# Patient Record
Sex: FEMALE | Race: WHITE | Employment: OTHER | URBAN - METROPOLITAN AREA
[De-identification: names, ages, dates, MRNs, and addresses within clinical notes are randomized per-mention and may not be internally consistent; named-entity substitution may affect disease eponyms.]

---

## 2017-02-17 ENCOUNTER — HOSPITAL ENCOUNTER (OUTPATIENT)
Dept: PHYSICAL THERAPY | Age: 57
Discharge: HOME | End: 2017-02-17
Payer: COMMERCIAL

## 2017-02-17 PROCEDURE — 97110 THERAPEUTIC EXERCISES: CPT

## 2017-02-17 PROCEDURE — 97140 MANUAL THERAPY 1/> REGIONS: CPT

## 2017-02-17 PROCEDURE — 97162 PT EVAL MOD COMPLEX 30 MIN: CPT

## 2017-02-17 NOTE — PROGRESS NOTES
[facility]  [place] [facility] [place]  DANIELA  [address] [facility]  [phone] fax    Plan of Care/ Statement of Necessity for Physical Therapy Services    Patient name: Juana Mcclain Start of Care: 2017   Referral source: Inna Dennis MD : 1960    Medical Diagnosis: Cervicalgia [M54.2]   Onset Date:> 1 year   Treatment Diagnosis: Cervicalgia    Prior Hospitalization: see medical history Provider#: [id number]   Medications: Verified on Patient summary List    Comorbidities: arthritis, depression, tobacco use, COPD, Bipolar, GERD, RLS, osteoporosis    Prior Level of Function: lives with  in a 1 story home with 4-5 steps to enter with B HR, R handed, retired from the [employer], activity tolerance 10-15 minutes with O2     The Plan of Care and following information is based on the information from the initial evaluation. Assessment/ key information: Pt is a 60 yo F who presents with R cervical pain of insidious onset over 1 year ago. Subjective reports of pain aggravated with sitting up and movement and relieved with heat and supine or L sidelying. Pt presents with forward head/rounded shoulders and is TTP spinous processes of C3, C6, T6, and T7 likely with increased stress at curve apexes contributing. She is TTP B UT/MT/levator and severe TTP R scalenes, with decreased R cervical rotation consistent with R scalene hypertonicity. Pt reports previous injury to L shoulder and is TTP over infraspinatus, subscapularis, and teres minor, demonstrating minor UT substitution B with shoulder elevation. Decreased trapezius strength noted B (grossly 4-/5). Findings consistent with DDD and postural pain syndrome. Pt will benefit from skilled PT to address ROM, strength, flexibility, and postural deficits in order to improve ease of daily tasks and return to PLOF.       Evaluation Complexity History HIGH Complexity :3+ comorbidities / personal factors will impact the outcome/ POC ; Examination MEDIUM Complexity : 3 Standardized tests and measures addressing body structure, function, activity limitation and / or participation in recreation  ;Presentation MEDIUM Complexity : Evolving with changing characteristics  ; Clinical Decision Making MEDIUM Complexity : FOTO score of 26-74  Overall Complexity Rating: MEDIUM  Problem List: pain affecting function, decrease ROM, decrease strength, decrease ADL/ functional abilitiies, decrease activity tolerance and decrease flexibility/ joint mobility   Treatment Plan may include any combination of the following: Therapeutic exercise, Therapeutic activities, Neuromuscular re-education, Physical agent/modality, Gait/balance training, Manual therapy, Patient education, Self Care training, Functional mobility training, Home safety training and Stair training  Patient / Family readiness to learn indicated by: trying to perform skills and interest  Persons(s) to be included in education: patient (P) and family support person (FSP);list sister, sister   Barriers to Learning/Limitations: None  Patient Goal (s): get rid of or lessen the pain  Patient Self Reported Health Status: fair  Rehabilitation Potential: good    Short Term Goals: To be accomplished in 1 weeks:  1. Pt will be compliant with initial HEP  Long Term Goals: To be accomplished in 6 weeks:  1. Pt will improve FOTO by 8 points in order to demonstrate functional improvement   2. Pt will improve R cervical rotation AROM to 60 dgrs in order to improve ease of turning head iwht ADLs  3. Pt will improve B UT/MT/LT strength to 4/5 in order to improve ease of upright posture  4. Pt will report <3/10 average pain with daily tasks in order to improve QOL   Frequency / Duration: Patient to be seen 2 times per week for 6 weeks.     Patient/ CarPatient/ Caregiver education and instruction: Diagnosis, prognosis, activity modification and exercises   [x]  Plan of care has been reviewed with TIAN Tanner, PT 2/17/2017 11:13 AM    ________________________________________________________________________    I certify that the above Therapy Services are being furnished while the patient is under my care. I agree with the treatment plan and certify that this therapy is necessary.     Physician's Signature:____________________  Date:____________Time: _________    Please sign and return to [facility]  [address] [facility]  [phone] fax

## 2017-02-17 NOTE — PROGRESS NOTES
PT DAILY TREATMENT NOTE/CERVICAL EVAL3-16    Patient Name: Hanna Rodriges  Date:2017  : 1960  [x]  Patient  Verified  Payor: Hoa Manzano / Plan: [id number] / Product Type: PPO /    In time: 11:11  Out time:12:00  Total Treatment Time (min): 49  Visit #: 1 of 12    Treatment Area: Cervicalgia [M54.2]    SUBJECTIVE  Pain Level (0-10 scale): 4/10  []constant []intermittent []improving []worsening []no change since onset    Any medication changes, allergies to medications, adverse drug reactions, diagnosis change, or new procedure performed?: [x] No    [] Yes (see summary sheet for update)  Subjective functional status/changes:       Cervicalgia with insidious onset >1 year. Worse on the R side.   No radicular sx     Barriers: []pain [x]financial []time []transportation []other  Motivation: high  Substance use: []Alcohol [x]Tobacco []other:   FABQ Score: [x]low []elevate - based on FOTO  Cognition: A & O x 4    Other:    OBJECTIVE/EXAMINATION      28 min [x]Eval                  []Re-Eval       9 min Therapeutic Exercise:  [] See flow sheet : See HEP   Rationale: increase ROM and increase strength to improve the patients ability to improve ease of ADLs    5 min Therapeutic Activity:  []  See flow sheet :   Rationale: Educated patient regarding findings of evaluation, importance of proper posture, heat use 10-15 minutes, self TPR with fingers, purpose of therapy, and therapy plan in order to ensure pt understanding/compliance with therapy     7 min Manual Therapy:  DTM B UT, TPR R UT/MT, STM R scalenes, MFR B UT   Rationale: decrease pain, increase ROM, increase tissue extensibility and decrease trigger points to tolerate daily tasks             With   [] TE   [] TA   [] neuro   [] other: Patient Education: [x] Review HEP    [] Progressed/Changed HEP based on:   [] positioning   [] body mechanics   [] transfers   [] heat/ice application    [] other:      Other Objective/Functional Measures:     /68 taken manually prior to therapy     Physical Therapy Evaluation Cervical Spine     SUBJECTIVE     General Health:  Red Flags Indicated? [] Yes    [] No  [] Yes [x] No Recent weight change (If yes, due to dieting? [] Yes  [] No)  [x] Yes [] No Persistent cough - COPD  [] Yes [x] No Unremitting pain at night  [] Yes [x] No Dizziness  [] Yes [x] No Blurred vision  [x] Yes [] No Hands more cold or painful in cold weather  [] Yes [x] No Ringing in ears  [] Yes [x] No Difficulty swallowing  [] Yes [x] No Dysfunction of bowel or bladder  [] Yes [x] No Recent illness within past 3 weeks (i.e, cold, flu)  [] Yes [x] No Jaw pain    Past History/Treatments:    Diagnostic Tests: none    Headaches: Do you have headaches?  [] Yes   [x] No    OBJECTIVE  Posture: [] WNL  Head Position:  Shoulder/Scapular Position:  C-Kyphosis:  [x] increased   [] decreased - lower cervical   C-Lordosis:   [x] increased   [] decreased - upper cervical   T-Kyphosis:  [x] increased   [] decreased  T-Lordosis:   [] increased   [] decreased     Cervical Retraction: [x] WNL    [] Abnormal:    Shoulder/Scapular Screen: [] WNL    [] Abnormal: increased upward rotation on L, decreased eccentric control B upon lowering     Active Movements: [] N/A   [] Too acute   [] Other:  ROM  AROM (dgrs) % PROM Comments:pain, area   Forward flexion 39     Extension 39     SB right 27     SB left  25     Rotation right 44     Rotation left 67       Palpation:  [] Min  [x] Mod  [] Severe    Location: TTP spinous processes C3/C6/T6/T7, B UT, B MT, B levator, L infraspinatus/subscapularis/teres minor, R subacromial space   [] Min  [] Mod  [x] Severe    Location: hypertonicity and TTP R scalenes     Strength:   MMT Shoulder  Flex 4-/5 B   Abd R 4-/5, L 3+/5  IR 4+/5 B  ER 4+/5 B     MMT Trapezius  UT 3+/5 B  MT L 4-/5, R 4/5  LT L 3+/5, R 4-/5     Muscle Flexibility: [] N/A   Scalenes: [] WNL    [x] Tight    [x] R    [] L   Upper Trap: [] WNL    [x] Tight    [x] R    [x] L   Levator: [] WNL    [x] Tight    [x] R    [x] L   Pect. Minor: [] WNL    [x] Tight    [x] R    [x] L    Other tests/comments:  Pain relieved after manual  No increased pain with HEP        Pain Level (0-10 scale) post treatment: 2/10    ASSESSMENT/Changes in Function: See POC    Patient will continue to benefit from skilled PT services to modify and progress therapeutic interventions, address ROM deficits, address strength deficits, analyze and address soft tissue restrictions, analyze and cue movement patterns, assess and modify postural abnormalities and instruct in home and community integration to attain remaining goals.      [x]  See Plan of Care  []  See progress note/recertification  []  See Discharge Summary         Progress towards goals / Updated goals:  See POC    PLAN  [x]  Upgrade activities as tolerated     []  Continue plan of care  [x]  Update interventions per flow sheet       []  Discharge due to:_  []  Other:_      Victorina Dowd, PT 2/17/2017  11:13 AM

## 2017-02-20 ENCOUNTER — HOSPITAL ENCOUNTER (OUTPATIENT)
Dept: PHYSICAL THERAPY | Age: 57
Discharge: HOME | End: 2017-02-20
Payer: COMMERCIAL

## 2017-02-20 PROCEDURE — 97140 MANUAL THERAPY 1/> REGIONS: CPT

## 2017-02-20 PROCEDURE — 97110 THERAPEUTIC EXERCISES: CPT

## 2017-02-20 NOTE — PROGRESS NOTES
PT DAILY TREATMENT NOTE     Patient Name: Payton Lazo  Date:2017  : 1960  [x]  Patient  Verified  Payor: Venu Javier / Plan: [id number] / Product Type: PPO /    In time:1000  Out time:1042  Total Treatment Time (min): 42  Visit #: 2 of 12    Treatment Area: Cervicalgia [M54.2]    SUBJECTIVE  Pain Level (0-10 scale): 3/10  Any medication changes, allergies to medications, adverse drug reactions, diagnosis change, or new procedure performed?: [x] No    [] Yes (see summary sheet for update)  Subjective functional status/changes:   [] No changes reported  Pt stated that she is doing pretty good and had a good weekend    OBJECTIVE    Modality rationale: decrease pain and increase tissue extensibility to improve the patients ability to increase ease with ADLs   Min Type Additional Details    [] Estim:  []Unatt       []IFC  []Premod                        []Other:  []w/ice   []w/heat  Position:  Location:    [] Estim: []Att    []TENS instruct  []NMES                    []Other:  []w/US   []w/ice   []w/heat  Position:  Location:    []  Traction: [] Cervical       []Lumbar                       [] Prone          []Supine                       []Intermittent   []Continuous Lbs:  [] before manual  [] after manual    []  Ultrasound: []Continuous   [] Pulsed                           []1MHz   []3MHz W/cm2:  Location:    []  Iontophoresis with dexamethasone         Location: [] Take home patch   [] In clinic   10 []  Ice     [x]  heat  []  Ice massage  []  Laser   []  Anodyne Position: seated  Location:B sh    []  Laser with stim  []  Other:  Position:  Location:    []  Vasopneumatic Device Pressure:       [] lo [] med [] hi   Temperature: [] lo [] med [] hi   [x] Skin assessment post-treatment:  [x]intact []redness- no adverse reaction    []redness  adverse reaction:     24 min Therapeutic Exercise:  [x] See flow sheet :   Rationale: increase ROM and increase strength to improve the patients ability to increase ease with ADLs    8 min Manual Therapy:  DTM to B UT and Lev   Rationale: decrease pain, increase ROM and increase tissue extensibility to increase ease with ADLs    With   [x] TE   [] TA   [] neuro   [] other: Patient Education: [x] Review HEP    [] Progressed/Changed HEP based on:   [] positioning   [] body mechanics   [] transfers   [] heat/ice application    [] other:      Other Objective/Functional Measures:   Pt had no complaint of increased pain with exercises  Pt reported that when she performed the doorway stretch last visit it caused ant sh pain. She performed with hands down low and had no increase in pain     Pain Level (0-10 scale) post treatment: 0/10    ASSESSMENT/Changes in Function:   Initiated therex today per flow sheet. Pt was issued HEP today. Pt put forth good effort with all exercises    Patient will continue to benefit from skilled PT services to address functional mobility deficits, address ROM deficits and address strength deficits to attain remaining goals. [x]  See Plan of Care  []  See progress note/recertification  []  See Discharge Summary         Progress towards goals / Updated goals:  Short Term Goals: To be accomplished in 1 weeks:  1. Pt will be compliant with initial HEP  Progressing. Initial HEP was issued today. 2/20/17  Long Term Goals: To be accomplished in 6 weeks:  1. Pt will improve FOTO by 8 points in order to demonstrate functional improvement   2. Pt will improve R cervical rotation AROM to 60 dgrs in order to improve ease of turning head iwht ADLs  3. Pt will improve B UT/MT/LT strength to 4/5 in order to improve ease of upright posture  4.  Pt will report <3/10 average pain with daily tasks in order to improve QOL     PLAN  []  Upgrade activities as tolerated     [x]  Continue plan of care  []  Update interventions per flow sheet       []  Discharge due to:_  []  Other:_      Elza Blue, PTA 2/20/2017  9:55 AM    Future Appointments  Date Time Provider Giovanni Imelda   2/20/2017 10:00 AM Elisha Guajardo PTA [facility]   2/22/2017 7:30 AM Elisha Guajardo PTA [facility]   2/27/2017 8:00 AM Elisha Guajardo PTA [facility]   3/2/2017 9:00 AM Elisha Guajardo PTA [facility]   3/6/2017 9:30 AM Ericka Dewitt PT [facility]   3/9/2017 8:00 AM Elisha Guajardo PTA [facility]

## 2017-02-22 ENCOUNTER — HOSPITAL ENCOUNTER (OUTPATIENT)
Dept: PHYSICAL THERAPY | Age: 57
Discharge: HOME | End: 2017-02-22
Payer: COMMERCIAL

## 2017-02-22 PROCEDURE — 97110 THERAPEUTIC EXERCISES: CPT

## 2017-02-22 PROCEDURE — 97140 MANUAL THERAPY 1/> REGIONS: CPT

## 2017-02-22 NOTE — PROGRESS NOTES
PT DAILY TREATMENT NOTE     Patient Name: Nixon Serrano  Date:2017  : 1960  [x]  Patient  Verified  Payor: BLUE CROSS / Plan: [id number] / Product Type: PPO /    In time:730  Out time:803  Total Treatment Time (min): 33  Visit #: 3 of 12    Treatment Area: Cervicalgia [M54.2]    SUBJECTIVE  Pain Level (0-10 scale): 3/10  Any medication changes, allergies to medications, adverse drug reactions, diagnosis change, or new procedure performed?: [x] No    [] Yes (see summary sheet for update)  Subjective functional status/changes:   [] No changes reported  Pt woke with some pain today. Feels as though she was laying wrong    OBJECTIVE    25 min Therapeutic Exercise:  [x] See flow sheet :   Rationale: increase ROM and increase strength to improve the patients ability to increase ease with ADLs    8 min Manual Therapy:  DTM to R UT and Lev   Rationale: decrease pain, increase ROM and increase tissue extensibility to increase ease with ADLs    With   [x] TE   [] TA   [] neuro   [] other: Patient Education: [x] Review HEP    [] Progressed/Changed HEP based on:   [] positioning   [] body mechanics   [] transfers   [] heat/ice application    [] other:      Other Objective/Functional Measures:   Pt had some tightness in L UT and Lev, but minimal on the R  Pt had no complaint of increased pain with exercises  Pt cont with forward shoulders and head     Pain Level (0-10 scale) post treatment: 2/10    ASSESSMENT/Changes in Function:   Pt cont to slowly progress toward goals. Pt cont with decreased range of cervical motion. Cont with 3/10 pain, which increases with increased activity. Cont with decreased strength in UT/MT/LT. Patient will continue to benefit from skilled PT services to modify and progress therapeutic interventions, address functional mobility deficits, address ROM deficits and address strength deficits to attain remaining goals.      [x]  See Plan of Care  []  See progress note/recertification  []  See Discharge Summary         Progress towards goals / Updated goals:  Short Term Goals: To be accomplished in 1 weeks:  1. Pt will be compliant with initial HEP  Progressing. Initial HEP was issued today. 2/20/17  Long Term Goals: To be accomplished in 6 weeks:  1. Pt will improve FOTO by 8 points in order to demonstrate functional improvement   2. Pt will improve R cervical rotation AROM to 60 dgrs in order to improve ease of turning head iwht ADLs  3. Pt will improve B UT/MT/LT strength to 4/5 in order to improve ease of upright posture  4.  Pt will report <3/10 average pain with daily tasks in order to improve QOL     PLAN  []  Upgrade activities as tolerated     [x]  Continue plan of care  []  Update interventions per flow sheet       []  Discharge due to:_  []  Other:_      Kimmy Childs PTA 2/22/2017  7:34 AM    Future Appointments  Date Time Provider Giovanni Segura   2/27/2017 8:00 AM Kimmy Childs PTA [facility]   3/2/2017 9:00 AM Kimmy Childs PTA [facility]   3/6/2017 9:30 AM Catalina Quarles PT [facility]   3/9/2017 8:00 AM Kimmy Childs PTA [facility]

## 2017-02-27 ENCOUNTER — HOSPITAL ENCOUNTER (OUTPATIENT)
Dept: PHYSICAL THERAPY | Age: 57
Discharge: HOME | End: 2017-02-27
Payer: COMMERCIAL

## 2017-02-27 PROCEDURE — 97110 THERAPEUTIC EXERCISES: CPT

## 2017-02-27 PROCEDURE — 97140 MANUAL THERAPY 1/> REGIONS: CPT

## 2017-02-27 NOTE — PROGRESS NOTES
PT DAILY TREATMENT NOTE     Patient Name: Catalina Luther  Date:2017  : 1960  [x]  Patient  Verified  Payor: Reggie May / Plan: [id number] / Product Type: PPO /    In time:800  Out time:834  Total Treatment Time (min): 34  Visit #: 4 of 12    Treatment Area: Cervicalgia [M54.2]    SUBJECTIVE  Pain Level (0-10 scale): 3/10  Any medication changes, allergies to medications, adverse drug reactions, diagnosis change, or new procedure performed?: [x] No    [] Yes (see summary sheet for update)  Subjective functional status/changes:   [] No changes reported  Pt stated that she is doing ok today    OBJECTIVE    26 min Therapeutic Exercise:  [x] See flow sheet :   Rationale: increase ROM and increase strength to improve the patients ability to increase ease with ADLs    8 min Manual Therapy:  DTM to B UT and Lev   Rationale: decrease pain, increase ROM and increase tissue extensibility to increase ease with ADLs    With   [x] TE   [] TA   [] neuro   [] other: Patient Education: [x] Review HEP    [] Progressed/Changed HEP based on:   [] positioning   [] body mechanics   [] transfers   [] heat/ice application    [] other:      Other Objective/Functional Measures:   Pt cont to need vc's for preventing utilization on UT with exercises  Pt had no complaint of increased pain with exercises  Pt had moderate tightness in L UT today which responded fairly well with manual therapy  Pt declined MH today     Pain Level (0-10 scale) post treatment: 1-2/10    ASSESSMENT/Changes in Function:   Pt is making limited progress toward goals. Pt cont to utilize UT muscles with most activities which increases her pain and stiffness. Pt cont with decreased strength in cervical musculature.  Cont with decreased cervical range of motion    Patient will continue to benefit from skilled PT services to modify and progress therapeutic interventions, address functional mobility deficits, address ROM deficits and address strength deficits to attain remaining goals. [x]  See Plan of Care  []  See progress note/recertification  []  See Discharge Summary         Progress towards goals / Updated goals:  Short Term Goals: To be accomplished in 1 weeks:  1. Pt will be compliant with initial HEP  Progressing. Initial HEP was issued today. 2/20/17  Long Term Goals: To be accomplished in 6 weeks:  1. Pt will improve FOTO by 8 points in order to demonstrate functional improvement   2. Pt will improve R cervical rotation AROM to 60 dgrs in order to improve ease of turning head iwht ADLs  3. Pt will improve B UT/MT/LT strength to 4/5 in order to improve ease of upright posture  4.  Pt will report <3/10 average pain with daily tasks in order to improve QOL     PLAN  []  Upgrade activities as tolerated     [x]  Continue plan of care  []  Update interventions per flow sheet       []  Discharge due to:_  []  Other:_      Kimmy Childs PTA 2/27/2017  8:09 AM    Future Appointments  Date Time Provider Giovanni Segura   3/2/2017 9:00 AM Kimmy Childs PTA [facility]   3/6/2017 9:30 AM Catalina Quarles PT [facility]   3/9/2017 8:00 AM Kimmy Childs PTA [facility]

## 2017-03-02 ENCOUNTER — APPOINTMENT (OUTPATIENT)
Dept: PHYSICAL THERAPY | Age: 57
End: 2017-03-02

## 2017-03-06 ENCOUNTER — APPOINTMENT (OUTPATIENT)
Dept: PHYSICAL THERAPY | Age: 57
End: 2017-03-06

## 2017-03-09 ENCOUNTER — HOSPITAL ENCOUNTER (OUTPATIENT)
Dept: PHYSICAL THERAPY | Age: 57
End: 2017-03-09

## 2017-04-25 DIAGNOSIS — D35.2 PITUITARY ADENOMA (H): Primary | ICD-10-CM

## 2017-05-02 RX ORDER — GLUCOSAMINE HCL 500 MG
TABLET ORAL EVERY OTHER DAY
COMMUNITY

## 2017-05-02 RX ORDER — DOCUSATE SODIUM 100 MG/1
100 CAPSULE, LIQUID FILLED ORAL 2 TIMES DAILY
COMMUNITY
End: 2019-09-30

## 2017-05-02 RX ORDER — OXYCODONE HYDROCHLORIDE 15 MG/1
15 TABLET ORAL 4 TIMES DAILY
COMMUNITY
End: 2019-09-30

## 2017-05-02 RX ORDER — MEGESTROL ACETATE 40 MG/1
40 TABLET ORAL DAILY
COMMUNITY

## 2017-05-02 RX ORDER — DICLOFENAC SODIUM 10 MG/G
GEL TOPICAL
COMMUNITY

## 2017-05-04 NOTE — H&P
[address]   Eugenie Gomez  Phone: [phone]  Fax: [phone]           Patient: Thanh Hutchison   : 1960   Date of Encounter: 2017 09:30 AM   I had the pleasure of seeing Thanh Hutchison in my office on the above date. The following is a description of that office visit. History of Present Illness    The patient is a 61year old female who presents for a Recheck of Wrist Problem. The patient describes having sharp pain and other (throbbing). The problem is described as being located in the left wrist and volar wrist (recheck for possible left thumb basilar joint arthroplasty revision on May 9, 2017. H&P done by PCP). The onset of the wrist problem has been sudden following a specific incident (after a fall- slipped in the snow). The symptom has been occuring for 4 months (2017). The symptom occurs intermittently (with use). The course has been unchanged. The wrist problem is described as moderate. The wrist problem is aggravated by flexion of the wrist and extension of the wrist. The wrist problem was preceeded by trauma. The patient's dominant hand is their right. I have reviewed the patient's reason for visit, review of systems, and past medical and social history as documented by my staff. Pertinent items were discussed with the patient.       History   Allergy   Levaquin *FLUOROQUINOLONES* []: Hives    Cipro *FLUOROQUINOLONES* []: Hives    Wellbutrin SR *ANTIDEPRESSANTS* []: Hives    NexIUM *ULCER DRUGS* []: Hives    Haldol *ANTIPSYCHOTICS/ANTIMANIC AGENTS* []: Hives       Problem List/Past Medical   Asthma   Esophageal Reflux   Depression   Headache   Emphysema Of Lung   Arthritis   Anxiety   Restless Leg Syndrome   Bipolar Disorder   Blood Clot   COPD   Osteoarthrosis, generalized, hand   Osteoarthritis of carpometacarpal (CMC) joint of left thumb   Arthritis of wrist, left   Osteoarthritis, hand, primary localized   Contracture of finger joint Joint pain   Wrist pain   Pain, hand joint, left   Ganglion, joint   Lateral epicondylitis   Tendonitis of wrist, left   Hand pain, left   Contusion of left wrist   Contusion of hand, left   Finger laceration   Scaphoid fracture of wrist   Closed displaced fracture of distal pole of scaphoid bone of left wrist, initial encounter   DRUJ (distal radioulnar joint) sprain   Skin abrasion   Wound infection, posttraumatic   Wound dehiscence   Accidental fall on or from other stairs or steps   Fall at home   Accidental fall, sequela      Past Surgical   3 major surgeries on right knee:   tonsillectomy:   Knee: Right; 12/10   Removed right knee replacement : June 28, 2011   Left basilar joint arthroplasty with trapeziectomy and abductor pollicis longus tendon transfer and interposition. Left wrist ganglionectomy on September 18, 2012:   Lesions removed from vocal cord: Date: 4/10/2012;   Left knee surgery: 2 in 2004   Cyst on neck:   Left basilar joint revision: Date: 12/27/2012;   Deviated septum:   Right basilar joint arthroplasty: Date: 10/31/2013;   Right thumb MCP joint release with capsulotomy: Date: 6/17/2014;   Right basilar joint revision: Date: 12/5/2013; Throat cyst removed: 2003   Joint replacement: Right knee- 9/14/06   Diagnostic Studies   CT Scan: Date: 11/23/2015, Results: ; Left Wrist: Postsurgical and erosive changes of the wrist most prominently involving the base of the first metacarpal. Small bony fragments around the base of the first metacarpal, favor postsurgical change over acute fracture, please correlate clinically. Otherwise, no definite evidence of acute fracture. Hand X-ray: December 28, 2007: Three views of the right hand: ulnar minus variance   Social   Tobacco Use: Smokes <1 packs of cigarettes per day for 40+ years   Non Drinker/No Alcohol Use:   Medications   Latuda (60MG Tablet, Oral daily) Active. Megestrol Acetate ( Oral once every 3 days) Specific dose unknown - Active. Probiotic ( Oral daily) Specific dose unknown - Active. Voltaren (1% Gel, Transdermal) Active. Spiriva HandiHaler (18MCG Capsule, Inhalation) Active. OxyCODONE HCl (15MG Tablet, Oral every 6 hrs) Active. Vitamin D3 (6000 mg Oral daily) Specific dose unknown - Active. Mobic (15MG Tablet, Oral once a day) Active. Dexilant (60MG Capsule DR, Oral daily) Active. Morphine Sulfate CR (60MG Tablet ER, 30 mg Oral three times daily) Active. Albuterol (90MCG/ACT Aerosol Soln, Inhalation prn) Active. LamoTRIgine (100MG Tablet, Oral two pills two times a day) Active. Ativan (1MG Tablet, Oral -one--three times a day) Active. Multivitamin ( Oral one a day) Active. Family   Family history unknown:;     Review of Systems    General Not Present- Chills and Fever. Skin Not Present- Bruising, Pallor and Skin Color Changes. Respiratory Not Present- Cough and Difficulty Breathing. Cardiovascular Not Present- Chest Pain and Fainting / Blacking Out. Musculoskeletal Present- Decreased Range of Motion and Joint Pain. Not Present- Joint Swelling. Neurological Not Present- Dysesthesia, Paresthesias and Weakness In Extremities. Hematology Not Present- Abnormal Bleeding and Petechiae. Physical Exam       General  Mental Status - Alert. General Appearance - Cooperative, Well groomed, Not in acute distress. Orientation - Oriented X3. Build & Nutrition - Well nourished. Musculoskeletal  Upper Extremity    Hand/Wrist: Hand - Evaluation of related systems reveals - no digital clubbing or cyanosis and neurovascularly intact bilaterally. Inspection and Palpation - Crepitus - no crepitus (L). Sensation is - normal, (L). Instability - Left - no instability or laxity. Hand - Deformities/Malalignments/Discrepancies - no deformities, malalignments, or discrepancies. Phalanges:   Left:   Thumb: Inspection and Palpation - Tenderness - moderate and over the basilar joint. Swelling - boggy.  Thumb - Deformities/Malalignments/Discrepancies - \"Z\" deformity of the thumb. Functional Testing - Flexor Pollicis Longus is intact. Index Finger - Functional Testing - Flexor Digitorum superficialis is intact and Flexor Digitorum Profundus is intact. Long Finger - Functional Testing - Flexor Digitorum Superficialis is intact and Flexor Digitorum Profundus is intact. Ring Finger - Functional Testing - Flexor Digitorum Superficialis is intact and Flexor Digitorum Profundus is intact. Small Finger - Functional Testing - Flexor Digitorum Superficialis is intact and Flexor Digitorum Profundus is intact. Assessment & Plan (Adriane Hernandez MD; 23/92/553550:87 PM)    Osteoarthritis of carpometacarpal [place]) joint of left thumb (715.94  M18.12)   Today's' Impression: She has received a preoperative clearance. She has evidence of significant collapse of the left basal joint. Our plan is to proceed with flexor carpi radialis tendon interpositional arthroplasty. Further we will pin the base of the thumb. Further a tight rope will be used to stabilize the thumb. We discussed this with her. We discussed other options. We will proceed with surgery. She understands return of pain and failure of procedure. Currently she takes pain medicine. The plan will be to prescribe 5-10 mg of Percocet every four hours for the next 72 hours after the surgery. Then she will resume her usual pain medication course. Current Plans:   List of current medications documented by the Provider () ; Routine ()   The procedure was discussed with the patient and a written consent was obtained and questions were answered. Risks of the procedure were discussed and include but are not limited to infection, bleeding, nerve, vascular injury as well as the need for future procedures. It was also discussed the importance of compliance with the treatment directions and participation in the care of the treated extremity.  Patient wishes to proceed with the planned LEFT basilar joint arthroplasty revision   General Patient Education          Voice recognition software may have been used to generate this report, which may have resulted in some phonetic based errors in grammar and contents. Even though attempts were made to correct all the mistakes, some may have been missed, and remain in the body of the document.            Isiah Heimlich MD

## 2017-05-08 ENCOUNTER — ANESTHESIA EVENT (OUTPATIENT)
Dept: SURGERY | Age: 57
End: 2017-05-08
Payer: COMMERCIAL

## 2017-05-08 ENCOUNTER — TRANSFERRED RECORDS (OUTPATIENT)
Dept: HEALTH INFORMATION MANAGEMENT | Facility: CLINIC | Age: 57
End: 2017-05-08

## 2017-05-09 ENCOUNTER — APPOINTMENT (OUTPATIENT)
Dept: GENERAL RADIOLOGY | Age: 57
End: 2017-05-09
Attending: ORTHOPAEDIC SURGERY
Payer: COMMERCIAL

## 2017-05-09 ENCOUNTER — HOSPITAL ENCOUNTER (OUTPATIENT)
Age: 57
Setting detail: OUTPATIENT SURGERY
Discharge: HOME | End: 2017-05-09
Attending: ORTHOPAEDIC SURGERY | Admitting: ORTHOPAEDIC SURGERY
Payer: COMMERCIAL

## 2017-05-09 ENCOUNTER — ANESTHESIA (OUTPATIENT)
Dept: SURGERY | Age: 57
End: 2017-05-09
Payer: COMMERCIAL

## 2017-05-09 VITALS
OXYGEN SATURATION: 96 % | TEMPERATURE: 97.5 F | DIASTOLIC BLOOD PRESSURE: 64 MMHG | SYSTOLIC BLOOD PRESSURE: 102 MMHG | WEIGHT: 94.31 LBS | BODY MASS INDEX: 17.36 KG/M2 | RESPIRATION RATE: 16 BRPM | HEART RATE: 98 BPM | HEIGHT: 62 IN

## 2017-05-09 PROBLEM — M18.12 ARTHRITIS OF CARPOMETACARPAL (CMC) JOINT OF LEFT THUMB: Chronic | Status: ACTIVE | Noted: 2017-05-09

## 2017-05-09 PROCEDURE — 76060000036 HC ANESTHESIA 2.5 TO 3 HR: Performed by: ORTHOPAEDIC SURGERY

## 2017-05-09 PROCEDURE — 74011250636 HC RX REV CODE- 250/636

## 2017-05-09 PROCEDURE — 77030002986 HC SUT PROL J&J -A: Performed by: ORTHOPAEDIC SURGERY

## 2017-05-09 PROCEDURE — 74011250637 HC RX REV CODE- 250/637: Performed by: ANESTHESIOLOGY

## 2017-05-09 PROCEDURE — 77030002933 HC SUT MCRYL J&J -A: Performed by: ORTHOPAEDIC SURGERY

## 2017-05-09 PROCEDURE — 76210000017 HC OR PH I REC 1.5 TO 2 HR: Performed by: ORTHOPAEDIC SURGERY

## 2017-05-09 PROCEDURE — 77030000032 HC CUF TRNQT ZIMM -B: Performed by: ORTHOPAEDIC SURGERY

## 2017-05-09 PROCEDURE — C1713 ANCHOR/SCREW BN/BN,TIS/BN: HCPCS | Performed by: ORTHOPAEDIC SURGERY

## 2017-05-09 PROCEDURE — 76010000132 HC OR TIME 2.5 TO 3 HR: Performed by: ORTHOPAEDIC SURGERY

## 2017-05-09 PROCEDURE — 74011250636 HC RX REV CODE- 250/636: Performed by: NURSE ANESTHETIST, CERTIFIED REGISTERED

## 2017-05-09 PROCEDURE — 74011000250 HC RX REV CODE- 250

## 2017-05-09 PROCEDURE — 77030016665 HC BUR RND2 STRY -B: Performed by: ORTHOPAEDIC SURGERY

## 2017-05-09 PROCEDURE — 77030018074 HC RTVR SUT ARTH4 S&N -B: Performed by: ORTHOPAEDIC SURGERY

## 2017-05-09 PROCEDURE — 77030018836 HC SOL IRR NACL ICUM -A: Performed by: ORTHOPAEDIC SURGERY

## 2017-05-09 PROCEDURE — 77030006689 HC BLD OPHTH BVR BD -A: Performed by: ORTHOPAEDIC SURGERY

## 2017-05-09 PROCEDURE — 74011000250 HC RX REV CODE- 250: Performed by: NURSE ANESTHETIST, CERTIFIED REGISTERED

## 2017-05-09 PROCEDURE — 77030002922 HC SUT FBRWRE ARTH -B: Performed by: ORTHOPAEDIC SURGERY

## 2017-05-09 PROCEDURE — 77030032490 HC SLV COMPR SCD KNE COVD -B: Performed by: ORTHOPAEDIC SURGERY

## 2017-05-09 PROCEDURE — 74011250636 HC RX REV CODE- 250/636: Performed by: PHYSICIAN ASSISTANT

## 2017-05-09 PROCEDURE — 74011000250 HC RX REV CODE- 250: Performed by: ORTHOPAEDIC SURGERY

## 2017-05-09 PROCEDURE — 74011000272 HC RX REV CODE- 272: Performed by: ORTHOPAEDIC SURGERY

## 2017-05-09 PROCEDURE — 76210000022 HC REC RM PH II 1.5 TO 2 HR: Performed by: ORTHOPAEDIC SURGERY

## 2017-05-09 DEVICE — KIT ARTHSCP INCLUDE 1.1MM MINI TIGHTROPE ANCHR S STL 2.6MM: Type: IMPLANTABLE DEVICE | Site: WRIST | Status: FUNCTIONAL

## 2017-05-09 DEVICE — ANCHOR SUT K WIRE DIA2.2MM MIC W/ 2-0 FIBERWIRE AND 2 NDL: Type: IMPLANTABLE DEVICE | Site: WRIST | Status: FUNCTIONAL

## 2017-05-09 RX ORDER — SODIUM CHLORIDE 0.9 % (FLUSH) 0.9 %
5-10 SYRINGE (ML) INJECTION EVERY 8 HOURS
Status: DISCONTINUED | OUTPATIENT
Start: 2017-05-09 | End: 2017-05-09 | Stop reason: HOSPADM

## 2017-05-09 RX ORDER — FENTANYL CITRATE 50 UG/ML
25 INJECTION, SOLUTION INTRAMUSCULAR; INTRAVENOUS AS NEEDED
Status: DISCONTINUED | OUTPATIENT
Start: 2017-05-09 | End: 2017-05-09 | Stop reason: HOSPADM

## 2017-05-09 RX ORDER — HYDROCODONE BITARTRATE AND ACETAMINOPHEN 5; 325 MG/1; MG/1
1 TABLET ORAL ONCE
Status: DISCONTINUED | OUTPATIENT
Start: 2017-05-09 | End: 2017-05-09 | Stop reason: HOSPADM

## 2017-05-09 RX ORDER — OXYCODONE AND ACETAMINOPHEN 10; 325 MG/1; MG/1
1-2 TABLET ORAL
Qty: 30 TAB | Refills: 0 | Status: SHIPPED | OUTPATIENT
Start: 2017-05-09 | End: 2017-10-02

## 2017-05-09 RX ORDER — OXYCODONE AND ACETAMINOPHEN 5; 325 MG/1; MG/1
1 TABLET ORAL ONCE
Status: COMPLETED | OUTPATIENT
Start: 2017-05-09 | End: 2017-05-09

## 2017-05-09 RX ORDER — SODIUM CHLORIDE, SODIUM LACTATE, POTASSIUM CHLORIDE, CALCIUM CHLORIDE 600; 310; 30; 20 MG/100ML; MG/100ML; MG/100ML; MG/100ML
75 INJECTION, SOLUTION INTRAVENOUS CONTINUOUS
Status: DISCONTINUED | OUTPATIENT
Start: 2017-05-09 | End: 2017-05-09 | Stop reason: HOSPADM

## 2017-05-09 RX ORDER — ALBUTEROL SULFATE 0.83 MG/ML
SOLUTION RESPIRATORY (INHALATION)
Status: COMPLETED
Start: 2017-05-09 | End: 2017-05-09

## 2017-05-09 RX ORDER — PROPOFOL 10 MG/ML
INJECTION, EMULSION INTRAVENOUS
Status: DISCONTINUED | OUTPATIENT
Start: 2017-05-09 | End: 2017-05-09 | Stop reason: HOSPADM

## 2017-05-09 RX ORDER — LIDOCAINE HYDROCHLORIDE 10 MG/ML
0.1 INJECTION, SOLUTION EPIDURAL; INFILTRATION; INTRACAUDAL; PERINEURAL AS NEEDED
Status: DISCONTINUED | OUTPATIENT
Start: 2017-05-09 | End: 2017-05-09 | Stop reason: HOSPADM

## 2017-05-09 RX ORDER — IPRATROPIUM BROMIDE AND ALBUTEROL SULFATE 2.5; .5 MG/3ML; MG/3ML
3 SOLUTION RESPIRATORY (INHALATION)
Status: COMPLETED | OUTPATIENT
Start: 2017-05-09 | End: 2017-05-09

## 2017-05-09 RX ORDER — CEPHALEXIN 500 MG/1
500 CAPSULE ORAL 3 TIMES DAILY
Qty: 21 CAP | Refills: 0 | Status: SHIPPED | OUTPATIENT
Start: 2017-05-09 | End: 2017-10-02

## 2017-05-09 RX ORDER — DIPHENHYDRAMINE HYDROCHLORIDE 50 MG/ML
25 INJECTION, SOLUTION INTRAMUSCULAR; INTRAVENOUS
Status: DISCONTINUED | OUTPATIENT
Start: 2017-05-09 | End: 2017-05-09 | Stop reason: HOSPADM

## 2017-05-09 RX ORDER — ONDANSETRON 2 MG/ML
INJECTION INTRAMUSCULAR; INTRAVENOUS AS NEEDED
Status: DISCONTINUED | OUTPATIENT
Start: 2017-05-09 | End: 2017-05-09 | Stop reason: HOSPADM

## 2017-05-09 RX ORDER — MIDAZOLAM HYDROCHLORIDE 1 MG/ML
INJECTION, SOLUTION INTRAMUSCULAR; INTRAVENOUS AS NEEDED
Status: DISCONTINUED | OUTPATIENT
Start: 2017-05-09 | End: 2017-05-09 | Stop reason: HOSPADM

## 2017-05-09 RX ORDER — CEFAZOLIN SODIUM 2 G/50ML
2 SOLUTION INTRAVENOUS ONCE
Status: COMPLETED | OUTPATIENT
Start: 2017-05-09 | End: 2017-05-09

## 2017-05-09 RX ORDER — IPRATROPIUM BROMIDE AND ALBUTEROL SULFATE 2.5; .5 MG/3ML; MG/3ML
3 SOLUTION RESPIRATORY (INHALATION)
Status: DISCONTINUED | OUTPATIENT
Start: 2017-05-09 | End: 2017-05-09 | Stop reason: HOSPADM

## 2017-05-09 RX ORDER — SODIUM CHLORIDE 0.9 % (FLUSH) 0.9 %
5-10 SYRINGE (ML) INJECTION AS NEEDED
Status: DISCONTINUED | OUTPATIENT
Start: 2017-05-09 | End: 2017-05-09 | Stop reason: HOSPADM

## 2017-05-09 RX ORDER — HYDROCORTISONE SODIUM SUCCINATE 100 MG/2ML
INJECTION, POWDER, FOR SOLUTION INTRAMUSCULAR; INTRAVENOUS AS NEEDED
Status: DISCONTINUED | OUTPATIENT
Start: 2017-05-09 | End: 2017-05-09 | Stop reason: HOSPADM

## 2017-05-09 RX ORDER — FENTANYL CITRATE 50 UG/ML
INJECTION, SOLUTION INTRAMUSCULAR; INTRAVENOUS AS NEEDED
Status: DISCONTINUED | OUTPATIENT
Start: 2017-05-09 | End: 2017-05-09 | Stop reason: HOSPADM

## 2017-05-09 RX ORDER — PROPOFOL 10 MG/ML
INJECTION, EMULSION INTRAVENOUS AS NEEDED
Status: DISCONTINUED | OUTPATIENT
Start: 2017-05-09 | End: 2017-05-09 | Stop reason: HOSPADM

## 2017-05-09 RX ORDER — LIDOCAINE HYDROCHLORIDE 20 MG/ML
INJECTION, SOLUTION EPIDURAL; INFILTRATION; INTRACAUDAL; PERINEURAL AS NEEDED
Status: DISCONTINUED | OUTPATIENT
Start: 2017-05-09 | End: 2017-05-09 | Stop reason: HOSPADM

## 2017-05-09 RX ADMIN — HYDROCORTISONE SODIUM SUCCINATE 100 MG: 100 INJECTION, POWDER, FOR SOLUTION INTRAMUSCULAR; INTRAVENOUS at 07:52

## 2017-05-09 RX ADMIN — SODIUM CHLORIDE, SODIUM LACTATE, POTASSIUM CHLORIDE, AND CALCIUM CHLORIDE: 600; 310; 30; 20 INJECTION, SOLUTION INTRAVENOUS at 07:30

## 2017-05-09 RX ADMIN — OXYCODONE HYDROCHLORIDE AND ACETAMINOPHEN 1 TABLET: 5; 325 TABLET ORAL at 12:19

## 2017-05-09 RX ADMIN — MIDAZOLAM HYDROCHLORIDE 1 MG: 1 INJECTION, SOLUTION INTRAMUSCULAR; INTRAVENOUS at 08:00

## 2017-05-09 RX ADMIN — PROPOFOL 100 MCG/KG/MIN: 10 INJECTION, EMULSION INTRAVENOUS at 07:51

## 2017-05-09 RX ADMIN — FENTANYL CITRATE 50 MCG: 50 INJECTION, SOLUTION INTRAMUSCULAR; INTRAVENOUS at 07:41

## 2017-05-09 RX ADMIN — FENTANYL CITRATE 25 MCG: 50 INJECTION, SOLUTION INTRAMUSCULAR; INTRAVENOUS at 10:40

## 2017-05-09 RX ADMIN — ONDANSETRON 4 MG: 2 INJECTION INTRAMUSCULAR; INTRAVENOUS at 08:15

## 2017-05-09 RX ADMIN — SODIUM CHLORIDE, SODIUM LACTATE, POTASSIUM CHLORIDE, AND CALCIUM CHLORIDE 75 ML/HR: 600; 310; 30; 20 INJECTION, SOLUTION INTRAVENOUS at 07:41

## 2017-05-09 RX ADMIN — IPRATROPIUM BROMIDE AND ALBUTEROL SULFATE 3 ML: .5; 2.5 SOLUTION RESPIRATORY (INHALATION) at 10:35

## 2017-05-09 RX ADMIN — MIDAZOLAM HYDROCHLORIDE 1 MG: 1 INJECTION, SOLUTION INTRAMUSCULAR; INTRAVENOUS at 07:41

## 2017-05-09 RX ADMIN — CEFAZOLIN SODIUM 2 G: 2 SOLUTION INTRAVENOUS at 07:44

## 2017-05-09 RX ADMIN — FENTANYL CITRATE 50 MCG: 50 INJECTION, SOLUTION INTRAMUSCULAR; INTRAVENOUS at 08:00

## 2017-05-09 RX ADMIN — PROPOFOL 20 MG: 10 INJECTION, EMULSION INTRAVENOUS at 08:57

## 2017-05-09 RX ADMIN — ALBUTEROL SULFATE 2.5 MG: 2.5 SOLUTION RESPIRATORY (INHALATION) at 07:46

## 2017-05-09 RX ADMIN — FENTANYL CITRATE 25 MCG: 50 INJECTION, SOLUTION INTRAMUSCULAR; INTRAVENOUS at 10:48

## 2017-05-09 RX ADMIN — LIDOCAINE HYDROCHLORIDE 60 MG: 20 INJECTION, SOLUTION EPIDURAL; INFILTRATION; INTRACAUDAL; PERINEURAL at 07:47

## 2017-05-09 NOTE — DISCHARGE INSTRUCTIONS
Cash Ferreira PA-C   Upper Extremity Surgery   Discharge Instructions   Please take the time to review the following instructions before you leave the hospital and use them as guidelines during your recovery from surgery. If you have any questions you may contact my office at [phone]. Wound Care/Dressing Changes:   Dont remove your dressing or get them wet. It isnt necessary to apply antibiotic ointment to your incisions. Sutures will be removed at your one week post-op visit. Staples (if you have them) are removed in two weeks. If you have steri-strips over your incision they will start to peel off in 7-10 days as you get them wet. They dont need to be removed prior to that. When they begin to peel off, you may remove them. They should all be removed by 14 days from your surgery. Showering/Bathing: You may shower after your surgery. Your dressing may NOT be removed for showering. Do not take a bath or get into a swimming pool or Jacuzzi until the incisions are completely healed. This may take about 14 days. Do not soak your incision under water. Sling: You are not required to wear your sling and should do so only as needed for comfort. You have no restrictions with regards to the movement of your shoulder. Please push to achieve full range of motion as soon as possible. Please follow motion instructions given at the time of surgery. Ice/Elevation   Continue ice consistently for 24 hours after surgery. After 48 hours, you should ice your hand 3 times per day, for 20 minutes at a time for the next 5 days. After one week from surgery, you may use ice as needed for pain and swelling. Elevate the extremity higher than your heart for the next 24 - 48 hours. If you get throbbing this is telling you to elevate the extremity. Diet:   You may advance to your regular diet as tolerated. Medication:   1.  You will be given a prescription for pain medication when you are discharged from the hospital. Take the medication as needed according to the directions on the prescription bottle. Possible side effects of the medication include dizziness, headache, nausea, vomiting, constipation and urinary retention. If you experience any of these side effects call the office so that we can assist you in relieving them. Discontinue the use of the pain medication if you develop itching, rash, shortness of breath or difficulties swallowing. If these symptoms become severe or arent relieved by discontinuing the medication you should seek immediate medical attention. Refills of pain medication are authorized during office hours only. (7 AM-3PM Mon. thru Fri.)    2. If you were prescribed Percocet/oxycodone you must have a written prescription. These medications legally cannot be called in to the pharmacy. 3. You may take over the counter Ibuprofen/Advil/Aleve between dosages of your pain medication if needed. Do not take Tylenol in addition to your pain medication as most of the pain medication already contains Tylenol. Do not exceed 3000mg of Tylenol per day. Ex: (hydrocodone 5/325g= 325mg of Tylenol)  4. You may resume the medication you were taking prior to surgery. Pain medication may change the effects of any antidepressant medication you are taking. If you have any questions about possible interactions between your regular medications and the pain medication you should consult the physician who prescribes your regular medications. Follow-up:   Check the letter for Follow-up appointment or call [phone] for questions.      DISCHARGE SUMMARY from Nurse    The following personal items are in your possession at time of discharge:    Dental Appliances: None  Visual Aid: Glasses     Home Medications: None  Jewelry: None  Clothing: Shirt, Socks, Sweater, Undergarments, Footwear, Pants  Other Valuables: None             PATIENT INSTRUCTIONS:    After general anesthesia or intravenous sedation, for 24 hours or while taking prescription Narcotics:  · Limit your activities  · Do not drive and operate hazardous machinery  · Do not make important personal or business decisions  · Do  not drink alcoholic beverages  · If you have not urinated within 8 hours after discharge, please contact your surgeon on call. Report the following to your surgeon:  · Excessive pain, swelling, redness or odor of or around the surgical area  · Temperature over 100.5  · Nausea and vomiting lasting longer than 4 hours or if unable to take medications  · Any signs of decreased circulation or nerve impairment to extremity: change in color, persistent  numbness, tingling, coldness or increase pain  · Any questions        What to do at Home:  These are general instructions for a healthy lifestyle:    No smoking/ No tobacco products/ Avoid exposure to second hand smoke    Surgeon General's Warning:  Quitting smoking now greatly reduces serious risk to your health. Obesity, smoking, and sedentary lifestyle greatly increases your risk for illness    A healthy diet, regular physical exercise & weight monitoring are important for maintaining a healthy lifestyle    You may be retaining fluid if you have a history of heart failure or if you experience any of the following symptoms:  Weight gain of 3 pounds or more overnight or 5 pounds in a week, increased swelling in our hands or feet or shortness of breath while lying flat in bed. Please call your doctor as soon as you notice any of these symptoms; do not wait until your next office visit. Recognize signs and symptoms of STROKE:    F-face looks uneven    A-arms unable to move or move unevenly    S-speech slurred or non-existent    T-time-call 911 as soon as signs and symptoms begin-DO NOT go       Back to bed or wait to see if you get better-TIME IS BRAIN.     Warning Signs of HEART ATTACK     Call 911 if you have these symptoms:   Chest discomfort. Most heart attacks involve discomfort in the center of the chest that lasts more than a few minutes, or that goes away and comes back. It can feel like uncomfortable pressure, squeezing, fullness, or pain.  Discomfort in other areas of the upper body. Symptoms can include pain or discomfort in one or both arms, the back, neck, jaw, or stomach.  Shortness of breath with or without chest discomfort.  Other signs may include breaking out in a cold sweat, nausea, or lightheadedness. Don't wait more than five minutes to call 911 - MINUTES MATTER! Fast action can save your life. Calling 911 is almost always the fastest way to get lifesaving treatment. Emergency Medical Services staff can begin treatment when they arrive -- up to an hour sooner than if someone gets to the hospital by car. The discharge information has been reviewed with the patient. The patient verbalized understanding. Discharge medications reviewed with the patient and appropriate educational materials and side effects teaching were provided. Patient armband removed and given to patient to take home.   Patient was informed of the privacy risks if armband lost or stolen

## 2017-05-09 NOTE — BRIEF OP NOTE
BRIEF OPERATIVE NOTE      BRIEF OPERATIVE NOTE    Patient: Shila Garcia MRN: [id number]  CSN: [id number]    YOB: 1960  Age: 61 y.o. Sex: female        Date of Procedure: 5/9/2017     Preoperative Diagnosis: osteoarthritis of cmc joint  m18.12    Postoperative Diagnosis: osteoarthritis of cmc joint  m18.12      Procedure: Procedure(s):  left thumb basilar joint revision with FCR tendon transposition and transfer     Surgeon: Julio Forde MD      First Assistant:   Audrey Rivas  Anesthesia Staff: Anesthesiologist: Haseeb Butler MD  CRNA: Alton Mendez CRNA    Anesthesia: MAC with wrist block    Local Used: 19 2% lidocaine and 0.05 % marcaine  50:50 mix    Fluid:  800 cc crystalloid    Tourniquet Time:   105 minutes @  200  mmHg    Total Tourniquet Time:   Total Tourniquet Time Documented:  Upper Arm (Left) - 106 minutes  Total: Upper Arm (Left) - 106 minutes       Estimated Blood Loss: < 20 cc    Specimens: * No specimens in log *     Implants:   Implant Name Type Inv. Item Serial No.  Lot No. LRB No. Used   ANCHOR MICROSCREW FULL THREAD --  - [id number]  ANCHOR MICROSCREW FULL THREAD --   [employer] [id number] Left 1   KIT TIGHTROPE MINI 1.1MM SS --  - [id number]   KIT TIGHTROPE MINI 1.1MM SS --    [employer] [id number] Left 1       Findings:Instability and settling of thumb    Complications: None; patient tolerated the procedure well.       Julio Forde MD  5/9/2017  9:57 AM      .

## 2017-05-09 NOTE — ANESTHESIA PREPROCEDURE EVALUATION
Anesthetic History               Review of Systems / Medical History  Patient summary reviewed, nursing notes reviewed and pertinent labs reviewed    Pulmonary    COPD: severe    Sleep apnea: CPAP    Asthma : poorly controlled       Neuro/Psych         Psychiatric history     Cardiovascular                  Exercise tolerance: <4 METS     GI/Hepatic/Renal     GERD: well controlled           Endo/Other        Arthritis     Other Findings   Comments:   Risk Factors for Postoperative nausea/vomiting:       History of postoperative nausea/vomiting? NO       Female? YES       Motion sickness? NO       Intended opioid administration for postoperative analgesia? YES      Smoking Abstinence  Current Smoker? YES  Elective Surgery? YES  Seen preoperatively by anesthesiologist or proxy prior to day of surgery? YES  Pt abstained from smoking 24 hours prior to anesthesia?  NO         Physical Exam    Airway  Mallampati: II  TM Distance: > 6 cm  Neck ROM: normal range of motion   Mouth opening: Normal     Cardiovascular    Rhythm: regular  Rate: normal         Dental    Dentition: Poor dentition     Pulmonary      Decreased breath sounds: bilateral  Wheezes         Abdominal  GI exam deferred       Other Findings            Anesthetic Plan    ASA: 4  Anesthesia type: MAC      Post-op pain plan if not by surgeon: peripheral nerve block single    Induction: Intravenous  Anesthetic plan and risks discussed with: Patient

## 2017-05-09 NOTE — IP AVS SNAPSHOT
[address] [address] [address]  
[phone] Patient: Shila Garcia MRN: [id number] :[id number] You are allergic to the following Allergen Reactions Abilify (Aripiprazole) Hives Ciprofloxacin Hives Demerol (Meperidine) Other (comments) PATIENT IS ON A MAO INHIBITOR Pt denies allergy currently Haldol (Haloperidol Lactate) Hives Levaquin (Levofloxacin) Hives Nexium (Esomeprazole Magnesium) Hives Wellbutrin (Bupropion Hcl) Hives Recent Documentation Height Weight BMI OB Status Smoking Status 1.575 m 42.8 kg 17.25 kg/m2 Postmenopausal Current Every Day Smoker Emergency Contacts Name Discharge Info Relation Home Work Mobile Sorin Valero DISCHARGE CAREGIVER [3] Spouse [3]   [phone] [address]  Other Relative [6] [phone] About your hospitalization You were admitted on:  May 9, 2017 [facility] You were discharged on:  May 9, 2017 Unit phone number:  [phone] Why you were hospitalized Your primary diagnosis was: 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  
  
 Julio Forde MD  If symptoms worsen, For suture removal, For wound re-check, as scheduled [address] [facility] [id number] [facility] [address] [address] 
[phone] Current Discharge Medication List  
  
START taking these medications Dose & Instructions Dispensing Information Comments Morning Noon Evening Bedtime  
 cephALEXin 500 mg capsule Commonly known as:  Marine Ulysses Your last dose was: Your next dose is:    
   
   
 Dose:  500 mg Take 1 Cap by mouth three (3) times daily. Quantity:  21 Cap Refills:  0  
     
   
   
   
  
 oxyCODONE-acetaminophen  mg per tablet Commonly known as:  PERCOCET 10 Your last dose was: Your next dose is:    
   
   
 Dose:  1-2 Tab Take 1-2 Tabs by mouth every six (6) hours as needed for Pain. Max Daily Amount: 8 Tabs. Quantity:  30 Tab Refills:  0 CONTINUE these medications which have NOT CHANGED Dose & Instructions Dispensing Information Comments Morning Noon Evening Bedtime * albuterol 90 mcg/actuation inhaler Commonly known as:  PROVENTIL HFA, VENTOLIN HFA, PROAIR HFA Your last dose was: Your next dose is:    
   
   
 Dose:  2 Puff Take 2 Puffs by inhalation every four (4) hours as needed. Encouraged pt to always carry her rescue inhaler when travelling and she agreed. Refills:  0  
     
   
   
   
  
 * albuterol 1.25 mg/3 mL Nebu Commonly known as:  Jackson Sabianist Your last dose was: Your next dose is:    
   
   
 Dose:  1.25 mg Take 1.25 mg by inhalation every six (6) hours as needed for Wheezing. Refills:  0  
     
   
   
   
  
 ATIVAN 1 mg tablet Generic drug:  LORazepam  
   
Your last dose was: Your next dose is:    
   
   
 Dose:  1 mg Take 1 mg by mouth three (3) times daily as needed. Refills:  0 Cholecalciferol (Vitamin D3) 3,000 unit Tab Your last dose was: Your next dose is: Take  by mouth daily. Refills:  0 DEXILANT 60 mg Cpdb Generic drug:  Dexlansoprazole Your last dose was: Your next dose is:    
   
   
 Dose:  60 mg Take 60 mg by mouth nightly. Refills:  0  
     
   
   
   
  
 diclofenac 1 % Gel Commonly known as:  VOLTAREN Your last dose was: Your next dose is:    
   
   
 Apply  to affected area four (4) times daily. Refills:  0  
     
   
   
   
  
 docusate sodium 100 mg capsule Commonly known as:  Dex Cost Your last dose was: Your next dose is:    
   
   
 Dose:  100 mg Take 100 mg by mouth two (2) times a day. Refills:  0  
     
   
   
   
  
 lamoTRIgine 100 mg tablet Commonly known as: LaMICtal  
   
Your last dose was: Your next dose is:    
   
   
 Dose:  200 mg Take 200 mg by mouth two (2) times a day. Refills:  0  
     
   
   
   
  
 LATUDA 60 mg Tab tablet Generic drug:  lurasidone Your last dose was: Your next dose is:    
   
   
 Dose:  60 mg Take 60 mg by mouth nightly. Refills:  0  
     
   
   
   
  
 megestrol 40 mg tablet Commonly known as:  MEGACE Your last dose was: Your next dose is:    
   
   
 Dose:  40 mg Take 40 mg by mouth daily. Refills:  0 MOBIC 15 mg tablet Generic drug:  meloxicam  
   
Your last dose was: Your next dose is:    
   
   
 Dose:  15 mg Take 15 mg by mouth daily. Patient reported that she stopped/placed med on hold since Nov. 22, 29[address] as instructed by her doctor before her surgery. Refills:  0 Morphine 60 mg ER capsule Commonly known as:  KALINA Your last dose was: Your next dose is:    
   
   
 Dose:  30 mg Take 30 mg by mouth three (3) times daily. Refills:  0  
     
   
   
   
  
 oxyCODONE IR 15 mg immediate release tablet Commonly known as:  OXY-IR Your last dose was: Your next dose is: Dose:  15 mg Take 15 mg by mouth four (4) times daily. Refills:  0 PROBIOTIC PO Your last dose was: Your next dose is: Take  by mouth. Refills:  0 SPIRIVA WITH HANDIHALER 18 mcg inhalation capsule Generic drug:  tiotropium Your last dose was: Your next dose is:    
   
   
 Dose:  2 Cap Take 2 Caps by inhalation daily. Refills:  0  
     
   
   
   
  
 therapeutic multivitamin tablet Commonly known as:  [facility] Your last dose was: Your next dose is:    
   
   
 Dose:  1 Tab Take 1 Tab by mouth daily. Refills:  0  
     
   
   
   
  
 * Notice: This list has 2 medication(s) that are the same as other medications prescribed for you. Read the directions carefully, and ask your doctor or other care provider to review them with you. Where to Get Your Medications Information on where to get these meds will be given to you by the nurse or doctor. ! Ask your nurse or doctor about these medications  
  cephALEXin 500 mg capsule  
 oxyCODONE-acetaminophen  mg per tablet Discharge Instructions Angela Rainey PA-C Upper Extremity Surgery Discharge Instructions Please take the time to review the following instructions before you leave the hospital and use them as guidelines during your recovery from surgery. If you have any questions you may contact my office at [phone]. Wound Care/Dressing Changes:  
Dont remove your dressing or get them wet. It isnt necessary to apply antibiotic ointment to your incisions. Sutures will be removed at your one week post-op visit. Staples (if you have them) are removed in two weeks. If you have steri-strips over your incision they will start to peel off in 7-10 days as you get them wet.  They dont need to be removed prior to that. When they begin to peel off, you may remove them. They should all be removed by 14 days from your surgery. Showering/Bathing: You may shower after your surgery. Your dressing may NOT be removed for showering. Do not take a bath or get into a swimming pool or Jacuzzi until the incisions are completely healed. This may take about 14 days. Do not soak your incision under water. Sling: You are not required to wear your sling and should do so only as needed for comfort. You have no restrictions with regards to the movement of your shoulder. Please push to achieve full range of motion as soon as possible. Please follow motion instructions given at the time of surgery. Ice/Elevation Continue ice consistently for 24 hours after surgery. After 48 hours, you should ice your hand 3 times per day, for 20 minutes at a time for the next 5 days. After one week from surgery, you may use ice as needed for pain and swelling. Elevate the extremity higher than your heart for the next 24 - 48 hours. If you get throbbing this is telling you to elevate the extremity. Diet:  
You may advance to your regular diet as tolerated. Medication:  
1. You will be given a prescription for pain medication when you are discharged from the hospital. Take the medication as needed according to the directions on the prescription bottle. Possible side effects of the medication include dizziness, headache, nausea, vomiting, constipation and urinary retention. If you experience any of these side effects call the office so that we can assist you in relieving them. Discontinue the use of the pain medication if you develop itching, rash, shortness of breath or difficulties swallowing. If these symptoms become severe or arent relieved by discontinuing the medication you should seek immediate medical attention. Refills of pain medication are authorized during office hours only. (7 AM-3PM Mon. thru Fri.) 2. If you were prescribed Percocet/oxycodone you must have a written prescription. These medications legally cannot be called in to the pharmacy. 3. You may take over the counter Ibuprofen/Advil/Aleve between dosages of your pain medication if needed. Do not take Tylenol in addition to your pain medication as most of the pain medication already contains Tylenol. Do not exceed 3000mg of Tylenol per day. Ex: (hydrocodone 5/325g= 325mg of Tylenol) 4. You may resume the medication you were taking prior to surgery. Pain medication may change the effects of any antidepressant medication you are taking. If you have any questions about possible interactions between your regular medications and the pain medication you should consult the physician who prescribes your regular medications. Follow-up:   Check the letter for Follow-up appointment or call [phone] for questions. DISCHARGE SUMMARY from Nurse The following personal items are in your possession at time of discharge: 
 
Dental Appliances: None Visual Aid: Glasses Home Medications: None Jewelry: None Clothing: Shirt, Socks, Sweater, Undergarments, Footwear, Pants Other Valuables: None PATIENT INSTRUCTIONS: 
 
After general anesthesia or intravenous sedation, for 24 hours or while taking prescription Narcotics: · Limit your activities · Do not drive and operate hazardous machinery · Do not make important personal or business decisions · Do  not drink alcoholic beverages · If you have not urinated within 8 hours after discharge, please contact your surgeon on call. Report the following to your surgeon: 
· Excessive pain, swelling, redness or odor of or around the surgical area · Temperature over 100.5 · Nausea and vomiting lasting longer than 4 hours or if unable to take medications · Any signs of decreased circulation or nerve impairment to extremity: change in color, persistent  numbness, tingling, coldness or increase pain · Any questions What to do at Home: These are general instructions for a healthy lifestyle: No smoking/ No tobacco products/ Avoid exposure to second hand smoke Surgeon General's Warning:  Quitting smoking now greatly reduces serious risk to your health. Obesity, smoking, and sedentary lifestyle greatly increases your risk for illness A healthy diet, regular physical exercise & weight monitoring are important for maintaining a healthy lifestyle You may be retaining fluid if you have a history of heart failure or if you experience any of the following symptoms:  Weight gain of 3 pounds or more overnight or 5 pounds in a week, increased swelling in our hands or feet or shortness of breath while lying flat in bed. Please call your doctor as soon as you notice any of these symptoms; do not wait until your next office visit. Recognize signs and symptoms of STROKE: 
 
F-face looks uneven A-arms unable to move or move unevenly S-speech slurred or non-existent T-time-call 911 as soon as signs and symptoms begin-DO NOT go Back to bed or wait to see if you get better-TIME IS BRAIN. Warning Signs of HEART ATTACK Call 911 if you have these symptoms: 
? Chest discomfort. Most heart attacks involve discomfort in the center of the chest that lasts more than a few minutes, or that goes away and comes back. It can feel like uncomfortable pressure, squeezing, fullness, or pain. ? Discomfort in other areas of the upper body. Symptoms can include pain or discomfort in one or both arms, the back, neck, jaw, or stomach. ? Shortness of breath with or without chest discomfort. ? Other signs may include breaking out in a cold sweat, nausea, or lightheadedness. Don't wait more than five minutes to call [address]! Fast action can save your life.  Calling 911 is almost always the fastest way to get lifesaving treatment. Emergency Medical Services staff can begin treatment when they arrive  up to an hour sooner than if someone gets to the hospital by car. The discharge information has been reviewed with the patient. The patient verbalized understanding. Discharge medications reviewed with the patient and appropriate educational materials and side effects teaching were provided. Patient armband removed and given to patient to take home. Patient was informed of the privacy risks if armband lost or stolen Discharge Orders None Introducing [facility]! Dear Giorgio Carver: Thank you for requesting a [employer] account. Our records indicate that you already have an active Fis[employer] account. You can access your account anytime at [email or web address] Did you know that you can access your hospital and ER discharge instructions at any time in Fisgo? You can also review all of your test results from your hospital stay or ER visit. Additional Information If you have questions, please visit the Frequently Asked Questions section of the Fisgo website at [email or web address] Remember, Fisgo is NOT to be used for urgent needs. For medical emergencies, dial 911. Now available from your iPhone and Android! General Information Please provide this summary of care documentation to your next provider. Patient Signature:  ____________________________________________________________ Date:  ____________________________________________________________  
  
Carmen Duran Provider Signature:  ____________________________________________________________ Date:  ____________________________________________________________

## 2017-05-09 NOTE — ANESTHESIA POSTPROCEDURE EVALUATION
Post-Anesthesia Evaluation & Assessment    Visit Vitals    /85    Pulse 94    Temp 36.4 °C (97.5 °F)    Resp 20    Ht 5' 2\" (1.575 m)    Wt 42.8 kg (94 lb 5 oz)    SpO2 95%    BMI 17.25 kg/m2       Post-operative hydration adequate. Pain score (VAS): 0 Pain Scale 1: Numeric (0 - 10) (05/09/17 0645)  Pain Intensity 1: 0 (05/09/17 0645)   Managed. Mental status & Level of consciousness: returned to baseline    Neurological status: returned to baseline    Pulmonary status: airway patent, oxygen given as needed. Complications related to anesthesia: none    Patient has met all discharge requirements.     Additional comments:        Juwan Carlson MD  May 9, 2017

## 2017-05-09 NOTE — INTERVAL H&P NOTE
H&P Update:  Xochitl Jackson was seen and examined. History and physical has been reviewed. The patient has been examined. There have been no significant clinical changes since the completion of the originally dated History and Physical.  Patient identified by surgeon; surgical site was confirmed by patient and surgeon.     Signed By: Nadine Keith MD     May 9, 2017 7:27 AM

## 2017-05-09 NOTE — PROGRESS NOTES
conducted a pre-surgery visit with Preethi Santos, who is a 61 y.o.,female. The  provided the following Interventions:  Initiated a relationship of care and support. Offered prayer and assurance of continued prayers on patient's behalf. Plan:  Chaplains will continue to follow and will provide pastoral care on an as needed/requested basis.  recommends bedside caregivers page  on duty if patient shows signs of acute spiritual or emotional distress.     [facility] Leader   Spiritual Care   [phone]

## 2017-05-09 NOTE — PERIOP NOTES
1021  Patient brought to PACU due to breathing difficulty and connected to monitors. Vital signs stable. Pt with noted difficulty breathing and wheezing. RN at bedside. Will continue to monitor. Anesthesia to order neb tx.    1035  Duo neb started per [facility]. Pt sitting at 45 degrees in stretcher. 1100  Pt changed from O2 mask to NC.    1115  Spoke to pt's sister in waiting room re update and plan of care. 1130  Pt instructed on incentive spirometer. Demonstrates understanding. 56  Spoke with anesthesia re pt's O2 sat and hx of emphysema, COPD, and asthma. Pt uses O2 at home. Dr. Mccord Pinky states, \"As long as pt's O2 sat is 90% or above, pt is okay to go home. \"

## 2017-05-10 NOTE — OP NOTES
Wisam Pugh    Name:  Karolina Argueta  MR#:  [id number]  :  1960  Account #:  [de-identified]  Date of Adm:  2017  Date of Surgery:  2017      PREOPERATIVE DIAGNOSES  1. 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 POSTOPERATIVE DIAGNOSES  1. 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 PROCEDURES PERFORMED:  Left basilar joint arthroplasty revision  using flexor carpi radialis tendon transfer and interpositional  arthroplasty using a micro-Corkscrew suture anchor of 2.2 mm, lot  number [id number], and a mini TightRope 1.1 mm, lot number [id number]. SURGEON:  Sarah Sanchez M.D.    ASSISTANT:  Georgette Neves    ESTIMATED BLOOD LOSS:  Less than 30 mL. SPECIMENS REMOVED: none    ANESTHESIA:  Local, with IV sedation. Local used was 19 mL of 2%  lidocaine and 0.5% Marcaine 50/50 mix. FLUIDS:  800 mL crystalloid. TOURNIQUET TIME:  105 minutes at 200 mmHg. COMPLICATIONS:  None. INDICATIONS FOR PROCEDURE:  The patient is a pleasant 57-year-  old female who has had previous basilar joint arthroplasty. She did  relatively well until recently. There was evidence of collapse and  abnormal alignment of her thumb. With her increasing pain, we  confirmed that there was evidence of proximal migration, and it was  decided to proceed with surgical revision. INFORMED CONSENT:  The risks and benefits of surgery were  discussed with her including, but not limited to, infection, bleeding,  neurovascular injury, and need for future surgeries. She stated she  understands and agreed to proceed. DESCRIPTION OF PROCEDURE:  After proper consent was  obtained, the extremity was marked. She was brought to the OR,  where she received IV sedation. The limb was prepped and draped. A  proper timeout was taken. Perioperative antibiotics were given.   Local  anesthetic was injected, for a total of 19 mL.  The injection sites were  in the previous area of the incision, dorsally along the hand, and then  blocking the median nerve, ulnar nerve, and the superficial radial nerve  using an infiltrative block technique. Further along the harvest site of  the flexor carpi radialis, we proceeded with an injection at this site as  well. The limb was elevated and exsanguinated. The tourniquet was  inflated to 200 mmHg and was up for a 105-minute duration. The  previous incision was incised. Sharp dissection through the skin was  followed by blunt dissection. The extensor pollicis brevis was released  from its tendon sheath, as well as the abductor pollicis longus. The  radial artery was isolated and protected. We then incised along the  course of the base of the metacarpal between the metacarpal and the  scaphoid. We developed this interval.  We removed some scar tissue  present at the interval between the scaphoid and the base of the  metacarpal.  Further, we dissected around the base of the metacarpal  in order to mobilize the base in order to be able to create further space  between the base of the metacarpal and the scaphoid. With traction,  we continued to work on creating the space. With this, as we corrected the base of the thumb, the alignment at the  metacarpophalangeal joint improved as well. We then created a neda hole and then went initially volarly. We  proceeded with isolating the flexor carpi radialis tendon and then  dissected proximally in order to transect the flexor carpi radialis and  deliver it distally. We brought it up into the joint to create our  suspension arthroplasty. We brought it through the neda hole that was  created, and there was copious irrigation.   We used a 2.2 mm  corkscrew in order to anchor the tendon after passing it through the  metacarpal.  We then brought it back upon itself and then, using an  anchovy-type suture, created it in a position with the other remaining  portions of the tendon. It is important to note that we sutured this using a 2-0 FiberWire  suture. Several sutures were placed in the capsule as well as in the  tendon itself, creating a suspension arthroplasty. We then used the guidewire for the TightRope System. We passed it  through the base of the metacarpal and then up through the second  metacarpal.  We made a small incision and dissected down onto the  pin. Then, we advanced the pin. We then passed the suture with the  button attached. We placed the button along the radial cortex of the  metacarpal and then brought it up through the index metacarpal and  then transected this. We passed it through the button and then tied  this overlying the button with multiple passes of the suture, creating  multiple knots. This was then ultimately transected and buried. We used C-arm images, both for the positioning of the base of the  metacarpal, as well as to help with creating a space between the  metacarpal and the scaphoid. We then placed a 0.62 K-wire coming across between the first and  second metacarpals and through the webspace. Then, having a nice  correction at the metacarpophalangeal joint, I also placed an additional  0.045 K-wire across the metacarpophalangeal joint, and in this joint as  well. This created a nice position to the thumb and should help  reinforce of the other surgery that was done on the thumb. With this, we closed the capsule using a Monocryl suture. She has  had reaction to sutures in the past.  It was decided to use the  FiberWire due to the advantage of FiberWire TightRope. In terms of  the scan, we proceeded with using Prolene so as to minimize  inflammatory reaction along the surface of the skin. With this, we closed all the wounds after the tourniquet had been  released. The total time was 105 minutes. Bipolar was used for  hemostasis. Copious irrigation was performed throughout. Xeroform  dressing, fluffs, and a bulky thumb spica splint were applied. Postoperatively, she is to keep it iced and elevated. She is to call if  there are any problems. There were no complications.         Araseli Hand MD    [facility] / [facility]  D:  05/09/2017   22:42  T:  05/09/2017   23:45  Job #:  [id number]

## 2017-06-03 ENCOUNTER — HEALTH MAINTENANCE LETTER (OUTPATIENT)
Age: 57
End: 2017-06-03

## 2017-07-05 NOTE — PROGRESS NOTES
[facility] [place]  [address] [facility]  [phone] fax    Physical Therapy Discharge Summary    Patient name: Evon Mason Start of Care: 17   Referral source: Freedom Mclean MD : 1960   Medical/Treatment Diagnosis: Cervicalgia [M54.2] Onset Date: >1 year     Prior Hospitalization: see medical history Provider#: [id number]   Medications: Verified on Patient Summary List    Comorbidities: arthritis, depression, tobacco use, COPD, Bipolar, GERD, RLS, osteoporosis    Prior Level of Function: lives with  in a 1 story home with 4-5 steps to enter with B HR, R handed, retired from the [employer], activity tolerance 10-15 minutes with O2       Visits from Start of Care: 4    Missed Visits: 1    Reporting Period : 17 to 17    Summary of Care:  Short Term Goals: To be accomplished in 1 weeks:  1. Pt will be compliant with initial HEP  Progressing. Initial HEP was issued today. 17  Long Term Goals: To be accomplished in 6 weeks:  1. Pt will improve FOTO by 8 points in order to demonstrate functional improvement   2. Pt will improve R cervical rotation AROM to 60 dgrs in order to improve ease of turning head iwht ADLs  3. Pt will improve B UT/MT/LT strength to 4/5 in order to improve ease of upright posture  4. Pt will report <3/10 average pain with daily tasks in order to improve QOL     Long term goals not assessed as pt returned to therapy for 3 sessions following initial evaluation       ASSESSMENT/RECOMMENDATIONS:  Pt has made limited progress in therapy as she only returned for 3 sessions following initial evaluation. She continues to demonstrate UT substitution with shoulder elevation as well as decreased cervical AROM.   Pt has injured her hand and is DC at this time to follow up with MD.   [x]Discontinue therapy: []Patient has reached or is progressing toward set goals      [x]Patient is non-compliant or has abdicated      [x]Patient has injured her hand and is following up with MD Jignesh Marinelli, PT 7/5/2017 1:32 PM

## 2017-07-24 ENCOUNTER — HOSPITAL ENCOUNTER (OUTPATIENT)
Dept: PHYSICAL THERAPY | Age: 57
Discharge: HOME | End: 2017-07-24
Payer: COMMERCIAL

## 2017-07-24 PROCEDURE — 97760 ORTHOTIC MGMT&TRAING 1ST ENC: CPT

## 2017-07-24 PROCEDURE — 97165 OT EVAL LOW COMPLEX 30 MIN: CPT

## 2017-07-24 NOTE — PROGRESS NOTES
OT DAILY TREATMENT NOTE  3-16    Patient Name: Cindy Apodaca  Date:2017  : 1960  [x]  Patient  Verified  Payor: BLUE CROSS / Plan: [id number] / Product Type: PPO /    In time:830  Out time:920  Total Treatment Time (min): 50  Visit #: 1 of 8    Treatment Area: Pain in left hand [M79.642]    SUBJECTIVE  Pain Level (0-10 scale): 2/10  Any medication changes, allergies to medications, adverse drug reactions, diagnosis change, or new procedure performed?: [x] No    [] Yes (see summary sheet for update)  Subjective functional status/changes:   [] No changes reported  I have trouble with my shoulder  I had to quit PT for my neck because of my hand  MD said to work on my whole arm    OBJECTIVE      5 min Therapeutic Exercise:  [] See flow sheet :   Rationale: increase ROM and increase strength to improve the patients ability to move shoulder without pain  Supine scap ret pro x 10 shoulder circles x 10         25 min Orthotic/Splinting: Hand based thumb spica   Rationale: increase ROM  to improve the patients ability to use thumb  Fabricated custom hand based thumb spica      With   [] TE   [] TA   [] neuro   [] other: Patient Education: [x] Review HEP    [] Progressed/Changed HEP based on: supine shoulder ex to reduce neck pain with shoulder movement  [] positioning   [] body mechanics   [] transfers   [] heat/ice application   [] Splint wear/care   [] Sensory re-education   [] scar management      [] other:             Other Objective/Functional Measures:   Subjective: pt is a right hand dominant, 61 y.o.y/o, female who  had surgery on 17 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  Patient with reduced ROM in total LUE  Prior level of function: I self care home care driving, gardening, retired  Pain level:(0-no pain 10-debilitating pain) mild    Description/Location: left thumb with c/o no relief   Worst pain10/10 Least pain 2/10   Activities which aggravate pain: unsure, use   Activities which ease pain: ice  Current functional limitations/living situation: with  in house, can't  things, some housework, opening jars, containers    Medical hx: MUltiple hand surgeries b hands, knee surgery, COPD, cyst in neck, depression    Medications: See the written copy of this report in the patient's paper medical record.        Objective:  Scar/Wound: size, color, drainage, adhesions, location: volar forearm and thumb    Sensation:no changes noted  kyler of Motion:Full AROM in shoulder with pain present      Active Passive     Norms Right Left Right Left   Shoulder Flex 0-180        Ext 0-60        abd 0-180        Horizontal add 0-45        IR 0-70        ER 0-90       Elbow Ext/flex 0-150       Forearm Supination 0-80        Pronation 0-80       Wrist Flex 0-80 58 28      Ext 0-70 68 35      Ulnar Dev 0-30 35 20      Radial Dev 0-20 15 0       Strength:WFL  Hand ROM    Index Middle Ring Small Thumb    MP      CMC   PIP     10 MP   DIP     60 IP        40 North Abd        35 Radial Abd     Hand Strength:NT  Nine-Hole Peg Test:  Left= __20___seconds  Right=__17___seconds  Finger Opposition:All tips      ADLs  Feeding:        []MaxA   []ModA   [x]Evie   [] CGA   []SBA   []Carleen   []Independent  UE Dressing:       []MaxA   []ModA   []Evie   [] CGA   []SBA   []Carleen   [x]Independent  LE Dressing:       []MaxA   []ModA   []Evie   [] CGA   []SBA   []Carleen   [x]Independent  Grooming:       []MaxA   []ModA   []Evie   [] CGA   []SBA   []Carleen   [x]Independent  Toileting:       []MaxA   []ModA   []Evie   [] CGA   []SBA   []Carleen   [x]Independent  Bathing:       []MaxA   []ModA   []Evie   [] CGA   []SBA   []Carleen   [x]Independent  Light Meal Prep:    []MaxA   [x]ModA   []Evie   [] CGA   []SBA   []Carleen   []Independent  Household/Other: []MaxA   [x]ModA   []Evie   [] CGA   []SBA   []Carleen   []Independent  Adaptive Equip:     []MaxA   []ModA   []Evie   [] CGA   []SBA   []Carleen []Independent  Driving:       []MaxA   []ModA   []Evie   [] CGA   []SBA   []Carleen   [x]Independent  Money Mgmt:        []MaxA   []ModA   []Evie   [] CGA   []SBA   []Carleen   [x]Independent       Pain Level (0-10 scale) post treatment: 2/10    ASSESSMENT/Changes in Function:   [x]  See Plan of Care  []  See progress note/recertification  []  See Discharge Summary               PLAN  [x]  Upgrade activities as tolerated     [x]  Continue plan of care  []  Update interventions per flow sheet       []  Discharge due to:_  []  Other:_      CLAIR Saini 7/24/2017  8:30 AM    No future appointments.

## 2017-07-28 ENCOUNTER — HOSPITAL ENCOUNTER (OUTPATIENT)
Dept: PHYSICAL THERAPY | Age: 57
Discharge: HOME | End: 2017-07-28
Payer: COMMERCIAL

## 2017-07-28 PROCEDURE — 97110 THERAPEUTIC EXERCISES: CPT

## 2017-07-28 PROCEDURE — 97530 THERAPEUTIC ACTIVITIES: CPT

## 2017-07-28 NOTE — PROGRESS NOTES
OT DAILY TREATMENT NOTE  3-16    Patient Name: Manoj Whitaker  Date:2017  : 1960  [x]  Patient  Verified  Payor: MEAGAN GARCES / Plan: [id number] / Product Type: PPO /    In time:805  Out time:830  Total Treatment Time (min): 25  Visit #: 2 of 8    Treatment Area: Pain in left hand [M79.642]    SUBJECTIVE  Pain Level (0-10 scale): 6/10  Any medication changes, allergies to medications, adverse drug reactions, diagnosis change, or new procedure performed?: [x] No    [] Yes (see summary sheet for update)  Subjective functional status/changes:   [] No changes reported  It has been killing me not sure why  I have a paraffin bath but I can't keep it out-the house is too small    OBJECTIVE      15 min Therapeutic Exercise:  [] See flow sheet :   Rationale: increase ROM to improve the patients ability to grasp  Wrist ext with and without fist x 10  Wrist circles x 10    10 min Therapeutic Activity:  []  See flow sheet :   Rationale: increase ROM and improve coordination  to improve the patients ability to manipulate items  1/4 in pegs palm to digit to place recip opp to remove       With   [] TE   [] TA   [] neuro   [] other: Patient Education: [x] Review HEP    [] Progressed/Changed HEP based on: wrist HEP  [] positioning   [] body mechanics   [] transfers   [] heat/ice application   [] Splint wear/care   [] Sensory re-education   [] scar management      [] other:             Other Objective/Functional Measures:   Wrist with limited extension     Pain Level (0-10 scale) post treatment: 6/10    ASSESSMENT/Changes in Function: Pain increase may be weather related    Patient will continue to benefit from skilled OT services to modify and progress therapeutic interventions, address ROM deficits, address strength deficits, analyze and address soft tissue restrictions and instruct in home and community integration to attain remaining goals.      []  See Plan of Care  []  See progress note/recertification  []  See Discharge Summary         Progress towards goals / Updated goals:  1. Patient will be provided with custom hand based thumb spica to position L thumb during recovery. met 7/24/17  2. Patient will be independent in initial home program for thumb ROM. 3.  Patient will be able to  small objects using opposition with all digits    Long Term Goals: To be accomplished in 8 treatments:   1. Patient will improve L wrist AROM by at least 20 in flexion extension for positioning wrist for bathing. 2.  Patient will be able to use l hand as assist in household activities without increase in pain. 3.  Patient will report improved performance in household tasks such as cooking as shown by increase in FOTO of at least 10 points.         PLAN  [x]  Upgrade activities as tolerated     [x]  Continue plan of care  []  Update interventions per flow sheet       []  Discharge due to:_  []  Other:_      CLAIR Fraser 7/28/2017  1:14 PM    Future Appointments  Date Time Provider Giovanni Segura   8/1/2017 8:00 AM CLAIR Fraser [facility]   8/4/2017 8:30 AM Katelyn Castro [facility]   8/8/2017 8:00 AM Diana Aparicio [facility]   8/11/2017 8:30 AM Katelyn Castro [facility]

## 2017-08-01 ENCOUNTER — HOSPITAL ENCOUNTER (OUTPATIENT)
Dept: PHYSICAL THERAPY | Age: 57
Discharge: HOME | End: 2017-08-01
Payer: COMMERCIAL

## 2017-08-01 PROCEDURE — 97530 THERAPEUTIC ACTIVITIES: CPT

## 2017-08-01 PROCEDURE — 97018 PARAFFIN BATH THERAPY: CPT

## 2017-08-01 PROCEDURE — 97110 THERAPEUTIC EXERCISES: CPT

## 2017-08-01 NOTE — PROGRESS NOTES
OT DAILY TREATMENT NOTE  3-16    Patient Name: Narinder Velasquez  Date:2017  : 1960  [x]  Patient  Verified  Payor: Loretta Levels / Plan: [id number] / Product Type: PPO /    In time:800  Out time:840  Total Treatment Time (min): 40  Visit #: 3 of 8    Treatment Area: Pain in left hand [M79.412]    SUBJECTIVE  Pain Level (0-10 scale): 5/10  Any medication changes, allergies to medications, adverse drug reactions, diagnosis change, or new procedure performed?: [x] No    [] Yes (see summary sheet for update)  Subjective functional status/changes:   [] No changes reported  MD said to adjust splint    OBJECTIVE    Modality rationale: decrease pain to improve the patients ability to grasp   Min Type Additional Details    [] Estim:  []Unatt       []IFC  []Premod                        []Other:  []w/ice   []w/heat  Position:  Location:    [] Estim: []Att    []TENS instruct  []NMES                    []Other:  []w/US   []w/ice   []w/heat  Position:  Location:    []  Traction: [] Cervical       []Lumbar                       [] Prone          []Supine                       []Intermittent   []Continuous Lbs:  [] before manual  [] after manual    []  Ultrasound: []Continuous   [] Pulsed                           []1MHz   []3MHz W/cm2:  Location:    []  Iontophoresis with dexamethasone         Location: [] Take home patch   [] In clinic    []  Ice     []  heat  []  Ice massage  []  Laser   []  Anodyne Position:  Location:     10 []  Laser with stim  [x]  Other:Paraffin  Position:  Location:L hand    []  Vasopneumatic Device Pressure:       [] lo [] med [] hi   Temperature: [] lo [] med [] hi   [x] Skin assessment post-treatment:  [x]intact []redness- no adverse reaction    []redness  adverse reaction:     15 min Therapeutic Exercise:  [] See flow sheet :   Rationale: increase ROM and improve coordination to improve the patients ability to move thumb  Blocking ex for L thumb  Wrist mazes x 5 each  Supine alphabet with shoulder , sidelying shoulder abd x 10    10 min Therapeutic Activity:  []  See flow sheet :   Rationale: increase ROM and improve coordination  to improve the patients ability to move thumb  1/4 in pegs palm to digit translation x 40         5 min Orthotic/Splinting: othosis adjustment   Rationale: increase ROM  to improve the patients ability to abduct      With   [] TE   [] TA   [] neuro   [] other: Patient Education: [x] Review HEP    [] Progressed/Changed HEP based on: avoid pain  [] positioning   [] body mechanics   [] transfers   [] heat/ice application   [] Splint wear/care   [] Sensory re-education   [] scar management      [] other:             Other Objective/Functional Measures:   Can touch all tips now     Pain Level (0-10 scale) post treatment: 3/10    ASSESSMENT/Changes in Function: Pain reduced with paraffin    Patient will continue to benefit from skilled OT services to modify and progress therapeutic interventions, address ROM deficits, address strength deficits, analyze and address soft tissue restrictions, analyze and cue movement patterns and instruct in home and community integration to attain remaining goals. []  See Plan of Care  []  See progress note/recertification  []  See Discharge Summary         Progress towards goals / Updated goals:  1. Patient will be provided with custom hand based thumb spica to position L thumb during recovery. met 7/24/17  2. Patient will be independent in initial home program for thumb ROM. met 8/1/17  3. Patient will be able to  small objects using opposition with all digitsmet 8/1/17    Long Term Goals: To be accomplished in 8 treatments:   1. Patient will improve L wrist AROM by at least 20 in flexion extension for positioning wrist for bathing. 2.  Patient will be able to use l hand as assist in household activities without increase in pain.   3.  Patient will report improved performance in household tasks such as cooking as shown by increase in FOTO of at least 10 points.         PLAN  [x]  Upgrade activities as tolerated     [x]  Continue plan of care  []  Update interventions per flow sheet       []  Discharge due to:_  []  Other:_      Padmini Fall, OTR/L 8/1/2017  12:54 PM    Future Appointments  Date Time Provider Giovanni Segura   8/2/2017 11:30 AM Padmini Singletary, OTR/L [facility]   8/4/2017 8:30 AM Teresa Oliver [facility]   8/8/2017 8:00 AM Dyan Cue Grogg [facility]   8/11/2017 8:30 AM Dyan Cue Grogg [facility]   8/18/2017 8:30 AM Dyan Cue Grogg [facility]   8/24/2017 10:00 AM Teresa Oliver [facility]

## 2017-08-02 ENCOUNTER — APPOINTMENT (OUTPATIENT)
Dept: PHYSICAL THERAPY | Age: 57
End: 2017-08-02
Payer: COMMERCIAL

## 2017-08-04 ENCOUNTER — HOSPITAL ENCOUNTER (OUTPATIENT)
Dept: PHYSICAL THERAPY | Age: 57
Discharge: HOME | End: 2017-08-04
Payer: COMMERCIAL

## 2017-08-04 PROCEDURE — 97110 THERAPEUTIC EXERCISES: CPT

## 2017-08-04 PROCEDURE — 97530 THERAPEUTIC ACTIVITIES: CPT

## 2017-08-04 PROCEDURE — 97018 PARAFFIN BATH THERAPY: CPT

## 2017-08-04 NOTE — PROGRESS NOTES
OT DAILY TREATMENT NOTE - non [facility] 3-16    Patient Name: Cindy Apodaca  Date:2017  : 1960  [x]  Patient  Verified  Payor: MEAGAN GARCES / Plan: [id number] / Product Type: PPO /    In time:830  Out time:900  Total Treatment Time (min): 30  Visit #: 4 of 8    Treatment Area: Pain in left hand [M79.642]    SUBJECTIVE  Pain Level (0-10 scale): 3/10  Any medication changes, allergies to medications, adverse drug reactions, diagnosis change, or new procedure performed?: [x] No    [] Yes (see summary sheet for update)  Subjective functional status/changes:   [] No changes reported  Pt reports practicing FM coordination at home.      OBJECTIVE  Modality rationale: decrease pain and increase tissue extensibility to improve the patients ability to use L Hand   Min Type Additional Details    [] Estim:  []Unatt       []IFC  []Premod                        []Other:  []w/ice   []w/heat  Position:  Location:    [] Estim: []Att    []TENS instruct  []NMES                    []Other:  []w/US   []w/ice   []w/heat  Position:  Location:    []  Traction: [] Cervical       []Lumbar                       [] Prone          []Supine                       []Intermittent   []Continuous Lbs:  [] before manual  [] after manual    []  Ultrasound: []Continuous   [] Pulsed                           []1MHz   []3MHz Location:  W/cm2:    []  Iontophoresis with dexamethasone         Location: [] Take home patch   [] In clinic    []  Ice     []  heat  []  Ice massage  []  Laser   []  Anodyne Position:  Location:   10 []  Laser with stim  [x]  Other: Paraffin Position:  Location: L Hand    []  Vasopneumatic Device Pressure:       [] lo [] med [] hi   Temperature: [] lo [] med [] hi   [x] Skin assessment post-treatment:  [x]intact []redness- no adverse reaction    []redness  adverse reaction:    10 min Therapeutic Exercise:  [] See flow sheet :   Rationale: increase ROM and increase strength to improve the patients ability to move thumb. Blocking ex for L thumb  L Wrist Circles        10 min Therapeutic Activity:  []  See flow sheet :   Rationale: increase ROM and improve coordination  to improve the patients ability to use thumb    1/4 inch pegs: palm to digit translation x40  Removal using finger opposition 40x    Chinese balls: clockwise, counter-clockwise 25x each    With   [x] TE   [] TA   [] neuro   [] other: Patient Education: [x] Review HEP    [] Progressed/Changed HEP based on:   [] positioning   [] body mechanics   [] transfers   [] heat/ice application   [] Splint wear/care   [] Sensory re-education   [] scar management      [] other:             Other Objective/Functional Measures:   Difficulty with removal of pegs with Thumb +SF     Pain Level (0-10 scale) post treatment: 2/10    ASSESSMENT/Changes in Function: Paraffin use decreased pain    Patient will continue to benefit from skilled OT services to modify and progress therapeutic interventions, address ROM deficits, address strength deficits, analyze and address soft tissue restrictions and instruct in home and community integration to attain remaining goals. []  See Plan of Care  []  See progress note/recertification  []  See Discharge Summary         Progress towards goals / Updated goals:  1. Patient will be provided with custom hand based thumb spica to position L thumb during recovery. met 7/24/17  2. Patient will be independent in initial home program for thumb ROM. met 8/1/17  3. Patient will be able to  small objects using opposition with all digitsmet 8/1/17     Long Term Goals: To be accomplished in 8 treatments:                 1.  Patient will improve L wrist AROM by at least 20 in flexion extension for positioning wrist for bathing. 2.  Patient will be able to use l hand as assist in household activities without increase in pain. Progressing 8/2/17  3.   Patient will report improved performance in household tasks such as cooking as shown by increase in FOTO of at least 10 points    PLAN  []  Upgrade activities as tolerated     [x]  Continue plan of care  []  Update interventions per flow sheet       []  Discharge due to:_  []  Other:_      Chuy Redmond 3/4/8358  4:96 AM    Future Appointments  Date Time Provider Giovanni Segura   8/7/2017 8:00 AM ROSA MARIA Valente/L [facility]   8/8/2017 8:00 AM Ralph Locus Grogg [facility]   8/11/2017 8:30 AM Ralph Locus Grogg [facility]   8/18/2017 8:30 AM Ralph Locus Grogg [facility]   8/24/2017 10:00 AM Barron Allen [facility]

## 2017-08-07 ENCOUNTER — HOSPITAL ENCOUNTER (OUTPATIENT)
Dept: PHYSICAL THERAPY | Age: 57
Discharge: HOME | End: 2017-08-07
Payer: COMMERCIAL

## 2017-08-07 PROCEDURE — 97760 ORTHOTIC MGMT&TRAING 1ST ENC: CPT

## 2017-08-07 NOTE — PROGRESS NOTES
OT DAILY TREATMENT NOTE  3    Patient Name: Jose Rutledge  Date:2017  : 1960  [x]  Patient  Verified  Payor: BLUE CROSS / Plan: [id number] / Product Type: PPO /    In time:800  Out time:825  Total Treatment Time (min): 25  Visit #: 5 of 8    Treatment Area: Pain in left hand [M79.642]    SUBJECTIVE  Pain Level (0-10 scale): 3/10  Any medication changes, allergies to medications, adverse drug reactions, diagnosis change, or new procedure performed?: [x] No    [] Yes (see summary sheet for update)  Subjective functional status/changes:   [] No changes reported  This feels better    OBJECTIVE        25 min Orthotic/Splinting: Thumb spica   Rationale: increase ROM  to improve the patients ability to keep thumb abducted  Refabricated orthosis to provide better MP abduction      With   [] TE   [] TA   [] neuro   [] other: Patient Education: [x] Review HEP    [] Progressed/Changed HEP based on:   [] positioning   [] body mechanics   [] transfers   [] heat/ice application   [] Splint wear/care   [] Sensory re-education   [] scar management      [] other:             Other Objective/Functional Measures:   Revised orthosis provided      Pain Level (0-10 scale) post treatment: 3/10    ASSESSMENT/Changes in Function: Improved positioning with orthosis    Patient will continue to benefit from skilled OT services to modify and progress therapeutic interventions and address ROM deficits to attain remaining goals. []  See Plan of Care  []  See progress note/recertification  []  See Discharge Summary         Progress towards goals / Updated goals:  1. Patient will be provided with custom hand based thumb spica to position L thumb during recovery. met 17, 17  2. Patient will be independent in initial home program for thumb ROM. met 17  3. Patient will be able to  small objects using opposition with all digitsmet 17     Long Term Goals:  To be accomplished in 8 treatments:                 1.  Patient will improve L wrist AROM by at least 20 in flexion extension for positioning wrist for bathing. 2.  Patient will be able to use l hand as assist in household activities without increase in pain. Progressing 8/2/17  3.   Patient will report improved performance in household tasks such as cooking as shown by increase in FOTO of at least 10 points        PLAN  [x]  Upgrade activities as tolerated     [x]  Continue plan of care  []  Update interventions per flow sheet       []  Discharge due to:_  []  Other:_      CLAIR Graves 8/7/2017  8:40 AM    Future Appointments  Date Time Provider Giovanni Segura   8/8/2017 8:00 AM Catrachito Lucas [facility]   8/11/2017 8:30 AM Trupti Petergg [facility]   8/18/2017 8:30 AM Trupti Petergg [facility]   8/24/2017 10:00 AM Catrachito Lucas [facility]

## 2017-08-08 ENCOUNTER — APPOINTMENT (OUTPATIENT)
Dept: PHYSICAL THERAPY | Age: 57
End: 2017-08-08
Payer: COMMERCIAL

## 2017-08-10 ENCOUNTER — HOSPITAL ENCOUNTER (OUTPATIENT)
Dept: PHYSICAL THERAPY | Age: 57
Discharge: HOME | End: 2017-08-10
Payer: COMMERCIAL

## 2017-08-10 PROCEDURE — 97762 HC OT ORTHOTIC/PROS CHECKOUT: CPT

## 2017-08-10 PROCEDURE — 97110 THERAPEUTIC EXERCISES: CPT

## 2017-08-10 NOTE — PROGRESS NOTES
[facility] Yamile Mcnair  [facility]  [phone] fax    Occupational Therapy Progress Note  Patient name: Zoe Li Start of Care: 17   Referral source: Janne Klinefelter, MD : 1960   Medical/Treatment Diagnosis: Pain in left hand [M79.642] Onset Date:17     Prior Hospitalization: see medical history Provider#: [id number]   Medications: Verified on Patient Summary List    Comorbidities: Prior L thumb and R thumb CMC arthroplasty, COPD  Prior Level of Function:I self care, gardening, driving, home care  Visits from Start of Care: 6    Missed Visits: 1    Established Goals:         Excellent           Good         Limited           None  [x] Increased ROM   []  [x]  []  []  [] Increased Strength  []  []  []  []  [] Increased Mobility  []  []  []  []   [x] Decreased Pain   []  [x]  []  []  [] Decreased Swelling  []  []  []  []  [] Increased Fine Motor Skills []  [x]  []  []  [] Increased ADL [place] []  []  [x]  []    Key Functional Changes: Improved ROM. Ongoing difficulty with MP flexion with limitation both actively and passively. Current measures are listed below with prior in ( )  MP  10  (10)  IP  70 (60)  Rad Abd 40 (35)  Palmar Abd 45 (40)  Wrist Flex 48 (28)  Ext  50 (35)  UD  32 (20)  RD  5 (0)    Prior goals and progress:  1. Patient will be provided with custom hand based thumb spica to position L thumb during recovery. met   2. Patient will be independent in initial home program for thumb ROM. met   3. Patient will be able to  small objects using opposition with all digitsmet     Long Term Goals: To be accomplished in 8 treatments:                 1.  Patient will improve L wrist AROM by at least 20 in flexion extension for positioning wrist for bathing. met  2. Patient will be able to use l hand as assist in household activities without increase in pain. Progressing   3.   Patient will report improved performance in household tasks such as cooking as shown by increase in FOTO of at least 10 points Progressing      Updated Goals: to be achieved in 1 weeks:(complete RX)  2. Patient will be able to use l hand as assist in household activities without increase in pain. 3.  Patient will report improved performance in household tasks such as cooking as shown by increase in FOTO of at least 10 points       ASSESSMENT/RECOMMENDATIONS:  [x]Continue therapy per initial plan/protocol at a frequency of  2 x per week for 1 weeks  []Continue therapy with the following recommended changes:_____________________      _____________________________________________________________________  []Discontinue therapy progressing towards or have reached established goals  []Discontinue therapy due to lack of appreciable progress towards goals  []Discontinue therapy due to lack of attendance or compliance  []Await Physician's recommendations/decisions regarding therapy  []Other:________________________________________________________________    Thank you for this referral.   CLAIR Will 8/10/2017 10:57 AM  NOTE TO PHYSICIAN:  [address]   FAX TO [facility]: [phone]  If you are unable to process this request in 24 hours please contact our office: [phone] I have read the above report and request that my patient continue as recommended. ? I have read the above report and request that my patient continue therapy with the following changes/special instructions:________________________________________________________  ? I have read the above report and request that my patient be discharged from therapy.     Physicians signature: ________________________________Date: _____Time:_____

## 2017-08-10 NOTE — PROGRESS NOTES
OT DAILY TREATMENT NOTE  3-16    Patient Name: Narinder Velasquez  Date:8/10/2017  : 1960  [x]  Patient  Verified  Payor: Loretta Levels / Plan: [id number] / Product Type: PPO /    In time:930  Out time:1000  Total Treatment Time (min): 30  Visit #: 6 of 8    Treatment Area: Pain in left hand [M79.632]    SUBJECTIVE  Pain Level (0-10 scale): 3/10  Any medication changes, allergies to medications, adverse drug reactions, diagnosis change, or new procedure performed?: [x] No    [] Yes (see summary sheet for update)  Subjective functional status/changes:   [] No changes reported  It is rubbing on the back of my thumb  I still can't get much of a bend here (MP)  Neck doing better after I do my exercises for my shoulder, but then the pain comes right back in my neck    OBJECTIVE      20 min Therapeutic Exercise:  [] See flow sheet :   Rationale: increase ROM to improve the patients ability to grasp  Recheck for ROM  Thumb MP flexion place and hold        10 min Orthotic/Splinting: splint adjustment   Rationale: improve comfort  to improve the patients ability to tolerate orthosis wear      With   [] TE   [] TA   [] neuro   [] other: Patient Education: [x] Review HEP    [] Progressed/Changed HEP based on: exercise modification  [] positioning   [] body mechanics   [] transfers   [] heat/ice application   [x] Splint wear/care   [] Sensory re-education   [] scar management      [] other:             Other Objective/Functional Measures:   MP  10  (10)  IP  70 (60)  Rad Abd 40 (35)  Palmar Abd 45 (40)  Wrist Flex 48 (28)  Ext  50 (35)  UD  32 (20)  RD  5 (0)    Pain Level (0-10 scale) post treatment: 0/10    ASSESSMENT/Changes in Function: Improving ROM    Patient will continue to benefit from skilled OT services to modify and progress therapeutic interventions, address ROM deficits, analyze and address soft tissue restrictions, analyze and cue movement patterns and instruct in home and community integration to attain remaining goals. []  See Plan of Care  [x]  See progress note/recertification  []  See Discharge Summary         Progress towards goals / Updated goals:  1. Patient will be provided with custom hand based thumb spica to position L thumb during recovery. met 7/24/17, 8/7/17  2. Patient will be independent in initial home program for thumb ROM. met 8/1/17  3. Patient will be able to  small objects using opposition with all digitsmet 8/1/17     Long Term Goals: To be accomplished in 8 treatments:                 1.  Patient will improve L wrist AROM by at least 20 in flexion extension for positioning wrist for bathing. Met 8/10/17  2. Patient will be able to use l hand as assist in household activities without increase in pain. Progressing 8/2/17  3.   Patient will report improved performance in household tasks such as cooking as shown by increase in FOTO of at least 10 pointsprogressing increased 2 8/10/17        PLAN  [x]  Upgrade activities as tolerated     [x]  Continue plan of care  []  Update interventions per flow sheet       []  Discharge due to:_  []  Other:_      CLAIR Fraser 8/10/2017  9:45 AM    Future Appointments  Date Time Provider Giovanni Segura   8/11/2017 8:30 AM Katelyn Castro [facility]   8/18/2017 8:30 AM [facility]   8/24/2017 10:00 AM Armauricio Castro [facility]

## 2017-08-11 ENCOUNTER — APPOINTMENT (OUTPATIENT)
Dept: PHYSICAL THERAPY | Age: 57
End: 2017-08-11
Payer: COMMERCIAL

## 2017-08-16 ENCOUNTER — HOSPITAL ENCOUNTER (OUTPATIENT)
Dept: PHYSICAL THERAPY | Age: 57
Discharge: HOME | End: 2017-08-16
Payer: COMMERCIAL

## 2017-08-16 PROCEDURE — 97530 THERAPEUTIC ACTIVITIES: CPT

## 2017-08-16 PROCEDURE — 97110 THERAPEUTIC EXERCISES: CPT

## 2017-08-16 NOTE — PROGRESS NOTES
OT DAILY TREATMENT NOTE  3-16    Patient Name: Hiren Cortes  Date:2017  : 1960  [x]  Patient  Verified  Payor: BLUE CROSS / Plan: [id number] / Product Type: PPO /    In time:800  Out time:830  Total Treatment Time (min): 30  Visit #: 1 of 8    Treatment Area: Pain in left hand [M79.642]    SUBJECTIVE  Pain Level (0-10 scale): 3/10  Any medication changes, allergies to medications, adverse drug reactions, diagnosis change, or new procedure performed?: [x] No    [] Yes (see summary sheet for update)  Subjective functional status/changes:   [] No changes reported  MD said three more weeks in brace    OBJECTIVE      10 min Therapeutic Exercise:  [] See flow sheet :   Rationale: increase ROM to improve the patients ability to flex MP  Blocking and passive ROM MP of thumb    15 min Therapeutic Activity:  []  See flow sheet :   Rationale: increase ROM and improve coordination  to improve the patients ability to pinch  Tip pinch with proper O to remove pegs from soft putty  Tip pinch to  foam pieces       5 min Orthotic/Splinting: Adjustment   Rationale: increase ROM  to improve the patients ability to move thumb      With   [] TE   [] TA   [] neuro   [] other: Patient Education: [x] Review HEP    [] Progressed/Changed HEP based on: Proper O  [] positioning   [] body mechanics   [] transfers   [] heat/ice application   [x] Splint wear/care   [] Sensory re-education   [] scar management      [] other:             Other Objective/Functional Measures:   Able to perform tip pinch with proper O     Pain Level (0-10 scale) post treatment: 3/10    ASSESSMENT/Changes in Function: Improving hand function    Patient will continue to benefit from skilled OT services to modify and progress therapeutic interventions, address ROM deficits, address strength deficits, analyze and address soft tissue restrictions, analyze and cue movement patterns and instruct in home and community integration to attain remaining goals. []  See Plan of Care  []  See progress note/recertification  []  See Discharge Summary         Progress towards goals / Updated goals:  2. Patient will be able to use l hand as assist in household activities without increase in painimproving pinch accuracy 8/16/17.    3.  Patient will report improved performance in household tasks such as cooking as shown by increase in FOTO of at least 10 points         PLAN  [x]  Upgrade activities as tolerated     [x]  Continue plan of care  []  Update interventions per flow sheet       []  Discharge due to:_  []  Other:_      Andria Pereira, OTR/L 8/16/2017  10:56 AM    Future Appointments  Date Time Provider Giovanni Segura   8/18/2017 8:30 AM Darcy Smoke [facility]   8/21/2017 8:00 AM Andria Pereira, OTR/L [facility]   8/24/2017 10:00 AM Denis Aparicio [facility]   8/28/2017 8:30 AM Andria Pereira OTR/L [facility]   8/30/2017 8:30 AM Darcy Smoke [facility]

## 2017-08-17 ENCOUNTER — APPOINTMENT (OUTPATIENT)
Dept: PHYSICAL THERAPY | Age: 57
End: 2017-08-17
Payer: COMMERCIAL

## 2017-08-17 ENCOUNTER — HOSPITAL ENCOUNTER (OUTPATIENT)
Dept: PHYSICAL THERAPY | Age: 57
Discharge: HOME | End: 2017-08-17
Payer: COMMERCIAL

## 2017-08-17 PROCEDURE — 97110 THERAPEUTIC EXERCISES: CPT

## 2017-08-17 NOTE — PROGRESS NOTES
OT DAILY TREATMENT NOTE  3    Patient Name: Ángel Fried  Date:2017  : 1960  [x]  Patient  Verified  Payor: MEAGAN GARCES / Plan: [id number] / Product Type: PPO /    In time:*300**  Out time:325  Total Treatment Time (min): 25  Visit #: 2 of 8    Treatment Area: Pain in left hand [M79.642]    SUBJECTIVE  Pain Level (0-10 scale): 3/10  Any medication changes, allergies to medications, adverse drug reactions, diagnosis change, or new procedure performed?: [x] No    [] Yes (see summary sheet for update)  Subjective functional status/changes:   [] No changes reported  It rubs here (dorsal thumb    OBJECTIVE      20 min Therapeutic Exercise:  [] See flow sheet :   Rationale: increase ROM and increase strength to improve the patients ability to pinch  MP flex blocking and place and hold  Soft putty O prehension      5 min Orthotic/Splinting: Adjust   Rationale: improve comfort  to improve the patients ability to tolerate prescribed splint wear      With   [] TE   [] TA   [] neuro   [] other: Patient Education: [x] Review HEP    [] Progressed/Changed HEP based on:   [] positioning   [] body mechanics   [] transfers   [] heat/ice application   [] Splint wear/care   [] Sensory re-education   [] scar management      [] other:             Other Objective/Functional Measures:   Difficulty with accurate O     Pain Level (0-10 scale) post treatment: 3/10    ASSESSMENT/Changes in Function: Improving strength    Patient will continue to benefit from skilled OT services to modify and progress therapeutic interventions, address ROM deficits, address strength deficits, analyze and address soft tissue restrictions and instruct in home and community integration to attain remaining goals. []  See Plan of Care  []  See progress note/recertification  []  See Discharge Summary         Progress towards goals / Updated goals:  1.   Patient will be able to use l hand as assist in household activities without increase in painimproving pinch accuracy 8/16/17.    2.  Patient will report improved performance in household tasks such as cooking as shown by increase in FOTO of at least 10 points         PLAN  [x]  Upgrade activities as tolerated     [x]  Continue plan of care  []  Update interventions per flow sheet       []  Discharge due to:_  []  Other:_      Andria Pereira OTR/L 8/17/2017  4:03 PM    Future Appointments  Date Time Provider Giovanni Segura   8/21/2017 8:00 AM Andria Pereira OTR/L [facility]   8/24/2017 10:00 AM Denis Aparicio [facility]   8/28/2017 8:30 AM Andria Pereira OTR/L [facility]   8/30/2017 8:30 AM Darcy Smoke [facility]

## 2017-08-18 ENCOUNTER — APPOINTMENT (OUTPATIENT)
Dept: PHYSICAL THERAPY | Age: 57
End: 2017-08-18
Payer: COMMERCIAL

## 2017-08-21 ENCOUNTER — APPOINTMENT (OUTPATIENT)
Dept: PHYSICAL THERAPY | Age: 57
End: 2017-08-21
Payer: COMMERCIAL

## 2017-08-24 ENCOUNTER — HOSPITAL ENCOUNTER (OUTPATIENT)
Dept: PHYSICAL THERAPY | Age: 57
Discharge: HOME | End: 2017-08-24
Payer: COMMERCIAL

## 2017-08-24 PROCEDURE — 97110 THERAPEUTIC EXERCISES: CPT

## 2017-08-24 PROCEDURE — 97530 THERAPEUTIC ACTIVITIES: CPT

## 2017-08-24 NOTE — PROGRESS NOTES
OT DAILY TREATMENT NOTE - non [facility] 3    Patient Name: Catherine Grewal  Date:2017  : 1960  [x]  Patient  Verified  Payor: MEAGAN GARCES / Plan: [id number] / Product Type: PPO /    In time:1005  Out time:1035  Total Treatment Time (min): 35  Visit #: 3 of 8    Treatment Area: Pain in left hand [M79.642]    SUBJECTIVE  Pain Level (0-10 scale): 10  Any medication changes, allergies to medications, adverse drug reactions, diagnosis change, or new procedure performed?: [x] No    [] Yes (see summary sheet for update)  Subjective functional status/changes:   [] No changes reported  Pt reports next MD visit is 17    OBJECTIVE    15 min Therapeutic Exercise:  [] See flow sheet :   Rationale: increase ROM and increase strength to improve the patients ability to use L Hand    Paper Tearing: Pt engaged in 10 minutes of paper tearing using L Thumb + MF O Prehension  L CMC Extension 10x      15 min Therapeutic Activity:  []  See flow sheet :   Rationale: increase ROM and improve coordination  to improve the patients ability to pinch    Theraputty- Pt used L Hand to manipulate soft theraputty to reveal and remove 1/4 in pegs 10x  Puff Balls: Pt used L Hand tip pinch (Thumb + MF) to  puff balls    With   [] TE   [] TA   [] neuro   [] other: Patient Education: [x] Review HEP    [] Progressed/Changed HEP based on:   [] positioning   [] body mechanics   [] transfers   [] heat/ice application   [] Splint wear/care   [] Sensory re-education   [] scar management      [x] other:  CMC Orthosis            Other Objective/Functional Measures:    Pt required frequent breaks secondary to muscle fatigue    Pain Level (0-10 scale) post treatment: 4/10    ASSESSMENT/Changes in Function: CMC extension decreased pain after activities    Patient will continue to benefit from skilled OT services to modify and progress therapeutic interventions, address ROM deficits, address strength deficits, analyze and address soft tissue restrictions and instruct in home and community integration to attain remaining goals. []  See Plan of Care  []  See progress note/recertification  []  See Discharge Summary         Progress towards goals / Updated goals:  1.  Patient will be able to use l hand as assist in household activities without increase in pain.  Progressing 8/24/17   2.  Patient will report improved performance in household tasks such as cooking as shown by increase in FOTO of at least 10 points     PLAN  []  Upgrade activities as tolerated     [x]  Continue plan of care  []  Update interventions per flow sheet       []  Discharge due to:_  []  Other:_      Adam Blanco, DERRICK-Applicant 4/28/1657  91:15 AM    Future Appointments  Date Time Provider Giovanni Segura   8/28/2017 8:30 AM CLAIR Blel [facility]   8/30/2017 8:30 AM Adam Blanco [facility]

## 2017-08-28 ENCOUNTER — APPOINTMENT (OUTPATIENT)
Dept: PHYSICAL THERAPY | Age: 57
End: 2017-08-28
Payer: COMMERCIAL

## 2017-08-30 ENCOUNTER — APPOINTMENT (OUTPATIENT)
Dept: PHYSICAL THERAPY | Age: 57
End: 2017-08-30
Payer: COMMERCIAL

## 2017-10-02 RX ORDER — POLYETHYLENE GLYCOL 3350 17 G/17G
17 POWDER, FOR SOLUTION ORAL AS NEEDED
COMMUNITY
End: 2019-09-30

## 2017-10-03 ENCOUNTER — ANESTHESIA EVENT (OUTPATIENT)
Dept: ENDOSCOPY | Age: 57
End: 2017-10-03
Payer: COMMERCIAL

## 2017-10-04 ENCOUNTER — HOSPITAL ENCOUNTER (OUTPATIENT)
Age: 57
Setting detail: OUTPATIENT SURGERY
Discharge: HOME | End: 2017-10-04
Attending: INTERNAL MEDICINE | Admitting: INTERNAL MEDICINE
Payer: COMMERCIAL

## 2017-10-04 ENCOUNTER — ANESTHESIA (OUTPATIENT)
Dept: ENDOSCOPY | Age: 57
End: 2017-10-04
Payer: COMMERCIAL

## 2017-10-04 VITALS
RESPIRATION RATE: 14 BRPM | WEIGHT: 90 LBS | BODY MASS INDEX: 16.56 KG/M2 | OXYGEN SATURATION: 98 % | SYSTOLIC BLOOD PRESSURE: 124 MMHG | HEIGHT: 62 IN | DIASTOLIC BLOOD PRESSURE: 73 MMHG | HEART RATE: 80 BPM | TEMPERATURE: 98.3 F

## 2017-10-04 LAB
ATRIAL RATE: 75 BPM
CALCULATED P AXIS, ECG09: 70 DEGREES
CALCULATED R AXIS, ECG10: 79 DEGREES
CALCULATED T AXIS, ECG11: 66 DEGREES
DIAGNOSIS, 93000: NORMAL
P-R INTERVAL, ECG05: 122 MS
Q-T INTERVAL, ECG07: 356 MS
QRS DURATION, ECG06: 82 MS
QTC CALCULATION (BEZET), ECG08: 397 MS
VENTRICULAR RATE, ECG03: 75 BPM

## 2017-10-04 PROCEDURE — 74011250636 HC RX REV CODE- 250/636

## 2017-10-04 PROCEDURE — 74011250636 HC RX REV CODE- 250/636: Performed by: NURSE ANESTHETIST, CERTIFIED REGISTERED

## 2017-10-04 PROCEDURE — 77030019988 HC FCPS ENDOSC DISP BSC -B: Performed by: INTERNAL MEDICINE

## 2017-10-04 PROCEDURE — 88305 TISSUE EXAM BY PATHOLOGIST: CPT | Performed by: INTERNAL MEDICINE

## 2017-10-04 PROCEDURE — 74011250637 HC RX REV CODE- 250/637: Performed by: INTERNAL MEDICINE

## 2017-10-04 PROCEDURE — 93005 ELECTROCARDIOGRAM TRACING: CPT

## 2017-10-04 PROCEDURE — 76040000019: Performed by: INTERNAL MEDICINE

## 2017-10-04 PROCEDURE — 88342 IMHCHEM/IMCYTCHM 1ST ANTB: CPT | Performed by: INTERNAL MEDICINE

## 2017-10-04 PROCEDURE — 74011000250 HC RX REV CODE- 250: Performed by: NURSE ANESTHETIST, CERTIFIED REGISTERED

## 2017-10-04 PROCEDURE — 77030018846 HC SOL IRR STRL H20 ICUM -A: Performed by: INTERNAL MEDICINE

## 2017-10-04 PROCEDURE — 76060000031 HC ANESTHESIA FIRST 0.5 HR: Performed by: INTERNAL MEDICINE

## 2017-10-04 PROCEDURE — 77030008565 HC TBNG SUC IRR ERBE -B: Performed by: INTERNAL MEDICINE

## 2017-10-04 PROCEDURE — 74011000250 HC RX REV CODE- 250

## 2017-10-04 RX ORDER — SODIUM CHLORIDE, SODIUM LACTATE, POTASSIUM CHLORIDE, CALCIUM CHLORIDE 600; 310; 30; 20 MG/100ML; MG/100ML; MG/100ML; MG/100ML
75 INJECTION, SOLUTION INTRAVENOUS CONTINUOUS
Status: DISCONTINUED | OUTPATIENT
Start: 2017-10-04 | End: 2017-10-04 | Stop reason: HOSPADM

## 2017-10-04 RX ORDER — MAGNESIUM SULFATE 100 %
4 CRYSTALS MISCELLANEOUS AS NEEDED
Status: DISCONTINUED | OUTPATIENT
Start: 2017-10-04 | End: 2017-10-04 | Stop reason: HOSPADM

## 2017-10-04 RX ORDER — LIDOCAINE HYDROCHLORIDE 20 MG/ML
INJECTION, SOLUTION EPIDURAL; INFILTRATION; INTRACAUDAL; PERINEURAL AS NEEDED
Status: DISCONTINUED | OUTPATIENT
Start: 2017-10-04 | End: 2017-10-04 | Stop reason: HOSPADM

## 2017-10-04 RX ORDER — SODIUM CHLORIDE 0.9 % (FLUSH) 0.9 %
5-10 SYRINGE (ML) INJECTION EVERY 8 HOURS
Status: DISCONTINUED | OUTPATIENT
Start: 2017-10-04 | End: 2017-10-04 | Stop reason: HOSPADM

## 2017-10-04 RX ORDER — DEXTROSE 50 % IN WATER (D50W) INTRAVENOUS SYRINGE
25-50 AS NEEDED
Status: DISCONTINUED | OUTPATIENT
Start: 2017-10-04 | End: 2017-10-04 | Stop reason: HOSPADM

## 2017-10-04 RX ORDER — SODIUM CHLORIDE, SODIUM LACTATE, POTASSIUM CHLORIDE, CALCIUM CHLORIDE 600; 310; 30; 20 MG/100ML; MG/100ML; MG/100ML; MG/100ML
50 INJECTION, SOLUTION INTRAVENOUS CONTINUOUS
Status: DISCONTINUED | OUTPATIENT
Start: 2017-10-04 | End: 2017-10-04 | Stop reason: HOSPADM

## 2017-10-04 RX ORDER — SODIUM CHLORIDE 0.9 % (FLUSH) 0.9 %
5-10 SYRINGE (ML) INJECTION AS NEEDED
Status: DISCONTINUED | OUTPATIENT
Start: 2017-10-04 | End: 2017-10-04 | Stop reason: HOSPADM

## 2017-10-04 RX ORDER — INSULIN LISPRO 100 [IU]/ML
INJECTION, SOLUTION INTRAVENOUS; SUBCUTANEOUS ONCE
Status: DISCONTINUED | OUTPATIENT
Start: 2017-10-04 | End: 2017-10-04 | Stop reason: HOSPADM

## 2017-10-04 RX ORDER — PROPOFOL 10 MG/ML
INJECTION, EMULSION INTRAVENOUS AS NEEDED
Status: DISCONTINUED | OUTPATIENT
Start: 2017-10-04 | End: 2017-10-04 | Stop reason: HOSPADM

## 2017-10-04 RX ORDER — DEXTROMETHORPHAN/PSEUDOEPHED 2.5-7.5/.8
DROPS ORAL AS NEEDED
Status: DISCONTINUED | OUTPATIENT
Start: 2017-10-04 | End: 2017-10-04 | Stop reason: HOSPADM

## 2017-10-04 RX ADMIN — FAMOTIDINE 20 MG: 10 INJECTION, SOLUTION INTRAVENOUS at 08:09

## 2017-10-04 RX ADMIN — PROPOFOL 70 MG: 10 INJECTION, EMULSION INTRAVENOUS at 08:50

## 2017-10-04 RX ADMIN — SODIUM CHLORIDE, SODIUM LACTATE, POTASSIUM CHLORIDE, AND CALCIUM CHLORIDE 75 ML/HR: 600; 310; 30; 20 INJECTION, SOLUTION INTRAVENOUS at 08:08

## 2017-10-04 RX ADMIN — LIDOCAINE HYDROCHLORIDE 40 MG: 20 INJECTION, SOLUTION EPIDURAL; INFILTRATION; INTRACAUDAL; PERINEURAL at 08:50

## 2017-10-04 RX ADMIN — Medication 10 ML: at 07:59

## 2017-10-04 NOTE — PERIOP NOTES
Patient armband removed and shredded    Patient confirmed by two identifiers with discharge instructions prior to being provided to patient, sister refused to wait

## 2017-10-04 NOTE — DISCHARGE INSTRUCTIONS
Colonoscopy: What to Expect at [address] [place] [address]  After you have a colonoscopy, you will stay at the clinic for 1 to 2 hours until the medicines wear off. Then you can go home. But you will need to arrange for a ride. Your doctor will tell you when you can eat and do your other usual activities. Your doctor will talk to you about when you will need your next colonoscopy. Your doctor can help you decide how often you need to be checked. This will depend on the results of your test and your risk for colorectal cancer. After the test, you may be bloated or have gas pains. You may need to pass gas. If a biopsy was done or a polyp was removed, you may have streaks of blood in your stool (feces) for a few days. This care sheet gives you a general idea about how long it will take for you to recover. But each person recovers at a different pace. Follow the steps below to get better as quickly as possible. How can you care for yourself at home? Activity  · Rest when you feel tired. · You can do your normal activities when it feels okay to do so. Diet  · Follow your doctor's directions for eating. · Unless your doctor has told you not to, drink plenty of fluids. This helps to replace the fluids that were lost during the colon prep. · Do not drink alcohol. Medicines  · Your doctor will tell you if and when you can restart your medicines. He or she will also give you instructions about taking any new medicines. · If you take blood thinners, such as warfarin (Coumadin), clopidogrel (Plavix), or aspirin, be sure to talk to your doctor. He or she will tell you if and when to start taking those medicines again. Make sure that you understand exactly what your doctor wants you to do. · If polyps were removed or a biopsy was done during the test, your doctor may tell you not to take aspirin or other anti-inflammatory medicines for a few days. These include ibuprofen (Advil, Motrin) and naproxen (Aleve).   Other instructions  · For your safety, do not drive or operate machinery until the medicine wears off and you can think clearly. Your doctor may tell you not to drive or operate machinery until the day after your test.  · Do not sign legal documents or make major decisions until the medicine wears off and you can think clearly. The anesthesia can make it hard for you to fully understand what you are agreeing to. Follow-up care is a key part of your treatment and safety. Be sure to make and go to all appointments, and call your doctor if you are having problems. It's also a good idea to know your test results and keep a list of the medicines you take. When should you call for help? Call 911 anytime you think you may need emergency care. For example, call if:  · You passed out (lost consciousness). · You pass maroon or bloody stools. · You have severe belly pain. Call your doctor now or seek immediate medical care if:  · Your stools are black and tarlike. · Your stools have streaks of blood, but you did not have a biopsy or any polyps removed. · You have belly pain, or your belly is swollen and firm. · You vomit. · You have a fever. · You are very dizzy. Watch closely for changes in your health, and be sure to contact your doctor if you have any problems. Where can you learn more? Go to http://sonia-good.info/. Enter E264 in the search box to learn more about \"Colonoscopy: What to Expect at Home. \"  Current as of: August 9, 2016  Content Version: 11.3  © 0130-3556 [employer]. Care instructions adapted under license by [employer] (which disclaims liability or warranty for this information). If you have questions about a medical condition or this instruction, always ask your healthcare professional. [place] [address] any warranty or liability for your use of this information.     DISCHARGE SUMMARY from Nurse    The following personal items are in your possession at time of discharge:    Dental Appliances: None  Visual Aid: Glasses                  PATIENT INSTRUCTIONS:    After general anesthesia or intravenous sedation, for 24 hours or while taking prescription Narcotics:  · Limit your activities  · Do not drive and operate hazardous machinery  · Do not make important personal or business decisions  · Do  not drink alcoholic beverages  · If you have not urinated within 8 hours after discharge, please contact your surgeon on call. Report the following to your surgeon:  · Excessive pain, swelling, redness or odor of or around the surgical area  · Temperature over 100.5  · Nausea and vomiting lasting longer than 4 hours or if unable to take medications  · Any signs of decreased circulation or nerve impairment to extremity: change in color, persistent  numbness, tingling, coldness or increase pain  · Any questions      *  Please give a list of your current medications to your Primary Care Provider. *  Please update this list whenever your medications are discontinued, doses are      changed, or new medications (including over-the-counter products) are added. *  Please carry medication information at all times in case of emergency situations. These are general instructions for a healthy lifestyle:    No smoking/ No tobacco products/ Avoid exposure to second hand smoke    Surgeon General's Warning:  Quitting smoking now greatly reduces serious risk to your health. Obesity, smoking, and sedentary lifestyle greatly increases your risk for illness    A healthy diet, regular physical exercise & weight monitoring are important for maintaining a healthy lifestyle    You may be retaining fluid if you have a history of heart failure or if you experience any of the following symptoms:  Weight gain of 3 pounds or more overnight or 5 pounds in a week, increased swelling in our hands or feet or shortness of breath while lying flat in bed.   Please call your doctor as soon as you notice any of these symptoms; do not wait until your next office visit. Recognize signs and symptoms of STROKE:    F-face looks uneven    A-arms unable to move or move unevenly    S-speech slurred or non-existent    T-time-call 911 as soon as signs and symptoms begin-DO NOT go       Back to bed or wait to see if you get better-TIME IS BRAIN. Warning Signs of HEART ATTACK     Call 911 if you have these symptoms:   Chest discomfort. Most heart attacks involve discomfort in the center of the chest that lasts more than a few minutes, or that goes away and comes back. It can feel like uncomfortable pressure, squeezing, fullness, or pain.  Discomfort in other areas of the upper body. Symptoms can include pain or discomfort in one or both arms, the back, neck, jaw, or stomach.  Shortness of breath with or without chest discomfort.  Other signs may include breaking out in a cold sweat, nausea, or lightheadedness. Don't wait more than five minutes to call 911 - MINUTES MATTER! Fast action can save your life. Calling 911 is almost always the fastest way to get lifesaving treatment. Emergency Medical Services staff can begin treatment when they arrive -- up to an hour sooner than if someone gets to the hospital by car. The discharge information has been reviewed with the patient. The patient verbalized understanding. Discharge medications reviewed with the patient and appropriate educational materials and side effects teaching were provided.

## 2017-10-04 NOTE — ANESTHESIA PREPROCEDURE EVALUATION
Anesthetic History   No history of anesthetic complications            Review of Systems / Medical History  Patient summary reviewed and pertinent labs reviewed    Pulmonary    COPD    Sleep apnea: CPAP    Asthma        Neuro/Psych         Psychiatric history     Cardiovascular                       GI/Hepatic/Renal     GERD           Endo/Other        Arthritis     Other Findings   Comments:   Risk Factors for Postoperative nausea/vomiting:       History of postoperative nausea/vomiting? NO       Female? YES       Motion sickness? NO       Intended opioid administration for postoperative analgesia? NO      Smoking Abstinence  Current Smoker? NO  Elective Surgery? YES  Seen preoperatively by anesthesiologist or proxy prior to day of surgery? YES  Pt abstained from smoking 24 hours prior to anesthesia?  N/A           Physical Exam    Airway  Mallampati: II  TM Distance: 4 - 6 cm  Neck ROM: normal range of motion        Cardiovascular    Rhythm: irregular  Rate: normal         Dental    Dentition: Bridges and Poor dentition     Pulmonary      Decreased breath sounds: bilateral           Abdominal  GI exam deferred       Other Findings            Anesthetic Plan    ASA: 4  Anesthesia type: MAC            Anesthetic plan and risks discussed with: Patient

## 2017-10-04 NOTE — H&P
[facility]    [email or web address]      Impression:   1.epigastric pain dysphagia     Plan:     1. egd possible dil mac all risks discussed       Chief Complaint: epigastric pain ? Dysphagia       HPI:  Priya Allen is a 61 y.o. female who is being seen on consult for epigastric pain dysphagia . PMH:   Past Medical History:   Diagnosis Date    Adverse effect of anesthesia     delayed waking up    Anxiety     Arthritis     Asthma     Bipolar disorder ([address]     COPD     Emphysema     GERD (gastroesophageal reflux disease)     On home O2     Pain of right thumb     Psychiatric disorder     bipolar    Restless leg syndrome     Restless legs syndrome     Sleep apnea     uses cpap machine & oxygen 2L NC    Thromboembolus ([address]     right leg s/p knee revision    Unspecified adverse effect of anesthesia     reported lung accidentally punctured Nov. 2012 during procedure       PSH:   Past Surgical History:   Procedure Laterality Date    HX HEENT      LESIONS REMOVED FROM VOCAL CORDS    HX KNEE ARTHROSCOPY      LEFT & RIGHT    HX KNEE REPLACEMENT      RIGHT (3RD REPLACEMENT ON THIS KNEE)    HX OTHER SURGICAL      RIGHT GROIN LYMPH NODE    HX SEPTOPLASTY      HX TONSILLECTOMY      HX TUMOR REMOVAL      NECK    IR TUBE THORACOSTOMY  11/2012    THUMB TENDON TRANSFER,GRAFT  11/13/2012    left thumb       Social HX:   Social History     Social History    Marital status:      Spouse name: N/A    Number of children: N/A    Years of education: N/A     Occupational History    Not on file.      Social History Main Topics    Smoking status: Current Every Day Smoker     Packs/day: 1.00     Years: 40.00    Smokeless tobacco: Never Used    Alcohol use No    Drug use: No    Sexual activity: Not on file     Other Topics Concern    Not on file     Social History Narrative       FHX:   Family History   Problem Relation Age of Onset    Emphysema Mother     Cancer Father        Allergy:   Allergies   Allergen Reactions    Abilify [Aripiprazole] Hives    Ciprofloxacin Hives    Demerol [Meperidine] Other (comments)     PATIENT IS ON A MAO INHIBITOR    Pt denies allergy currently     Haldol [Haloperidol Lactate] Hives    Levaquin [Levofloxacin] Hives    Nexium [Esomeprazole Magnesium] Hives    Wellbutrin [Bupropion Hcl] Hives       Home Medications:     Prescriptions Prior to Admission   Medication Sig    polyethylene glycol (MIRALAX) 17 gram packet Take 17 g by mouth as needed.  oxyCODONE IR (OXY-IR) 15 mg immediate release tablet Take 15 mg by mouth four (4) times daily.  lurasidone (LATUDA) 60 mg tab tablet Take 60 mg by mouth nightly.  tiotropium (SPIRIVA WITH HANDIHALER) 18 mcg inhalation capsule Take 2 Caps by inhalation daily.  docusate sodium (COLACE) 100 mg capsule Take 100 mg by mouth two (2) times a day.  megestrol (MEGACE) 40 mg tablet Take 40 mg by mouth daily.  diclofenac (VOLTAREN) 1 % gel Apply  to affected area four (4) times daily.  LACTOBACILLUS ACIDOPHILUS (PROBIOTIC PO) Take  by mouth.  Cholecalciferol, Vitamin D3, 3,000 unit tab Take  by mouth daily.  albuterol (ACCUNEB) 1.25 mg/3 mL nebulizer solution Take 1.25 mg by inhalation every six (6) hours as needed for Wheezing.  therapeutic multivitamin (THERAGRAN) tablet Take 1 Tab by mouth daily.  lamoTRIgine (LAMICTAL) 100 mg tablet Take 200 mg by mouth two (2) times a day.  LORazepam (ATIVAN) 1 mg tablet Take 1 mg by mouth three (3) times daily as needed.  meloxicam (MOBIC) 15 mg tablet Take 15 mg by mouth daily. Patient reported that she stopped/placed med on hold since Nov. 22, 29013 as instructed by her doctor before her surgery.  Dexlansoprazole (DEXILANT) 60 mg CpDM Take 60 mg by mouth nightly.  albuterol (PROVENTIL HFA, VENTOLIN HFA) 90 mcg/actuation inhaler Take 2 Puffs by inhalation every four (4) hours as needed. Encouraged pt to always carry her rescue inhaler when travelling and she agreed.  Morphine (KALINA) 60 mg ER capsule Take 30 mg by mouth three (3) times daily. Review of Systems:     Constitutional: No fevers, chills, weight loss, fatigue. Skin: No rashes, pruritis, jaundice, ulcerations, erythema. HENT: No headaches, nosebleeds, sinus pressure, rhinorrhea, sore throat. Eyes: No visual changes, blurred vision, eye pain, photophobia, jaundice. Cardiovascular: No chest pain, heart palpitations. Respiratory: No cough, SOB, wheezing, chest discomfort, orthopnea. Gastrointestinal: Epigastric pain ? Dysphagia    Genitourinary: No dysuria, bleeding, discharge, pyuria. Musculoskeletal: No weakness, arthralgias, wasting. Endo: No sweats. Heme: No bruising, easy bleeding. Allergies: As noted. Neurological: Cranial nerves intact. Alert and oriented. Gait not assessed. Psychiatric:  No anxiety, depression, hallucinations. Visit Vitals    Ht 5' 2\" (1.575 m)    Wt 42.6 kg (94 lb)    BMI 17.19 kg/m2       Physical Assessment:     constitutional: appearance: well developed, well nourished, normal habitus, no deformities, in no acute distress. skin: inspection: no rashes, ulcers, icterus or other lesions; no clubbing or telangiectasias. palpation: no induration or subcutaneos nodules. eyes: inspection: normal conjunctivae and lids; no jaundice pupils: symmetrical, normoreactive to light, normal accommodation and size. ENMT: mouth: normal oral mucosa,lips and gums; good dentition. oropharynx: normal tongue, hard and soft palate; posterior pharynx without erythema, exudate or lesions. neck: no masses organomegaly or tenderness. respiratory: effort: normal chest excursion; no intercostal retraction or accessory muscle use. cardiovascular: abdominal aorta: normal size and position; no bruits. palpation: PMI of normal size and position; normal rhythm; no thrill or murmurs. abdominal: abdomen: normal consistency; no tenderness or masses. hernias: no hernias appreciated. liver: normal size and consistency. spleen: not palpable. rectal: hemoccult/guaiac: not performed. musculoskeletal: no deformities or muscle wasting   lymphatic: axilae: not palpable. groin: not palpable. neck: within normal limits. other: not palpable. neurologic: cranial nerves: II-XII normal.   psychiatric: judgement/insight: within normal limits. memory: within normal limits for recent and remote events. mood and affect: no evidence of depression, anxiety or agitation. orientation: oriented to time, space and person. Basic Metabolic Profile   No results for input(s): NA, K, CL, CO2, BUN, GLU, CA, MG, PHOS in the last 72 hours. No lab exists for component: CREAT      CBC w/Diff    No results for input(s): WBC, RBC, HGB, HCT, MCV, MCH, MCHC, RDW, PLT, HGBEXT, HCTEXT, PLTEXT in the last 72 hours. No lab exists for component: MPV No results for input(s): GRANS, LYMPH, EOS, PRO, MYELO, METAS, BLAST in the last 72 hours. No lab exists for component: MONO, BASO     Hepatic Function   No results for input(s): ALB, TP, TBILI, GPT, SGOT, AP, AML, LPSE in the last 72 hours. No lab exists for component: Siobhan Nguyen MD, M.D. [facility] 1948, [address] [facility]  [email or web address]

## 2017-10-04 NOTE — ANESTHESIA POSTPROCEDURE EVALUATION
Post-Anesthesia Evaluation and Assessment    Patient: Priya Allen MRN: [id number]  SSN: [id number]    YOB: 1960  Age: 61 y.o. Sex: female      Data from PACU flowsheet    Cardiovascular Function/Vital Signs  Visit Vitals    /75    Pulse 81    Temp 37.1 °C (98.8 °F)    Resp 21    Ht 5' 2\" (1.575 m)    Wt 40.8 kg (90 lb)    SpO2 93%    Breastfeeding No    BMI 16.46 kg/m2       Patient is status post MAC anesthesia for Procedure(s):  ENDOSCOPY with biopsies. Nausea/Vomiting: controlled    Postoperative hydration reviewed and adequate. Pain:  Pain Scale 1: Numeric (0 - 10) (10/04/17 0920)  Pain Intensity 1: 0 (10/04/17 0920)   Managed      Mental Status and Level of Consciousness: Alert and oriented     Pulmonary Status:   O2 Device: Nasal cannula (10/04/17 0920)   Adequate oxygenation and airway patent    Complications related to anesthesia: None    Post-anesthesia assessment completed.  No concerns    Signed By: Nkechi Leonard MD     October 4, 2017

## 2017-10-04 NOTE — IP AVS SNAPSHOT
[address] Patient: Noemi Childs MRN: [id number] :1960 You are allergic to the following Allergen Reactions Abilify (Aripiprazole) Hives Ciprofloxacin Hives Demerol (Meperidine) Other (comments) PATIENT IS ON A MAO INHIBITOR Pt denies allergy currently Haldol (Haloperidol Lactate) Hives Levaquin (Levofloxacin) Hives Nexium (Esomeprazole Magnesium) Hives Wellbutrin (Bupropion Hcl) Hives Recent Documentation Height Weight Breastfeeding? BMI OB Status Smoking Status 1.575 m 40.8 kg No 16.46 kg/m2 Postmenopausal Current Every Day Smoker Emergency Contacts Name Discharge Info Relation Home Work Mobile Sorin Valero DISCHARGE CAREGIVER [3] Spouse [3] [phone] Jurline Nirav  Sister [23]   [phone] About your hospitalization You were admitted on:  2017 You last received care in the:  [facility] You were discharged on:  2017 Unit phone number:  [phone] Why you were hospitalized Your primary diagnosis was:  Not on File Providers Seen During Your Hospitalizations Provider Role Specialty Primary office phone Tran Mathis MD Attending Provider Gastroenterology [phone] Your Primary Care Physician (PCP) Primary Care Physician Office Phone Office Fax NONE ** None ** ** None ** Follow-up Information Follow up With Details Comments Contact Info None   None ([phone]) Patient stated that they have no PCP 
  
 Tran Mathis MD  Follow up in 6 weeks [place] [id number] [facility] [address] [place] [address] 
[phone] Current Discharge Medication List  
  
CONTINUE these medications which have NOT CHANGED Dose & Instructions Dispensing Information Comments Morning Noon Evening Bedtime * albuterol 90 mcg/actuation inhaler Commonly known as:  PROVENTIL HFA, VENTOLIN HFA, PROAIR HFA Your last dose was: Your next dose is:    
   
   
 Dose:  2 Puff Take 2 Puffs by inhalation every four (4) hours as needed. Encouraged pt to always carry her rescue inhaler when travelling and she agreed. Refills:  0  
     
   
   
   
  
 * albuterol 1.25 mg/3 mL Nebu Commonly known as:  Peterson Carbajal Your last dose was: Your next dose is:    
   
   
 Dose:  1.25 mg Take 1.25 mg by inhalation every six (6) hours as needed for Wheezing. Refills:  0  
     
   
   
   
  
 ATIVAN 1 mg tablet Generic drug:  LORazepam  
   
Your last dose was: Your next dose is:    
   
   
 Dose:  1 mg Take 1 mg by mouth three (3) times daily as needed. Refills:  0 Cholecalciferol (Vitamin D3) 3,000 unit Tab Your last dose was: Your next dose is: Take  by mouth daily. Refills:  0 DEXILANT 60 mg Cpdb Generic drug:  Dexlansoprazole Your last dose was: Your next dose is:    
   
   
 Dose:  60 mg Take 60 mg by mouth nightly. Refills:  0  
     
   
   
   
  
 diclofenac 1 % Gel Commonly known as:  VOLTAREN Your last dose was: Your next dose is:    
   
   
 Apply  to affected area four (4) times daily. Refills:  0  
     
   
   
   
  
 docusate sodium 100 mg capsule Commonly known as:  Arman Duster Your last dose was: Your next dose is:    
   
   
 Dose:  100 mg Take 100 mg by mouth two (2) times a day. Refills:  0  
     
   
   
   
  
 lamoTRIgine 100 mg tablet Commonly known as: LaMICtal  
   
Your last dose was: Your next dose is:    
   
   
 Dose:  200 mg Take 200 mg by mouth two (2) times a day. Refills:  0  
     
   
   
   
  
 LATUDA 60 mg Tab tablet Generic drug:  lurasidone Your last dose was: Your next dose is:    
   
   
 Dose:  60 mg Take 60 mg by mouth nightly. Refills:  0  
     
   
   
   
  
 megestrol 40 mg tablet Commonly known as:  MEGACE Your last dose was: Your next dose is:    
   
   
 Dose:  40 mg Take 40 mg by mouth daily. Refills:  0 MIRALAX 17 gram packet Generic drug:  polyethylene glycol Your last dose was: Your next dose is:    
   
   
 Dose:  17 g Take 17 g by mouth as needed. Refills:  0 MOBIC 15 mg tablet Generic drug:  meloxicam  
   
Your last dose was: Your next dose is:    
   
   
 Dose:  15 mg Take 15 mg by mouth daily. Patient reported that she stopped/placed med on hold since Nov. 22, 29013 as instructed by her doctor before her surgery. Refills:  0 Morphine 60 mg ER capsule Commonly known as:  KALINA Your last dose was: Your next dose is:    
   
   
 Dose:  30 mg Take 30 mg by mouth three (3) times daily. Refills:  0  
     
   
   
   
  
 oxyCODONE IR 15 mg immediate release tablet Commonly known as:  OXY-IR Your last dose was: Your next dose is:    
   
   
 Dose:  15 mg Take 15 mg by mouth four (4) times daily. Refills:  0 PROBIOTIC PO Your last dose was: Your next dose is: Take  by mouth. Refills:  0 SPIRIVA WITH HANDIHALER 18 mcg inhalation capsule Generic drug:  tiotropium Your last dose was: Your next dose is:    
   
   
 Dose:  2 Cap Take 2 Caps by inhalation daily. Refills:  0  
     
   
   
   
  
 therapeutic multivitamin tablet Commonly known as:  [facility] Your last dose was: Your next dose is:    
   
   
 Dose:  1 Tab Take 1 Tab by mouth daily. Refills:  0 * Notice: This list has 2 medication(s) that are the same as other medications prescribed for you. Read the directions carefully, and ask your doctor or other care provider to review them with you. Discharge Instructions Colonoscopy: What to Expect at [facility] Your Recovery After you have a colonoscopy, you will stay at the clinic for 1 to 2 hours until the medicines wear off. Then you can go home. But you will need to arrange for a ride. Your doctor will tell you when you can eat and do your other usual activities. Your doctor will talk to you about when you will need your next colonoscopy. Your doctor can help you decide how often you need to be checked. This will depend on the results of your test and your risk for colorectal cancer. After the test, you may be bloated or have gas pains. You may need to pass gas. If a biopsy was done or a polyp was removed, you may have streaks of blood in your stool (feces) for a few days. This care sheet gives you a general idea about how long it will take for you to recover. But each person recovers at a different pace. Follow the steps below to get better as quickly as possible. How can you care for yourself at home? Activity · Rest when you feel tired. · You can do your normal activities when it feels okay to do so. Diet · Follow your doctor's directions for eating. · Unless your doctor has told you not to, drink plenty of fluids. This helps to replace the fluids that were lost during the colon prep. · Do not drink alcohol. Medicines · Your doctor will tell you if and when you can restart your medicines. He or she will also give you instructions about taking any new medicines. · If you take blood thinners, such as warfarin (Coumadin), clopidogrel (Plavix), or aspirin, be sure to talk to your doctor. He or she will tell you if and when to start taking those medicines again.  Make sure that you understand exactly what your doctor wants you to do. · If polyps were removed or a biopsy was done during the test, your doctor may tell you not to take aspirin or other anti-inflammatory medicines for a few days. These include ibuprofen (Advil, Motrin) and naproxen (Aleve). Other instructions · For your safety, do not drive or operate machinery until the medicine wears off and you can think clearly. Your doctor may tell you not to drive or operate machinery until the day after your test. 
· Do not sign legal documents or make major decisions until the medicine wears off and you can think clearly. The anesthesia can make it hard for you to fully understand what you are agreeing to. Follow-up care is a key part of your treatment and safety. Be sure to make and go to all appointments, and call your doctor if you are having problems. It's also a good idea to know your test results and keep a list of the medicines you take. When should you call for help? Call 911 anytime you think you may need emergency care. For example, call if: 
· You passed out (lost consciousness). · You pass maroon or bloody stools. · You have severe belly pain. Call your doctor now or seek immediate medical care if: 
· Your stools are black and tarlike. · Your stools have streaks of blood, but you did not have a biopsy or any polyps removed. · You have belly pain, or your belly is swollen and firm. · You vomit. · You have a fever. · You are very dizzy. Watch closely for changes in your health, and be sure to contact your doctor if you have any problems. Where can you learn more? Go to http://sonia-good.info/. Enter E264 in the search box to learn more about \"Colonoscopy: What to Expect at Home. \" Current as of: August 9, 2016 Content Version: 11.3 © 9303-7874 [employer].  Care instructions adapted under license by [employer] (which disclaims liability or warranty for this information). If you have questions about a medical condition or this instruction, always ask your healthcare professional. [address] any warranty or liability for your use of this information. DISCHARGE SUMMARY from Nurse The following personal items are in your possession at time of discharge: 
 
Dental Appliances: None Visual Aid: Glasses PATIENT INSTRUCTIONS: 
 
 
F-face looks uneven A-arms unable to move or move unevenly S-speech slurred or non-existent T-time-call 911 as soon as signs and symptoms begin-DO NOT go Back to bed or wait to see if you get better-TIME IS BRAIN. Warning Signs of HEART ATTACK Call 911 if you have these symptoms: 
? Chest discomfort. Most heart attacks involve discomfort in the center of the chest that lasts more than a few minutes, or that goes away and comes back. It can feel like uncomfortable pressure, squeezing, fullness, or pain. ? Discomfort in other areas of the upper body. Symptoms can include pain or discomfort in one or both arms, the back, neck, jaw, or stomach. ? Shortness of breath with or without chest discomfort. ? Other signs may include breaking out in a cold sweat, nausea, or lightheadedness. Don't wait more than five minutes to call [address]! Fast action can save your life. Calling 911 is almost always the fastest way to get lifesaving treatment. Emergency Medical Services staff can begin treatment when they arrive  up to an hour sooner than if someone gets to the hospital by car. The discharge information has been reviewed with the patient. The patient verbalized understanding. Discharge medications reviewed with the patient and appropriate educational materials and side effects teaching were provided. Discharge Orders None Introducing [facility]! Dear Grisel Beverly: Thank you for requesting a [email or web address] account. Our records indicate that you already have an active [email or web address] account. You can access your account anytime at [email or web address] Did you know that you can access your hospital and ER discharge instructions at any time in [email or web address]? You can also review all of your test results from your hospital stay or ER visit. Additional Information If you have questions, please visit the Frequently Asked Questions section of the [email or web address] website at [email or web address] Remember, [email or web address] is NOT to be used for urgent needs. For medical emergencies, dial 911. Now available from your iPhone and Android! General Information Please provide this summary of care documentation to your next provider. Patient Signature:  ____________________________________________________________ Date:  ____________________________________________________________  
  
Prabhjot Pipermp Provider Signature:  ____________________________________________________________ Date:  ____________________________________________________________

## 2017-10-12 NOTE — PROGRESS NOTES
[facility]z Prior   [facility]  [phone] fax    Occupational Therapy Discharge Summary    Patient name: Nicolle Shafer Start of Care: 17   Referral source: Carloz Bruce MD : 1960   Medical/Treatment Diagnosis: Pain in left hand [M79.642] Onset Date:17     Prior Hospitalization: see medical history Provider#: [id number]   Medications: Verified on Patient Summary List    Comorbidities: L thumb and R thumb CMC arthroplasty, COPD  Prior Level of Function:I self care driving, home care, gardening  Visits from Start of Care: 9    Missed Visits: 4  Reporting Period : 8/10/17 to 17    Summary of Care:Patient seen for therapeutic exercises and activities and modalities for R hand. She has HEP. Earl Shelton will be able to use l hand as assist in household activities without increase in pain. Status at last note/certification:Unable  Status at discharge: not met progressing    Earl Shelton will report improved performance in household tasks such as cooking as shown by increase in FOTO of at least 10 points   Status at last note/certification:FOTO 38  Status at discharge: not met progressing now 40      ASSESSMENT/Changes in Function: patient progressed well with functional use of L thumb for basic pinch and grasp. She cancelled last two appointments.       ASSESSMENT/RECOMMENDATIONS:  [x]Discontinue therapy: []Patient has reached or is progressing toward set goals      [x]Patient is non-compliant or has abdicated      []Due to lack of appreciable progress towards set goals    ROSA MARIA Vilchis/L 10/12/2017 3:34 PM

## 2018-05-16 ENCOUNTER — OFFICE VISIT (OUTPATIENT)
Dept: VASCULAR SURGERY | Age: 58
End: 2018-05-16

## 2018-05-16 VITALS
DIASTOLIC BLOOD PRESSURE: 70 MMHG | BODY MASS INDEX: 16.56 KG/M2 | WEIGHT: 90 LBS | HEART RATE: 88 BPM | SYSTOLIC BLOOD PRESSURE: 110 MMHG | RESPIRATION RATE: 17 BRPM | HEIGHT: 62 IN

## 2018-05-16 DIAGNOSIS — J44.9 CHRONIC OBSTRUCTIVE PULMONARY DISEASE, UNSPECIFIED COPD TYPE (HCC): ICD-10-CM

## 2018-05-16 DIAGNOSIS — R63.4 WEIGHT LOSS: ICD-10-CM

## 2018-05-16 DIAGNOSIS — R10.13 POSTPRANDIAL EPIGASTRIC PAIN: Primary | ICD-10-CM

## 2018-05-16 DIAGNOSIS — I70.213 ATHEROSCLEROSIS OF NATIVE ARTERY OF BOTH LOWER EXTREMITIES WITH INTERMITTENT CLAUDICATION (HCC): ICD-10-CM

## 2018-05-16 DIAGNOSIS — Z99.81 O2 DEPENDENT: ICD-10-CM

## 2018-05-16 RX ORDER — RABEPRAZOLE SODIUM 20 MG/1
20 TABLET, DELAYED RELEASE ORAL DAILY
COMMUNITY
End: 2019-09-30

## 2018-05-16 NOTE — PROGRESS NOTES
1. Have you been to an emergency room or urgent care clinic since your last visit? No    Hospitalized since your last visit? If yes, where, when, and reason for visit? No  2. Have you seen or consulted any other health care providers outside of the [facility] since your last visit including any procedures, health maintenance items. If yes, where, when and reason for visit?   No

## 2018-05-16 NOTE — MR AVS SNAPSHOT
Anise Breeding 
 
 
 [address] [place], [place] [address] [facility] [address] 
[phone] Patient: Dayo Pugh MRN: [id number] :1960 Visit Information Date & Time Provider Department Dept. Phone Encounter #  
 2018  9:00 AM [facility] [phone] [id number] Your Appointments 2018  9:00 AM  
PROCEDURE with [facility] IMAGING 2 [facility] ([facility]) Appt Note: abdominal pain wild [address] [facility] Terrell Coleman [address] [facility] [address]  
[phone] [address] [facility]  
  
    
 2018 10:00 AM  
PROCEDURE with [facility] NONIMAGING [facility] ([facility]) Appt Note: LEG ART WILD WILL CALL PATIENT WITH RESULTS  
 [address] [place], [place] [address] [facility] [address]  
[phone] [address] [facility] [address] [facility] [address] Upcoming Health Maintenance Date Due Hepatitis C Screening 1960 Pneumococcal 19-64 Medium Risk (1 of 1 - PPSV23) 10/14/1978 DTaP/Tdap/Td series (1 - Tdap) 10/14/1980 PAP AKA CERVICAL CYTOLOGY 10/14/1980 BREAST CANCER SCRN MAMMOGRAM 10/14/2009 FOBT Q 1 YEAR AGE 50-75 10/14/2009 Influenza Age 5 to Adult 2018 Allergies as of 2018  Review Complete On: 2018 By: Elder Parkinson Severity Noted Reaction Type Reactions Abilify [Aripiprazole]  2012    Hives Ciprofloxacin  2012    Hives Demerol [Meperidine]  2012    Other (comments) PATIENT IS ON A MAO INHIBITOR Pt denies allergy currently Haldol [Haloperidol Lactate]  2012    Hives Levaquin [Levofloxacin]  2012    Hives Nexium [Esomeprazole Magnesium]  2012    Hives Wellbutrin [Bupropion Hcl]  2012    Hives Current Immunizations  Never Reviewed No immunizations on file. Not reviewed this visit You Were Diagnosed With   
  
 Codes Comments Atherosclerosis of native artery of both lower extremities with intermittent claudication ([facility]    -  Primary ICD-10-CM: F60.896 ICD-9-CM: 440.21 Postprandial epigastric pain     ICD-10-CM: R10.13 ICD-9-CM: 789.06 Vitals BP Pulse Resp Height(growth percentile) Weight(growth percentile) BMI  
 110/70 (BP 1 Location: Left arm, BP Patient Position: Sitting) 88 17 5' 2\" (1.575 m) 90 lb (40.8 kg) 16.46 kg/m2 OB Status Smoking Status Postmenopausal Current Every Day Smoker Vitals History BMI and BSA Data Body Mass Index Body Surface Area  
 16.46 kg/m 2 1.34 m 2 Your Updated Medication List  
  
   
This list is accurate as of 5/16/18  9:18 AM.  Always use your most recent med list.  
  
  
  
  
 ACIPHEX 20 mg tablet Generic drug:  RABEprazole Take 20 mg by mouth daily. * albuterol 90 mcg/actuation inhaler Commonly known as:  PROVENTIL HFA, VENTOLIN HFA, PROAIR HFA Take 2 Puffs by inhalation every four (4) hours as needed. Encouraged pt to always carry her rescue inhaler when travelling and she agreed. * albuterol 1.25 mg/3 mL Nebu Commonly known as:  Kaleen Ashish Take 1.25 mg by inhalation every six (6) hours as needed for Wheezing. ATIVAN 1 mg tablet Generic drug:  LORazepam  
Take 1 mg by mouth three (3) times daily as needed. Cholecalciferol (Vitamin D3) 3,000 unit Tab Take  by mouth daily. DEXILANT 60 mg Cpdb Generic drug:  Dexlansoprazole Take 60 mg by mouth nightly. diclofenac 1 % Gel Commonly known as:  VOLTAREN Apply  to affected area four (4) times daily. docusate sodium 100 mg capsule Commonly known as:  Charity Sauce Take 100 mg by mouth two (2) times a day. lamoTRIgine 100 mg tablet Commonly known as: LaMICtal  
Take 200 mg by mouth two (2) times a day. LATUDA 60 mg Tab tablet Generic drug:  lurasidone Take 60 mg by mouth nightly. megestrol 40 mg tablet Commonly known as:  MEGACE Take 40 mg by mouth daily. MIRALAX 17 gram packet Generic drug:  polyethylene glycol Take 17 g by mouth as needed. MOBIC 15 mg tablet Generic drug:  meloxicam  
Take 15 mg by mouth daily. Patient reported that she stopped/placed med on hold since Nov. 22, 29[address] as instructed by her doctor before her surgery. Morphine 60 mg ER capsule Commonly known as:  KALINA Take 30 mg by mouth three (3) times daily. oxyCODONE IR 15 mg immediate release tablet Commonly known as:  OXY-IR Take 15 mg by mouth four (4) times daily. PROBIOTIC PO Take  by mouth. SPIRIVA WITH HANDIHALER 18 mcg inhalation capsule Generic drug:  tiotropium Take 2 Caps by inhalation daily. therapeutic multivitamin tablet Commonly known as:  [facility] Take 1 Tab by mouth daily. * Notice: This list has 2 medication(s) that are the same as other medications prescribed for you. Read the directions carefully, and ask your doctor or other care provider to review them with you. To-Do List   
 06/01/2018 Imaging:  DUPLEX ABD VISC ART ORGANS COMPLETE AMB   
  
 06/01/2018 Imaging:  LOWER EXT ART PVR MULT LEVEL SEG PRESSURES AMB [facility]! Dear Ronnie Stapleton: Thank you for requesting a [employer] account. Our records indicate that you already have an active [employer] account. You can access your account anytime at [email or web address] Did you know that you can access your hospital and ER discharge instructions at any time in [employer]? You can also review all of your test results from your hospital stay or ER visit. Additional Information If you have questions, please visit the Frequently Asked Questions section of the [employer] website at [email or web address] Remember, [employer] is NOT to be used for urgent needs. For medical emergencies, dial 911. Now available from your iPhone and Android! Please provide this summary of care documentation to your next provider. Your primary care clinician is listed as ANTHONY REAL. If you have any questions after today's visit, please call [phone].

## 2018-05-16 NOTE — PROGRESS NOTES
Preston Estrada    Chief Complaint   Patient presents with   [facility]     Renal Bruit       HPI    Preston Estrada is a 61 y.o. female who presents to the office today at the request of her primary care physician for possible renal bruit. Patient states she has been dealing with abdominal discomfort and constipation for quite some time. She does follow with a GI physician. She did have a CT scan done in September of last year which showed some atherosclerosis of the abdominal aorta without aneurysm. There is also question of renal bruit on exam.  She was therefore sent for vascular evaluation. She has no history of hypertension and does not take any hypertensive medications. She has no history of chronic renal disease. She does not describe any significant claudication symptoms although she does state that her legs will get fatigued with muscle burning if she walks too far. She states that this is due to deconditioning as she does not do much activity due to her severe COPD. She is oxygen dependent as well. Unfortunately she does continue to smoke. She denies any rest pain. No history of skin changes or ulcers. She does state that she has had significant weight loss over the past year or so. She states that she is able to eat only small meals. She states that if she eats to the point that she is full she will have abdominal pain. She does suffer from chronic constipation. She denies any nausea or vomiting. No bloody stools are reported. No fevers or chills.       Past Medical History:   Diagnosis Date    Adverse effect of anesthesia     delayed waking up    Anxiety     Arthritis     Asthma     Bipolar disorder ([facility]     COPD     Emphysema     GERD (gastroesophageal reflux disease)     On home O2     Pain of right thumb     Psychiatric disorder     bipolar    Restless leg syndrome     Restless legs syndrome     Sleep apnea     uses cpap machine & oxygen 2L NC    Thromboembolus ([facility]     right leg s/p knee revision    Unspecified adverse effect of anesthesia     reported lung accidentally punctured Nov. 2012 during procedure     Patient Active Problem List   Diagnosis Code    COPD (chronic obstructive pulmonary disease) ([facility] J44.9    Arthritis of carpometacarpal (CMC) joint of left thumb M18.12     Past Surgical History:   Procedure Laterality Date    HX HEENT      LESIONS REMOVED FROM VOCAL CORDS    HX KNEE ARTHROSCOPY      LEFT & RIGHT    HX KNEE REPLACEMENT      RIGHT (3RD REPLACEMENT ON THIS KNEE)    HX OTHER SURGICAL      RIGHT GROIN LYMPH NODE    HX SEPTOPLASTY      HX TONSILLECTOMY      HX TUMOR REMOVAL      NECK    IR TUBE THORACOSTOMY  11/2012    THUMB TENDON TRANSFER,GRAFT  11/13/2012    left thumb     Current Outpatient Prescriptions   Medication Sig Dispense Refill    RABEprazole (ACIPHEX) 20 mg tablet Take 20 mg by mouth daily.  polyethylene glycol (MIRALAX) 17 gram packet Take 17 g by mouth as needed.  oxyCODONE IR (OXY-IR) 15 mg immediate release tablet Take 15 mg by mouth four (4) times daily.  lurasidone (LATUDA) 60 mg tab tablet Take 60 mg by mouth nightly.  tiotropium (SPIRIVA WITH HANDIHALER) 18 mcg inhalation capsule Take 2 Caps by inhalation daily.  docusate sodium (COLACE) 100 mg capsule Take 100 mg by mouth two (2) times a day.  megestrol (MEGACE) 40 mg tablet Take 40 mg by mouth daily.  diclofenac (VOLTAREN) 1 % gel Apply  to affected area four (4) times daily.  LACTOBACILLUS ACIDOPHILUS (PROBIOTIC PO) Take  by mouth.  Cholecalciferol, Vitamin D3, 3,000 unit tab Take  by mouth daily.  albuterol (ACCUNEB) 1.25 mg/3 mL nebulizer solution Take 1.25 mg by inhalation every six (6) hours as needed for Wheezing.  therapeutic multivitamin (THERAGRAN) tablet Take 1 Tab by mouth daily.  lamoTRIgine (LAMICTAL) 100 mg tablet Take 200 mg by mouth two (2) times a day.         meloxicam (MOBIC) 15 mg tablet Take 15 mg by mouth daily. Patient reported that she stopped/placed med on hold since Nov. 22, 29[address] as instructed by her doctor before her surgery.  albuterol (PROVENTIL HFA, VENTOLIN HFA) 90 mcg/actuation inhaler Take 2 Puffs by inhalation every four (4) hours as needed. Encouraged pt to always carry her rescue inhaler when travelling and she agreed.  LORazepam (ATIVAN) 1 mg tablet Take 1 mg by mouth three (3) times daily as needed.  Dexlansoprazole (DEXILANT) 60 mg CpDM Take 60 mg by mouth nightly.  Morphine (KALINA) 60 mg ER capsule Take 30 mg by mouth three (3) times daily. Allergies   Allergen Reactions    Abilify [Aripiprazole] Hives    Ciprofloxacin Hives    Demerol [Meperidine] Other (comments)     PATIENT IS ON A MAO INHIBITOR    Pt denies allergy currently     Haldol [Haloperidol Lactate] Hives    Levaquin [Levofloxacin] Hives    Nexium [Esomeprazole Magnesium] Hives    Wellbutrin [Bupropion Hcl] Hives     Social History     Social History    Marital status: SINGLE     Spouse name: N/A    Number of children: N/A    Years of education: N/A     Occupational History    Not on file.      Social History Main Topics    Smoking status: Current Every Day Smoker     Packs/day: 1.00     Years: 40.00    Smokeless tobacco: Never Used    Alcohol use No    Drug use: No    Sexual activity: Not on file     Other Topics Concern    Not on file     Social History Narrative      Family History   Problem Relation Age of Onset    Emphysema Mother     Cancer Father        Review of Systems    Constitutional: positive for weight loss  HEENT: negative   Respiratory: positive for chronic SOB, O2 dependent  Cardiovascular: negative   Gastrointestinal: positive for abdominal pain, postprandial pain, constipation  Genitourinary:negative   Hematologic/lymphatic: negative   Musculoskeletal:negative   Neurological: negative   Behavioral/Psych: negative   Endocrine: negative   Allergic/Immunologic: negative      Physical Exam:    Visit Vitals    /70 (BP 1 Location: Left arm, BP Patient Position: Sitting)    Pulse 88    Resp 17    Ht 5' 2\" (1.575 m)    Wt 90 lb (40.8 kg)    BMI 16.46 kg/m2      General: Well-appearing female in no acute distress   HEENT: EOMI, no scleral icterus is noted. No carotid bruits are heard bilaterally   Cardiovascular: Regular rhythm normal S1-S2 no rubs murmurs or gallops   Pulmonary: No increased work or breathing is noted. Clear to auscultation bilaterally. No wheeze, rales or rhonchi. Abdomen: cachectic, positive bowel sounds, soft, nondistended. TTP in epigastric region. No rebound or guarding is noted. Easily palpable aorta. Extremities: Warm and well perfused bilaterally. Pt has no BLE edema. Palpable femoral pulses and pedal pulses bilaterally. Neuro: Cranial nerves II through XII are grossly intact   Integument: No ulcerations are identified visibly      Impression and Plan:  Jose Rutledge is a 61 y.o. female with atherosclerosis of the abdominal aorta. No aneurysm per report. She also complains of some muscle pain with ambulation which she feels is secondary to severe deconditioning. She also has some chronic abdominal pain as well as postprandial pain and significant weight loss. Discussed that we will obtain arterial studies to assess for any arterial insufficiency which may be contributing to her muscle pain with ambulation. We will also obtain ultrasound of the mesenteric arteries to assess for any significant stenosis which may be contributing to her abdominal pain. Discussed that we will also assess the renal arteries during ultrasound. I will call her with the results of her ultrasound. Discussed that if everything looks okay and no significant stenosis is reported then she can follow-up in our office as needed. I did encourage her to quit smoking as well. Plan was discussed.  Patient expresses understanding and agrees. Erika Ward        PLEASE NOTE:  This document has been produced using voice recognition software. Unrecognized errors in transcription may be present.

## 2018-06-04 ENCOUNTER — OFFICE VISIT (OUTPATIENT)
Dept: VASCULAR SURGERY | Age: 58
End: 2018-06-04

## 2018-06-04 DIAGNOSIS — I70.213 ATHEROSCLEROSIS OF NATIVE ARTERY OF BOTH LOWER EXTREMITIES WITH INTERMITTENT CLAUDICATION (HCC): ICD-10-CM

## 2018-06-04 DIAGNOSIS — R10.13 POSTPRANDIAL EPIGASTRIC PAIN: ICD-10-CM

## 2018-06-04 NOTE — PROCEDURES
[facility]  *** FINAL REPORT ***    Name: Fabiola Licona  MRN: [id number]       Outpatient  : 14 Oct 1960  HIS Order #: [id number]  [address] [facility] Visit #: [id number]  Date: 2018    TYPE OF TEST: Visceral Arterial Duplex    REASON FOR TEST  Abdominal pain    Aortic PSV:  75.0 cm/s  Diameter AP:     cm   TV:     cm                   Right          Left  Renal Artery:- -------------  -------------  Proximal  PSV: 107.0           92.0  Mid       PSV:  Distal    PSV:  Aortic ratio :   1.4            1.2    Medullary PSV:            EDV:            EDR:            SDR:    Cortical  PSV:            EDV:            EDR:            SDR:  Stenosis:  Kidney size:        cm             cm               x      cm      x      cm    Hilar:-        Right          Left  Acc. Time  AT:     secs           secs  Acc. Index AI:             RI:    Mesenteric:-                  Prox   Mid   Dist Ratio Stenosis          Aneurysm                  ----- ----- ----- ----- ----------------- ------------  SMA:            140.0 177.0 188.0  2.5 Normal  Celiac:         163.0               2.2 Normal  Hepatic:        140.0               1.9  Splenic:        124.0 100.0 132.0   1.8  SHEILA:            138.0               1.8  :    INTERPRETATION/FINDINGS  Duplex images were obtained using 2-D gray scale, color flow and  spectral doppler analysis. Visceral Study:  1. No significant stenosis identified in the superior mesenteric  artery. 2. No significant stenosis identified in the celiac artery. 3. Splenic artery patent wihtout significant stenosis. 4. Proximal hepatic artery patent. 5. Proximal renal arteries patent. 6. Proximal SHEILA patent. No prior study. ADDITIONAL COMMENTS    I have personally reviewed the data relevant to the interpretation of  this  study. TECHNOLOGIST: Kumar Navarro RVT, BS  Signed: 2018 09:09 AM    PHYSICIAN: Caio Vazquez D.O.   Signed: 2018 09:01 AM

## 2018-06-04 NOTE — PROCEDURES
[facility]  *** FINAL REPORT ***    Name: Mark Andujar  MRN: [id number]       Outpatient  : 14 Oct 1960  HIS Order #: [id number]  [id number] [facility] Visit #: [id number]  Date: 2018    TYPE OF TEST: Peripheral Arterial Testing    REASON FOR TEST  Claudication, Peripheral vascular dz NOS    Right Leg  Segmentals: Normal                     mmHg  Brachial         122  High thigh  Low thigh  Calf             122  Posterior tibial 114  Dorsalis pedis   118  Peroneal  Metatarsal  Toe pressure      48  Doppler:    Normal  Ankle/Brachial: 0.97    Left Leg  Segmentals: Abnormal                     mmHg  Brachial         120  High thigh  Low thigh  Calf              89  Posterior tibial  85  Dorsalis pedis    84  Peroneal  Metatarsal  Toe pressure      46  Doppler:    Abnormal  Ankle/Brachial: 0.70    Site of occlusive disease:-  femoral, popliteal and tibioperoneal segments and the digits    INTERPRETATION/FINDINGS  Physiologic testing was performed using continuous wave doppler and  segmental pressures. 1. No evidence of significant peripheral arterial disease at rest in  the right leg. 2. Moderate arterial insufficiency on the left at rest due to  infrapopliteal vessel disease with femoral/popliteal artery  involvement. 3. The right ankle/brachial index is 0.97 and the left ankle/brachial  index is 0.70.  4. The DBI on the right is 0.39 and on the left is 0.38. No prior study. ADDITIONAL COMMENTS    I have personally reviewed the data relevant to the interpretation of  this  study. TECHNOLOGIST: Nivia Navarro RVT, BS  Signed: 2018 09:13 AM    PHYSICIAN: Sole Pickering D.O.   Signed: 2018 09:01 AM

## 2018-06-05 ENCOUNTER — TELEPHONE (OUTPATIENT)
Dept: VASCULAR SURGERY | Age: 58
End: 2018-06-05

## 2018-06-05 DIAGNOSIS — I70.213 ATHEROSCLEROSIS OF NATIVE ARTERY OF BOTH LOWER EXTREMITIES WITH INTERMITTENT CLAUDICATION (HCC): Primary | ICD-10-CM

## 2018-06-05 NOTE — TELEPHONE ENCOUNTER
Called patient to review results of studies. No arterial stenosis reported in the mesenteric arteries or renal arteries. She does have moderate disease in the right leg. No significant arterial insufficiency on the left at rest.   She does c/o bilateral claudication but does not feel it is lifestyle limiting at this point. She also states that she has advanced arthritis in the left knee and has been told that she may need TKR in the future. Discussed that we will have her return in 6 months with repeat studies. She was educated on the signs and symptoms of worsening arterial disease and will call sooner as needed.

## 2018-06-18 ENCOUNTER — TRANSFERRED RECORDS (OUTPATIENT)
Dept: HEALTH INFORMATION MANAGEMENT | Facility: CLINIC | Age: 58
End: 2018-06-18

## 2018-06-22 ENCOUNTER — TELEPHONE (OUTPATIENT)
Dept: VASCULAR SURGERY | Age: 58
End: 2018-06-22

## 2018-07-06 ENCOUNTER — TELEPHONE (OUTPATIENT)
Dept: ENDOCRINOLOGY | Facility: CLINIC | Age: 58
End: 2018-07-06

## 2018-07-06 NOTE — TELEPHONE ENCOUNTER
"Spoke to Gopal who agreed to see patient, despite her being considered \"new\" due to not being seen within last 3 years.    Got a \"page financial counselor\" pop-up warning message when I went to schedule. Sent message to Sumeet Delgado to look into.    [facility] for pt to call me back to discuss we have proper insurance and discuss scheduling options.  "

## 2018-07-06 NOTE — TELEPHONE ENCOUNTER
[facility]    Phone Message    May a detailed message be left on voicemail: yes    Reason for Call: Other: Other: PT would like to be seen again by Dr. Herron - last seen in 2014.  She had an appt. scheduled 8/29/17 with Dr. Herron but cancelled it.  Please follow up with the PT.      Action Taken: Message routed to:  [facility] Endo

## 2018-07-18 ENCOUNTER — APPOINTMENT (OUTPATIENT)
Dept: PHYSICAL THERAPY | Age: 58
End: 2018-07-18

## 2018-07-19 ENCOUNTER — HOSPITAL ENCOUNTER (OUTPATIENT)
Dept: PHYSICAL THERAPY | Age: 58
Discharge: HOME | End: 2018-07-19
Payer: COMMERCIAL

## 2018-07-19 PROCEDURE — 97110 THERAPEUTIC EXERCISES: CPT

## 2018-07-19 PROCEDURE — 97162 PT EVAL MOD COMPLEX 30 MIN: CPT

## 2018-07-19 NOTE — PROGRESS NOTES
PT DAILY TREATMENT NOTE/LUMBAR EVAL 3-16    Patient Name: Hanna Rodriges  Date:2018  : 1960  [x]  Patient  Verified  Payor: MEAGAN GARCES / Plan: [id number] / Product Type: PPO /    In time:8:50  Out time:9:30  Total Treatment Time (min): 40  Total Timed Codes (min): 25  1:1 Treatment Time ( only): 40  Visit #: 1 of   Treatment Area: Lower back pain [M54.5]  Myofascial pain syndrome [M79.1]  SUBJECTIVE  Pain Level (0-10 scale): 7/10 right leg, neck, left knee, left hand  []constant []intermittent []improving []worsening []no change since onset    Any medication changes, allergies to medications, adverse drug reactions, diagnosis change, or new procedure performed?: [x] No    [] Yes (see summary sheet for update)  Subjective functional status/changes:     Pt is a 62 yo F who presents with a long hx of pain/weakness in all joints with progressive decline. Pt does not wish to be evaluated for pain at this time. Her main goal in therapy is to establish an exercise program to address BUE/BLE strength deficits in order to improve ease of functional mobility and ADLs.   Pt reports a fear of falling d/t sensation that knees will buckle when fatigued      Barriers: []pain []financial []time []transportation []other  Motivation: high   Substance use: []Alcohol []Tobacco []other:   FABQ Score: []low []elevate  Cognition: A & O x 4    Other:    OBJECTIVE/EXAMINATION    15 min [x]Eval                  []Re-Eval       25 min Therapeutic Exercise:  [] See flow sheet :   Rationale: increase ROM and increase strength to improve the patients ability to improve ease of ambulation and ADLs            With   [x] TE   [] TA   [] neuro   [] other: Patient Education: [x] Review HEP    [] Progressed/Changed HEP based on:   [] positioning   [] body mechanics   [] transfers   [] heat/ice application    [] other: educated patient regarding findings of evaluation and new therex technique and purpose       Other Objective/Functional Measures:     /68 taken manually prior to therapy     Physical Therapy Evaluation - Lumbar Spine ([facility])    SUBJECTIVE     General Health:  Red Flags Indicated? [] Yes    [] No  [] Yes [x] No Recent weight change (If yes, due to dieting?  [] Yes  [] No)   [x] Yes [] No Weakness in legs during walking - right knee clem   [x] Yes [] No Unremitting pain at night  [] Yes [x] No Abdominal pain or problems  [] Yes [x] No Rectal bleeding  [x] Yes [] No Feet more cold or painful in cold weather  [] Yes [] No Menstrual irregularities  [] Yes [x] No Blood or pain with urination  [] Yes [x] No Dysfunction of bowel or bladder  [] Yes [x] No Recent illness within past 3 weeks (i.e, cold, flu)  [] Yes [x] No Numbness/tingling in buttock/genitalia region    Past History/Treatments:     Gait:  [] Normal     [x] Abnormal: increased lumbar flexion during gait      Strength   L(0-5) R (0-5) N/T   Hip Flexion (L1,2) 4- 4- []   Knee Extension (L3,4) 4 3+ []   Ankle Dorsiflexion (L4) 4 4 []   Hip IR   []   Hip ER  3+ 3+ []   Knee Flexion (S1,2) 4 4 []   Shoulder Flexoin 4- 4- []   Shoulder Abduction 4- 4- []   Shoulder IR 4 4 []   Shoulder ER 4 4 []   Elbow Flexion 4 4 []   Elbow Extension  4 4 []     Wrist Flexion: right 4/5, left 3/5  Wrist Extension right 4/5, left not tested   right 53#, left PD    Balance Screen  Romberg Floor EO/EC 30 seconds  Romberg Foam EO 30 seconds, EC 12 seconds     Other tests/comments:     No increased pain following session     Pain Level (0-10 scale) post treatment: 7/10 right leg, neck, left knee, left hand    ASSESSMENT/Changes in Function: See POC    Patient will continue to benefit from skilled PT services to modify and progress therapeutic interventions, address functional mobility deficits, address ROM deficits, address strength deficits, analyze and address soft tissue restrictions, analyze and cue movement patterns, analyze and modify body mechanics/ergonomics, assess and modify postural abnormalities, address imbalance/dizziness and instruct in home and community integration to attain remaining goals.      [x]  See Plan of Care  []  See progress note/recertification  []  See Discharge Summary         Progress towards goals / Updated goals:  See POC    PLAN  [x]  Upgrade activities as tolerated     []  Continue plan of care  [x]  Update interventions per flow sheet       []  Discharge due to:_  []  Other:_      Holley Aquino, PT 7/19/2018  8:51 AM

## 2018-07-19 NOTE — PROGRESS NOTES
[facility]  [employer] [facility]  [address] [place] [address]  [phone] fax    Plan of Care/ Statement of Necessity for Physical Therapy Services    Patient name: Amada Ahumada Start of Care: 2018   Referral source: Janesjuhi Hill, * : 1960    Medical Diagnosis: Lower back pain [M54.5]  Myofascial pain syndrome [M79.1]   Onset Date: with progressive decline    Treatment Diagnosis: Generalized Weakness    Prior Hospitalization: see medical history Provider#: [id number]   Medications: Verified on Patient summary List    Comorbidities: depression, osteoporosis, arthritis, tobacco use (1 pack per day), asthma, GERD, restless leg syndrome, bipolar, COPD, peripheral vascular disease    Prior Level of Function: lives with  in a 1 story home with 4-5 steps to enter with B HR, R handed, retired from the [employer], activity tolerance     The Plan of Care and following information is based on the information from the initial evaluation. Assessment/ key information: Pt is a 60 yo F who presents with a long hx of pain/weakness in all joints with progressive decline. Pt does not wish to be evaluated for pain at this time. Her main goal in therapy is to establish an exercise program to address BUE/BLE strength deficits in order to improve ease of functional mobility and ADLs. Pt reports a fear of falling d/t sensation that knees will buckle when fatigued. Strength of BLE/BUE is decreased, with greatest LE strength deficits evident in hip ER B and left knee extension, with decreased left quad strength likely contributing to fall risk. Left wrist/hand testing was limited d/t recent hand injury, and pt is following up with MD for her hand next week. Pt is able to maintain Romberg EO/EC on non-compliant surface and EO on compliant surface for 30 seconds.   Romberg EC on non-compliant surface is limited to 12 seconds before requiring hand-held support on parallel bars. Pt will benefit from a short bout of skilled PT to address strength deficits in order to improve ease of ADLs and improve QOL with transition towards independent strength progression with HEP when skilled intervention is no longer required. Evaluation Complexity History HIGH Complexity :3+ comorbidities / personal factors will impact the outcome/ POC ; Examination LOW Complexity : 1-2 Standardized tests and measures addressing body structure, function, activity limitation and / or participation in recreation  ;Presentation MEDIUM Complexity : Evolving with changing characteristics  ; Clinical Decision Making MEDIUM Complexity : FOTO score of 26-74  Overall Complexity Rating: MEDIUM  Problem List: pain affecting function, decrease ROM, decrease strength, impaired gait/ balance, decrease ADL/ functional abilitiies, decrease activity tolerance, decrease flexibility/ joint mobility and decrease transfer abilities   Treatment Plan may include any combination of the following: Therapeutic exercise, Therapeutic activities, Neuromuscular re-education, Physical agent/modality, Gait/balance training, Manual therapy, Patient education, Self Care training, Functional mobility training, Home safety training and Stair training  Patient / Family readiness to learn indicated by: asking questions, trying to perform skills and interest  Persons(s) to be included in education: patient (P) and family support person (FSP);list sister, brother  Barriers to Learning/Limitations: None  Patient Goal (s): to set up an exercise routine  Patient Self Reported Health Status: fair  Rehabilitation Potential: fair    Short Term Goals: To be accomplished in 1 weeks:  1. Pt will be compliant with initial HEP to improve therapy outcomes   Long Term Goals: To be accomplished in 6 weeks:  1. Pt will improve FOTO by 6 points in order to demonstrate functional improvement   2.  Pt will improve BUE/BLE strength by 1/2 MMT grade in order to improve ease of ADLs  3. Pt will report 50% improvement in order to improve QOL    Frequency / Duration: Patient to be seen 2-3 times per week for 6 weeks. Patient/ CarPatient/ Caregiver education and instruction: Diagnosis, prognosis, activity modification and exercises   [x]  Plan of care has been reviewed with TIAN Mejia, PT 7/19/2018 8:51 AM    ________________________________________________________________________    I certify that the above Therapy Services are being furnished while the patient is under my care. I agree with the treatment plan and certify that this therapy is necessary.     Physician's Signature:____________________  Date:____________Time: _________    Please sign and return to [facility]  [address]  [phone] fax

## 2018-07-26 ENCOUNTER — APPOINTMENT (OUTPATIENT)
Dept: PHYSICAL THERAPY | Age: 58
End: 2018-07-26
Payer: COMMERCIAL

## 2018-07-31 ENCOUNTER — HOSPITAL ENCOUNTER (OUTPATIENT)
Dept: PHYSICAL THERAPY | Age: 58
Discharge: HOME | End: 2018-07-31
Payer: COMMERCIAL

## 2018-07-31 PROCEDURE — 97110 THERAPEUTIC EXERCISES: CPT

## 2018-07-31 NOTE — PROGRESS NOTES
PT DAILY TREATMENT NOTE - [facility]  Patient Name: Josue Watts Date:2018 : 1960 [x]  Patient  Verified Payor: MEAGAN GARCES / Plan: [id number] / Product Type: PPO / In time:300  Out time:340 Total Treatment Time (min): 40 Total Timed Codes (min): 40 
1:1 Treatment Time ( only): 17 Visit #: 2 of  Treatment Area: Lower back pain [M54.5] Myofascial pain syndrome [M79.1] Weakness [R53.1] SUBJECTIVE Pain Level (0-10 scale): 7/10 knee, 5/10 hand Any medication changes, allergies to medications, adverse drug reactions, diagnosis change, or new procedure performed?: [x] No    [] Yes (see summary sheet for update) Subjective functional status/changes:   [] No changes reported Pt stated that her pain is less than it was last week OBJECTIVE 40 min Therapeutic Exercise:  [x] See flow sheet :  
Rationale: increase ROM and increase strength to improve the patients ability to increase ease with aDLs With 
 [x] TE 
 [] TA 
 [] neuro 
 [] other: Patient Education: [x] Review HEP [] Progressed/Changed HEP based on:  
[] positioning   [] body mechanics   [] transfers   [] heat/ice application   
[] other:   
 
Other Objective/Functional Measures:  
Pt requested to have more home exercises for her sh, was issued all 6 sh iso No complaint of increased pain with exercises No LOB with foam Romberg EO Pain Level (0-10 scale) post treatment: 7/10 knee, 5/10 hand ASSESSMENT/Changes in Function:  
Initiated therex today per flow sheet. Pt reported compliance with HEP. Pt put forth good effort with all exercises Patient will continue to benefit from skilled PT services to address functional mobility deficits, address ROM deficits and address strength deficits to attain remaining goals. [x]  See Plan of Care 
[]  See progress note/recertification 
[]  See Discharge Summary Progress towards goals / Updated goals: 
Short Term Goals:  To be accomplished in 1 weeks: 1. Pt will be compliant with initial HEP to improve therapy outcomes Goal met. 7/31/18 Long Term Goals: To be accomplished in 6 weeks: 1. Pt will improve FOTO by 6 points in order to demonstrate functional improvement 2. Pt will improve BUE/BLE strength by 1/2 MMT grade in order to improve ease of ADLs 3. Pt will report 50% improvement in order to improve QOL PLAN 
[]  Upgrade activities as tolerated     [x]  Continue plan of care 
[]  Update interventions per flow sheet      
[]  Discharge due to:_ 
[]  Other:_ Raven Asif PTA 7/31/2018  3:01 PM 
 
Future Appointments Date Time Provider Giovanni Segura 8/2/2018 3:00 PM Raven Asif PTA [facility]  
8/6/2018 1:00 PM Raven Asif PTA [facility]  
8/8/2018 1:00 PM Raven Asif PTA [facility]  
8/15/2018 10:30 AM Raven Asif PTA [facility]  
8/17/2018 10:30 AM Victorina Dowd, PT [id number] [facility]  
8/21/2018 10:30 AM Victorina Dowd, PT [id number] [facility]  
8/23/2018 10:30 AM Raven Asif, PTA [id number] [facility]  
12/13/2018 10:00 AM [facility]AG[facility] JAYANT SCHED  
12/13/2018 1:30 PM Belem WeiserGERONIMO Ziegler

## 2018-08-02 ENCOUNTER — APPOINTMENT (OUTPATIENT)
Dept: PHYSICAL THERAPY | Age: 58
End: 2018-08-02
Payer: COMMERCIAL

## 2018-08-06 ENCOUNTER — HOSPITAL ENCOUNTER (OUTPATIENT)
Dept: PHYSICAL THERAPY | Age: 58
Discharge: HOME | End: 2018-08-06
Payer: COMMERCIAL

## 2018-08-06 PROCEDURE — 97110 THERAPEUTIC EXERCISES: CPT

## 2018-08-06 NOTE — PROGRESS NOTES
PT DAILY TREATMENT NOTE - [facility]     Patient Name: Hanna Rodriges  Date:2018  : 1960  [x]  Patient  Verified  Payor: MEAGAN GARCES / Plan: [id number] / Product Type: PPO /    In time:102  Out time:233  Total Treatment Time (min): 31  Total Timed Codes (min): 31  1:1 Treatment Time ( only): 31   Visit #: 3 of     Treatment Area: Lower back pain [M54.5]  Myofascial pain syndrome [M79.1]  Weakness [R53.1]    SUBJECTIVE  Pain Level (0-10 scale): 6/10 sh, 2/10 knee  Any medication changes, allergies to medications, adverse drug reactions, diagnosis change, or new procedure performed?: [x] No    [] Yes (see summary sheet for update)  Subjective functional status/changes:   [] No changes reported  Pt stated that she over did the shoulder exercises over the weekend and is really sore today    OBJECTIVE    31 min Therapeutic Exercise:  [x] See flow sheet :   Rationale: increase ROM and increase strength to improve the patients ability to increase ease with ADLs    With   [x] TE   [] TA   [] neuro   [] other: Patient Education: [x] Review HEP    [] Progressed/Changed HEP based on:   [] positioning   [] body mechanics   [] transfers   [] heat/ice application    [] other:      Other Objective/Functional Measures:   Pt had no complaint of increased pain with exercises  No difficulty with EO Romberg on foam, slight swaying with EC  No complaint of increased pain with bicep curls x 3     Pain Level (0-10 scale) post treatment: 6/10 sh, 2/10 knee    ASSESSMENT/Changes in Function:   Pt is slowly progressing toward goals. Pt cont with decreased strength in B LE and UE. Cont with decreased activity tolerance    Patient will continue to benefit from skilled PT services to modify and progress therapeutic interventions, address functional mobility deficits, address ROM deficits and address strength deficits to attain remaining goals.      [x]  See Plan of Care  []  See progress note/recertification  []  See Discharge Summary         Progress towards goals / Updated goals:  Short Term Goals: To be accomplished in 1 weeks:  1. Pt will be compliant with initial HEP to improve therapy outcomes   Goal met. 7/31/18  Long Term Goals: To be accomplished in 6 weeks:  1. Pt will improve FOTO by 6 points in order to demonstrate functional improvement   2. Pt will improve BUE/BLE strength by 1/2 MMT grade in order to improve ease of ADLs  3.  Pt will report 50% improvement in order to improve QOL    PLAN  []  Upgrade activities as tolerated     [x]  Continue plan of care  []  Update interventions per flow sheet       []  Discharge due to:_  []  Other:_      Sada Caban PTA 8/6/2018  1:08 PM    Future Appointments  Date Time Provider Giovanni Segura   8/8/2018 1:00 PM Sada Caban PTA [facility]   8/15/2018 10:30 AM Sada Caban PTA [facility]   8/17/2018 10:30 AM Perla Jane, PT [id number] [facility]   8/21/2018 10:30 AM Perla Jane, PT [id number] [facility]   8/23/2018 10:30 AM Sada Caban PTA [facility]   12/13/2018 10:00 AM [facility] NONIMAGING [facility] JAYANT SCHED   12/13/2018 1:30 PM GERONIMO Martinez 51

## 2018-08-08 ENCOUNTER — HOSPITAL ENCOUNTER (OUTPATIENT)
Dept: PHYSICAL THERAPY | Age: 58
Discharge: HOME | End: 2018-08-08
Payer: COMMERCIAL

## 2018-08-08 PROCEDURE — 97110 THERAPEUTIC EXERCISES: CPT

## 2018-08-08 NOTE — PROGRESS NOTES
PT DAILY TREATMENT NOTE - [facility]     Patient Name: Catalina Luther  Date:2018  : 1960  [x]  Patient  Verified  Payor: MEAGAN GARCES / Plan: [id number] / Product Type: PPO /    In time:100  Out time:131  Total Treatment Time (min): 31  Total Timed Codes (min): 31  1:1 Treatment Time ( only): 23   Visit #: 4 of     Treatment Area: Lower back pain [M54.5]  Myofascial pain syndrome [M79.1]  Weakness [R53.1]    SUBJECTIVE  Pain Level (0-10 scale): 5/10, 2/10 knee  Any medication changes, allergies to medications, adverse drug reactions, diagnosis change, or new procedure performed?: [x] No    [] Yes (see summary sheet for update)  Subjective functional status/changes:   [] No changes reported  Pt stated that the sh iso cont to increase her pain    OBJECTIVE    31 min Therapeutic Exercise:  [x] See flow sheet :   Rationale: increase ROM and increase strength to improve the patients ability to increase ease with ADLs    With   [x] TE   [] TA   [] neuro   [] other: Patient Education: [x] Review HEP    [] Progressed/Changed HEP based on:   [] positioning   [] body mechanics   [] transfers   [] heat/ice application    [] other:      Other Objective/Functional Measures:   Pt tolerated increased weights today well without difficulty  No complaint of increased pain in knee or sh during session  Was unable to perform ecc step downs on the right  Was able to ascend stairs using the right foot first, but is unable to descend     Pain Level (0-10 scale) post treatment: 5/10 sh. 1-210 knee    ASSESSMENT/Changes in Function:   Pt is slowly progressing toward goals. Pain level is slowly decreasing. Strength in B knees is improving.  Pt cont to have increased B sh pain with prolonged activity and isometric exercises performed at home    Patient will continue to benefit from skilled PT services to modify and progress therapeutic interventions, address functional mobility deficits, address ROM deficits and address strength deficits to attain remaining goals. [x]  See Plan of Care  []  See progress note/recertification  []  See Discharge Summary         Progress towards goals / Updated goals:  Short Term Goals: To be accomplished in 1 weeks:  1. Pt will be compliant with initial HEP to improve therapy outcomes   Goal met. 7/31/18  Long Term Goals: To be accomplished in 6 weeks:  1. Pt will improve FOTO by 6 points in order to demonstrate functional improvement   2. Pt will improve BUE/BLE strength by 1/2 MMT grade in order to improve ease of ADLs  3.  Pt will report 50% improvement in order to improve QOL    PLAN  []  Upgrade activities as tolerated     [x]  Continue plan of care  []  Update interventions per flow sheet       []  Discharge due to:_  []  Other:_      Shabnam Medicine, PTA 8/8/2018  12:59 PM    Future Appointments  Date Time Provider Giovanni Segura   8/8/2018 1:00 PM Shabnam Medicine, PTA [facility]   8/15/2018 10:30 AM Shabnam Medicine, PTA [facility]   8/17/2018 10:30 AM Greg Tanner, PT [id number] [facility]   8/21/2018 10:30 AM Greg Tanner, PT [id number] [facility]   8/23/2018 10:30 AM Shabnam Medicine, PTA [facility]CP[facility]   12/13/2018 10:00 AM [facility] NONIMAGING [facility] JAYANT SCHED   12/13/2018 1:30 PM GERONIMO Martinez

## 2018-08-15 ENCOUNTER — APPOINTMENT (OUTPATIENT)
Dept: PHYSICAL THERAPY | Age: 58
End: 2018-08-15
Payer: COMMERCIAL

## 2018-08-17 ENCOUNTER — APPOINTMENT (OUTPATIENT)
Dept: PHYSICAL THERAPY | Age: 58
End: 2018-08-17
Payer: COMMERCIAL

## 2018-08-21 ENCOUNTER — APPOINTMENT (OUTPATIENT)
Dept: PHYSICAL THERAPY | Age: 58
End: 2018-08-21
Payer: COMMERCIAL

## 2018-08-23 ENCOUNTER — HOSPITAL ENCOUNTER (OUTPATIENT)
Dept: PHYSICAL THERAPY | Age: 58
Discharge: HOME | End: 2018-08-23
Payer: COMMERCIAL

## 2018-08-23 PROCEDURE — 97110 THERAPEUTIC EXERCISES: CPT

## 2018-08-23 NOTE — PROGRESS NOTES
PT DAILY TREATMENT NOTE - [facility]     Patient Name: Tremaine Palomares  Date:2018  : 1960  [x]  Patient  Verified  Payor: MEAGAN GARCES / Plan: [id number] / Product Type: PPO /    In time:1025  Out time:1103  Total Treatment Time (min): 38  Total Timed Codes (min): 38  1:1 Treatment Time ( only): 38   Visit #: 5 of     Treatment Area: Lower back pain [M54.5]  Myofascial pain syndrome [M79.1]  Weakness [R53.1]    SUBJECTIVE  Pain Level (0-10 scale): 7/10  Any medication changes, allergies to medications, adverse drug reactions, diagnosis change, or new procedure performed?: [x] No    [] Yes (see summary sheet for update)  Subjective functional status/changes:   [] No changes reported  Pt stated that she is doing fair today    OBJECTIVE    38 min Therapeutic Exercise:  [x] See flow sheet :   Rationale: increase ROM and increase strength to improve the patients ability to increase ease with ADLs    With   [x] TE   [] TA   [] neuro   [] other: Patient Education: [x] Review HEP    [] Progressed/Changed HEP based on:   [] positioning   [] body mechanics   [] transfers   [] heat/ice application    [] other:      Other Objective/Functional Measures:   Pt stated that she had to miss last week due to finances  Had no complaint of increased pain during session     Pain Level (0-10 scale) post treatment: 7/10    ASSESSMENT/Changes in Function:   See progress note    Patient will continue to benefit from skilled PT services to modify and progress therapeutic interventions, address functional mobility deficits, address ROM deficits and address strength deficits to attain remaining goals. []  See Plan of Care  [x]  See progress note/recertification  []  See Discharge Summary         Progress towards goals / Updated goals:  Short Term Goals: To be accomplished in 1 weeks:  1. Pt will be compliant with initial HEP to improve therapy outcomes   Goal met.  18  Long Term Goals: To be accomplished in 6 weeks:  1. Pt will improve FOTO by 6 points in order to demonstrate functional improvement   Goal met. Increased from 56 to 63. 8/23/18  2. Pt will improve BUE/BLE strength by 1/2 MMT grade in order to improve ease of ADLs  Progressing: see below 8/23/18    Strength    L(0-5) /  8/23/18  R (0-5) / 8/23/18 N/T   Hip Flexion (L1,2) 4- / 4- 4- / 4 []   Knee Extension (L3,4) 4 / 4+ 3+ / 3+ []   Ankle Dorsiflexion (L4) 4 / 4+ 4 / 4+ []   Hip IR     []   Hip ER  3+ 3+ [x]   Knee Flexion (S1,2) 4 / 4+ 4 / 4 []   Shoulder Flexoin 4- / 4- 4- / 4 []   Shoulder Abduction 4- / 4- 4- / 4- []   Shoulder IR 4 / 4+ 4 / 4 []   Shoulder ER 4 / 4+ 4 / 4 []   Elbow Flexion 4 / 4+ 4 / 4+ []   Elbow Extension  4 / 4+ 4 / 4+ []     3. Pt will report 50% improvement in order to improve QOL  Progressing.  Pt reports 25% improvement since eval. 8/23/18    PLAN  []  Upgrade activities as tolerated     [x]  Continue plan of care  []  Update interventions per flow sheet       []  Discharge due to:_  []  Other:_      Ivana Hill PTA 8/23/2018  10:24 AM    Future Appointments  Date Time Provider Giovanni Segura   8/23/2018 10:30 AM Ivana Hill PTA [facility]   12/13/2018 10:00  Se Nallely Alexisy   12/13/2018 1:30  [facility], GERONIMO Dykes

## 2018-08-23 NOTE — PROGRESS NOTES
[facility] [address]  [address] [facility]  [phone] fax    Physical Therapy Progress Note  Patient name: Brenda Ramos Start of Care: 18   Referral source: Natalie Manzanares, * : 1960   Medical/Treatment Diagnosis: Lower back pain [M54.5]  Myofascial pain syndrome [M79.1]  Weakness [R53.1] Onset Date: with progressive decline      Prior Hospitalization: see medical history Provider#: [id number]   Medications: Verified on Patient Summary List    Comorbidities: depression, osteoporosis, arthritis, tobacco use (1 pack per day), asthma, GERD, restless leg syndrome, bipolar, COPD, peripheral vascular disease    Prior Level of Function: lives with  in a 1 story home with 4-5 steps to enter with B HR, R handed, retired from the [employer], activity tolerance     Visits from [place] of Care: 5    Missed Visits: 1    Established Goals:         Excellent           Good         Limited           None  [] Increased ROM   []  []  []  []  [x] Increased Strength  []  [x]  []  []  [] Increased Mobility  []  []  []  []   [] Decreased Pain   []  []  []  []  [] Decreased Swelling  []  []  []  []    Key Functional Changes: improving strength     Updated Goals: to be achieved in 4 weeks:  1. PT will improve B hip flexion strength to 4+/5, and right knee extension strength to 4-/5 in order to improve ease of prolonged ambulation   2. Pt will report 50% improvement in sx in order to improve QOL  3. Pt will demonstrate understanding/compliance with final HEP for continued progression independently upon DC    ASSESSMENT/RECOMMENDATIONS:  Pt is making slow, steady progress in therapy, meeting 2/4 initial goals and progressing towards strength and % improvement goals. Strength is slowly improving, with further strength progression limited by nearly 2 week absence from therapy d/t illness and finances.   Pt reports 25% improvement since initial evaluation, and increase in FOTO score is indicative of functional improvement. Pt will benefit from continued skilled PT to address remaining strength deficits with anticipated transition towards final HEP and DC within the next 3 weeks for continued progression independently upon DC. [x]Continue therapy per initial plan/protocol at a frequency of  2 x per week for 3 weeks  []Continue therapy with the following recommended changes:_____________________      _____________________________________________________________________  []Discontinue therapy progressing towards or have reached established goals  []Discontinue therapy due to lack of appreciable progress towards goals  []Discontinue therapy due to lack of attendance or compliance  []Await Physician's recommendations/decisions regarding therapy  []Other:________________________________________________________________    Thank you for this referral.   James Aviles, PT 8/23/2018 3:53 PM    NOTE TO PHYSICIAN:  [address] AND   [facility] [phone]  If you are unable to process this request in 24 hours please contact our office: [phone] I have read the above report and request that my patient continue as recommended. ? I have read the above report and request that my patient continue therapy with the following changes/special instructions:____________________________________  ? I have read the above report and request that my patient be discharged from therapy.     Physicians signature: ______________________________Date: ______Time:______

## 2018-08-28 ENCOUNTER — APPOINTMENT (OUTPATIENT)
Dept: PHYSICAL THERAPY | Age: 58
End: 2018-08-28
Payer: COMMERCIAL

## 2018-08-31 ENCOUNTER — HOSPITAL ENCOUNTER (OUTPATIENT)
Dept: PHYSICAL THERAPY | Age: 58
Discharge: HOME | End: 2018-08-31
Payer: COMMERCIAL

## 2018-08-31 PROCEDURE — 97110 THERAPEUTIC EXERCISES: CPT

## 2018-09-04 ENCOUNTER — HOSPITAL ENCOUNTER (OUTPATIENT)
Dept: PHYSICAL THERAPY | Age: 58
End: 2018-09-04

## 2018-09-05 NOTE — PROGRESS NOTES
[facility] [address] [facility] 
[phone] fax Physical Therapy Discharge Summary Patient name: Anne Marie Valero Start of Care: 18 Referral source: Genet Resendiz, * : 1960 Medical/Treatment Diagnosis: Lower back pain [M54.5] Myofascial pain syndrome [M79.1] Weakness [R53.1] Onset Date: with progressive decline Prior Hospitalization: see medical history Provider#: [id number] Medications: Verified on Patient Summary List   
Comorbidities: depression, osteoporosis, arthritis, tobacco use (1 pack per day), asthma, GERD, restless leg syndrome, bipolar, COPD, peripheral vascular disease  
 Prior Level of Function: lives with  in a 1 story home with 4-5 steps to enter with B HR, R handed, retired from the [employer], activity tolerance Visits from Start of Care:6    Missed Visits: 3 Reporting Period : 18 to 18 Summary of Care: 
Goal: PT will improve B hip flexion strength to 4+/5, and right knee extension strength to 4-/5 in order to improve ease of prolonged ambulation Status at last note/certification: Not assessed as pt returned for 1 session since last progress note Status at discharge: not met Goal: Pt will report 50% improvement in sx in order to improve QOL Status at last note/certification: Goal met. Pt reports 55-60% improvement Status at discharge: met Goal: Pt will demonstrate understanding/compliance with final HEP for continued progression independently upon DC Status at last note/certification: Goal met Status at discharge: met ASSESSMENT/RECOMMENDATIONS: 
Pt has made steady progress in therapy, meeting 2/3 updated goals. She returned to therapy for 1 session since last progress note before she woke up and her leg was hurting her on Saturday without injury. Pt is following up with MD d/t leg pain.   Spoke to patient on phone and she reports 55-60% improvement with therapy. She was compliant with HEP until recent leg pain. Pt would like to be DC at this time to follow up with her MD regarding LE pain and then to continue with HEP independently pending LE pain subsiding. [x]Discontinue therapy: [x]Patient has reached or is progressing toward set goals [x]Patient is non-compliant or has abdicated 
    []Due to lack of appreciable progress towards set goals Tamera Osullivan, PT 9/5/2018 12:17 PM

## 2018-09-06 ENCOUNTER — APPOINTMENT (OUTPATIENT)
Dept: PHYSICAL THERAPY | Age: 58
End: 2018-09-06

## 2018-09-11 ENCOUNTER — APPOINTMENT (OUTPATIENT)
Dept: PHYSICAL THERAPY | Age: 58
End: 2018-09-11

## 2018-09-13 ENCOUNTER — APPOINTMENT (OUTPATIENT)
Dept: PHYSICAL THERAPY | Age: 58
End: 2018-09-13

## 2018-09-18 ENCOUNTER — APPOINTMENT (OUTPATIENT)
Dept: PHYSICAL THERAPY | Age: 58
End: 2018-09-18

## 2018-09-20 ENCOUNTER — APPOINTMENT (OUTPATIENT)
Dept: PHYSICAL THERAPY | Age: 58
End: 2018-09-20

## 2018-09-25 ENCOUNTER — APPOINTMENT (OUTPATIENT)
Dept: PHYSICAL THERAPY | Age: 58
End: 2018-09-25

## 2018-09-27 ENCOUNTER — APPOINTMENT (OUTPATIENT)
Dept: PHYSICAL THERAPY | Age: 58
End: 2018-09-27

## 2018-10-25 ENCOUNTER — TELEPHONE (OUTPATIENT)
Dept: ENDOCRINOLOGY | Facility: CLINIC | Age: 58
End: 2018-10-25

## 2018-10-25 LAB
ANION GAP SERPL CALCULATED.3IONS-SCNC: 11 MMOL/L (ref 6–20)
BUN SERPL-MCNC: 26 MG/DL (ref 6–22)
BUN/CREATININE RATIO: 27.1 (ref 10–25)
CALCIUM SERPL-MCNC: 9.3 MG/DL (ref 8.5–10.5)
CHLORIDE SERPLBLD-SCNC: 107 MMOL/L (ref 99–110)
CHOLEST SERPL-MCNC: 213 MG/DL (ref 100–200)
CO2 SERPL-SCNC: 26 MMOL/L (ref 23–32)
CREAT SERPL-MCNC: 0.96 MG/DL (ref 0.6–1.1)
GFR SERPL CREATININE-BSD FRML MDRD: 59 ML/MIN/1.73M2
GLUCOSE SERPL-MCNC: 93 MG/DL (ref 70–100)
HDLC SERPL-MCNC: 55 MG/DL (ref 40–80)
LDL CHOLESTEROL: 142 MG/DL (ref 0–129)
POTASSIUM SERPL-SCNC: 4.2 MMOL/L (ref 3.5–5.3)
SODIUM SERPL-SCNC: 140 MMOL/L (ref 135–145)
TRIGL SERPL-MCNC: 78 MG/DL (ref 20–150)
VITAMIN D 25 HYDROXY (EXTERNAL): 25 (ref 30–100)

## 2018-10-25 NOTE — TELEPHONE ENCOUNTER
[facility] Call Center    Phone Message    May a detailed message be left on voicemail: yes    Reason for Call: Other: Pt states that her insurance called and said Dr. Herron authorization for pt has  in August. Pt request to have the authorization to be extended. Please call back pt for updates. Thanks.      Action Taken: Message routed to:  [facility] Thalia

## 2018-10-25 NOTE — TELEPHONE ENCOUNTER
Left message on Pts vm to please provide more detail on what it is she is wanting authorization extended for-

## 2018-10-28 NOTE — PROGRESS NOTES
PT DAILY TREATMENT NOTE - [facility]  Patient Name: Darron Puentes Date:2018 : 1960 [x]  Patient  Verified Payor: MEAGAN GARCES / Plan: [id number] / Product Type: PPO / In time: 1:32  Out time: 1:55 Total Treatment Time (min): 23 Total Timed Codes (min): 23 
1:1 Treatment Time (MC only): 1:45-1:55 (10) Visit #: 6 of  Treatment Area: Lower back pain [M54.5] Myofascial pain syndrome [M79.1] Weakness [R53.1] SUBJECTIVE Pain Level (0-10 scale): 10 Any medication changes, allergies to medications, adverse drug reactions, diagnosis change, or new procedure performed?: [x] No    [] Yes (see summary sheet for update) Subjective functional status/changes:   [] No changes reported I need to leave by 1:55 PM. I have to go pick my nephew up. OBJECTIVE 
 
23 (10) min Therapeutic Exercise:  [x] See flow sheet :  
Rationale: increase ROM and increase strength to improve the patients ability to increase ease with ADLs With 
 [x] TE 
 [] TA 
 [] neuro 
 [] other: Patient Education: [x] Review HEP [] Progressed/Changed HEP based on:  
[] positioning   [] body mechanics   [] transfers   [] heat/ice application   
[] other:   
 
Other Objective/Functional Measures:  
- Pt requests to leave early to  nephew - Pt provided with HEP today, will start prior to next visit. Pain Level (0-10 scale) post treatment: 4/10 ASSESSMENT/Changes in Function: Pt with good activity tolerance today. Limited session due to patient having to leave early. Will progress as able. Patient will continue to benefit from skilled PT services to modify and progress therapeutic interventions, address functional mobility deficits, address ROM deficits and address strength deficits to attain remaining goals. []  See Plan of Care [x]  See progress note/recertification 
[]  See Discharge Summary Updated Goals: to be achieved in 4 weeks: 1. PT will improve B hip flexion strength to 4+/5, and right knee extension strength to 4-/5 in order to improve ease of prolonged ambulation 2. Pt will report 50% improvement in sx in order to improve QOL 3. Pt will demonstrate understanding/compliance with final HEP for continued progression independently upon DC 
 
PLAN [x]  Upgrade activities as tolerated     [x]  Continue plan of care 
[]  Update interventions per flow sheet      
[]  Discharge due to:_ 
[]  Other:_   
 
LLOYD Rocha 8/31/2018  1:55 PM 
 
Future Appointments Date Time Provider Giovanni Segura 8/31/2018 1:30 PM [facility] PT [address] MMCPTPB [facility]  
9/4/2018 10:30 AM Vikki Velasquez, PT UPQXTAA [facility]  
9/6/2018 10:30 AM Evie Arms, PTA MMCPTPB [facility]  
9/11/2018 10:00 AM [facility] PT [address] MMCPTP[facility]  
9/13/2018 10:30 AM Evie Arms, PTA AMLTOVM [facility]  
9/18/2018 11:00 AM Evie Arms, PTA MMCPTPB [facility]  
9/20/2018 9:30 AM Evie Arms, PTA JGUUGML [facility]  
9/25/2018 10:30 AM Evie Arms, PTA KHTUIID [facility]  
9/27/2018 11:00 AM Vikki Velasquez, PT YJCRPHJ [facility]  
12/13/2018 10:00 AM [facility] NONIMAGING B[facility] JAYANT SCHED  
12/13/2018 1:30 PM GERONIMO Martinez  
 
 
 
 no

## 2018-11-19 ENCOUNTER — PATIENT OUTREACH (OUTPATIENT)
Dept: CARE COORDINATION | Facility: CLINIC | Age: 58
End: 2018-11-19

## 2018-11-20 ENCOUNTER — OFFICE VISIT (OUTPATIENT)
Dept: ENDOCRINOLOGY | Facility: CLINIC | Age: 58
End: 2018-11-20
Payer: COMMERCIAL

## 2018-11-20 VITALS
HEIGHT: 67 IN | WEIGHT: 162.5 LBS | BODY MASS INDEX: 25.51 KG/M2 | SYSTOLIC BLOOD PRESSURE: 100 MMHG | DIASTOLIC BLOOD PRESSURE: 55 MMHG | HEART RATE: 53 BPM

## 2018-11-20 DIAGNOSIS — N28.9 RENAL DYSFUNCTION: ICD-10-CM

## 2018-11-20 DIAGNOSIS — D35.2 PITUITARY ADENOMA (H): ICD-10-CM

## 2018-11-20 DIAGNOSIS — D35.2 PITUITARY ADENOMA (H): Primary | ICD-10-CM

## 2018-11-20 LAB
ALBUMIN UR-MCNC: NEGATIVE MG/DL
APPEARANCE UR: CLEAR
BACTERIA #/AREA URNS HPF: ABNORMAL /HPF
BILIRUB UR QL STRIP: NEGATIVE
CALCIUM SERPL-MCNC: 8.8 MG/DL (ref 8.5–10.1)
COLOR UR AUTO: ABNORMAL
FSH SERPL-ACNC: 28.9 IU/L
GH SERPL-MCNC: <0.1 UG/L
GLUCOSE UR STRIP-MCNC: NEGATIVE MG/DL
HGB UR QL STRIP: ABNORMAL
IGF-I BLD-MCNC: 144 NG/ML (ref 44–240)
KETONES UR STRIP-MCNC: NEGATIVE MG/DL
LEUKOCYTE ESTERASE UR QL STRIP: NEGATIVE
LH SERPL-ACNC: 11.6 IU/L
NITRATE UR QL: NEGATIVE
PH UR STRIP: 6 PH (ref 5–7)
PROLACTIN SERPL-MCNC: <1 UG/L (ref 3–27)
RBC #/AREA URNS AUTO: 1 /HPF (ref 0–2)
SOURCE: ABNORMAL
SP GR UR STRIP: 1.01 (ref 1–1.03)
SQUAMOUS #/AREA URNS AUTO: 1 /HPF (ref 0–1)
T4 FREE SERPL-MCNC: 1.01 NG/DL (ref 0.76–1.46)
TSH SERPL DL<=0.005 MIU/L-ACNC: 1.75 MU/L (ref 0.4–4)
UROBILINOGEN UR STRIP-MCNC: 0 MG/DL (ref 0–2)
WBC #/AREA URNS AUTO: <1 /HPF (ref 0–5)

## 2018-11-20 RX ORDER — ZOLMITRIPTAN 5 MG/1
5 TABLET, FILM COATED ORAL PRN
Refills: 0 | COMMUNITY
Start: 2018-10-23 | End: 2019-01-21

## 2018-11-20 RX ORDER — VIT C/E/ZN/COPPR/LUTEIN/ZEAXAN 60 MG-6 MG
1 CAPSULE ORAL DAILY
COMMUNITY
Start: 2018-10-24 | End: 2019-02-14

## 2018-11-20 ASSESSMENT — PAIN SCALES - GENERAL: PAINLEVEL: NO PAIN (0)

## 2018-11-20 NOTE — PROGRESS NOTES
[facility]    HPI   Ms. Casper is a 58 years old female who is s/p transphenoidal surgery 2x for a non-functioning pituitary macroadenoma.   I last saw Ms. Casper in 2014, and she saw Dr. Lujan in [address] in 2016 and 2017.  She has moved to [place] recently and is seeing me today for follow-up of her pituitary adenoma.  She was also seen by Dr. Hunter at [facility].       Briefly, Ms. Casper underwent a transphenoidal resection of a 1.4 cm sellar mass with suprasellar extension on 7/28/07.  Prior to surgery, patient reported occasional breast discharge.  Patient also reported headaches and amenorrhea for one year prior to the discovery of her macroadenoma.  There was no biochemical evidence of hormone hypersecretion or deficiency.  Pathology report suggested a non functioning macroadenoma.  Ms. Casper had a pituitary MRI which showed recurrence of the tumor.  Hormonal work-up was again negative, and the patient underwent a second transphenoidal procedure on 8/8/10.  Pathology report showed a pituitary adenoma.  After resection of the tumor, abdominal fat was packed into the sella.  There was a CSF leak, and a catheter was placed to control drainage.  Ms. Casper recovered extremely well.  After this surgery, Ms. Casper had an 1 mcg ACTH stimulation test to be done in [place] which was normal.  Baseline: 17.1   30 min: 19.8  60 min: 15.6    Her most recent MRI (May 2017) showed a stable 1-1.1cm lesion on the left aspect of the pituitary gland without compression.      She feels very well today and except for been concerned about renal function decline, she has no complains. Ms. Casper denies breast discharge or galactorrhea, increased urination, tremors, anxiety, palpitations, diarrhea or constipation. She is now in menopause.      Her previous history is significant for chronic migraines that would happen only once a month, on the day prior to the beginning of her  menstrual period.  Migraines have improved with menopause. She uses zolmitriptan as needed, but not on a regular basis.      Ms. Casper had an upper GI bleeding in .  She was on steroids at the time.  No endoscopic procedures were done. She was treated with IV and then oral Protonix.         Ms. Casper also has a history of vitamin D Deficiency and she is on vitamin D replacement.       Current Outpatient Prescriptions   Medication     multivitamin  with lutein (OCUVITE WITH LTEIN) CAPS per capsule     pramipexole (MIRAPEX) 1 MG tablet     PROPRANOLOL HCL PO     VITAMIN D, ERGOCALCIFEROL, PO     ZOLMitriptan (ZOMIG) 5 MG tablet     No current facility-administered medications for this visit.          ROS   Systemic symptoms: See HPI.  Eye symptoms: No eye symptoms.  Otolaryngeal symptoms: No otolaryngeal symptoms.  Breast symptoms: No breast symptoms.  Cardiovascular symptoms: No cardiovascular symptoms.  Pulmonary symptoms: No pulmonary symptoms.  Gastrointestinal symptoms:   See HPI.  Genitourinary symptoms:  per HPI.  Endocrine symptoms:  see HPI.  Musculoskeletal symptoms: No musculoskeletal symptoms.  Neurological symptoms: See HPI.  Skin symptoms: No skin symptoms.    Family Hx   Father  at age 85, CAD at age 59, HTN,  of pancreatic cancer  HTN in paternal grandparents  8 siblings, all of which have migraines. 68 yrs old sister with peritoneal cancer, 67 yrs old sister with migraines, 62 yrs old sister has lupus and borderline personality disorder, 61yrs old brother with addiction problems, 59 yrs old brother who is healthy, 56 yrs old brother that has severe seizures on his youth, okay now, 53 yrs old brother who has migraines as well.  Has 2 children, 28 years old son and 20 years old daughter, both healthy.  Breast cancer on maternal grandmother later in life, lived until age 94.    Personal Hx   No tobacco use.  No secondhand tobacco smoke in home.  2 kids, son graduated from  "college, and is working in [employer].    Worked as an  in a nursing home.  .  Ex- has depression.  Recently moved to [facility], living in [facility]   1. laparoscopic procedure for infertility  2. ehrlichiosis tick infection  3. GERD  4. depression  5. Pituitary macroadenoma s/p transphenoidal surgery 2x  6. Steroid replacement therapy  7. Upper GI bleeding.    Physical Exam   Vital signs:   /55  Pulse 53  Ht 1.702 m (5' 7\")  Wt 73.7 kg (162 lb 8 oz)  BMI 25.45 kg/m2  Estimated body mass index is 22.81 kg/(m^2) as calculated from the following:    Height as of 12/16/14: 1.727 m (5' 8\").    Weight as of 12/16/14: 68 kg (150 lb).  Gen:  alert, cooperative, NAD  HEENT:  PERRLA, EOMI, no gross visual field deficits, no conjunctival injection  Neck: supple, no LAD, no thyromegaly  CV: rrr, no m/r/g  Lung: ctab, no wheezes, rales, rhonchi  Extremities: non tender, no edema, no tremor out of the outstretched hands  Neuro: AAOx3, 2+ symmetric biceps and patellar reflexes, no focal weakness  Skin: no icterus.  Psych: appropriate mood and affect    Results   Image and lab results were reviewed with the patient      Assessment and Plan:  Ms. Casper is a 58 yrs old female patient with a non-functional pituitary tumor status post surgical resection x2     1. Non-functioning pituitary macroadenoma:  Ms. Casper has a non-functional pituitary macroadenoma.  She underwent two transnasal laparoscopic resections, the last one in August 2010.  Recent imaging was considered unchanged from previous. I will plan to review it with  Neurosurgery.  The patient is feeling very well and there is no evidence of hypofunction of the pituitary gland.  Mrs. Casper is aware of symptoms of pituitary hyper- and hypofunction and she was again advised to call in the unlikely situation that she develops symptoms related to pituitary dysfunction.   - We will obtain TSH, Free T4, ACTH, LH, FSH, GH, " IGF-1 and PRL today.  - RTC in 1 year or sooner if needed     2. Vitamin D Deficiency: Pt had documented vitamin D deficiency.  - advise to continue Vit D 1000 to 2000 IU PO daily, particularly during the winter months.  We will check vitamin D levels and calcium metabolism today.       3. History of migraines and headaches: this has significantly improved with menopause.      4. Renal function decline:  Ms. Casper has been losing renal function slowly.  No workup was done.  She is not on NSAIDS and does not seem to be exposed to any nephrotoxic agents.  She denies history of kidney stones.  No family history of kidney dysfunction. We will obtain an UA and a kidney US and refer to nephrology if needed.    She also would like to establish care with a PCP in our system, and I will try to facilitate that for her.  I will contact the patient with the test results and I will see her back in clinic in 1 year or sooner if needed.      Orders Placed This Encounter   Procedures     US Renal Complete     Adrenal corticotropin     Arginine vasopressin hormone     Follicle stimulating hormone     Lutropin     Human growth hormone     Prolactin     Insulin Growth Factor 1 by Immunoassay     TSH     T4 free     25 Hydroxyvitamin D2 and D3     Calcium     Routine UA with micro reflex to culture        Farida Herron MD MS PhD    Division of Endocrinology and Diabetes.

## 2018-11-20 NOTE — MR AVS SNAPSHOT
After Visit Summary   11/20/2018    Katie Casper    MRN: [id number]           Patient Information     Date Of Birth          1960        Visit Information        Provider Department      11/20/2018 10:00 AM MARAH Herron MD [facility]        Today's Diagnoses     Pituitary adenoma (H)    -  1    Renal dysfunction           Follow-ups after your visit        Follow-up notes from your care team     Return in about 1 year (around 11/20/2019).      Your next 10 appointments already scheduled     Nov 20, 2018 11:15 AM CST   LAB with  LAB   [facility] ([facility])    [address] [place] [id number] [phone]           Please do not eat 10-12 hours before your appointment if you are coming in fasting for labs on lipids, cholesterol, or glucose (sugar). This does not apply to pregnant women. Water, hot tea and black coffee (with nothing added) are okay. Do not drink other fluids, diet soda or chew gum.            Nov 21, 2018  6:45 AM CST   US RENAL COMPLETE with [facility] [place] [address] Imaging Center US ([facility])    [address] [place] [id number] [phone]           How do I prepare for my exam? (Food and drink instructions) No Food and Drink Restrictions.  How do I prepare for my exam? (Other instructions) You do not need to do anything special to prepare for your exam.  What should I wear: Wear comfortable clothes.  How long does the exam take: Most ultrasounds take 30 to 60 minutes.  What should I bring: Bring a list of your medicines, including vitamins, minerals and over-the-counter drugs. It is safest to leave personal items at home.  Do I need a :  No  is needed.  What do I need to tell my doctor: Tell your doctor about any allergies you may have.  What should I do after the exam: No restrictions, You may resume normal activities.  What is  this test: An ultrasound uses sound waves to make pictures of the body. Sound waves do not cause pain. The only discomfort may be the pressure of the wand against your skin or full bladder.  Who should I call with questions: If you have any questions, please call the Imaging Department where you will have your exam. Directions, parking instructions, and other information is available on our website, [email or web address].              Future tests that were ordered for you today     Open Standing Orders        Priority Remaining Interval Expires Ordered    TSH Routine 2/2 6/18/2019 11/20/2018          Open Future Orders        Priority Expected Expires Ordered    US Renal Complete Routine  11/20/2019 11/20/2018    Routine UA with micro reflex to culture Routine  11/20/2019 11/20/2018    Adrenal corticotropin Routine  11/20/2019 11/20/2018    Arginine vasopressin hormone Routine  11/20/2019 11/20/2018    Follicle stimulating hormone Routine  11/20/2019 11/20/2018    Lutropin Routine  11/20/2019 11/20/2018    Human growth hormone Routine  11/20/2019 11/20/2018    Prolactin Routine  11/20/2019 11/20/2018    Insulin Growth Factor 1 by Immunoassay Routine  11/20/2019 11/20/2018    T4 free Routine  11/20/2019 11/20/2018    25 Hydroxyvitamin D2 and D3 Routine  11/20/2019 11/20/2018    Calcium Routine  11/20/2019 11/20/2018            Who to contact     Please call your clinic at [phone] to:    Ask questions about your health    Make or cancel appointments    Discuss your medicines    Learn about your test results    Speak to your doctor            Additional Information About Your Visit        [employer] Information     [employer] gives you secure access to your electronic health record. If you see a primary care provider, you can also send messages to your care team and make appointments. If you have questions, please call your primary care clinic.  If you do not have a primary care provider, please call [phone] and  "they will assist you.      [employer] is an electronic gateway that provides easy, online access to your medical records. With [employer], you can request a clinic appointment, read your test results, renew a prescription or communicate with your care team.     To access your existing account, please contact your [facility] or call [phone] for assistance.        Care EveryWhere ID     This is your Care EveryWhere ID. This could be used by other organizations to access your [facility] medical records  [id number]        Your Vitals Were     Pulse Height BMI (Body Mass Index)             53 1.702 m (5' 7\") 25.45 kg/m2          Blood Pressure from Last 3 Encounters:   11/20/18 100/55   12/16/14 114/69   12/16/14 114/69    Weight from Last 3 Encounters:   11/20/18 73.7 kg (162 lb 8 oz)   12/16/14 68 kg (150 lb)   12/16/14 68 kg (150 lb)               Primary Care Provider Office Phone # Fax #    Amanda MARCH [facility] [phone]       [facility] [address] [facility] [address]        Equal Access to Services     [facility]: Hadii aad ku hadasho Soomaali, waaxda luqadaha, qaybta kaalmada adeegyada, waxay idiin hayaan iris belcher . So [facility] [phone].    ATENCIÓN: Si habla español, tiene a bianchi disposición servicios gratuitos de asistencia lingüística. Llame al [phone].    We comply with applicable federal civil rights laws and [place] laws. We do not discriminate on the basis of race, color, national origin, age, disability, sex, sexual orientation, or gender identity.            Thank you!     Thank you for choosing [facility] ENDOCRINOLOG[facility]  for your care. Our goal is always to provide you with excellent care. Hearing back from our patients is one way we can continue to improve our services. Please take a few minutes to complete the written survey that you may receive in the mail after your visit with us. Thank you!             Your Updated Medication List " - Protect others around you: Learn how to safely use, store and throw away your medicines at www.disposemymeds.org.          This list is accurate as of 11/20/18 10:23 AM.  Always use your most recent med list.                   Brand Name Dispense Instructions for use Diagnosis    MIRAPEX 1 MG tablet   Generic drug:  pramipexole      Take 1 mg by mouth At Bedtime        multivitamin  with lutein Caps per capsule      Take 1 capsule by mouth daily        PROPRANOLOL HCL PO      Take 160 mg by mouth daily        VITAMIN D (ERGOCALCIFEROL) PO      Take 1 tablet by mouth daily        ZOLMitriptan 5 MG tablet    ZOMIG     Take 5 mg by mouth as needed

## 2018-11-20 NOTE — LETTER
11/20/2018       RE: Katie Casper  [address] [facility] [id number]     Dear Colleague,    Thank you for referring your patient, Katie Casper, to the [facility]. Please see a copy of my visit note below.     Endocrinology and Diabetes Outpatient Clinic    HPI   Ms. Casper is a 58 years old female who is s/p transphenoidal surgery 2x for a non-functioning pituitary macroadenoma.   I last saw Ms. Casper in 2014, and she saw Dr. Lujan in [address] in 2016 and 2017.  She has moved to [place] recently and is seeing me today for follow-up of her pituitary adenoma.  She was also seen by Dr. Hunter at [facility].       Briefly, Ms. Casper underwent a transphenoidal resection of a 1.4 cm sellar mass with suprasellar extension on 7/28/07.  Prior to surgery, patient reported occasional breast discharge.  Patient also reported headaches and amenorrhea for one year prior to the discovery of her macroadenoma.  There was no biochemical evidence of hormone hypersecretion or deficiency.  Pathology report suggested a non functioning macroadenoma.  Ms. Casper had a pituitary MRI which showed recurrence of the tumor.  Hormonal work-up was again negative, and the patient underwent a second transphenoidal procedure on 8/8/10.  Pathology report showed a pituitary adenoma.  After resection of the tumor, abdominal fat was packed into the sella.  There was a CSF leak, and a catheter was placed to control drainage.  Ms. Casper recovered extremely well.  After this surgery, Ms. Casper had an 1 mcg ACTH stimulation test to be done in [place] which was normal.  Baseline: 17.1   30 min: 19.8  60 min: 15.6    Her most recent MRI (May 2017) showed a stable 1-1.1cm lesion on the left aspect of the pituitary gland without compression.      She feels very well today and except for been concerned about renal function decline, she has no complains. Ms.  Tremayne denies breast discharge or galactorrhea, increased urination, tremors, anxiety, palpitations, diarrhea or constipation. She is now in menopause.      Her previous history is significant for chronic migraines that would happen only once a month, on the day prior to the beginning of her menstrual period.  Migraines have improved with menopause. She uses zolmitriptan as needed, but not on a regular basis.      Ms. Casper had an upper GI bleeding in .  She was on steroids at the time.  No endoscopic procedures were done. She was treated with IV and then oral Protonix.         Ms. Casper also has a history of vitamin D Deficiency and she is on vitamin D replacement.       Current Outpatient Prescriptions   Medication     multivitamin  with lutein (OCUVITE WITH LTEIN) CAPS per capsule     pramipexole (MIRAPEX) 1 MG tablet     PROPRANOLOL HCL PO     VITAMIN D, ERGOCALCIFEROL, PO     ZOLMitriptan (ZOMIG) 5 MG tablet     No current facility-administered medications for this visit.          ROS   Systemic symptoms: See HPI.  Eye symptoms: No eye symptoms.  Otolaryngeal symptoms: No otolaryngeal symptoms.  Breast symptoms: No breast symptoms.  Cardiovascular symptoms: No cardiovascular symptoms.  Pulmonary symptoms: No pulmonary symptoms.  Gastrointestinal symptoms:   See HPI.  Genitourinary symptoms:  per HPI.  Endocrine symptoms:  see HPI.  Musculoskeletal symptoms: No musculoskeletal symptoms.  Neurological symptoms: See HPI.  Skin symptoms: No skin symptoms.    Family Hx   Father  at age 85, CAD at age 59, HTN,  of pancreatic cancer  HTN in paternal grandparents  8 siblings, all of which have migraines. 68 yrs old sister with peritoneal cancer, 67 yrs old sister with migraines, 62 yrs old sister has lupus and borderline personality disorder, 61yrs old brother with addiction problems, 59 yrs old brother who is healthy, 56 yrs old brother that has severe seizures on his youth, okay now, 53  "yrs old brother who has migraines as well.  Has 2 children, 28 years old son and 20 years old daughter, both healthy.  Breast cancer on maternal grandmother later in life, lived until age 94.    Personal Hx   No tobacco use.  No secondhand tobacco smoke in home.  2 kids, son graduated from college, and is working in [employer].    Worked as an  in a nursing home.  .  Ex- has depression.  Recently moved to [facility], living in [facility]   1. laparoscopic procedure for infertility  2. ehrlichiosis tick infection  3. GERD  4. depression  5. Pituitary macroadenoma s/p transphenoidal surgery 2x  6. Steroid replacement therapy  7. Upper GI bleeding.    Physical Exam   Vital signs:   /55  Pulse 53  Ht 1.702 m (5' 7\")  Wt 73.7 kg (162 lb 8 oz)  BMI 25.45 kg/m2  Estimated body mass index is 22.81 kg/(m^2) as calculated from the following:    Height as of 12/16/14: 1.727 m (5' 8\").    Weight as of 12/16/14: 68 kg (150 lb).  Gen:  alert, cooperative, NAD  HEENT:  PERRLA, EOMI, no gross visual field deficits, no conjunctival injection  Neck: supple, no LAD, no thyromegaly  CV: rrr, no m/r/g  Lung: ctab, no wheezes, rales, rhonchi  Extremities: non tender, no edema, no tremor out of the outstretched hands  Neuro: AAOx3, 2+ symmetric biceps and patellar reflexes, no focal weakness  Skin: no icterus.  Psych: appropriate mood and affect    Results   Image and lab results were reviewed with the patient      Assessment and Plan:  Ms. Casper is a 58 yrs old female patient with a non-functional pituitary tumor status post surgical resection x2     1. Non-functioning pituitary macroadenoma:  Ms. Casper has a non-functional pituitary macroadenoma.  She underwent two transnasal laparoscopic resections, the last one in August 2010.  Recent imaging was considered unchanged from previous. I will plan to review it with  Neurosurgery.  The patient is feeling very well and there is no " evidence of hypofunction of the pituitary gland.  Mrs. Casper is aware of symptoms of pituitary hyper- and hypofunction and she was again advised to call in the unlikely situation that she develops symptoms related to pituitary dysfunction.   - We will obtain TSH, Free T4, ACTH, LH, FSH, GH, IGF-1 and PRL today.  - RTC in 1 year or sooner if needed     2. Vitamin D Deficiency: Pt had documented vitamin D deficiency.  - advise to continue Vit D 1000 to 2000 IU PO daily, particularly during the winter months.  We will check vitamin D levels and calcium metabolism today.       3. History of migraines and headaches: this has significantly improved with menopause.      4. Renal function decline:  Ms. Casper has been losing renal function slowly.  No workup was done.  She is not on NSAIDS and does not seem to be exposed to any nephrotoxic agents.  She denies history of kidney stones.  No family history of kidney dysfunction. We will obtain an UA and a kidney US and refer to nephrology if needed.    She also would like to establish care with a PCP in our system, and I will try to facilitate that for her.  I will contact the patient with the test results and I will see her back in clinic in 1 year or sooner if needed.      Orders Placed This Encounter   Procedures     US Renal Complete     Adrenal corticotropin     Arginine vasopressin hormone     Follicle stimulating hormone     Lutropin     Human growth hormone     Prolactin     Insulin Growth Factor 1 by Immunoassay     TSH     T4 free     25 Hydroxyvitamin D2 and D3     Calcium     Routine UA with micro reflex to culture        Farida Herron MD MS PhD    Division of Endocrinology and Diabetes.

## 2018-11-21 ENCOUNTER — RADIANT APPOINTMENT (OUTPATIENT)
Dept: ULTRASOUND IMAGING | Facility: CLINIC | Age: 58
End: 2018-11-21
Attending: INTERNAL MEDICINE
Payer: COMMERCIAL

## 2018-11-21 DIAGNOSIS — N28.9 RENAL DYSFUNCTION: ICD-10-CM

## 2018-11-21 DIAGNOSIS — D35.2 PITUITARY ADENOMA (H): ICD-10-CM

## 2018-11-21 LAB — ACTH PLAS-MCNC: 15 PG/ML

## 2018-11-23 LAB
DEPRECATED CALCIDIOL+CALCIFEROL SERPL-MC: <37 UG/L (ref 20–75)
VITAMIN D2 SERPL-MCNC: <5 UG/L
VITAMIN D3 SERPL-MCNC: 32 UG/L

## 2018-11-25 LAB — VASOPRESSIN SERPL-MCNC: <0.5 PG/ML (ref 0–6.9)

## 2018-11-26 ENCOUNTER — TELEPHONE (OUTPATIENT)
Dept: INTERNAL MEDICINE | Facility: CLINIC | Age: 58
End: 2018-11-26

## 2018-11-26 NOTE — TELEPHONE ENCOUNTER
Dear Norma;     Please see note from Dr. Herron and add Ms. Casper onto my schedule     Thanks, LEE Del Castillo            Previous Messages       ----- Message -----      From: MARAH Herron MD      Sent: 11/20/2018   6:11 PM        To: MD Jadiel Stuart Arnaud Hawkins is a delightful lady who moved to [place] to the [facility] area, and needs a PCP (here on in ).  I believe you are not taking new pts.  Any suggestions?   Thanks and happy Thanksgiving.   Farida Herron MD PhD      Division of Endocrinology and Diabetes          Left message for pt to call back.  Norma Hernandez RN 3:08 PM on 11/26/2018.

## 2018-11-26 NOTE — TELEPHONE ENCOUNTER
[facility] Call Center    Phone Message    May a detailed message be left on voicemail: yes    Reason for Call: Other: Pt called back to schedule an appointment with Dr. Del Castillo. I scheduled her for the next available appointment on 02/11. If pt was needing to be seen sooner for any reason please give her a call back to discuss.     Action Taken: Message routed to:  [facility]     Left message for pt to call back.  Norma Hernandez RN 8:45 AM on 11/27/2018.

## 2018-11-27 NOTE — TELEPHONE ENCOUNTER
[facility] Call Center    Phone Message    May a detailed message be left on voicemail: yes    Reason for Call: Other: Pt is at work so hard tro answer cell - she is on break right now so if you could call in the next half hour she will try to answer. If she doesn't pick the first time, please call right back as she will be taking the phone to somewhere she can talk,.. Call her cell #. Thanks     Action Taken: Message routed to:  [facility] med      Pt called and appt changed to Dec 19th at 7:05am.  Norma Hernandez RN 8:37 AM on 11/28/2018.

## 2018-12-19 ENCOUNTER — OFFICE VISIT (OUTPATIENT)
Dept: INTERNAL MEDICINE | Facility: CLINIC | Age: 58
End: 2018-12-19
Payer: MEDICAID

## 2018-12-19 VITALS
BODY MASS INDEX: 25.37 KG/M2 | WEIGHT: 162 LBS | OXYGEN SATURATION: 100 % | TEMPERATURE: 97.9 F | HEART RATE: 52 BPM | SYSTOLIC BLOOD PRESSURE: 107 MMHG | DIASTOLIC BLOOD PRESSURE: 58 MMHG | RESPIRATION RATE: 16 BRPM

## 2018-12-19 DIAGNOSIS — D35.2 PITUITARY ADENOMA (H): ICD-10-CM

## 2018-12-19 DIAGNOSIS — D35.2 PITUITARY ADENOMA (H): Primary | ICD-10-CM

## 2018-12-19 DIAGNOSIS — Z28.9 VACCINATION DELAY: ICD-10-CM

## 2018-12-19 DIAGNOSIS — N28.9 RENAL DYSFUNCTION: ICD-10-CM

## 2018-12-19 DIAGNOSIS — Z12.83 SKIN CANCER SCREENING: ICD-10-CM

## 2018-12-19 DIAGNOSIS — R93.7 ABNORMAL BONE DENSITY SCREENING: ICD-10-CM

## 2018-12-19 LAB
ALBUMIN SERPL-MCNC: 3.6 G/DL (ref 3.4–5)
ALP SERPL-CCNC: 80 U/L (ref 40–150)
ALT SERPL W P-5'-P-CCNC: 27 U/L (ref 0–50)
ANION GAP SERPL CALCULATED.3IONS-SCNC: 5 MMOL/L (ref 3–14)
AST SERPL W P-5'-P-CCNC: 22 U/L (ref 0–45)
BASOPHILS # BLD AUTO: 0 10E9/L (ref 0–0.2)
BASOPHILS NFR BLD AUTO: 0.9 %
BILIRUB SERPL-MCNC: 0.4 MG/DL (ref 0.2–1.3)
BUN SERPL-MCNC: 21 MG/DL (ref 7–30)
CALCIUM SERPL-MCNC: 8.7 MG/DL (ref 8.5–10.1)
CHLORIDE SERPL-SCNC: 107 MMOL/L (ref 94–109)
CO2 SERPL-SCNC: 28 MMOL/L (ref 20–32)
CREAT SERPL-MCNC: 0.9 MG/DL (ref 0.52–1.04)
CREAT UR-MCNC: 32 MG/DL
DIFFERENTIAL METHOD BLD: NORMAL
EOSINOPHIL # BLD AUTO: 0.1 10E9/L (ref 0–0.7)
EOSINOPHIL NFR BLD AUTO: 2.8 %
ERYTHROCYTE [DISTWIDTH] IN BLOOD BY AUTOMATED COUNT: 13.1 % (ref 10–15)
GFR SERPL CREATININE-BSD FRML MDRD: 70 ML/MIN/{1.73_M2}
GLUCOSE SERPL-MCNC: 90 MG/DL (ref 70–99)
HCT VFR BLD AUTO: 40 % (ref 35–47)
HGB BLD-MCNC: 13.1 G/DL (ref 11.7–15.7)
IMM GRANULOCYTES # BLD: 0 10E9/L (ref 0–0.4)
IMM GRANULOCYTES NFR BLD: 0.2 %
LYMPHOCYTES # BLD AUTO: 1.3 10E9/L (ref 0.8–5.3)
LYMPHOCYTES NFR BLD AUTO: 30.8 %
MCH RBC QN AUTO: 29.5 PG (ref 26.5–33)
MCHC RBC AUTO-ENTMCNC: 32.8 G/DL (ref 31.5–36.5)
MCV RBC AUTO: 90 FL (ref 78–100)
MONOCYTES # BLD AUTO: 0.4 10E9/L (ref 0–1.3)
MONOCYTES NFR BLD AUTO: 8.3 %
NEUTROPHILS # BLD AUTO: 2.5 10E9/L (ref 1.6–8.3)
NEUTROPHILS NFR BLD AUTO: 57 %
NRBC # BLD AUTO: 0 10*3/UL
NRBC BLD AUTO-RTO: 0 /100
PLATELET # BLD AUTO: 193 10E9/L (ref 150–450)
POTASSIUM SERPL-SCNC: 4.2 MMOL/L (ref 3.4–5.3)
PROT SERPL-MCNC: 6.6 G/DL (ref 6.8–8.8)
PROT UR-MCNC: <0.05 G/L
PROT/CREAT 24H UR: NORMAL G/G CR (ref 0–0.2)
RBC # BLD AUTO: 4.44 10E12/L (ref 3.8–5.2)
SODIUM SERPL-SCNC: 140 MMOL/L (ref 133–144)
TSH SERPL DL<=0.005 MIU/L-ACNC: 1.92 MU/L (ref 0.4–4)
WBC # BLD AUTO: 4.3 10E9/L (ref 4–11)

## 2018-12-19 RX ORDER — ESTRADIOL 2 MG/1
RING VAGINAL
Refills: 2 | COMMUNITY
Start: 2018-09-13 | End: 2019-01-21

## 2018-12-19 ASSESSMENT — PAIN SCALES - GENERAL: PAINLEVEL: NO PAIN (0)

## 2018-12-19 NOTE — NURSING NOTE
Chief Complaint   Patient presents with     Establish Care     Pt is here to establish a new PCP.      Lety Lee LPN at 7:02 AM on 12/19/2018.

## 2018-12-19 NOTE — PROGRESS NOTES
HPI:    Pt. Comes into establish care today. [place] She has h/o non-functioning pituitary adenoma s/p resection. She is followed by Dr. Herron, Endocrine and last seen 11/20/2018 with lab tests. She declines influenza vaccine but will get Tdap. She does not smoke nor abuse EtOH. She remains active. She has h/o migraines stable on medications. She has fam. Hx. Migraines. She would like a dermatology evaluation for skin check. No other new HEENT, cardiopulmonary, abdominal, , GYN, neurological complaints.    PE:    Vitals noted gen nad cooperative alert, HEENT, oropharynx clear no exudate, neck supple nl rom, LCTA, B good air movement, RRR, S1, S2, no MRG, abdomen, no acute findings. Grossly normal neurological exam    A/P:    1. Pituitary adenoma. Seen in Endo by Dr. Herron 11/20/2018 with lab tests. She had outside MRI imaging 6/18/2018. Referred back to Dr. Hunter; neurosurgery  2. She has some concerns about possible altered renal function. She had normal renal U/S study 11/21/2018. Will check urine protein and get cystain C lab test and follow up  3. Referred to Dermatology for skin check  4. Referred to GYN for use of estring  5. Will order DEXA scan for baseline bone density.  6. Referred to optho for eye exam (also given h/o pituitary surgery)   7. Migraines; she is on daily Propanolol and PRN Zomig  8. She declines influenza vaccination but Tdap today  9. Mammogram done outside 6/7/2018  10. Colonoscopy done outside (in [facility]) 9/18/2013 but not noted when to repeat  11. Lipids done 10/25/2018;  and HDL 55      Total time spent 45  minutes.  More than 50% of the time spent with Ms. Casper on counseling / coordinating her care    I will see her back in one month

## 2018-12-19 NOTE — LETTER
Patient:  Katie Casper  :   1960  MRN:     [id number]        Ms.Teresa ANCA Casper  [facility] [address]        2018    Dear ,    We are writing to inform you of your test results.      Resulted Orders   Cystatin C with GFR   Result Value Ref Range    Lab Scanned Result CYSTATIN C GFR-Scanned (A)    TSH   Result Value Ref Range    TSH 1.92 0.40 - 4.00 mU/L       Lab  The kidney filtration, estimated by the other method we discussed (Cystain C) is 73 ml/min/1.73m2.  This is basically the same we obtained from the serum creatinine measurements, and thus suggest some initial loss of renal function.  It was a pleasure to see you at your recent visit.  Please let me know if you have any questions or concerns.  Sincerely,    Farida Herron MD PhD    Division of Endocrinology and Diabetes

## 2018-12-19 NOTE — PATIENT INSTRUCTIONS
[facility] Medication Refill Request Information:  * Please contact your pharmacy regarding ANY request for medication refills.  ** [facility] Pres[facility] Fax = [phone]  * Please allow 3 business days for routine medication refills.  * Please allow 5 business days for controlled substance medication refills.     [facility] Test Result notification information:  *You will be notified with in 7-10 days of your appointment day regarding the results of your test.  If you are on MyChart you will be notified as soon as the provider has reviewed the results and signed off on them.    [facility]: [phone]

## 2018-12-19 NOTE — LETTER
Patient:  Katie Casper  :   1960  MRN:     [id number]        Ms.Katie Casper  [address] [address]        January 10, 2019    Dear ,    Vasopressin and TSH levels are normal.  GFR (glomerular filtration rate, a marker of kidney function) based on Cystain C is 73. This is similar to the value we obtained based on serum creatinine measurement, and indicate mild reduction on kidney filtering capacity (normal is >90).   As there was some blood in the urine in your last urine collection, I suggest we repeat that.  Also please feel free to discuss with Dr. Del Castillo if any additional workup is needed at this time.         Resulted Orders   Cystatin C with GFR   Result Value Ref Range    Lab Scanned Result CYSTATIN C GFR-Scanned (A)    Arginine vasopressin hormone   Result Value Ref Range    Vasopressin 6.3 0.0 - 6.9 pg/mL      Comment:      (Note)  INTERPRETIVE INFORMATION: Arginine Vasopressin Hormone  Test developed and characteristics determined by [facility]. See Compliance Statement D: [facility]  Performed by [facility],  [address] [place] [id number] [phone]  [email or web address], Darell Doe MD, Lab. Director     TSH   Result Value Ref Range    TSH 1.92 0.40 - 4.00 mU/L       It was a pleasure to see you at your recent visit.  Please let me know if you have any questions or concerns.  Sincerely,    Farida Herron MD PhD    Division of Endocrinology and Diabetes

## 2018-12-21 LAB — LAB SCANNED RESULT: ABNORMAL

## 2018-12-26 LAB — VASOPRESSIN SERPL-MCNC: 6.3 PG/ML (ref 0–6.9)

## 2019-01-03 ENCOUNTER — OFFICE VISIT (OUTPATIENT)
Dept: DERMATOLOGY | Facility: CLINIC | Age: 59
End: 2019-01-03
Payer: COMMERCIAL

## 2019-01-03 DIAGNOSIS — L82.1 SEBORRHEIC KERATOSIS: ICD-10-CM

## 2019-01-03 DIAGNOSIS — L81.4 SOLAR LENTIGO: ICD-10-CM

## 2019-01-03 DIAGNOSIS — D23.9 DERMAL NEVUS: Primary | ICD-10-CM

## 2019-01-03 ASSESSMENT — PAIN SCALES - GENERAL: PAINLEVEL: NO PAIN (0)

## 2019-01-03 NOTE — PATIENT INSTRUCTIONS
If the lesion on the left neck area does not resolve/improve in a 6-8 weeks, please let us know and we would see you back in clinic to consider a biopsy.  Otherwise, you can RTC in 1 year for a regular skin check.

## 2019-01-03 NOTE — LETTER
1/3/2019       RE: Katie Casper  [address]  [facility] [address]     Dear Colleague,    Thank you for referring your patient, Katie Casper, to the [facility] DERMATOLOGY at [facility]. Please see a copy of my visit note below.    [facility] Dermatology Note      Dermatology Problem List:  1. Suspected Dermal nevus over left trapezius  -Biopsy if lesion doesn't improve, gets bigger or bleeds (patient will let us know in ~6-8 weeks)  2. Solar lentigenes  3. SKs    Encounter Date: Vick 3, 2019    CC:   Chief Complaint   Patient presents with     Skin Check     Katie is here today for a skin check. No major concerns          History of Present Illness:  Ms. Katie Casper is a 58 year old female who presents for evaluation of a skin check.   She has no lesions of specific concern other than a spot she hasaround her left neck, which she says she noticed a few days ago. It is not painful. She has scratched at the lesion vs irritated by her necklace.  The patient has no other painful, growing, changing, or bleeding skin lesions.  She otherwise feels well and no other health related concerns at this time.    Past Medical History:   Patient Active Problem List   Diagnosis     Pituitary adenoma (H)     History reviewed. No pertinent past medical history.  History reviewed. No pertinent surgical history.    Social History:  Patient reports that  has never smoked. she has never used smokeless tobacco.    Family History:  Family History   Problem Relation Age of Onset     Anesthesia Reaction Maternal Half-Brother      FH of NMSC in mother  No history of melanoma    Medications:  Current Outpatient Medications   Medication Sig Dispense Refill     ESTRING 2 MG vaginal ring INSERT 1 RING VAGINALLY EVERY 3 MONTHS  2     multivitamin  with lutein (OCUVITE WITH LTEIN) CAPS per capsule Take 1 capsule by mouth daily       pramipexole (MIRAPEX) 1 MG tablet Take 1  mg by mouth At Bedtime       PROPRANOLOL HCL PO Take 160 mg by mouth daily        VITAMIN D, ERGOCALCIFEROL, PO Take 1 tablet by mouth daily       ZOLMitriptan (ZOMIG) 5 MG tablet Take 5 mg by mouth as needed  0        Allergies   Allergen Reactions     Sulfa Drugs Rash         Review of Systems:  -As per HPI  -Constitutional: Otherwise feeling well today, in usual state of health.  -HEENT: Patient denies nonhealing oral sores.  -Skin: As above in HPI. No additional skin concerns.    Physical exam:  Vitals: There were no vitals taken for this visit.  GEN: This is a well developed, well-nourished female in no acute distress, in a pleasant mood.    SKIN: Total skin excluding the undergarment areas was performed. The exam included the head/face, neck, both arms, chest, back, abdomen, both legs, digits and/or nails.   -Scattered brown macules on sun exposed areas.  -There are a few waxy stuck on tan to brown papules on the upper back.  -L trapezius with ~3 mm pink to skin colored papule with overlying crust; and under dermoscopy there are comma-shaped vessels seen  -No other lesions of concern on areas examined.     Impression/Plan:  1. Suspected Dermal nevus over left trapezius- Suspect benign etiology most likely but as there is overlying crust cannot completely exclude other etiology, ie BCC (though suspect this is less likely).  -Biopsy if lesion doesn't improve, gets bigger or bleeds (patient will let us know in ~6-8 weeks)    2. Solar lentigines    Benign etiology explained      3. Seborrheic keratosis, non irritated    Seborrheic keratosis: Discussed benign nature of lesions.    CC Arnaud Del Castillo MD  [address] [address] on close of this encounter.    Follow-up in 1 year, earlier for new or changing lesions. Will schedule patient sooner if lesion over L trapezius not improved.    Dr. Adorno staffed the patient.    Staff Involved:  Resident(Jamil Parmar)/Staff    Jamil  RONDA Parmar MD    I, Ruth Adorno MD, saw this patient with the resident and agree with the resident s findings and plan of care as documented in the resident s note.

## 2019-01-03 NOTE — NURSING NOTE
Dermatology Rooming Note    Katie Casper's goals for this visit include:   Chief Complaint   Patient presents with     Skin Check     Katie is here today for a skin check. No major concerns      TIFFANIE Leach

## 2019-01-03 NOTE — PROGRESS NOTES
[facility] Dermatology Note      Dermatology Problem List:  1. Suspected Dermal nevus over left trapezius  -Biopsy if lesion doesn't improve, gets bigger or bleeds (patient will let us know in ~6-8 weeks)  2. Solar lentigenes  3. SKs    Encounter Date: Vick 3, 2019    CC:   Chief Complaint   Patient presents with     Skin Check     Katie is here today for a skin check. No major concerns          History of Present Illness:  Ms. Katie Casper is a 58 year old female who presents for evaluation of a skin check.   She has no lesions of specific concern other than a spot she hasaround her left neck, which she says she noticed a few days ago. It is not painful. She has scratched at the lesion vs irritated by her necklace.  The patient has no other painful, growing, changing, or bleeding skin lesions.  She otherwise feels well and no other health related concerns at this time.    Past Medical History:   Patient Active Problem List   Diagnosis     Pituitary adenoma (H)     History reviewed. No pertinent past medical history.  History reviewed. No pertinent surgical history.    Social History:  Patient reports that  has never smoked. she has never used smokeless tobacco.    Family History:  Family History   Problem Relation Age of Onset     Anesthesia Reaction Maternal Half-Brother      FH of NMSC in mother  No history of melanoma    Medications:  Current Outpatient Medications   Medication Sig Dispense Refill     ESTRING 2 MG vaginal ring INSERT 1 RING VAGINALLY EVERY 3 MONTHS  2     multivitamin  with lutein (OCUVITE WITH LTEIN) CAPS per capsule Take 1 capsule by mouth daily       pramipexole (MIRAPEX) 1 MG tablet Take 1 mg by mouth At Bedtime       PROPRANOLOL HCL PO Take 160 mg by mouth daily        VITAMIN D, ERGOCALCIFEROL, PO Take 1 tablet by mouth daily       ZOLMitriptan (ZOMIG) 5 MG tablet Take 5 mg by mouth as needed  0        Allergies   Allergen Reactions     Sulfa Drugs Rash          Review of Systems:  -As per HPI  -Constitutional: Otherwise feeling well today, in usual state of health.  -HEENT: Patient denies nonhealing oral sores.  -Skin: As above in HPI. No additional skin concerns.    Physical exam:  Vitals: There were no vitals taken for this visit.  GEN: This is a well developed, well-nourished female in no acute distress, in a pleasant mood.    SKIN: Total skin excluding the undergarment areas was performed. The exam included the head/face, neck, both arms, chest, back, abdomen, both legs, digits and/or nails.   -Scattered brown macules on sun exposed areas.  -There are a few waxy stuck on tan to brown papules on the upper back.  -L trapezius with ~3 mm pink to skin colored papule with overlying crust; and under dermoscopy there are comma-shaped vessels seen  -No other lesions of concern on areas examined.     Impression/Plan:  1. Suspected Dermal nevus over left trapezius- Suspect benign etiology most likely but as there is overlying crust cannot completely exclude other etiology, ie BCC (though suspect this is less likely).  -Biopsy if lesion doesn't improve, gets bigger or bleeds (patient will let us know in ~6-8 weeks)    2. Solar lentigines    Benign etiology explained      3. Seborrheic keratosis, non irritated    Seborrheic keratosis: Discussed benign nature of lesions.    CC Arnaud Del Castillo MD  [address] [address] on close of this encounter.  Follow-up in 1 year, earlier for new or changing lesions. Will schedule patient sooner if lesion over L trapezius not improved.      Dr. Adorno staffed the patient.    Staff Involved:  Resident(Jamil Parmar)/Staff  .I, Ruth Adorno MD, saw this patient with the resident and agree with the resident s findings and plan of care as documented in the resident s note.

## 2019-01-07 ASSESSMENT — ANXIETY QUESTIONNAIRES
7. FEELING AFRAID AS IF SOMETHING AWFUL MIGHT HAPPEN: NOT AT ALL
5. BEING SO RESTLESS THAT IT IS HARD TO SIT STILL: NOT AT ALL
GAD7 TOTAL SCORE: 1
3. WORRYING TOO MUCH ABOUT DIFFERENT THINGS: SEVERAL DAYS
GAD7 TOTAL SCORE: 1
6. BECOMING EASILY ANNOYED OR IRRITABLE: NOT AT ALL
2. NOT BEING ABLE TO STOP OR CONTROL WORRYING: NOT AT ALL
4. TROUBLE RELAXING: NOT AT ALL
1. FEELING NERVOUS, ANXIOUS, OR ON EDGE: NOT AT ALL
7. FEELING AFRAID AS IF SOMETHING AWFUL MIGHT HAPPEN: NOT AT ALL

## 2019-01-09 ASSESSMENT — ANXIETY QUESTIONNAIRES: GAD7 TOTAL SCORE: 1

## 2019-01-10 ENCOUNTER — OFFICE VISIT (OUTPATIENT)
Dept: PHARMACY | Facility: CLINIC | Age: 59
End: 2019-01-10
Payer: COMMERCIAL

## 2019-01-10 ENCOUNTER — OFFICE VISIT (OUTPATIENT)
Dept: OBGYN | Facility: CLINIC | Age: 59
End: 2019-01-10
Attending: OPTOMETRIST
Payer: COMMERCIAL

## 2019-01-10 VITALS
SYSTOLIC BLOOD PRESSURE: 114 MMHG | WEIGHT: 165.3 LBS | HEART RATE: 76 BPM | DIASTOLIC BLOOD PRESSURE: 70 MMHG | BODY MASS INDEX: 25.89 KG/M2

## 2019-01-10 DIAGNOSIS — Z12.4 CERVICAL CANCER SCREENING: ICD-10-CM

## 2019-01-10 DIAGNOSIS — Z83.518 FAMILY HISTORY OF MACULAR DEGENERATION: ICD-10-CM

## 2019-01-10 DIAGNOSIS — Z11.3 ROUTINE SCREENING FOR STI (SEXUALLY TRANSMITTED INFECTION): ICD-10-CM

## 2019-01-10 DIAGNOSIS — N95.2 ATROPHIC VAGINITIS: Primary | ICD-10-CM

## 2019-01-10 DIAGNOSIS — Z01.419 ENCOUNTER FOR GYNECOLOGICAL EXAMINATION WITHOUT ABNORMAL FINDING: Primary | ICD-10-CM

## 2019-01-10 DIAGNOSIS — Z12.39 BREAST CANCER SCREENING: ICD-10-CM

## 2019-01-10 DIAGNOSIS — G25.81 RESTLESS LEGS SYNDROME (RLS): ICD-10-CM

## 2019-01-10 DIAGNOSIS — G43.909 MIGRAINE WITHOUT STATUS MIGRAINOSUS, NOT INTRACTABLE, UNSPECIFIED MIGRAINE TYPE: ICD-10-CM

## 2019-01-10 DIAGNOSIS — E56.9 VITAMIN DEFICIENCY: ICD-10-CM

## 2019-01-10 PROBLEM — F41.9 ANXIETY AND DEPRESSION: Status: ACTIVE | Noted: 2017-11-01

## 2019-01-10 PROBLEM — E55.9 VITAMIN D DEFICIENCY: Status: ACTIVE | Noted: 2018-10-24

## 2019-01-10 PROBLEM — F32.A ANXIETY AND DEPRESSION: Status: ACTIVE | Noted: 2017-11-01

## 2019-01-10 PROCEDURE — 99607 MTMS BY PHARM ADDL 15 MIN: CPT | Performed by: PHARMACIST

## 2019-01-10 PROCEDURE — 87624 HPV HI-RISK TYP POOLED RSLT: CPT | Performed by: ADVANCED PRACTICE MIDWIFE

## 2019-01-10 PROCEDURE — 87591 N.GONORRHOEAE DNA AMP PROB: CPT | Performed by: ADVANCED PRACTICE MIDWIFE

## 2019-01-10 PROCEDURE — 99605 MTMS BY PHARM NP 15 MIN: CPT | Performed by: PHARMACIST

## 2019-01-10 PROCEDURE — G0145 SCR C/V CYTO,THINLAYER,RESCR: HCPCS | Performed by: ADVANCED PRACTICE MIDWIFE

## 2019-01-10 PROCEDURE — 87491 CHLMYD TRACH DNA AMP PROBE: CPT | Performed by: ADVANCED PRACTICE MIDWIFE

## 2019-01-10 RX ORDER — ESTRADIOL 10 UG/1
10 INSERT VAGINAL
Qty: 24 TABLET | Refills: 3 | Status: SHIPPED | OUTPATIENT
Start: 2019-01-10 | End: 2019-02-14

## 2019-01-10 RX ORDER — MULTIVIT-MIN/IRON/FOLIC ACID/K 18-600-40
1 CAPSULE ORAL AT BEDTIME
COMMUNITY

## 2019-01-10 ASSESSMENT — PAIN SCALES - GENERAL: PAINLEVEL: NO PAIN (0)

## 2019-01-10 NOTE — LETTER
"1/10/2019       RE: Katie Casper  [address]  [facility] [address]     Dear Colleague,    Thank you for referring your patient, Katie Casper, to the [facility] at [facility]. Please see a copy of my visit note below.          Progress Note    SUBJECTIVE:  Katie Casper is an 58 year old  , who requests a breast and pelvic exam  And to establish care at [facility].    Patient is followed by Magdalene for primary care.      Concerns today include:   - Has been using Estring on/off for the past 1.5 years with good results for atrophic vaginitis.  Has had it out for ~2 weeks since her refill is now ~$150 dollars. Agreeable to meet with Dr. An today to discuss options for hormone therapy.    - Taking Multivitamin with luetin \"only sometimes\" since she is unsure if it causing her more migraines.   -  interested in Medical nutristionist.   - Recently started a new romantic/sexual relationship - feels safe.  No history of emotional ,physical, sexually unsafe situations.   - Moved from [facility] as her family lives here (mother, her kids)    - C/section x2  male,  female  - Pt's sister had Ovarian cancer with negative BRACA testing. Pt hasn't had any testing done.   - Her Maternal Grandmother had breast cancer in her 70s    Menstrual History: Post menopausal.  Last vaginal bleeding 1-2 years ago.  No spotting, no postcoital bleeding.  No spotting with E string.     Pap smears noted in   Last  PAP noted  NIL with NO HPV done in care everywhere           Pap noted  NIL with NO HPV done in care everywhere  History of abnormal Pap smear: NO - age 30-65 PAP every 5 years with negative HPV co-testing recommended.   Reviewed recommended Pap today as no co testing was done in .     Mammogram current: yes. BIRADS 2 2018 in care everywhere    HISTORY:  Prescription Medications as of 1/10/2019       Rx Number Disp Refills " Start End Last Dispensed Date Next Fill Date Owning Pharmacy    ESTRING 2 MG vaginal ring   2 2018        Sig: INSERT 1 RING VAGINALLY EVERY 3 MONTHS    Class: Historical    multivitamin  with lutein (OCUVITE WITH LTEIN) CAPS per capsule    10/24/2018        Sig: Take 1 capsule by mouth daily    Class: Historical    Route: Oral    pramipexole (MIRAPEX) 1 MG tablet            Sig: Take 1 mg by mouth At Bedtime    Class: Historical    Route: Oral    PROPRANOLOL HCL PO            Sig: Take 160 mg by mouth daily     Class: Historical    Route: Oral    estradiol (VAGIFEM) 10 MCG TABS vaginal tablet  24 tablet 3 1/10/2019 1/10/2020   [id number] [place] [address]    Sig: Place 1 tablet (10 mcg) vaginally twice a week    Class: E-Prescribe    Route: Vaginal    Vitamin D, Cholecalciferol, 1000 units TABS            Sig: Take 1 tablet by mouth daily    Class: Historical    Route: Oral    ZOLMitriptan (ZOMIG) 5 MG tablet   0 10/23/2018        Sig: Take 5 mg by mouth as needed    Class: Historical    Route: Oral        Allergies   Allergen Reactions     Sulfa Drugs Rash     Immunization History   Administered Date(s) Administered     Influenza Vaccine IM 3yrs+ 4 Valent IIV4 10/13/2014     TDAP Vaccine (Boostrix) 2008, 2018       Obstetric History       T2      L2     SAB0   TAB0   Ectopic0   Multiple0   Live Births0      Past Medical History:   Diagnosis Date     Anemia      Chronic kidney disease 2018    labs     Hx of previous reproductive problem      Past Surgical History:   Procedure Laterality Date      SECTION        SECTION       ENT SURGERY  pituitary tumor removal through nose     Family History   Problem Relation Age of Onset     Anesthesia Reaction Maternal Half-Brother      Other Cancer Sister         Ovarian cancer - negative BRACCA     Substance Abuse Brother         no longer living     Breast Cancer Maternal  Grandmother         in 70s     Social History     Socioeconomic History     Marital status:      Spouse name: None     Number of children: None     Years of education: None     Highest education level: None   Social Needs     Financial resource strain: None     Food insecurity - worry: None     Food insecurity - inability: None     Transportation needs - medical: None     Transportation needs - non-medical: None   Occupational History     None   Tobacco Use     Smoking status: Never Smoker     Smokeless tobacco: Never Used   Substance and Sexual Activity     Alcohol use: No     Drug use: No     Sexual activity: Yes     Partners: Male     Birth control/protection: Post-menopausal   Other Topics Concern     None   Social History Narrative     None       ROS    ROS: 10 point ROS neg other than the symptoms noted above in the HPI.      EXAM:  Blood pressure 114/70, pulse 76, weight 75 kg (165 lb 4.8 oz), not currently breastfeeding. Body mass index is 25.89 kg/m .  General appearance: Pleasant female in no acute distress.     BREAST EXAM:  Breast: Without visible skin changes. No dimpling or lesions seen.   Breasts supple, non-tender with palpation, no dominant mass, nodularity, or nipple discharge noted bilaterally. Axillary nodes negative.      PELVIC EXAM:  EG/BUS: Normal genital architecture without lesions, erythema or abnormal secretions Bartholin's, Urethra, [place]'s normal   Urethral meatus: normal   Urethra: no masses, tenderness, or scarring   Bladder: no masses or tenderness   Vagina: moist, slightly atrophic no secetions  Cervix: slightly atrophic, moist, closed, without lesion or CMT  Uterus: anteverted,  and small, smooth, firm, mobile w/o pain  Adnexa: Within normal limits and No masses, nodularity, tenderness  Rectum:hemorrhoids small external       ASSESSMENT:  Encounter Diagnoses   Name Primary?     Cervical cancer screening      Breast cancer screening      Routine screening for STI (sexually  transmitted infection)      Encounter for gynecological examination without abnormal finding Yes      58 year old Female Pelvic and Breast Exam  HRT Surveillance    PLAN:   Orders Placed This Encounter   Procedures     Pelvic and Breast Exam Procedure []     Obtaining, preparing and conveyance of cervical or vaginal smear to laboratory.     MA Screening Digital Bilateral     Pap imaged thin layer screen with HPV - recommended age 30 - 65 years (select HPV order below)     HPV High Risk Types DNA Cervical       - Plan to meet with Dr. An today to discuss HT and medications to talk about insurance and HT to conitnue.   - Pap with HPV co testing today.    - Continue annual well woman care  - Return in one year/PRN for preventive care or problems/concerns.     Verbalized understanding and agreement with visit plan.    Nicole FERNANEDZ, CNM      Answers for HPI/ROS submitted by the patient on 1/7/2019   BONITA 7 TOTAL SCORE: 1  General Symptoms: No  Skin Symptoms: No  HENT Symptoms: No  EYE SYMPTOMS: No  HEART SYMPTOMS: No  LUNG SYMPTOMS: No  INTESTINAL SYMPTOMS: No  URINARY SYMPTOMS: No  GYNECOLOGIC SYMPTOMS: No  BREAST SYMPTOMS: No  SKELETAL SYMPTOMS: No  BLOOD SYMPTOMS: No  NERVOUS SYSTEM SYMPTOMS: No  MENTAL HEALTH SYMPTOMS: No

## 2019-01-10 NOTE — PROGRESS NOTES
SUBJECTIVE/OBJECTIVE:                           Katie Casper is a 58 year old female coming in for an initial visit for Medication Therapy Management.  She was referred to me from Nicole Adamson CNM.    Chief Complaint: estrogen and vitamin questions.    Allergies/ADRs: Reviewed in Epic  Tobacco: No tobacco use  Alcohol: not currently using  PMH: Reviewed in Epic    Medication Adherence/Access:  no issues reported    Postmenopausal vaginal atrophy: has been on estring for ~1year with significant relief of vaginal discomfort.  Did not pick it up last time due to $150 cost.  Vaginal estradiol creams worsened migraines;  Is open to options that may be less expensive  Migraines:  Lifetime problem; states everyone in her family has migraines.  Use of zolmitriptan varies 1-3 times per week;   Has never been able to identify clear dietary triggers after years of trying.  Takes propranolol alternating 120mg up to 160mg daily for prevention -- states this has been helpful.  Believes her current level of control is very manageable.  Is educated on risk of medication overuse headache  RLS: currently controlled with pramipexole 1 mg at bedtime  Family h/o macular degeneration: States this is present in her mom and multiple older siblings.  Was prescribed Ocuvite with Lutein by her eye doctor, but feels it might be worsening her migraines.    Vitamin D deficiency:  Currently on vitamin D 1000 units daily, which raised her level from 25 to 32.      BP Readings from Last 3 Encounters:   01/10/19 114/70   12/19/18 107/58   11/20/18 100/55     Current Outpatient Medications   Medication Sig Dispense Refill     estradiol (VAGIFEM) 10 MCG TABS vaginal tablet Place 1 tablet (10 mcg) vaginally twice a week 24 tablet 3     ESTRING 2 MG vaginal ring INSERT 1 RING VAGINALLY EVERY 3 MONTHS  2     multivitamin  with lutein (OCUVITE WITH LTEIN) CAPS per capsule Take 1 capsule by mouth daily       pramipexole (MIRAPEX) 1 MG tablet Take 1  mg by mouth At Bedtime       PROPRANOLOL HCL PO Take 160 mg by mouth daily        Vitamin D, Cholecalciferol, 1000 units TABS Take 1 tablet by mouth daily       ZOLMitriptan (ZOMIG) 5 MG tablet Take 5 mg by mouth as needed  0         ASSESSMENT:                             Current medications were reviewed today.     Medication Adherence: good, no issues identified    Postmenopausal vaginal atrophy: Reviewed what is likely her insurance drug formulary, and generic vaginal estrogen tablets are likely to be less expensive for her.  She can try these and see if she likes;  Making sure they don't contribute to migraines.    Migraines: Good control currently;  Is likely maxed out on propranolol since she has had pulses in the 50's in the past.  RLS: stable  Family h/o macular generation: Reviewed the components of Ocuvite with Lutein.  Very difficult to know if that if any component of the vitamin may be contributing.  May benefit from trying other AREDS2 fomulations.    Vitamin D deficiency:  stable    PLAN:                            1. For your postmenopausal vaginal symptoms - we discussed that the estradiol vaginal tablets are likely to be less expensive for you.  An order for this was sent to your pharmacy.  If these worsen your headaches at all, you can return to the estring;   Please let us know what you decide so that we can keep your medication list up to date.    2. For reducing the risk of macular degeneration -- I gave you handouts from the [employer] [place] on the AREDS2 study.  You could consider switching to an AREDS2 vitamin, such as Preservision, and see if this is less likely to cause headaches.          I spent 20 minutes with this patient today. All changes were made via verbal approval with Nicole Adamson CNM. A copy of the visit note was provided to the patient's referring provider.    Will follow up as needed.    The patient was sent via [employer] a summary of these recommendations as an after  visit summary.         Dara An, Pharm.D., BCPS

## 2019-01-10 NOTE — PROGRESS NOTES
"  Progress Note    SUBJECTIVE:  Katie Casper is an 58 year old  , who requests a breast and pelvic exam  And to establish care at [facility].    Patient is followed by Magdalene for primary care.      Concerns today include:   - Has been using Estring on/off for the past 1.5 years with good results for atrophic vaginitis.  Has had it out for ~2 weeks since her refill is now ~$150 dollars. Agreeable to meet with Dr. An today to discuss options for hormone therapy.    - Taking Multivitamin with luetin \"only sometimes\" since she is unsure if it causing her more migraines.   -  interested in Medical nutristionist.   - Recently started a new romantic/sexual relationship - feels safe.  No history of emotional ,physical, sexually unsafe situations.   - Moved from [facility] as her family lives here (mother, her kids)    - C/section x2  male,  female  - Pt's sister had Ovarian cancer with negative BRACA testing. Pt hasn't had any testing done.   - Her Maternal Grandmother had breast cancer in her 70s    Menstrual History: Post menopausal.  Last vaginal bleeding 1-2 years ago.  No spotting, no postcoital bleeding.  No spotting with E string.     Pap smears noted in   Last  PAP noted  NIL with NO HPV done in care everywhere           Pap noted  NIL with NO HPV done in care everywhere  History of abnormal Pap smear: NO - age 30-65 PAP every 5 years with negative HPV co-testing recommended.   Reviewed recommended Pap today as no co testing was done in .     Mammogram current: yes. BIRADS 2 2018 in care everywhere    HISTORY:  Prescription Medications as of 1/10/2019       Rx Number Disp Refills Start End Last Dispensed Date Next Fill Date Owning Pharmacy    ESTRING 2 MG vaginal ring   2 2018        Sig: INSERT 1 RING VAGINALLY EVERY 3 MONTHS    Class: Historical    multivitamin  with lutein (OCUVITE WITH LTEIN) CAPS per capsule    10/24/2018        Sig: Take 1 capsule by mouth daily "    Class: Historical    Route: Oral    pramipexole (MIRAPEX) 1 MG tablet            Sig: Take 1 mg by mouth At Bedtime    Class: Historical    Route: Oral    PROPRANOLOL HCL PO            Sig: Take 160 mg by mouth daily     Class: Historical    Route: Oral    estradiol (VAGIFEM) 10 MCG TABS vaginal tablet  24 tablet 3 1/10/2019 1/10/2020   [facility] [id number] [place] [id number]    Sig: Place 1 tablet (10 mcg) vaginally twice a week    Class: E-Prescribe    Route: Vaginal    Vitamin D, Cholecalciferol, 1000 units TABS            Sig: Take 1 tablet by mouth daily    Class: Historical    Route: Oral    ZOLMitriptan (ZOMIG) 5 MG tablet   0 10/23/2018        Sig: Take 5 mg by mouth as needed    Class: Historical    Route: Oral        Allergies   Allergen Reactions     Sulfa Drugs Rash     Immunization History   Administered Date(s) Administered     Influenza Vaccine IM 3yrs+ 4 Valent IIV4 10/13/2014     TDAP Vaccine (Boostrix) 2008, 2018       Obstetric History       T2      L2     SAB0   TAB0   Ectopic0   Multiple0   Live Births0      Past Medical History:   Diagnosis Date     Anemia 2006     Chronic kidney disease 2018    labs     Hx of previous reproductive problem      Past Surgical History:   Procedure Laterality Date      SECTION        SECTION       ENT SURGERY  pituitary tumor removal through nose     Family History   Problem Relation Age of Onset     Anesthesia Reaction Maternal Half-Brother      Other Cancer Sister         Ovarian cancer - negative BRACCA     Substance Abuse Brother         no longer living     Breast Cancer Maternal Grandmother         in 70s     Social History     Socioeconomic History     Marital status:      Spouse name: None     Number of children: None     Years of education: None     Highest education level: None   Social Needs     Financial resource strain: None     Food insecurity - worry: None      Food insecurity - inability: None     Transportation needs - medical: None     Transportation needs - non-medical: None   Occupational History     None   Tobacco Use     Smoking status: Never Smoker     Smokeless tobacco: Never Used   Substance and Sexual Activity     Alcohol use: No     Drug use: No     Sexual activity: Yes     Partners: Male     Birth control/protection: Post-menopausal   Other Topics Concern     None   Social History Narrative     None       ROS    ROS: 10 point ROS neg other than the symptoms noted above in the HPI.      EXAM:  Blood pressure 114/70, pulse 76, weight 75 kg (165 lb 4.8 oz), not currently breastfeeding. Body mass index is 25.89 kg/m .  General appearance: Pleasant female in no acute distress.     BREAST EXAM:  Breast: Without visible skin changes. No dimpling or lesions seen.   Breasts supple, non-tender with palpation, no dominant mass, nodularity, or nipple discharge noted bilaterally. Axillary nodes negative.      PELVIC EXAM:  EG/BUS: Normal genital architecture without lesions, erythema or abnormal secretions Bartholin's, Urethra, [place]'s normal   Urethral meatus: normal   Urethra: no masses, tenderness, or scarring   Bladder: no masses or tenderness   Vagina: moist, slightly atrophic no secetions  Cervix: slightly atrophic, moist, closed, without lesion or CMT  Uterus: anteverted,  and small, smooth, firm, mobile w/o pain  Adnexa: Within normal limits and No masses, nodularity, tenderness  Rectum:hemorrhoids small external       ASSESSMENT:  Encounter Diagnoses   Name Primary?     Cervical cancer screening      Breast cancer screening      Routine screening for STI (sexually transmitted infection)      Encounter for gynecological examination without abnormal finding Yes      58 year old Female Pelvic and Breast Exam  HRT Surveillance    PLAN:   Orders Placed This Encounter   Procedures     Pelvic and Breast Exam Procedure []     Obtaining, preparing and conveyance  of cervical or vaginal smear to laboratory.     MA Screening Digital Bilateral     Pap imaged thin layer screen with HPV - recommended age 30 - 65 years (select HPV order below)     HPV High Risk Types DNA Cervical       - Plan to meet with Dr. An today to discuss HT and medications to talk about insurance and HT to yulia.   - Pap with HPV co testing today.    - Continue annual well woman care  - Return in one year/PRN for preventive care or problems/concerns.     Verbalized understanding and agreement with visit plan.    Nicole FERNANDEZ, FRED      Answers for HPI/ROS submitted by the patient on 1/7/2019   BONITA 7 TOTAL SCORE: 1  General Symptoms: No  Skin Symptoms: No  HENT Symptoms: No  EYE SYMPTOMS: No  HEART SYMPTOMS: No  LUNG SYMPTOMS: No  INTESTINAL SYMPTOMS: No  URINARY SYMPTOMS: No  GYNECOLOGIC SYMPTOMS: No  BREAST SYMPTOMS: No  SKELETAL SYMPTOMS: No  BLOOD SYMPTOMS: No  NERVOUS SYSTEM SYMPTOMS: No  MENTAL HEALTH SYMPTOMS: No

## 2019-01-10 NOTE — PATIENT INSTRUCTIONS
Recommendations from today's MTM visit:                                                    MTM (medication therapy management) is a service provided by a clinical pharmacist designed to help you get the most of out of your medicines.     1. For your postmenopausal vaginal symptoms - we discussed that the estradiol vaginal tablets are likely to be less expensive for you.  An order for this was sent to your pharmacy.  If these worsen your headaches at all, you can return to the estring;   Please let us know what you decide so that we can keep your medication list up to date.    2. For reducing the risk of macular degeneration -- I gave you handouts from the [employer] [facility] [place] on the AREDS2 study.  You could consider switching to an AREDS2 vitamin, such as Preservision, and see if this is less likely to cause headaches.        Next MTM visit: as needed    To schedule another MTM appointment, please call the clinic directly or you may call the MTM scheduling line at [phone] or toll-free at [phone].     My Clinical Pharmacist's contact information:                                                      It was a pleasure talking with you today!  Please feel free to contact me with any questions or concerns you have.      Dara nA, Pharm.D., [place].  Phone:  [phone]      You may receive a survey about the MTM services you received.  I would appreciate your feedback to help me serve you better in the future. Please fill it out and return it when you can. Your comments will be anonymous.

## 2019-01-11 LAB
C TRACH DNA SPEC QL NAA+PROBE: NEGATIVE
N GONORRHOEA DNA SPEC QL NAA+PROBE: NEGATIVE
SPECIMEN SOURCE: NORMAL
SPECIMEN SOURCE: NORMAL

## 2019-01-13 PROBLEM — H52.12 MYOPIA WITH PRESBYOPIA OF LEFT EYE: Status: ACTIVE | Noted: 2017-01-30

## 2019-01-13 PROBLEM — H52.4 MYOPIA WITH PRESBYOPIA OF LEFT EYE: Status: ACTIVE | Noted: 2017-01-30

## 2019-01-13 PROBLEM — H35.363 DRUSEN OF MACULA OF BOTH EYES: Status: ACTIVE | Noted: 2017-01-30

## 2019-01-14 LAB
COPATH REPORT: ABNORMAL
PAP: ABNORMAL

## 2019-01-16 ENCOUNTER — TELEPHONE (OUTPATIENT)
Dept: OBGYN | Facility: CLINIC | Age: 59
End: 2019-01-16

## 2019-01-16 LAB
FINAL DIAGNOSIS: ABNORMAL
HPV HR 12 DNA CVX QL NAA+PROBE: POSITIVE
HPV16 DNA SPEC QL NAA+PROBE: NEGATIVE
HPV18 DNA SPEC QL NAA+PROBE: NEGATIVE
SPECIMEN DESCRIPTION: ABNORMAL
SPECIMEN SOURCE CVX/VAG CYTO: ABNORMAL

## 2019-01-16 NOTE — TELEPHONE ENCOUNTER
Noted LSIL pap with +HR HPV (negaitve 16/18) with no noted history of abnormal.  Voicemail left for pt to call back to discuss lab results. Needs colpo.   Nicole FERNANDEZ, CNM

## 2019-01-16 NOTE — TELEPHONE ENCOUNTER
Received callback from Horace. Gave her all lab results including pap/hpv and need for colposcopy. Educated on what HPV is, what her result means, and what colposcopy is. informed her to abstain from sexual intercourse or anything else in the vagina for the 24 hours prior to the procedure. Can take tylenol one hour before procedure for mild discomfort. She indicated understanding and agreed with plan. She was forwarded to  to schedule.

## 2019-01-21 ENCOUNTER — OFFICE VISIT (OUTPATIENT)
Dept: INTERNAL MEDICINE | Facility: CLINIC | Age: 59
End: 2019-01-21
Payer: COMMERCIAL

## 2019-01-21 VITALS
HEART RATE: 54 BPM | WEIGHT: 163.4 LBS | OXYGEN SATURATION: 98 % | BODY MASS INDEX: 25.59 KG/M2 | RESPIRATION RATE: 20 BRPM | SYSTOLIC BLOOD PRESSURE: 98 MMHG | DIASTOLIC BLOOD PRESSURE: 60 MMHG

## 2019-01-21 DIAGNOSIS — Z12.31 VISIT FOR SCREENING MAMMOGRAM: ICD-10-CM

## 2019-01-21 DIAGNOSIS — Z12.11 SPECIAL SCREENING FOR MALIGNANT NEOPLASMS, COLON: Primary | ICD-10-CM

## 2019-01-21 DIAGNOSIS — Z11.59 NEED FOR HEPATITIS C SCREENING TEST: ICD-10-CM

## 2019-01-21 DIAGNOSIS — Z12.11 SCREEN FOR COLON CANCER: ICD-10-CM

## 2019-01-21 DIAGNOSIS — G43.C0 PERIODIC HEADACHE SYNDROME, NOT INTRACTABLE: ICD-10-CM

## 2019-01-21 DIAGNOSIS — Z12.11 SPECIAL SCREENING FOR MALIGNANT NEOPLASMS, COLON: ICD-10-CM

## 2019-01-21 DIAGNOSIS — E78.00 HIGH CHOLESTEROL: ICD-10-CM

## 2019-01-21 LAB
ALBUMIN UR-MCNC: NEGATIVE MG/DL
APPEARANCE UR: ABNORMAL
BACTERIA #/AREA URNS HPF: ABNORMAL /HPF
BILIRUB UR QL STRIP: NEGATIVE
CHOLEST SERPL-MCNC: 211 MG/DL
COLOR UR AUTO: YELLOW
GLUCOSE UR STRIP-MCNC: NEGATIVE MG/DL
HCV AB SERPL QL IA: NONREACTIVE
HDLC SERPL-MCNC: 55 MG/DL
HGB UR QL STRIP: ABNORMAL
KETONES UR STRIP-MCNC: NEGATIVE MG/DL
LDLC SERPL CALC-MCNC: 137 MG/DL
LEUKOCYTE ESTERASE UR QL STRIP: ABNORMAL
MUCOUS THREADS #/AREA URNS LPF: PRESENT /LPF
NITRATE UR QL: NEGATIVE
NONHDLC SERPL-MCNC: 156 MG/DL
PH UR STRIP: 6 PH (ref 5–7)
RBC #/AREA URNS AUTO: 4 /HPF (ref 0–2)
SOURCE: ABNORMAL
SP GR UR STRIP: 1.01 (ref 1–1.03)
SQUAMOUS #/AREA URNS AUTO: 5 /HPF (ref 0–1)
TRIGL SERPL-MCNC: 99 MG/DL
UROBILINOGEN UR STRIP-MCNC: 0 MG/DL (ref 0–2)
WBC #/AREA URNS AUTO: 4 /HPF (ref 0–5)

## 2019-01-21 RX ORDER — ZOLMITRIPTAN 5 MG/1
5 TABLET, FILM COATED ORAL PRN
Qty: 9 TABLET | Refills: 3 | Status: SHIPPED | OUTPATIENT
Start: 2019-01-21 | End: 2019-11-10

## 2019-01-21 ASSESSMENT — PAIN SCALES - GENERAL: PAINLEVEL: NO PAIN (0)

## 2019-01-21 NOTE — PROGRESS NOTES
HPI:    Pt. Comes in for follow up today. She has moved from [facility] recently. She has h/o non-functioning pituitary adenoma s/p resection. She is followed by Dr. Herron, Endocrine and last seen 11/20/2018 with lab tests. She declines influenza vaccine but will get Tdap. She does not smoke nor abuse EtOH. She remains active. She has h/o migraines stable on medications. She has fam. Hx. Migraines. She would like a dermatology evaluation for skin check. No other new HEENT, cardiopulmonary, abdominal, , GYN, neurological complaints. She states her 20 y/o daughter was dismissed from [employer] this term (she is home now in [place]). Apparently, she could not attend classes. She did see a Psychiatrist and was started on Wellbutrin. Horace has stress related to this.      PE:    Vitals noted gen nad cooperative alert, HEENT, oropharynx clear no exudate, neck supple nl rom, LCTA, B good air movement, RRR, S1, S2, no MRG, abdomen, no acute findings. Grossly normal neurological exam    A/P:    1. Pituitary adenoma. Seen in Endo by Dr. Herron 11/20/2018 with lab tests. She had outside MRI imaging 6/18/2018. She has appt. With  Dr. Hunter; neurosurgery 2/20/2019  2. She has some concerns about possible altered renal function. She had normal renal U/S study 11/21/2018.  Checked urine protein 12/19/2018 (normal).  Cystain C lab test and reviewed with Nephrology and no concern  3. Seen in  Dermatology for skin check 1/3/2019  4. Seen in  GYN for use of estring 1/10/2019 abnormal PAP and she has follow up 1/25/2019  5. Will order DEXA scan for baseline bone density.  6. Referred to optho for eye exam (also given h/o pituitary surgery)   7. Migraines; she is on daily Propanolol and PRN Zomig (refilled Zomig today 1/21/2019)  8. She declines influenza vaccination but Tdap  9. Mammogram done outside 6/7/2018 and ordered future  10. Colonoscopy done outside (in Care Everywhere) 9/18/2013 but not noted when to repeat. Ordered  future FIT test today  11. Lipids done 10/25/2018;  and HDL 55      Total time spent 25  minutes.  More than 50% of the time spent with Ms. Casper on counseling / coordinating her care

## 2019-01-25 ENCOUNTER — OFFICE VISIT (OUTPATIENT)
Dept: OBGYN | Facility: CLINIC | Age: 59
End: 2019-01-25
Payer: COMMERCIAL

## 2019-01-25 VITALS
SYSTOLIC BLOOD PRESSURE: 112 MMHG | WEIGHT: 165 LBS | HEART RATE: 52 BPM | DIASTOLIC BLOOD PRESSURE: 60 MMHG | BODY MASS INDEX: 25.84 KG/M2

## 2019-01-25 DIAGNOSIS — R30.0 DYSURIA: ICD-10-CM

## 2019-01-25 DIAGNOSIS — R87.612 LOW GRADE SQUAMOUS INTRAEPITHELIAL LESION (LGSIL) ON PAPANICOLAOU SMEAR OF CERVIX: Primary | ICD-10-CM

## 2019-01-25 PROCEDURE — G0463 HOSPITAL OUTPT CLINIC VISIT: HCPCS | Mod: 25,ZF

## 2019-01-25 PROCEDURE — 87086 URINE CULTURE/COLONY COUNT: CPT | Performed by: STUDENT IN AN ORGANIZED HEALTH CARE EDUCATION/TRAINING PROGRAM

## 2019-01-25 PROCEDURE — 88305 TISSUE EXAM BY PATHOLOGIST: CPT | Performed by: STUDENT IN AN ORGANIZED HEALTH CARE EDUCATION/TRAINING PROGRAM

## 2019-01-25 PROCEDURE — 57455 BIOPSY OF CERVIX W/SCOPE: CPT

## 2019-01-25 ASSESSMENT — PAIN SCALES - GENERAL: PAINLEVEL: NO PAIN (0)

## 2019-01-25 NOTE — PROGRESS NOTES
Colposcopy Visit/Procedure Note    Katie Casper is an 58 year old, , who comes in for diagnosis of abnormal pap screen.     Menstrual History:  LMP 2 years ago    Recent new partner, but abnormal Pap 2 weeks after first intercourse.     Lab Results   Component Value Date    PAP LSIL 01/10/2019     Last   Lab Results   Component Value Date    HPV16 Negative 01/10/2019     Last   Lab Results   Component Value Date    HPV18 Negative 01/10/2019     Last   Lab Results   Component Value Date    HRHPV Positive 01/10/2019       Dates/results of previous cervical pathology: 1/10/2019, this was her first abnormal ever    Dates of previous cervical pathology treatment: none    History   Smoking Status     Never Smoker   Smokeless Tobacco     Never Used       Allergies as of 2019 - Reviewed 2019   Allergen Reaction Noted     Sulfa drugs Rash 2011        Colposcopy Procedure:    Consent:  Details of the colposcopic procedure were reviewed. Risks, benefits of treatment, and alternate forms of evaluation were discussed.  Patient's questions were elicited and answered.   Written consent was obtained and scanned into medical record.     Verification of Procedure  Just before the procedure begins, through verbal and active participation of team members, I verified:   Initials   Patient Name cat   Patient  cat   Procedure to be performed cat       OBJECTIVE: /60   Pulse 52   Wt 74.8 kg (165 lb)   Breastfeeding? No   BMI 25.84 kg/m      Pelvic Exam:  EG/BUS: Normal genital architecture without lesions, erythema or abnormal secretions. Bartholin's, Urethra, Cookson's glands are normal.   Vagina: moist, pink, rugae with creamy, white and odorlesssecretions  Cervix: no lesions  Rectum:anus normal    PROCEDURE:  Acetic acid applied to cervix.  Colposcopic exam of the cervix and apex of the vagina was conducted in the usual fashion.     Findings:  SCJ was not seen entirely and the exam was not  satisfactory.    There were no visible lesions    Biopsies were obtained at 9 o'clock and 1 o'clock and placed together in labeled formalin. ECC collected separately and also sent in formalin.     Assessment: Normal exam without visible pathology, random biopsies sent    Plan:     Biopsies sent to pathology.  Will contact patient with results and recommended follow-up plan based on results. Please call patient with results.     Patient advised to contact clinic with heavy vaginal bleeding, fever over 101 degrees F, or any other concerns.    Advised that evaluation of sexual contacts is NOT warranted as she can not get the same virus again. Risk of exposure to a NEW virus is possible with partner change.    Verbalized understanding and agreement with plan.    Dr. De Dios was present for the procedure.     Paola Murillo MD PGY4  OB/Gyn  1/25/2019, 5:40 PM    I have personally performed colposcopy and supervised the biopsies. I have reviewed and edited the excellent note.   Stefanie De Dios

## 2019-01-26 LAB
BACTERIA SPEC CULT: NO GROWTH
Lab: NORMAL
SPECIMEN SOURCE: NORMAL

## 2019-01-29 LAB — COPATH REPORT: NORMAL

## 2019-01-30 DIAGNOSIS — Z12.11 SCREEN FOR COLON CANCER: ICD-10-CM

## 2019-01-30 PROCEDURE — 82274 ASSAY TEST FOR BLOOD FECAL: CPT | Performed by: INTERNAL MEDICINE

## 2019-01-30 NOTE — RESULT ENCOUNTER NOTE
Spoke with patient regarding LSIL on biopsy. Patient understands result and will schedule repeat PAP and cotesting in one year as recommended.

## 2019-02-01 ENCOUNTER — PATIENT OUTREACH (OUTPATIENT)
Dept: SURGERY | Facility: CLINIC | Age: 59
End: 2019-02-01

## 2019-02-01 DIAGNOSIS — K80.20 GALLSTONES: Primary | ICD-10-CM

## 2019-02-01 RX ORDER — CEFAZOLIN SODIUM 2 G/50ML
2 SOLUTION INTRAVENOUS
Status: CANCELLED | OUTPATIENT
Start: 2019-02-01

## 2019-02-01 RX ORDER — CEFAZOLIN SODIUM 1 G/50ML
1 INJECTION, SOLUTION INTRAVENOUS SEE ADMIN INSTRUCTIONS
Status: CANCELLED | OUTPATIENT
Start: 2019-02-01

## 2019-02-01 NOTE — PROGRESS NOTES
Katie Casper is a patient of Dr. Remy that is vacationing in [place].  She was seen in the ED for gallstones.  Dr. Snyder spoke with the patient and answered several questions.    Appointment arranged with Dr. Snyder 2/11 at 10 AM to discuss lap kelle.  Patient will need an H&P with her PCP.

## 2019-02-03 LAB — HEMOCCULT STL QL IA: NEGATIVE

## 2019-02-04 ENCOUNTER — TELEPHONE (OUTPATIENT)
Dept: SURGERY | Facility: CLINIC | Age: 59
End: 2019-02-04

## 2019-02-04 NOTE — TELEPHONE ENCOUNTER
Per task, this writer called the patient to schedule procedure with Dr. Sampson Snyder and confirmed the information below.    Patient is scheduled for surgery with Dr. Sampson Snyder      Spoke with: Katie Casper in clinic about surgery dates    Date of Surgery: 02/18/2019 at 11:00 AM with Dr. Esvin Lopez    Pre-op with surgeon (if applicable): 02/11/2019    H&P: Scheduled with Arnaud Del Castillo or a partner. Patient is aware that she can call to schedule a pre-op with the [facility] ([facility] if she is unable to schedule with her primary doctor.      Informed patient they will need an adult : YES    Surgery packet sent: Patient would like a hardcopy given to her in clinic on 02/11/2019    Additional comments: She also knows to call general surgery if she experiences any cold or flu symptoms. Surgery teaching and soap will be given to in clinic on 02/11/2019. She was asked to call [phone] if she needs to reschedule and [phone] if she experiences any symptoms.

## 2019-02-04 NOTE — TELEPHONE ENCOUNTER
----- Message from Kimberly Shahid RN sent at 2/1/2019 10:34 AM CST -----  Regarding: Scheduling  JG spoke with this patient.  She is vacationing in [place].  He will be seeing her in the clinic 2/11.    OBI would like you to arrange her surgery for when he is back in town.  She will need an H&P with Dr. Del Castillo.    WS has been placed.    Kimberly

## 2019-02-08 ENCOUNTER — TELEPHONE (OUTPATIENT)
Dept: SURGERY | Facility: CLINIC | Age: 59
End: 2019-02-08

## 2019-02-08 NOTE — TELEPHONE ENCOUNTER
Pre Visit Call and Assessment    Date of call:  02/08/2019    Phone numbers:  Home number on file [phone] (home)    Reached patient/confirmed appointment:  Yes  Patient care team/Primary provider:  Arnaud Del Castillo    Referred to:  Dr. Sampson Snyder    Reason for visit:  Gallstones consult

## 2019-02-11 ENCOUNTER — OFFICE VISIT (OUTPATIENT)
Dept: SURGERY | Facility: CLINIC | Age: 59
End: 2019-02-11
Payer: COMMERCIAL

## 2019-02-11 VITALS
TEMPERATURE: 97.8 F | SYSTOLIC BLOOD PRESSURE: 112 MMHG | HEIGHT: 67 IN | OXYGEN SATURATION: 99 % | HEART RATE: 49 BPM | WEIGHT: 161.2 LBS | BODY MASS INDEX: 25.3 KG/M2 | DIASTOLIC BLOOD PRESSURE: 53 MMHG

## 2019-02-11 DIAGNOSIS — K80.20 GALLSTONES: Primary | ICD-10-CM

## 2019-02-11 RX ORDER — ESOMEPRAZOLE MAGNESIUM 40 MG/1
40 CAPSULE, DELAYED RELEASE ORAL
COMMUNITY
Start: 2019-01-31 | End: 2019-02-14

## 2019-02-11 RX ORDER — DICYCLOMINE HCL 20 MG
20 TABLET ORAL PRN
COMMUNITY
Start: 2019-02-01 | End: 2019-04-11

## 2019-02-11 ASSESSMENT — ENCOUNTER SYMPTOMS
JAUNDICE: 0
BLOOD IN STOOL: 0
NAUSEA: 0
DIARRHEA: 0
HEARTBURN: 0
CONSTIPATION: 0
RECTAL PAIN: 0
BOWEL INCONTINENCE: 0
ABDOMINAL PAIN: 1
BLOATING: 0
VOMITING: 0

## 2019-02-11 ASSESSMENT — MIFFLIN-ST. JEOR: SCORE: 1338.83

## 2019-02-11 ASSESSMENT — PAIN SCALES - GENERAL: PAINLEVEL: NO PAIN (0)

## 2019-02-11 NOTE — PROGRESS NOTES
Katie Casper is a 58 year old female with a many week history of abdominal pain localized to the right upper quadrant.  Symptoms are associated with fatty food intake.  Associated symptoms: nausea  Other constitutional symptoms: no  Common duct symptoms:  None  Diet tolerance:  good  Patient was seen in the Emergency Room for these symptoms.  LFT's:  abnormal    Image studies included:  Ultrasound  Findings:  Gallstones seen    Past medical and surgical history, medications, allergies, family history, and social history were reviewed with the patient.    ROS: 10 point review of systems negative except noted in HPI  PHYSICAL EXAM  General appearance- healthy, alert, and in no distress.  Abdomen - soft non distended, non tender without obvious masses.  Impression:  Symptomatic cholelithiasis  Recommendation:  Laparoscopic Cholecystectomy and a low to nonfat diet until the patient undergoes surgery.    A full discussion regarding the alternatives, risks, goals, and potential complications for this surgery was completed today.  The patient understood that the potential problems included but are not limited to:  Infection, bleeding, bile leak, injury to structures about the gallbladder, possible common duct stone requiring further procedures. Most current review of literature confirm the more common specific risks related to laparoscopic cholecystectomy include bile duct injury (3/1000), bile leak (10/1000), retained common bile duct stone (10/1000), postcholecystectomy diarrhea (1-2%) and these complications may require additional treatment.    The patient verbally expressed understanding, was given the opportunity for questions, and gives full informed consent for the procedure.      Today the patient was instructed on the need for a preoperative H&P, NPO status prior to surgery, and the need to stop anticoagulants prior to surgery.  Additional educational material, soap, and instructions will be mailed out to the  patients home.    The total time spent with this patient was 10 minutes.  Of this time, greater than 50% was spent counseling and coordinating care.      Lovenox:  No          Answers for HPI/ROS submitted by the patient on 2/11/2019   General Symptoms: No  Skin Symptoms: No  HENT Symptoms: No  EYE SYMPTOMS: No  HEART SYMPTOMS: No  LUNG SYMPTOMS: No  INTESTINAL SYMPTOMS: Yes  URINARY SYMPTOMS: No  GYNECOLOGIC SYMPTOMS: No  BREAST SYMPTOMS: No  SKELETAL SYMPTOMS: No  BLOOD SYMPTOMS: No  NERVOUS SYSTEM SYMPTOMS: No  MENTAL HEALTH SYMPTOMS: No  Heart burn or indigestion: No  Nausea: No  Vomiting: No  Abdominal pain: Yes  Bloating: No  Constipation: No  Diarrhea: No  Blood in stool: No  Black stools: No  Rectal or Anal pain: No  Fecal incontinence: No  Yellowing of skin or eyes: No  Vomit with blood: No  Change in stools: No

## 2019-02-11 NOTE — PATIENT INSTRUCTIONS
You met with Dr. Sampson Snyder.      If you have questions please contact Wilber RN or Kimberly RN during regular clinic hours, Monday through Friday 7:30 AM - 4:00 PM, or you can contact us via [employer] at anytime.       If you have urgent needs after-hours, weekends, or holidays please call the hospital at [phone] and ask to speak with our on-call General Surgery Team.    Appointment [phone], option #1   Nurse Advice (Wilber or Kimberly): [phone]   Surgery Scheduler (Alicia): [phone]  Fax: [phone]    After your Laparoscopic Cholecystectomy          Incision care     You may take a shower the day after surgery. Carefully wash your incision with soap and water. Do not submerge yourself in water (bath, whirlpool, hot tub, pool, lake) for 14 days after surgery.     Remove the bandage the day after surgery, but leave the medical tape (Steri-Strips) or glue in place. These will loosen and fall off on their own 5 to 7 days after surgery.       Always wash your hands before touching your incisions or removing bandages.     It is not unusual to form a collection of fluid or blood under your incision that may feel firm or squishy- it can take several weeks to months for your body to reabsorb it.  At times, it may even drain.  If that should happen keep the area clean with soap, water,  and cover with a clean gauze dressing. You can change this daily or as needed.       Other medicines     Wait to start aspirin or blood thinners until the day after surgery. You can continue your regular medicines at your normal time the day after surgery.      Your pain medicine may cause constipation (hard, dry stools). To help with this, take the stool softener your doctor gave you or an over-the-counter stool softener or laxative. You can stop taking this when you are no longer taking pain medicine and your bowel movements are back to normal.      For pain or discomfort     Take the narcotic pain medicine your  doctor gave you as needed and as instructed on the bottle. If you prefer to use over-the-counter medication, use acetaminophen (Tylenol) or ibuprofen (Advil, Motrin) as instructed on the box. Do not take Tylenol if it is in your narcotic pain medication.     Use an ice pack on your abdomen (belly) for 20 minutes at a time as needed for the first 24 hours. Be sure to protect your skin by putting a cloth between the ice pack and your skin.     After 24 hours you can switch to heat for 20 minutes as needed. Be sure to protect your skin by putting a cloth between the heat pack and your skin.       Activities     No driving until you feel it s safe to do so. Don t drive while taking narcotic pain medicine.     Don t lift anything heavier than 20 pounds for 3 to 4 weeks after surgery.      Special equipment     If you left the hospital in VITALY socks (compression stockings), you may take them off the day after surgery.      Diet     You can eat your regular meals after surgery.      When to call the doctor   Call your doctor if you have:     A fever above 101 F (38.3 C) (taken under the tongue), or a fever or chills lasting more than a day.     Redness at the incision site.     Any fluid or blood draining from the incision, especially if it smells bad.      Severe pain that doesn t improve with pain medicine.          We will call you 2 to 4 days after surgery to review this handout, answer questions and help arrange after-surgery care. If you have questions or concerns, please call [phone] during regular office hours. If you need to call after business hours, call [phone] and ask to page the surgeon on-call.         Transversus Abdominis Plane (TAP) Pain Block      What is a TAP block?   A TAP block can help you manage your pain after surgery. TAP stands for transversus abdominis plane, which is a muscle layer in your abdomen (belly). The TAP block uses numbing medicine similar to Novocaine to block pain near the  site of your surgery.       Why get a TAP block?     To better manage your pain after surgery. A tap block will help keep the pain from getting severe and out of control.     To block pain signals from the nerve, which helps decrease pain after surgery.     To help you sleep, easily breathe deeply, walk and visit with others.      How is it done?   You will lie still on a table. We will use an ultrasound machine to help us see the correct muscle layer of your abdomen. Then, we ll use a needle to inject the medicine. We may also give you some sleep medicine to lessen the pain of the injection.       The procedure takes between 5 and 15 minutes. It is usually done right before surgery, but will sometimes be after. It depends on your surgery and care needs.      What can I expect?     You may feel numbness, tingling or a heaviness in your abdomen.      You may have pain control up to 72 hours after surgery.     The TAP block may not lessen all of your surgery pain. But most patients feel 50 to 75 percent less pain than without the block.       Tell your nurse if you have:     Numbness or tingling in areas other than where the injection was     Blurry vision     Ringing in your ears     A metallic taste in your mouth

## 2019-02-11 NOTE — LETTER
2/11/2019       RE: Katie Casper  [address] [id number]     Dear Colleague,    Thank you for referring your patient, Katie Casper, to the [facility] at [facility]. Please see a copy of my visit note below.    Katie Casper is a 58 year old female with a many week history of abdominal pain localized to the right upper quadrant.  Symptoms are associated with fatty food intake.  Associated symptoms: nausea  Other constitutional symptoms: no  Common duct symptoms:  None  Diet tolerance:  good  Patient was seen in the Emergency Room for these symptoms.  LFT's:  abnormal    Image studies included:  Ultrasound  Findings:  Gallstones seen    Past medical and surgical history, medications, allergies, family history, and social history were reviewed with the patient.    ROS: 10 point review of systems negative except noted in HPI  PHYSICAL EXAM  General appearance- healthy, alert, and in no distress.  Abdomen - soft non distended, non tender without obvious masses.  Impression:  Symptomatic cholelithiasis  Recommendation:  Laparoscopic Cholecystectomy and a low to nonfat diet until the patient undergoes surgery.    A full discussion regarding the alternatives, risks, goals, and potential complications for this surgery was completed today.  The patient understood that the potential problems included but are not limited to:  Infection, bleeding, bile leak, injury to structures about the gallbladder, possible common duct stone requiring further procedures. Most current review of literature confirm the more common specific risks related to laparoscopic cholecystectomy include bile duct injury (3/1000), bile leak (10/1000), retained common bile duct stone (10/1000), postcholecystectomy diarrhea (1-2%) and these complications may require additional treatment.    The patient verbally expressed understanding, was given the opportunity for questions,  and gives full informed consent for the procedure.      Today the patient was instructed on the need for a preoperative H&P, NPO status prior to surgery, and the need to stop anticoagulants prior to surgery.  Additional educational material, soap, and instructions will be mailed out to the patients home.    The total time spent with this patient was 10 minutes.  Of this time, greater than 50% was spent counseling and coordinating care.      Lovenox:  No    Again, thank you for allowing me to participate in the care of your patient.      Sincerely,    Sampson Snyder MD

## 2019-02-11 NOTE — NURSING NOTE
Pre and Post op Patient Education/Teaching Flowsheet  Relevant Diagnosis:  Gallstones  Teaching Topic:  Pre and post op teaching  Person(s) Involved in teaching:  Patient     Motivation Level:  Asks Questions:  Yes  Eager to Learn:  Yes  Cooperative:  Yes  Receptive (willing/able to accept information):  Yes  Any cultural factors/Alevism beliefs that may influence understanding or compliance?  No    Patient/caregiver/family demonstrates understanding of the following:  Reason for the appointment, diagnosis, and treatment plan:  Yes  Patient demonstrates understanding of the following:  Pre-op bowel prep:  No  Post-op pain management recommendations (medications, ice compress, binder/athletic supporter (if applicable), etc.:  Yes  Inguinal hernia patients:  Post-op urinary retention- discussed signs/symptoms and visit to ER for [facility] catheter placement and to stay in place for at least 48 hours:  NA  Restrictions:  Yes  Medications to take the day of surgery:  Per PCP  Blood thinner medications discussed and when to stop (if applicable):  Yes  Wound care:  Yes  Diabetes medication management (if applicable):  Per PCP  Which situations necessitate calling provider and whom to contact:  Discussed how to contact the hospital, nurse, and clinic scheduling staff if necessary      Date and time of surgery:  Yes  Location of surgery: [facility] [place]- 5th Floor  History and Physical and any other testing necessary prior to surgery:  Yes  Required time line for completion of History and Physical and any pre-op testing:  Yes  Discuss need for someone to drive patient home and stay with them for 24 hours:  Yes  Pre-op showering/scrub information with Surgical Scrub:  Yes  NPO Guidelines:  NPO per Anesthesia Guidelines    Infection Prevention: Patient demonstrates understanding of the following:  Patient instructed on hand hygiene:  Yes  Surgical procedure site care will be taught and will be  reviewed at the time of discharge  Signs and symptoms of infection taught:  Yes  Wound care reviewed and will be taught at the time of discharge  Central venous catheter care will be taught at the time of discharge (if applicable)    Post-op follow-up:  Instructional materials used/given/mailed:  [facility] Booklet, post op teaching sheet, Map, Soap, and arrival/location information    Surgical instructions mailed to patient

## 2019-02-11 NOTE — NURSING NOTE
"Chief Complaint   Patient presents with     Consult     NEW - Gallstones       Vitals:    02/11/19 1003   BP: 112/53   Pulse: (!) 49   Temp: 97.8  F (36.6  C)   TempSrc: Oral   SpO2: 99%   Weight: 73.1 kg (161 lb 3.2 oz)   Height: 1.702 m (5' 7\")       Body mass index is 25.25 kg/m .      Sp Veras, EMT on 2/11/2019 at 10:11 AM                          "

## 2019-02-14 ENCOUNTER — ANESTHESIA EVENT (OUTPATIENT)
Dept: SURGERY | Facility: AMBULATORY SURGERY CENTER | Age: 59
End: 2019-02-14

## 2019-02-14 ENCOUNTER — OFFICE VISIT (OUTPATIENT)
Dept: SURGERY | Facility: CLINIC | Age: 59
End: 2019-02-14
Payer: COMMERCIAL

## 2019-02-14 VITALS
RESPIRATION RATE: 16 BRPM | TEMPERATURE: 97.7 F | DIASTOLIC BLOOD PRESSURE: 70 MMHG | BODY MASS INDEX: 25.25 KG/M2 | WEIGHT: 160.9 LBS | SYSTOLIC BLOOD PRESSURE: 108 MMHG | HEIGHT: 67 IN | OXYGEN SATURATION: 100 % | HEART RATE: 52 BPM

## 2019-02-14 DIAGNOSIS — K80.10 CALCULUS OF GALLBLADDER WITH CHRONIC CHOLECYSTITIS WITHOUT OBSTRUCTION: Primary | ICD-10-CM

## 2019-02-14 ASSESSMENT — MIFFLIN-ST. JEOR: SCORE: 1337.47

## 2019-02-14 NOTE — ANESTHESIA PREPROCEDURE EVALUATION
Anesthesia Pre-Procedure Evaluation    Patient: Katie Casper   MRN:     [id number] Gender:   female   Age:    58 year old :      1960        Preoperative Diagnosis: Gallstones   Procedure(s):  Laparoscopic Cholecystectomy     Past Medical History:   Diagnosis Date     Anemia 2006     Atrophic vaginitis      Chronic kidney disease 2018    labs     Drusen (degenerative) of macula, bilateral      Hx of previous reproductive problem      Insomnia      Migraines      Myopia      Pituitary macroadenoma (H)       Past Surgical History:   Procedure Laterality Date      SECTION        SECTION       colonscopy       ENT SURGERY  pituitary tumor removal through nose     LAPAROSCOPY DIAGNOSTIC (GENERAL)            Anesthesia Evaluation     . Pt has had prior anesthetic. Type: General    History of anesthetic complications    woke up during surgery  did not during       ROS/MED HX    ENT/Pulmonary: Comment: Myopia  Drusen (degenerative) of macula, bilateral  Astigmatism of right eye with presbyopia      Neurologic: Comment: 2 weekly. Takes zomig prn and inderol daily    Restless legs    (+)migraines,     Cardiovascular:  - neg cardiovascular ROS   (+) ----. : . . . :. . Previous cardiac testing date:results:date: results:ECG reviewed date:2019 results:Sinus Bradycardia date: results:          METS/Exercise Tolerance:  >4 METS   Hematologic:     (+) Anemia, -      Musculoskeletal:   (+) fracture upper extremity: Other (comment) (right elbow fracture), , -       GI/Hepatic:     (+) cholecystitis/cholelithiasis (history of abdominal pain localized to the right upper quadrant & nausea over the last several months.),       Renal/Genitourinary: Comment: hx of abnormal labs w/ normal renal U/S study 2018. Labs improving    Atrophic vaginitis    (+) chronic renal disease, Pt does not require dialysis,       Endo: Comment: Vitamin D deficiency    (+) Other Endocrine  "Disorder non-functioning pituitary adenoma s/p resection.      Psychiatric: Comment: Insomnia    (+) psychiatric history depression and anxiety      Infectious Disease:  - neg infectious disease ROS       Malignancy:      - no malignancy   Other:    (+) No chance of pregnancy                        PHYSICAL EXAM:   Mental Status/Neuro: A/A/O   Airway: Facies: Feasible  Mallampati: Not Assessed  Mouth/Opening: Not Assessed  TM distance: Not Assessed  Neck ROM: Not Assessed   Respiratory: Auscultation: CTAB     Resp. Rate: Normal     Resp. Effort: Normal      CV: Rhythm: Regular  Rate: Maxime  Heart: Normal Sounds   Comments:      Dental: Normal                  Lab Results   Component Value Date    WBC 4.3 12/19/2018    HGB 13.1 12/19/2018    HCT 40.0 12/19/2018     12/19/2018     12/19/2018    POTASSIUM 4.2 12/19/2018    CHLORIDE 107 12/19/2018    CO2 28 12/19/2018    BUN 21 12/19/2018    CR 0.90 12/19/2018    GLC 90 12/19/2018    KVNG 8.7 12/19/2018    PHOS 2.3 (L) 08/08/2010    MAG 2.1 08/08/2010    ALBUMIN 3.6 12/19/2018    PROTTOTAL 6.6 (L) 12/19/2018    ALT 27 12/19/2018    AST 22 12/19/2018    ALKPHOS 80 12/19/2018    BILITOTAL 0.4 12/19/2018    PTT 30 09/28/2007    INR 1.06 08/07/2010    TSH 1.92 12/19/2018    T4 1.01 11/20/2018    T3 105 07/12/2011       Preop Vitals  BP Readings from Last 3 Encounters:   02/11/19 112/53   01/25/19 112/60   01/21/19 98/60    Pulse Readings from Last 3 Encounters:   02/11/19 (!) 49   01/25/19 52   01/21/19 54      Resp Readings from Last 3 Encounters:   01/21/19 20   12/19/18 16    SpO2 Readings from Last 3 Encounters:   02/11/19 99%   01/21/19 98%   12/19/18 100%      Temp Readings from Last 1 Encounters:   02/11/19 97.8  F (36.6  C) (Oral)    Ht Readings from Last 1 Encounters:   02/11/19 1.702 m (5' 7\")      Wt Readings from Last 1 Encounters:   02/11/19 73.1 kg (161 lb 3.2 oz)    Estimated body mass index is 25.25 kg/m  as calculated from the following:    " "Height as of 2/11/19: 1.702 m (5' 7\").    Weight as of 2/11/19: 73.1 kg (161 lb 3.2 oz).     LDA:            Assessment:   ASA SCORE: 2    NPO Status: > 2 hours since completed Clear Liquids; > 6 hours since completed Solid Foods   Documentation: H&P complete; Preop Testing complete; Consents complete   Proceeding: Proceed without further delay  Tobacco Use:  NO Active use of Tobacco/UNKNOWN Tobacco use status     Plan:   Anes. Type:  General; Regional     RA Details:  Pre Induction; SS; Exparel; FOR POSTOP PAIN CONTROL; L; R     RA-Location/Type: TAP; Plane Block   Pre-Induction: Midazolam IV; Opioid IV; Acetaminophen PO   Induction:  IV (Standard); Propofol   Airway: Oral ETT   Access/Monitoring: PIV   Maintenance: Balanced   Emergence: Procedure Site   Logistics: Same Day Surgery     Postop Pain/Sedation Strategy:  Standard-Options: Opioids PRN; Block SS; Exparel     PONV Management:  Adult Risk Factors: Female, Non-Smoker, Postop Opioids  Prevention: Ondansetron; Dexamethasone     CONSENT: Direct conversation   Plan and risks discussed with: Patient   Blood Products: N/a       Comments for Plan/Consent:  59 yo for Laparoscopic Cholecystectomy (N/A Abdomen) under GETA/regional block                  PAC Discussion and Assessment    ASA Classification: 1  Case is suitable for: ASC  Anesthetic techniques and relevant risks discussed: GA  Invasive monitoring and risk discussed:   Types:   Possibility and Risk of blood transfusion discussed:   NPO instructions given:   Additional anesthetic preparation and risks discussed:   Needs early admission to pre-op area:   Other:     PAC Resident/NP Anesthesia Assessment:  Horace Casper is a 58 year old female scheduled for Laparoscopic Cholecystectomy on 2/18/2019 by Dr. Snyder in treatment of Symptomatic cholelithiasis.  PAC referral for risk assessment and optimization for anesthesia with comorbid conditions of the following:    Pre-operative considerations:  1.  " Cardiac:  Functional status- METS >4.  Denies chest pain. No other cardiac history.       EKG 2/1/2019 Sinus Bradycardia  2.  Pulm:  Airway feasible.  Denies SOB or any other pulmonary symptoms or history. Never smoked.  3. Renal: hx of abnormal labs w/ normal renal U/S study 11/21/2018. Last BUN 18 and creat 0.85  4. Endo: She has h/o non-functioning pituitary adenoma s/p resection. 2007 & 2010 She is followed by Dr. Herron, Endocrine   5. Heme. Hx of anemia. No longer an issue post menopause. Last HGB 13.6  6. Hep: elevated liver enzymes thought to be secondary to cholelithiasis. No previous hx liver size normal on US  6. Anesthesia: Patient reports waking up during her 2007 surgery. Did not during 2010 surgical procedure.    RCRI :  0.4%  risk of major adverse cardiac event.   - VTE risk: 0.26%  - GLENDA # 1/8 of risks   - Risk of PONV score = 2.  If > 2, anti-emetic intervention recommended.    Patient is optimized and is acceptable candidate for the proposed procedure.  No further diagnostic evaluation is needed.      Patient also discussed with Dr. De Souza. See recommendations below.     For further details of assessment, testing, and physical exam please see H and P completed on same date.    Reviewed and Signed by PAC Mid-Level Provider/Resident  Mid-Level Provider/Resident: Janelle FERNANDEZ CNP  Date: 2/14/2019  Time:     Attending Anesthesiologist Anesthesia Assessment:        Anesthesiologist:   Date:   Time:   Pass/Fail:   Disposition:     PAC Pharmacist Assessment:        Pharmacist:   Date:   Time:        JIM Corbett CNP

## 2019-02-14 NOTE — PATIENT INSTRUCTIONS
Preparing for Your Surgery      Name:  Katie Casper   MRN:  [id number]   :  1960   Today's Date:  2019     Arriving for surgery:  Surgery date:  19  Arrival time:  9:30 am    Please come to:     [facility]  [address] [place] [id number]     Parking is available in front of the Clinics and Surgery Center building from 5:30AM to 8:00PM.  -  Proceed to the 5th floor to check into the Ambulatory Surgery Center.              >> There will be patient concierges on the 1st and 5th floor, for assistance or an escort, if you would like.              >> Please call [phone] with any questions day of surgery.    -  Bring your ID and insurance card.    What can I eat or drink?  -  You may have solid food or milk products until 8 hours prior to your surgery. (Until 3 am )  -  You may have water, apple juice or 7up/Sprite until 2 hours prior to your surgery. (Until 9 am )    Which medicines can I take?       -  Do not bring your own medications to the hospital.        -  Follow Surgery Clinic instructions regarding Ibuprofen. If no instructions given, NO Ibuprofen the day prior to surgery.    -  Please take these medications the morning of surgery:      Acetaminophen (Tylenol) if needed    How do I prepare myself?      -  Bring Health Care Directive.  -  Take two showers: one the night before surgery; and one the morning of surgery.         Use Scrubcare or Hibiclens to wash from neck down.  You may use your own shampoo and conditioner. No other hair products.   -  Do NOT use lotion, powder, colognes, deodorant, or antiperspirant the day of your surgery.  -  Do NOT wear any makeup, fingernail polish or jewelry.    Questions or Concerns:  If your surgery is at the Ambulatory Surgery Center, please call [phone]. (Mon - Fri 8 am- 3:30 pm)  Questions about surgery, contact your Surgeons office.      AFTER YOUR SURGERY  Breathing exercises   Breathing  exercises help you recover faster. Take deep breaths and let the air out slowly. This will:     Help you wake up after surgery.    Help prevent complications like pneumonia.  Preventing complications will help you go home sooner.   We may give you a breathing device (incentive spirometer) to encourage you to breathe deeply.   Nausea and vomiting   You may feel sick to your stomach after surgery; if so, let your nurse know.    Pain control:  After surgery, you may have pain. Our goal is to help you manage your pain. Pain medicine will help you feel comfortable enough to do activities that will help you heal.  These activities may include breathing exercises, walking and physical therapy.   To help your health care team treat your pain we will ask: 1) If you have pain  2) where it is located 3) describe your pain in your words  Methods of pain control include medications given by mouth, vein or by nerve block for some surgeries.  Sequential Compression Device (SCD) or Pneumo Boots:  You may need to wear SCD S on your legs or feet. These are wraps connected to a machine that pumps in air and releases it. The repeated pumping helps prevent blood clots from forming.

## 2019-02-14 NOTE — H&P
Pre-Operative H & P     CC:  Preoperative exam to assess for increased cardiopulmonary risk while undergoing surgery and anesthesia.    Date of Encounter: 2019  Primary Care Physician:  Arnaud Del Castillo  Reason for visit: Symptomatic Cholelithiasis  HPI  Katie Casper is a 58 year old female who presents for pre-operative H & P in preparation for Laparoscopic Cholecystectomy with Dr. Snyder on 19 at [facility].     Katie Casper is a 58 year old female with a  history of abdominal pain localized to the right upper quadrant & nausea over the last several months. Symptoms became significant and she was seen in the ED in early February.  Symptoms are associated with fatty food intake. She has consulted with Dr. Snyder and the above procedure was scheduled.    Her history also includes, anemia, Abnormal renal labs with normal renal U/S, weekly migraines and pituitary macroadenoma s/p surgery  and again in .    History is obtained from the patient.     Past Medical History  Past Medical History:   Diagnosis Date     Anemia      Atrophic vaginitis      Chronic kidney disease 2018    labs     Drusen (degenerative) of macula, bilateral      Hx of previous reproductive problem      Insomnia      Migraines      Myopia      Pituitary macroadenoma (H)        Past Surgical History  Past Surgical History:   Procedure Laterality Date      SECTION        SECTION       colonscopy       ENT SURGERY  pituitary tumor removal through nose    ,      LAPAROSCOPY DIAGNOSTIC (GENERAL)         Hx of Blood transfusions/reactions: no     Hx of abnormal bleeding or anti-platelet use: no    Menstrual history: No LMP recorded. Patient is postmenopausal.:     Steroid use in the last year: no    Personal or FH with difficulty with Anesthesia:  Patient states she woke up during her 2007 surgical procedure. However did not during her extensive surgery  in 2010    Prior to Admission Medications  Current Outpatient Medications   Medication Sig Dispense Refill     pramipexole (MIRAPEX) 1 MG tablet Take 1 mg by mouth At Bedtime       PROPRANOLOL HCL PO Take 160 mg by mouth At Bedtime        Vitamin D, Cholecalciferol, 1000 units TABS Take 1 tablet by mouth At Bedtime        ZOLMitriptan (ZOMIG) 5 MG tablet Take 1 tablet (5 mg) by mouth as needed 9 tablet 3     dicyclomine (BENTYL) 20 MG tablet Take 20 mg by mouth as needed          Allergies  Allergies   Allergen Reactions     Sulfa Drugs Rash       Social History  Social History     Socioeconomic History     Marital status:      Spouse name: Not on file     Number of children: Not on file     Years of education: Not on file     Highest education level: Not on file   Social Needs     Financial resource strain: Not on file     Food insecurity - worry: Not on file     Food insecurity - inability: Not on file     Transportation needs - medical: Not on file     Transportation needs - non-medical: Not on file   Occupational History     Not on file   Tobacco Use     Smoking status: Never Smoker     Smokeless tobacco: Never Used   Substance and Sexual Activity     Alcohol use: No     Drug use: No     Sexual activity: Yes     Partners: Male     Birth control/protection: Post-menopausal   Other Topics Concern     Not on file   Social History Narrative     Not on file       Family History  Family History   Problem Relation Age of Onset     Anesthesia Reaction Maternal Half-Brother      Other Cancer Sister         Ovarian cancer - negative BRACCA     Substance Abuse Brother         no longer living     Breast Cancer Maternal Grandmother         in 70s       ROS/MED HX  The complete review of systems is negative other than noted in the HPI or here.       ENT/Pulmonary:  - neg pulmonary ROS     Neurologic: Comment: 2 weekly. Takes zomig prn and inderol daily    (+)migraines,     Cardiovascular:  - neg cardiovascular ROS  "  (+) ----. : . . . :. . Previous cardiac testing date:results:date: results:ECG reviewed date:2/1/2019 results:Sinus Bradycardia date: results:          METS/Exercise Tolerance:  >4 METS   Hematologic:     (+) Anemia, -      Musculoskeletal:   (+) fracture upper extremity: Other (comment) (right elbow fracture), , -       GI/Hepatic:     (+) cholecystitis/cholelithiasis,       Renal/Genitourinary: Comment: hx of abnormal labs w/ normal renal U/S study 11/21/2018. Labs improving    (+) chronic renal disease, Pt does not require dialysis,       Endo:     (+) Other Endocrine Disorder non-functioning pituitary adenoma s/p resection.      Psychiatric:     (+) psychiatric history depression      Infectious Disease:  - neg infectious disease ROS       Malignancy:         Other:    (+) No chance of pregnancy                    Preop Vitals  BP Readings from Last 3 Encounters:   02/11/19 112/53   01/25/19 112/60   01/21/19 98/60    Pulse Readings from Last 3 Encounters:   02/11/19 (!) 49   01/25/19 52   01/21/19 54      Resp Readings from Last 3 Encounters:   01/21/19 20   12/19/18 16    SpO2 Readings from Last 3 Encounters:   02/11/19 99%   01/21/19 98%   12/19/18 100%      Temp Readings from Last 1 Encounters:   02/11/19 97.8  F (36.6  C) (Oral)    Ht Readings from Last 1 Encounters:   02/11/19 1.702 m (5' 7\")      Wt Readings from Last 1 Encounters:   02/11/19 73.1 kg (161 lb 3.2 oz)    Estimated body mass index is 25.25 kg/m  as calculated from the following:    Height as of 2/11/19: 1.702 m (5' 7\").    Weight as of 2/11/19: 73.1 kg (161 lb 3.2 oz).           Temp: 97.7  F (36.5  C) Temp src: Oral BP: 108/70 Pulse: 52   Resp: 16 SpO2: 100 %         160 lbs 14.4 oz  5' 7\"   Body mass index is 25.2 kg/m .       Physical Exam  Constitutional: Awake, alert, cooperative, no apparent distress, and appears stated age.  Eyes: Pupils equal, round and reactive to light, extra ocular muscles intact, sclera clear, conjunctiva " normal.  HENT: Normocephalic, oral pharynx with moist mucus membranes, good dentition. No goiter appreciated.   Respiratory: Clear to auscultation bilaterally, no crackles or wheezing.  Cardiovascular: Regular rate and rhythm, normal S1 and S2, and no murmur noted.  Carotids +2, no bruits. No edema. Palpable pulses to radial  DP and PT arteries.   GI: Normal bowel sounds, soft, non-distended, non-tender, no masses palpated, no hepatosplenomegaly.    Lymph/Hematologic: No cervical lymphadenopathy and no supraclavicular lymphadenopathy.  Genitourinary:  deferred  Skin: Warm and dry.  No rashes at anticipated surgical site.   Musculoskeletal: Full ROM of neck. There is no redness, warmth, or swelling of the joints. Gross motor strength is normal.    Neurologic: Awake, alert, oriented to name, place and time. Cranial nerves II-XII are grossly intact. Gait is normal.   Neuropsychiatric: Calm, cooperative. Normal affect.     Labs: (personally reviewed)  Lab Results   Component Value Date    WBC 4.3 12/19/2018     Lab Results   Component Value Date    RBC 4.44 12/19/2018     Lab Results   Component Value Date    HGB 13.1 12/19/2018     Lab Results   Component Value Date    HCT 40.0 12/19/2018     No components found for: MCT  Lab Results   Component Value Date    MCV 90 12/19/2018     Lab Results   Component Value Date    MCH 29.5 12/19/2018     Lab Results   Component Value Date    MCHC 32.8 12/19/2018     Lab Results   Component Value Date    RDW 13.1 12/19/2018     Lab Results   Component Value Date     12/19/2018     Last Comprehensive Metabolic Panel:  Sodium   Date Value Ref Range Status   12/19/2018 140 133 - 144 mmol/L Final     Potassium   Date Value Ref Range Status   12/19/2018 4.2 3.4 - 5.3 mmol/L Final     Chloride   Date Value Ref Range Status   12/19/2018 107 94 - 109 mmol/L Final     Carbon Dioxide   Date Value Ref Range Status   12/19/2018 28 20 - 32 mmol/L Final     Anion Gap   Date Value Ref Range  Status   12/19/2018 5 3 - 14 mmol/L Final     Glucose   Date Value Ref Range Status   12/19/2018 90 70 - 99 mg/dL Final     Urea Nitrogen   Date Value Ref Range Status   12/19/2018 21 7 - 30 mg/dL Final     Creatinine   Date Value Ref Range Status   12/19/2018 0.90 0.52 - 1.04 mg/dL Final     GFR Estimate   Date Value Ref Range Status   12/19/2018 70 >60 mL/min/[1.73_m2] Final     Comment:     Non  GFR Calc  Starting 12/18/2018, serum creatinine based estimated GFR (eGFR) will be   calculated using the Chronic Kidney Disease Epidemiology Collaboration   (CKD-EPI) equation.       Calcium   Date Value Ref Range Status   12/19/2018 8.7 8.5 - 10.1 mg/dL Final         EKG: Sinus Bradycardia ( 2/1/2019)      Outside records reviewed from: Care everywhere    ASSESSMENT and PLAN  Horace Casper is a 58 year old female scheduled for Laparoscopic Cholecystectomy on 2/18/2019 by Dr. Snyder in treatment of Symptomatic cholelithiasis.  PAC referral for risk assessment and optimization for anesthesia with comorbid conditions of the following:    Pre-operative considerations:  1.  Cardiac:  Functional status- METS >4.  Denies chest pain. No other cardiac history.       EKG 2/1/2019 Sinus Bradycardia  2.  Pulm:  Airway feasible.  Denies SOB or any other pulmonary symptoms or history. Never smoked.  3. Renal: hx of abnormal labs w/ normal renal U/S study 11/21/2018. Last BUN 18 and creat 0.85  4. Endo: She has h/o non-functioning pituitary adenoma s/p resection. 2007 & 2010 She is followed by Dr. Herron, Endocrine   5. Heme. Hx of anemia. No longer an issue post menopause. Last HGB 13.6  6. Hep: elevated liver enzymes thought to be secondary to cholelithiasis. No previous hx liver size normal on US  6. Anesthesia: Patient reports waking up during her 2007 surgery. Did not during 2010 surgical procedure.    RCRI :  0.4%  risk of major adverse cardiac event.   - VTE risk: 0.26%  - GLENDA # 1/8 of risks   - Risk of  PONV score = 2.  If > 2, anti-emetic intervention recommended.      Patient was discussed with Dr De Souza.    JIM Corbett CNP  Preoperative Assessment Center  [facility]  Clinic and Surgery Center  Phone: [phone]  Fax: [phone]

## 2019-02-18 ENCOUNTER — ANESTHESIA (OUTPATIENT)
Dept: SURGERY | Facility: AMBULATORY SURGERY CENTER | Age: 59
End: 2019-02-18

## 2019-02-18 ENCOUNTER — HOSPITAL ENCOUNTER (OUTPATIENT)
Facility: AMBULATORY SURGERY CENTER | Age: 59
End: 2019-02-18
Attending: SURGERY
Payer: COMMERCIAL

## 2019-02-18 VITALS
TEMPERATURE: 98.4 F | BODY MASS INDEX: 25.11 KG/M2 | HEART RATE: 64 BPM | HEIGHT: 67 IN | DIASTOLIC BLOOD PRESSURE: 74 MMHG | OXYGEN SATURATION: 100 % | SYSTOLIC BLOOD PRESSURE: 119 MMHG | RESPIRATION RATE: 16 BRPM | WEIGHT: 160 LBS

## 2019-02-18 DIAGNOSIS — K80.20 GALLSTONES: Primary | ICD-10-CM

## 2019-02-18 RX ORDER — ONDANSETRON 2 MG/ML
4 INJECTION INTRAMUSCULAR; INTRAVENOUS EVERY 30 MIN PRN
Status: DISCONTINUED | OUTPATIENT
Start: 2019-02-18 | End: 2019-02-19 | Stop reason: HOSPADM

## 2019-02-18 RX ORDER — PROPOFOL 10 MG/ML
INJECTION, EMULSION INTRAVENOUS CONTINUOUS PRN
Status: DISCONTINUED | OUTPATIENT
Start: 2019-02-18 | End: 2019-02-18

## 2019-02-18 RX ORDER — CEFAZOLIN SODIUM 2 G/50ML
2 SOLUTION INTRAVENOUS
Status: COMPLETED | OUTPATIENT
Start: 2019-02-18 | End: 2019-02-18

## 2019-02-18 RX ORDER — SODIUM CHLORIDE, SODIUM LACTATE, POTASSIUM CHLORIDE, CALCIUM CHLORIDE 600; 310; 30; 20 MG/100ML; MG/100ML; MG/100ML; MG/100ML
INJECTION, SOLUTION INTRAVENOUS CONTINUOUS
Status: DISCONTINUED | OUTPATIENT
Start: 2019-02-18 | End: 2019-02-18 | Stop reason: HOSPADM

## 2019-02-18 RX ORDER — GABAPENTIN 300 MG/1
300 CAPSULE ORAL ONCE
Status: COMPLETED | OUTPATIENT
Start: 2019-02-18 | End: 2019-02-18

## 2019-02-18 RX ORDER — MEPERIDINE HYDROCHLORIDE 25 MG/ML
12.5 INJECTION INTRAMUSCULAR; INTRAVENOUS; SUBCUTANEOUS
Status: DISCONTINUED | OUTPATIENT
Start: 2019-02-18 | End: 2019-02-19 | Stop reason: HOSPADM

## 2019-02-18 RX ORDER — DEXAMETHASONE SODIUM PHOSPHATE 4 MG/ML
INJECTION, SOLUTION INTRA-ARTICULAR; INTRALESIONAL; INTRAMUSCULAR; INTRAVENOUS; SOFT TISSUE PRN
Status: DISCONTINUED | OUTPATIENT
Start: 2019-02-18 | End: 2019-02-18

## 2019-02-18 RX ORDER — LIDOCAINE 40 MG/G
CREAM TOPICAL
Status: DISCONTINUED | OUTPATIENT
Start: 2019-02-18 | End: 2019-02-18 | Stop reason: HOSPADM

## 2019-02-18 RX ORDER — ONDANSETRON 4 MG/1
4 TABLET, ORALLY DISINTEGRATING ORAL EVERY 30 MIN PRN
Status: DISCONTINUED | OUTPATIENT
Start: 2019-02-18 | End: 2019-02-19 | Stop reason: HOSPADM

## 2019-02-18 RX ORDER — SODIUM CHLORIDE, SODIUM LACTATE, POTASSIUM CHLORIDE, CALCIUM CHLORIDE 600; 310; 30; 20 MG/100ML; MG/100ML; MG/100ML; MG/100ML
INJECTION, SOLUTION INTRAVENOUS CONTINUOUS
Status: DISCONTINUED | OUTPATIENT
Start: 2019-02-18 | End: 2019-02-19 | Stop reason: HOSPADM

## 2019-02-18 RX ORDER — ACETAMINOPHEN 325 MG/1
975 TABLET ORAL ONCE
Status: COMPLETED | OUTPATIENT
Start: 2019-02-18 | End: 2019-02-18

## 2019-02-18 RX ORDER — OXYCODONE HYDROCHLORIDE 5 MG/1
5 TABLET ORAL EVERY 4 HOURS PRN
Status: DISCONTINUED | OUTPATIENT
Start: 2019-02-18 | End: 2019-02-19 | Stop reason: HOSPADM

## 2019-02-18 RX ORDER — BUPIVACAINE HYDROCHLORIDE 2.5 MG/ML
INJECTION, SOLUTION INFILTRATION; PERINEURAL PRN
Status: DISCONTINUED | OUTPATIENT
Start: 2019-02-18 | End: 2019-02-18 | Stop reason: HOSPADM

## 2019-02-18 RX ORDER — NALOXONE HYDROCHLORIDE 0.4 MG/ML
.1-.4 INJECTION, SOLUTION INTRAMUSCULAR; INTRAVENOUS; SUBCUTANEOUS
Status: DISCONTINUED | OUTPATIENT
Start: 2019-02-18 | End: 2019-02-18 | Stop reason: HOSPADM

## 2019-02-18 RX ORDER — GLYCOPYRROLATE 0.2 MG/ML
INJECTION, SOLUTION INTRAMUSCULAR; INTRAVENOUS PRN
Status: DISCONTINUED | OUTPATIENT
Start: 2019-02-18 | End: 2019-02-18

## 2019-02-18 RX ORDER — CEFAZOLIN SODIUM 1 G/50ML
1 SOLUTION INTRAVENOUS SEE ADMIN INSTRUCTIONS
Status: DISCONTINUED | OUTPATIENT
Start: 2019-02-18 | End: 2019-02-18 | Stop reason: HOSPADM

## 2019-02-18 RX ORDER — KETOROLAC TROMETHAMINE 30 MG/ML
INJECTION, SOLUTION INTRAMUSCULAR; INTRAVENOUS PRN
Status: DISCONTINUED | OUTPATIENT
Start: 2019-02-18 | End: 2019-02-18

## 2019-02-18 RX ORDER — FLUMAZENIL 0.1 MG/ML
0.2 INJECTION, SOLUTION INTRAVENOUS
Status: DISCONTINUED | OUTPATIENT
Start: 2019-02-18 | End: 2019-02-18 | Stop reason: HOSPADM

## 2019-02-18 RX ORDER — ONDANSETRON 2 MG/ML
INJECTION INTRAMUSCULAR; INTRAVENOUS PRN
Status: DISCONTINUED | OUTPATIENT
Start: 2019-02-18 | End: 2019-02-18

## 2019-02-18 RX ORDER — NALOXONE HYDROCHLORIDE 0.4 MG/ML
.1-.4 INJECTION, SOLUTION INTRAMUSCULAR; INTRAVENOUS; SUBCUTANEOUS
Status: DISCONTINUED | OUTPATIENT
Start: 2019-02-18 | End: 2019-02-19 | Stop reason: HOSPADM

## 2019-02-18 RX ORDER — PROPOFOL 10 MG/ML
INJECTION, EMULSION INTRAVENOUS PRN
Status: DISCONTINUED | OUTPATIENT
Start: 2019-02-18 | End: 2019-02-18

## 2019-02-18 RX ORDER — BUPIVACAINE HYDROCHLORIDE 2.5 MG/ML
INJECTION, SOLUTION EPIDURAL; INFILTRATION; INTRACAUDAL PRN
Status: DISCONTINUED | OUTPATIENT
Start: 2019-02-18 | End: 2019-02-18

## 2019-02-18 RX ORDER — HYDROCODONE BITARTRATE AND ACETAMINOPHEN 5; 325 MG/1; MG/1
1-2 TABLET ORAL EVERY 4 HOURS PRN
Qty: 15 TABLET | Refills: 0 | Status: SHIPPED | OUTPATIENT
Start: 2019-02-18 | End: 2019-04-11

## 2019-02-18 RX ORDER — AMOXICILLIN 250 MG
1-2 CAPSULE ORAL 2 TIMES DAILY
Qty: 30 TABLET | Refills: 0 | Status: SHIPPED | OUTPATIENT
Start: 2019-02-18 | End: 2019-04-11

## 2019-02-18 RX ORDER — FENTANYL CITRATE 50 UG/ML
25-50 INJECTION, SOLUTION INTRAMUSCULAR; INTRAVENOUS
Status: DISCONTINUED | OUTPATIENT
Start: 2019-02-18 | End: 2019-02-18 | Stop reason: HOSPADM

## 2019-02-18 RX ORDER — EPHEDRINE SULFATE 50 MG/ML
INJECTION, SOLUTION INTRAMUSCULAR; INTRAVENOUS; SUBCUTANEOUS PRN
Status: DISCONTINUED | OUTPATIENT
Start: 2019-02-18 | End: 2019-02-18

## 2019-02-18 RX ORDER — DIAZEPAM 5 MG
5 TABLET ORAL EVERY 8 HOURS PRN
Qty: 5 TABLET | Refills: 0 | Status: SHIPPED | OUTPATIENT
Start: 2019-02-18 | End: 2019-04-11

## 2019-02-18 RX ADMIN — FENTANYL CITRATE 50 MCG: 50 INJECTION, SOLUTION INTRAMUSCULAR; INTRAVENOUS at 11:17

## 2019-02-18 RX ADMIN — ONDANSETRON 4 MG: 2 INJECTION INTRAMUSCULAR; INTRAVENOUS at 11:23

## 2019-02-18 RX ADMIN — Medication 10 MG: at 11:25

## 2019-02-18 RX ADMIN — GLYCOPYRROLATE 0.3 MG: 0.2 INJECTION, SOLUTION INTRAMUSCULAR; INTRAVENOUS at 11:30

## 2019-02-18 RX ADMIN — FENTANYL CITRATE 50 MCG: 50 INJECTION, SOLUTION INTRAMUSCULAR; INTRAVENOUS at 10:20

## 2019-02-18 RX ADMIN — CEFAZOLIN SODIUM 2 G: 2 SOLUTION INTRAVENOUS at 11:02

## 2019-02-18 RX ADMIN — BUPIVACAINE HYDROCHLORIDE 20 ML: 2.5 INJECTION, SOLUTION EPIDURAL; INFILTRATION; INTRACAUDAL at 10:25

## 2019-02-18 RX ADMIN — SODIUM CHLORIDE, SODIUM LACTATE, POTASSIUM CHLORIDE, CALCIUM CHLORIDE: 600; 310; 30; 20 INJECTION, SOLUTION INTRAVENOUS at 09:55

## 2019-02-18 RX ADMIN — Medication 40 MG: at 11:07

## 2019-02-18 RX ADMIN — PROPOFOL 150 MCG/KG/MIN: 10 INJECTION, EMULSION INTRAVENOUS at 11:07

## 2019-02-18 RX ADMIN — PROPOFOL 160 MG: 10 INJECTION, EMULSION INTRAVENOUS at 11:07

## 2019-02-18 RX ADMIN — KETOROLAC TROMETHAMINE 30 MG: 30 INJECTION, SOLUTION INTRAMUSCULAR; INTRAVENOUS at 11:57

## 2019-02-18 RX ADMIN — ACETAMINOPHEN 975 MG: 325 TABLET ORAL at 09:55

## 2019-02-18 RX ADMIN — DEXAMETHASONE SODIUM PHOSPHATE 4 MG: 4 INJECTION, SOLUTION INTRA-ARTICULAR; INTRALESIONAL; INTRAMUSCULAR; INTRAVENOUS; SOFT TISSUE at 11:07

## 2019-02-18 RX ADMIN — FENTANYL CITRATE 50 MCG: 50 INJECTION, SOLUTION INTRAMUSCULAR; INTRAVENOUS at 11:07

## 2019-02-18 RX ADMIN — EPHEDRINE SULFATE 10 MG: 50 INJECTION, SOLUTION INTRAMUSCULAR; INTRAVENOUS; SUBCUTANEOUS at 11:25

## 2019-02-18 RX ADMIN — PROPOFOL: 10 INJECTION, EMULSION INTRAVENOUS at 11:51

## 2019-02-18 RX ADMIN — GABAPENTIN 300 MG: 300 CAPSULE ORAL at 09:55

## 2019-02-18 ASSESSMENT — MIFFLIN-ST. JEOR: SCORE: 1333.39

## 2019-02-18 NOTE — ANESTHESIA PROCEDURE NOTES
Peripheral Nerve Block Procedure Note  Date/Time: 2/18/2019 10:25 AM    Staff:     Anesthesiologist:  Matthew Martin MD    Resident/CRNA:  Gagan Santiago MD    Block performed by resident/CRNA in the presence of a teaching physician    Location: Pre-op  Procedure Start/Stop TImes:      2/18/2019 10:20 AM     2/18/2019 10:25 AM    patient identified, IV checked, site marked, risks and benefits discussed, informed consent, monitors and equipment checked, pre-op evaluation, at physician/surgeon's request and post-op pain management      Correct Patient: Yes      Correct Position: Yes      Correct Site: Yes      Correct Procedure: Yes      Correct Laterality:  Yes    Site Marked:  Yes  Procedure details:     Procedure:  TAP    ASA:  2    Diagnosis:  Cholelithiasis    Laterality:  Bilateral    Position:  Supine    Sterile Prep: chloraprep, mask and sterile gloves      Needle:  Short bevel    Needle gauge:  21    Needle length (inches):  4    Ultrasound: Yes      Ultrasound used to identify targeted nerve, plexus, or vascular structure and placed a needle adjacent to it      Permanent Image entered into patiient's record      Abnormal pain on injection: No      Blood Aspirated: No      Paresthesias:  No    Bleeding at site: No      Test dose negative for signs of intravascular injection: Yes      Bolus via:  Needle    Infusion Method:  Single Shot  Assessment/Narrative:     Injection made incrementally with aspirations every (mL):  5

## 2019-02-18 NOTE — ANESTHESIA CARE TRANSFER NOTE
Patient: Katie Casper    Procedure(s):  Laparoscopic Cholecystectomy, umbilical hernia repair    Diagnosis: Gallstones  Diagnosis Additional Information: No value filed.    Anesthesia Type:   General, Peripheral Nerve Block for post-op pain at surgeon's request, ETT     Note:  Airway :Nasal Cannula  Patient transferred to:PACU  Comments: VSS and WNL, comfortable, no PONV, report to Leidy KRAMERHandoff Report: Identifed the Patient, Identified the Reponsible Provider, Reviewed the pertinent medical history, Discussed the surgical course, Reviewed Intra-OP anesthesia mangement and issues during anesthesia, Set expectations for post-procedure period and Allowed opportunity for questions and acknowledgement of understanding      Vitals: (Last set prior to Anesthesia Care Transfer)    CRNA VITALS  2/18/2019 1154 - 2/18/2019 1228      2/18/2019             Pulse:  75    SpO2:  100 %    Resp Rate (observed):  12                Electronically Signed By: JIM Kumari CRNA  February 18, 2019  12:28 PM

## 2019-02-18 NOTE — LETTER
[facility]  [address]  [address] [place] [address]  [phone]          February 18, 2019    RE:  Katie Casper                                                                                                                                                       [address] [address]            To whom it may concern:    Katie Casper is under my professional care for surgery on 2/18/2019. She is off work until 2/26/2019 and has lifting restrictions ( not to lift more than 15 lbs) for a total of 3 weeks.      Sincerely,         Sampson Snyder MD  Department of Surgery  [facility]

## 2019-02-18 NOTE — DISCHARGE INSTRUCTIONS
"[facility]  Home Care Following Anesthesia  For 24 hours after surgery:  1. Get plenty of rest.  A responsible adult must stay with you for at least 24 hours after you leave the surgery center.  2. Do not drive or use heavy equipment.  If you have weakness or tingling, don't drive or use heavy equipment until this feeling goes away.   3. Do not drink alcohol.   4. Avoid strenuous or risky activities.  Ask for help when climbing stairs.  5. You may feel lightheaded.  IF so, sit for a few minutes before standing.  Have someone help you get up.   6. If you have nausea (feel sick to your stomach): Drink only clear liquids such as apple juice, ginger ale, broth or 7-Up.  Rest may also help.  Be sure to drink enough fluids.  Move to a regular diet as you feel able.   7. You may have a slight fever.  Call the doctor if your fever is over 100 F (37.7 C) (taken under the tongue) or lasts longer than 24 hours.  8. You may have a dry mouth, a sore throat, muscle aches or trouble sleeping. These should go away after 24 hours.  9. Do not make important or legal decisions.     Today you receivedbupivacaine block to numb the nerves near your surgery site.  This is a block using a \"numbing\" medication injected around the nerves to anesthetize or \"numb\" the area supplied by those nerves.  This block is injected into the muscle layer near your surgical site.  The medication may numb the location where you had surgery for 6-18 hours, but may last up to 24 hours.      If your surgical site is an arm or leg you should be careful with your affected limb, since it is possible to injure your limb without being aware of it due to the numbing.  Until full feeling returns, you should guard against bumping or hitting your limb, and avoid extreme hot or cold temperatures on the skin.  As the block wears off, the feeling will return as a tingling or prickly sensation near your surgical site.  You will " "experience more discomfort from your incision as the feeling returns.  You may want to take a pain pill (a narcotic or Tylenol if this was prescribed by your surgeon) when you start to experience mild pain before the pain beccomes more severe.  If your pain medications do not control your pain you should notifiy your surgeon.    Today you received an Exparel block to numb the nerves near your surgery site.  This is a block using local anesthetic or \"numbing\" medication injected around the nerves to anesthetize or \"numb\" the area supplied by those nerves.  This block is injected into the muscle layer near your surgical site.  This medication may numb the location where you had surgery up to 72 hours.  If your surgical site is an arm or leg you should be careful with your affected limb, since it is possible to injure your limb without being aware of it due to the numbing.  Until full feeling returns, you should guard against bumping or hitting your limb, and avoid extreme hot or cold temperatures on the skin.  As the block wears off, the feeling will return as a tingling or prickly sensation near your surgical site.  You will experince more discomfort from your incision as the feeling returns.  You may want to take a pain pill (a narcotic or Tylenol if this was prescribed by your surgeon) when you start to experience mild pain before the pain beomes more severe.  If your pain medications do not control your pain, you should notify your surgeon.\"}    Tips for taking pain medications  To get the best pain relief possible, remember these points:    Take pain medications as directed, before pain becomes severe.    Pain medication can upset your stomach: taking it with food may help.    Constipation is a common side effect of pain medication. Drink plenty of  fluids.    Eat foods high in fiber. Take a stool softener if recommended by your doctor or pharmacist.    Do not drink alcohol, drive or operate machinery while " taking pain medications.    Ask about other ways to control pain, such as with heat, ice or relaxation.    Tylenol/Acetaminophen Consumption  To help encourage the safe use of acetaminophen, the makers of TYLENOL  have lowered the maximum daily dose for single-ingredient Extra Strength TYLENOL  (acetaminophen) products sold in the [place] from 8 pills per day (4,000 mg) to 6 pills per day (3,000 mg). The dosing interval has also changed from 2 pills every 4-6 hours to 2 pills every 6 hours.    If you feel your pain relief is insufficient, you may take Tylenol/Acetaminophen in addition to your narcotic pain medication.     Be careful not to exceed 3,000 mg of Tylenol/Acetaminophen in a 24 hour period from all sources.    If you are taking extra strength Tylenol/acetaminophen (500 mg), the maximum dose is 6 tablets in 24 hours.    If you are taking regular strength acetaminophen (325 mg), the maximum dose is 9 tablets in 24 hours.    Call a doctor for any of the followin. Signs of infection (fever, growing tenderness at the surgery site, a large amount of drainage or bleeding, severe pain, foul-smelling drainage, redness, swelling).  2. It has been over 8 to 10 hours since surgery and you are still not able to urinate (pass water).  3. Headache for over 24 hours.  4. Numbness, tingling or weakness the day after surgery (if you had spinal anesthesia).  Your doctor is:  Dr. Sampson Snyder, General Surgery: [phone]               Or dial [phone] and ask for the resident on call for:  General Surgery  For emergency care, call the:  [facility]:  [phone] (TTY for hearing impaired: [phone])            After your Laparoscopic Cholecystectomy          Incision care     You may take a shower the day after surgery. Carefully wash your incision with soap and water. Do not submerge yourself in water (bath, whirlpool, hot tub, pool, lake) for 14 days after surgery.     Remove the bandage the  day after surgery, but leave the medical tape (Steri-Strips) or glue in place. These will loosen and fall off on their own 5 to 7 days after surgery.       Always wash your hands before touching your incisions or removing bandages.     It is not unusual to form a collection of fluid or blood under your incision that may feel firm or squishy- it can take several weeks to months for your body to reabsorb it.  At times, it may even drain.  If that should happen keep the area clean with soap, water,  and cover with a clean gauze dressing. You can change this daily or as needed.       Other medicines     Wait to start aspirin or blood thinners until the day after surgery. You can continue your regular medicines at your normal time the day after surgery.      Your pain medicine may cause constipation (hard, dry stools). To help with this, take the stool softener your doctor gave you or an over-the-counter stool softener or laxative. You can stop taking this when you are no longer taking pain medicine and your bowel movements are back to normal.      For pain or discomfort     Take the narcotic pain medicine your doctor gave you as needed and as instructed on the bottle. If you prefer to use over-the-counter medication, use acetaminophen (Tylenol) or ibuprofen (Advil, Motrin) as instructed on the box. Do not take Tylenol if it is in your narcotic pain medication.     Use an ice pack on your abdomen (belly) for 20 minutes at a time as needed for the first 24 hours. Be sure to protect your skin by putting a cloth between the ice pack and your skin.     After 24 hours you can switch to heat for 20 minutes as needed. Be sure to protect your skin by putting a cloth between the heat pack and your skin.       Activities     No driving until you feel it s safe to do so. Don t drive while taking narcotic pain medicine.     Don t lift anything heavier than 20 pounds for 3 to 4 weeks after surgery.      Special equipment     If you  left the hospital in VITALY socks (compression stockings), you may take them off the day after surgery.      Diet     You can eat your regular meals after surgery.      When to call the doctor   Call your doctor if you have:     A fever above 101 F (38.3 C) (taken under the tongue), or a fever or chills lasting more than a day.     Redness at the incision site.     Any fluid or blood draining from the incision, especially if it smells bad.      Severe pain that doesn t improve with pain medicine.          We will call you 2 to 4 days after surgery to review this handout, answer questions and help arrange after-surgery care. If you have questions or concerns, please call [phone] during regular office hours. If you need to call after business hours, call [phone] and ask to page the surgeon on-call.         Transversus Abdominis Plane (TAP) Pain Block      What is a TAP block?   A TAP block can help you manage your pain after surgery. TAP stands for transversus abdominis plane, which is a muscle layer in your abdomen (belly). The TAP block uses numbing medicine similar to Novocaine to block pain near the site of your surgery.       Why get a TAP block?     To better manage your pain after surgery. A tap block will help keep the pain from getting severe and out of control.     To block pain signals from the nerve, which helps decrease pain after surgery.     To help you sleep, easily breathe deeply, walk and visit with others.      How is it done?   You will lie still on a table. We will use an ultrasound machine to help us see the correct muscle layer of your abdomen. Then, we ll use a needle to inject the medicine. We may also give you some sleep medicine to lessen the pain of the injection.       The procedure takes between 5 and 15 minutes. It is usually done right before surgery, but will sometimes be after. It depends on your surgery and care needs.      What can I expect?     You may feel numbness,  tingling or a heaviness in your abdomen.      You may have pain control up to 72 hours after surgery.     The TAP block may not lessen all of your surgery pain. But most patients feel 50 to 75 percent less pain than without the block.       Tell your nurse if you have:     Numbness or tingling in areas other than where the injection was     Blurry vision     Ringing in your ears     A metallic taste in your mouth

## 2019-02-18 NOTE — ANESTHESIA POSTPROCEDURE EVALUATION
Anesthesia POST Procedure Evaluation    Patient: Katie Casper   MRN:     [id number] Gender:   female   Age:    58 year old :      1960        Preoperative Diagnosis: Gallstones   Procedure(s):  Laparoscopic Cholecystectomy, umbilical hernia repair   Postop Comments: No value filed.       Anesthesia Type:  General, Regional    Reportable Event: NO     PAIN: Uncomplicated   Sign Out status: Comfortable, Well controlled pain     PONV: No PONV   Sign Out status:  No Nausea or Vomiting     Neuro/Psych: Uneventful perioperative course   Sign Out Status: Preoperative baseline; Age appropriate mentation     Airway/Resp.: Uneventful perioperative course   Sign Out Status: Non labored breathing, age appropriate RR; Resp. Status within EXPECTED Parameters     CV: Uneventful perioperative course   Sign Out status: Appropriate BP and perfusion indices; Appropriate HR/Rhythm     Disposition:   Sign Out in:  PACU  Disposition:  Phase II; Home  Recovery Course: Uneventful  Follow-Up: Not required           Last Anesthesia Record Vitals:  CRNA VITALS  2019 1154 - 2019 1254      2019             Pulse:  75    SpO2:  100 %    Resp Rate (observed):  12          Last PACU/Preop Vitals:  Vitals:    19 1245 19 1247 19 1325   BP: 121/76 119/78 119/74   Pulse: 72 58 64   Resp: 14 15 16   Temp:  36.9  C (98.4  F)    SpO2: 100% 100% 100%         Electronically Signed By: Kevin Mccauley MD, MD, 2019, 3:19 PM

## 2019-02-18 NOTE — OP NOTE
Procedure Date: 02/18/2019      PREOPERATIVE DIAGNOSIS:  Symptomatic cholelithiasis.      POSTOPERATIVE DIAGNOSIS:  Symptomatic cholelithiasis.      OPERATIVE PROCEDURES:   1.  Laparoscopic cholecystectomy.   2.  Primary repair of umbilical hernia.      SURGEON:  Sampson Snyder MD      ANESTHESIA:  General endotracheal.      INDICATIONS FOR PROCEDURE:  The patient presents with symptomatic cholelithiasis.  Informed consent was obtained.      OPERATIVE FINDINGS:  Normal cystic duct and cystic artery anatomy, cholelithiasis, adhesions about gallbladder, a small umbilical hernia.      PROCEDURE:  The patient was brought to the operating room and put under general anesthesia.  The abdomen was widely prepped and draped in the usual sterile fashion.  An infraumbilical skin incision was made.  Dissection was carried down to the umbilical hernia, which was dissected and secured laterally using stay stitches of 0 Vicryl.  The peritoneum was entered under direct vision, a 12 port placed and then 5 ports placed in routine position and technique.  The gallbladder was readily identified.  It was pushed out laterally.  Adhesions were taken down, and then the cystic duct and cystic artery were both readily identified.  The cystic duct was rather generous.  It was dissected proximally as far as I felt safe, doubly clipped with a 5 mm clip applier and cut.  The cystic artery was likewise identified, doubly clipped and cut.  The gallbladder was taken off the liver bed using electrocautery.  Hemostasis was achieved at the liver bed using electrocautery; 20 mL of Marcaine was placed in the liver bed for postop pain control.  The gallbladder was delivered to the infraumbilical port site without spillage of bile or stones.  The abdomen was deflated.  The fascial defect at the umbilical hernia was then secured circumferentially, and a series of interrupted figure-of-eight 0 Vicryls was used to close the defect primarily.  The skin was  closed with subcuticular, and Steri-Strips were applied.  Estimated blood loss was less than 5 mL.  Sponge and needle counts were correct twice.  The patient tolerated the procedure well and was taken to the recovery room, where she was without difficulty or apparent complication.         ROBERTO CARLOS CHAVEZ MD             D: 2019   T: 2019   MT: samantha      Name:     NICK BOYD   MRN:      [id number]        Account:        [id number]   :      1960           Procedure Date: 2019      Document: [id number]       cc: [facility] Billing

## 2019-02-19 ENCOUNTER — TELEPHONE (OUTPATIENT)
Dept: SURGERY | Facility: CLINIC | Age: 59
End: 2019-02-19

## 2019-02-19 NOTE — TELEPHONE ENCOUNTER
Health Call Center    Phone Message    May a detailed message be left on voicemail: yes    Reason for Call: Symptoms or Concerns     If patient has red-flag symptoms, warm transfer to triage line    Current symptom or concern: Can't sit or lay down    Symptoms have been present for:  All day today    Has patient previously been seen for this? No    By: Dr. Snyder    Date: 02/19/19    Are there any new or worsening symptoms? Yes: Horace called in, she says she thinks she is having a lot of trouble with the CO2 that was injected for her surgery. She says she cannot sit or lay down without shooting, excruciating pain. Everything else is fine, she can stand and walk perfectly normal and without pain. Please give Horace a call back.      Action Taken: Message routed to:  [facility] ([facility] General Surgery

## 2019-02-19 NOTE — BRIEF OP NOTE
[facility]    Brief Operative Note    Pre-operative diagnosis: Gallstones  Post-operative diagnosis * No post-op diagnosis entered *  Procedure: Procedure(s):  Laparoscopic Cholecystectomy, umbilical hernia repair  Surgeon: Surgeon(s) and Role:     * Sampson Snyder MD - Primary  Anesthesia: General   Estimated blood loss: None  Drains: None  Specimens:   ID Type Source Tests Collected by Time Destination   A : gallbladder and contents Tissue Gallbladder and Contents SURGICAL PATHOLOGY EXAM Sampson Snyder MD 2/18/2019 11:47 AM      Findings:   None.see dict  Complications: None.  Implants: None.

## 2019-02-20 ENCOUNTER — PATIENT OUTREACH (OUTPATIENT)
Dept: SURGERY | Facility: CLINIC | Age: 59
End: 2019-02-20

## 2019-02-20 LAB — COPATH REPORT: NORMAL

## 2019-02-20 NOTE — PROGRESS NOTES
RN Post-Op/Post-Discharge Care Coordination Note    Ms. Katie Casper is a 58 year old female who underwent laparoscopic holecuystectomy on 2/18 with  Dr. Sampson Snyder.  Spoke with Patient.    Support  Patient able to care for self independently     Health Status  Fevers/chills: Patient denies any fever or chills.  Nausea/Vomiting: Patient denies nausea/vomiting.  Eating/drinking: Patient is able to eat and drink without any complaints.  Bowel habits: Patient reports constipation with no BM in 2 days.  Minimal flatus, belching.  Denies bloating.  Recommended increasing Senna to 2 tabs BID and take OTC laxative tomorrow per package directions.  Drains (SLOANE): N/A  Incisions: Patient denies any signs and symptoms of infection..  Wound closure:  Steri strips  Pain: Improving.  Incisional pain minimal. Shoulder strap pain greatly improved.  New Medications:  Senna, Valium, Norco    Activity/Restrictions  No lifting in excess of 15-20 pounds for 3-4 weeks    Equipment  None    Pathology reviewed with patient:  No: Not yet available    Forms/Letters  No    All of her questions were answered including reviewing restrictions and wound care.  She will call this office if he has any further questions and/or concerns.      In lieu of a post-op clinic patient that patient would like to be contacted in approximately 7-10 days via telephone.    A copy of this note was routed to the primary surgeon.      Whom and When to Call  Patient acknowledges understanding of how to manage any medication changes and   when to seek medical care.     Patient advised that if after hour medical concerns arise to please call [phone] and choose option 4 to speak to the physician on call.

## 2019-02-26 ENCOUNTER — DOCUMENTATION ONLY (OUTPATIENT)
Dept: OTHER | Facility: CLINIC | Age: 59
End: 2019-02-26

## 2019-02-28 ENCOUNTER — PATIENT OUTREACH (OUTPATIENT)
Dept: SURGERY | Facility: CLINIC | Age: 59
End: 2019-02-28

## 2019-02-28 NOTE — PROGRESS NOTES
"Katie Casper was contacted several days post procedure via telephone for a status update and elected to have a telephone follow -up call approximately 10 days after original contact in lieu of a clinic visit with Dr. Sampson Chavez.  She continues to do well and the steri-strips has peeled off.  The patients wounds are healed and the area is C/D/I.  She is afebrile, pain free, and ayanna po; the patient is slowly resuming her normal activity. She states she is feeling much better today and feels she is finally over the \"hump\". She has also returned to work.       Pathology was reviewed with the patient: Yes: reviewed    All of Katie Casper question's were answered and  she would like to return to the clinic on a PRN basis.      A copy of this note was routed to his surgeon.        Patient Name: KATIE CASPER   MR#: [id number]   Specimen #: [id number]   Collected: 2/18/2019   Received: 2/18/2019   Reported: 2/20/2019 17:37   Ordering Phy(s): SAMPSON CHAVEZ     For improved result formatting, select 'View Enhanced Report Format' under    Linked Documents section.     SPECIMEN(S):   Gallbladder and contents     FINAL DIAGNOSIS:   GALLBLADDER AND CONTENTS, CHOLECYSTECTOMY:   - Chronic cholecystitis, cholelithiasis     I have personally reviewed all specimens and/or slides, including the   listed special stains, and used them   with my medical judgement to determine or confirm the final diagnosis.     Electronically signed out by:     Arnaud Beltran M.D., [facility]   "

## 2019-03-22 ENCOUNTER — TELEPHONE (OUTPATIENT)
Dept: PSYCHOLOGY | Facility: CLINIC | Age: 59
End: 2019-03-22

## 2019-03-22 NOTE — TELEPHONE ENCOUNTER
Received call from Ms. Casper about scheduling with Health Psychology  - returned call and LM to facilitate scheduling.   Ct Lorenzo, PhD, LP  Clinical Health Psychologist

## 2019-04-10 ENCOUNTER — MYC MEDICAL ADVICE (OUTPATIENT)
Dept: INTERNAL MEDICINE | Facility: CLINIC | Age: 59
End: 2019-04-10

## 2019-04-10 NOTE — TELEPHONE ENCOUNTER
HI there,  I have been having more migraines lately and was wondering if I should make an appointment to come up with a plan. I am usually getting them between 4am and 7am. Almost every other day.  Thanks! Stay warm in our BRIEF return of winter weather!  Horace    Pt called and states she has migraines about 14-15 a month.  Medications is not working to resolve headaches.   Appt with Dr Desai 04/11 at 7am with DR Desai.  Appt with DR Del Castillo on 04/30/2019 for evaluation, referral to neuro and medication check for pt's migraine headache.  Norma Hernandez RN 8:41 AM on 4/10/2019.

## 2019-04-10 NOTE — PROGRESS NOTES
[facility]  Primary Care Center   Jeremy Desai MD  04/11/2019      Chief Complaint:   1. Migraine with aura  2. Atrophic vaginitis  3. Liver function abnormalities      History of Present Illness:   Katie Casper is a 58 year old female with a history of migraine with aura, pituitary adenoma resection and atrophic vaginitis who presents for evaluation of migraine headaches.    Migraines:  Katie reports migraines have become more frequent over the past month  typically onset between 4-6 AM. She  has 1-2 migraines a week, but now has migraines 3-4 times a week. When she was having 1-2 migraines a week her Zomig was able to manage her symptoms, but when she has migraines 3-4 time a week the Zomig is not as effective. Her associated symptoms include phonophobia, photophobia,occasional nausea and rare vomiting. The biggest trigger for her migraines is change internally or in her environment such as barometric pressure changes, fragrances.  She rates the severity of her migraine symptoms as a 6-7/10. She does not ingest any artifical sugars. Currently not drinking any coffee. Over the past year she has had coffee once every 3-4 weeks. Her Propranolol dosage has gone up from 120 mg to 160 mg 5-6 months ago. She takes one pill at night. She has tried OTC anti- histamine for seasonal allergies such as Zyrtec, which has reduced some symptoms. Her pituitary gland ( previous resection) was last evaluated in June 2018. She feels her headaches are not related to her pituitary.    Atrophic Vaganitis  Between 20-40 years of age she had severe migraines once a month the day before her period. When she started menopause she started to get irration and pain in her vaginal region. She was prescribed E-string which has reduced her vaginal symptoms. I reviewed hormonal therapy is not advised with migraine with aura.    Other concerns discussed:    Her pulse was 49 in clinic today.     Been taking vitamin D supplements  "    Experienced gallbladder problems over the past year and had a cholecystectomy in Feb 2019     Review of Systems:   Pertinent items are noted in HPI, remainder of complete ROS is negative.      Active Medications:     Current Outpatient Medications:      pramipexole (MIRAPEX) 1 MG tablet, Take 1 mg by mouth At Bedtime, Disp: , Rfl:      propranolol ER (INDERAL LA) 120 MG 24 hr capsule, Take 1 capsule (120 mg) by mouth At Bedtime, Disp: 90 capsule, Rfl: 3     Vitamin D, Cholecalciferol, 1000 units TABS, Take 1 tablet by mouth At Bedtime , Disp: , Rfl:      ZOLMitriptan (ZOMIG) 5 MG tablet, Take 1 tablet (5 mg) by mouth as needed, Disp: 9 tablet, Rfl: 3      Allergies:   Ibuprofen and Sulfa drugs      Past Medical History:  Insomnia   Pituitary adenoma   Atrophic vaginitis   hormone replacement therapy   Vitamin D deficiency   Myopia with presbyopia of left eye   Anemia     Past Surgical History:  Laparoscopic cholecystectomy     Family History:   Ovarian cancer - sister  Breast cancer - maternal grandmother      Social History:   Patient presents in clinic alone   Denies any tobacco or drug use.   Admits to rare alcohol use.   She works as an  for [employer].   Life stressors recently: Daughter has mental health concerns working with [employer] providers, her son and his wife learned they are not able to have children and she is recurrently recovering from gallbladder surgery.      Physical Exam:   Resp 20   Ht 1.702 m (5' 7\")   Wt 73.3 kg (161 lb 9.6 oz)   SpO2 100%   BMI 25.31 kg/m     BMI= Body mass index is 25.31 kg/m .    GENERAL APPEARANCE: healthy, alert and no distress  EYES: Eyes grossly normal to inspection, PERRL and conjunctivae and sclerae normal   HENT: right ear canal  with cerumen, left ear canals and  bilateralTM's normal,  mouth without ulcers or lesions, oropharynx clear and oral mucous membranes moist, turner lateralizes to right, air conduction is greater than " bone conduction bilaterally   NECK: no adenopathy, no asymmetry, masses, or scars and thyroid normal to palpation  RESP: lungs clear to auscultation - no rales, rhonchi or wheezes  CV: bradycardic ( 42 bpm), normal S1 S2, no S3 or S4, no murmur, click or rub, no peripheral edema and peripheral pulses strong   ABDOMEN: soft, nontender, no hepatosplenomegaly, no masses and bowel sounds normal  MS: no musculoskeletal defects are noted and gait is age appropriate without ataxia  SKIN: no suspicious lesions or rashes  NEURO: CN 2-12 intact Normal strength and tone, sensory exam grossly normal, mentation intact and speech normal, finger to nose accurate, neg pronator drift, tandem walk normal  PSYCH: mentation appears normal and affect normal/bright    The 10-year ASCVD risk score (Cedrick FISHER Jr., et al., 2013) is: 2.3%    Values used to calculate the score:      Age: 58 years      Sex: Female      Is Non- : No      Diabetic: No      Tobacco smoker: No      Systolic Blood Pressure: 109 mmHg      Is BP treated: No      HDL Cholesterol: 55 mg/dL      Total Cholesterol: 211 mg/dL   Results for CURTIS BOYD (MRN [id number]) as of 4/11/2019 20:56   Ref. Range 4/11/2019 08:58   Sodium Latest Ref Range: 133 - 144 mmol/L 138   Potassium Latest Ref Range: 3.4 - 5.3 mmol/L 4.1   Chloride Latest Ref Range: 94 - 109 mmol/L 107   Carbon Dioxide Latest Ref Range: 20 - 32 mmol/L 28   Urea Nitrogen Latest Ref Range: 7 - 30 mg/dL 19   Creatinine Latest Ref Range: 0.52 - 1.04 mg/dL 0.82   GFR Estimate Latest Ref Range: >60 mL/min/1.73_m2 78   GFR Estimate If Black Latest Ref Range: >60 mL/min/1.73_m2 90   Calcium Latest Ref Range: 8.5 - 10.1 mg/dL 9.1   Anion Gap Latest Ref Range: 3 - 14 mmol/L 3   Albumin Latest Ref Range: 3.4 - 5.0 g/dL 3.7   Protein Total Latest Ref Range: 6.8 - 8.8 g/dL 6.9   Bilirubin Total Latest Ref Range: 0.2 - 1.3 mg/dL 0.3   Alkaline Phosphatase Latest Ref Range: 40 - 150 U/L 88   ALT  Latest Ref Range: 0 - 50 U/L 25   AST Latest Ref Range: 0 - 45 U/L 19   CRP Inflammation Latest Ref Range: 0.0 - 8.0 mg/L <2.9   Vitamin D Deficiency screening Latest Ref Range: 20 - 75 ug/L 30   Glucose Latest Ref Range: 70 - 99 mg/dL 86   WBC Latest Ref Range: 4.0 - 11.0 10e9/L 5.2   Hemoglobin Latest Ref Range: 11.7 - 15.7 g/dL 13.0   Hematocrit Latest Ref Range: 35.0 - 47.0 % 42.1   Platelet Count Latest Ref Range: 150 - 450 10e9/L 214   RBC Count Latest Ref Range: 3.8 - 5.2 10e12/L 4.59   MCV Latest Ref Range: 78 - 100 fl 92   MCH Latest Ref Range: 26.5 - 33.0 pg 28.3   MCHC Latest Ref Range: 31.5 - 36.5 g/dL 30.9 (L)   RDW Latest Ref Range: 10.0 - 15.0 % 13.2   Diff Method Unknown Automated Method   % Neutrophils Latest Units: % 54.1   % Lymphocytes Latest Units: % 31.5   % Monocytes Latest Units: % 6.6   % Eosinophils Latest Units: % 6.8   % Basophils Latest Units: % 0.8   % Immature Granulocytes Latest Units: % 0.2   Nucleated RBCs Latest Ref Range: 0 /100 0   Absolute Neutrophil Latest Ref Range: 1.6 - 8.3 10e9/L 2.8   Absolute Lymphocytes Latest Ref Range: 0.8 - 5.3 10e9/L 1.6   Absolute Monocytes Latest Ref Range: 0.0 - 1.3 10e9/L 0.3   Absolute Eosinophils Latest Ref Range: 0.0 - 0.7 10e9/L 0.4   Absolute Basophils Latest Ref Range: 0.0 - 0.2 10e9/L 0.0   Abs Immature Granulocytes Latest Ref Range: 0 - 0.4 10e9/L 0.0   Absolute Nucleated RBC Unknown 0.0   Hepatitis A Antibody IgG Latest Ref Range: NR^Nonreactive  Nonreactive   Hep B Surface Agn Latest Ref Range: NR^Nonreactive  Nonreactive   Hepatitis B Surface Antibody Latest Ref Range: <8.00 m[IU]/mL 0.00     Assessment and Plan:  Katie Casper is a 58 year old female with a history of migraines with aura, s/p pituitary adenoma resection which is stable, and atrophic vaginitis presents to day for evaluation of migraine headaches .    Migraine with aura and without status migrainosus, not intractable  Migraine has been stable with previous dosage of  Propranolol recently increased to 160 mg, however her blood pressure and pulse are low. Suspect we need to lower dose to 120 mg.  I advised against estrogen products due to migraine with Aura and stroke risk.  She also will work on hydration, dietary lifestyle changes and having routine patterns of sleep, exercise and eating. See after visit summary for details    She will continue current Zomig and contact me if she wishes to switch to Maxalt but needs 48-72 hours between medication switch   She may have seasonal allergy component therefore try Zrytec, Flonase OTC  - Comprehensive metabolic panel  - CBC with platelets differential  - CRP inflammation  - propranolol ER (INDERAL LA) 120 MG 24 hr capsule  Dispense: 90 capsule; Refill: 3    Anxiety and depression  She is following with health psychologist due to major family change.     ALT (SGPT) level raised  History of liver elevation, may be related to problems with her gallbladder resected in February, should have LFT normalized. therefore need to evaluate liver function. 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 She had a previous Hepatitis C test in January 2019, non reactive.   She would benefit from Hep A & B vaccination series as she works in a health care facility.  - Comprehensive metabolic panel  - Hepatitis B Surface Antibody  - Hepatitis B surface antigen  - Hepatitis Antibody A IgG    Vitamin D deficiency  - Vitamin D Deficiency    Visit for screening mammogram  - MA SCREENING DIGITAL BILAT - Future  (s+30)     Cerumen right ear, irrigation if nurse has time.  Follow-up: Return for follow-up Dr Del Castillo as scheduled.       Scribe Disclosure:  I, Dougie Tejeda, am serving as a scribe to document services personally performed by Jeremy Desai MD at this visit, based upon the provider's statements to me. All  documentation has been reviewed by the aforementioned provider prior to being entered into the official medical record.     Portions of this medical record were completed by a scribe. UPON MY REVIEW AND AUTHENTICATION BY ELECTRONIC SIGNATURE, this confirms (a) I performed the applicable clinical services, and (b) the record is accurate.   Jeremy Desai MD

## 2019-04-11 ENCOUNTER — OFFICE VISIT (OUTPATIENT)
Dept: FAMILY MEDICINE | Facility: CLINIC | Age: 59
End: 2019-04-11
Payer: COMMERCIAL

## 2019-04-11 VITALS — OXYGEN SATURATION: 100 % | WEIGHT: 161.6 LBS | RESPIRATION RATE: 20 BRPM | BODY MASS INDEX: 25.36 KG/M2 | HEIGHT: 67 IN

## 2019-04-11 DIAGNOSIS — G43.109 MIGRAINE WITH AURA AND WITHOUT STATUS MIGRAINOSUS, NOT INTRACTABLE: Primary | ICD-10-CM

## 2019-04-11 DIAGNOSIS — E55.9 VITAMIN D DEFICIENCY: ICD-10-CM

## 2019-04-11 DIAGNOSIS — Z12.31 VISIT FOR SCREENING MAMMOGRAM: ICD-10-CM

## 2019-04-11 DIAGNOSIS — R74.01 ALT (SGPT) LEVEL RAISED: ICD-10-CM

## 2019-04-11 DIAGNOSIS — F32.A ANXIETY AND DEPRESSION: ICD-10-CM

## 2019-04-11 DIAGNOSIS — G43.109 MIGRAINE WITH AURA AND WITHOUT STATUS MIGRAINOSUS, NOT INTRACTABLE: ICD-10-CM

## 2019-04-11 DIAGNOSIS — F41.9 ANXIETY AND DEPRESSION: ICD-10-CM

## 2019-04-11 DIAGNOSIS — H61.21 IMPACTED CERUMEN OF RIGHT EAR: ICD-10-CM

## 2019-04-11 LAB
ALBUMIN SERPL-MCNC: 3.7 G/DL (ref 3.4–5)
ALP SERPL-CCNC: 88 U/L (ref 40–150)
ALT SERPL W P-5'-P-CCNC: 25 U/L (ref 0–50)
ANION GAP SERPL CALCULATED.3IONS-SCNC: 3 MMOL/L (ref 3–14)
AST SERPL W P-5'-P-CCNC: 19 U/L (ref 0–45)
BASOPHILS # BLD AUTO: 0 10E9/L (ref 0–0.2)
BASOPHILS NFR BLD AUTO: 0.8 %
BILIRUB SERPL-MCNC: 0.3 MG/DL (ref 0.2–1.3)
BUN SERPL-MCNC: 19 MG/DL (ref 7–30)
CALCIUM SERPL-MCNC: 9.1 MG/DL (ref 8.5–10.1)
CHLORIDE SERPL-SCNC: 107 MMOL/L (ref 94–109)
CO2 SERPL-SCNC: 28 MMOL/L (ref 20–32)
CREAT SERPL-MCNC: 0.82 MG/DL (ref 0.52–1.04)
CRP SERPL-MCNC: <2.9 MG/L (ref 0–8)
DEPRECATED CALCIDIOL+CALCIFEROL SERPL-MC: 30 UG/L (ref 20–75)
DIFFERENTIAL METHOD BLD: ABNORMAL
EOSINOPHIL # BLD AUTO: 0.4 10E9/L (ref 0–0.7)
EOSINOPHIL NFR BLD AUTO: 6.8 %
ERYTHROCYTE [DISTWIDTH] IN BLOOD BY AUTOMATED COUNT: 13.2 % (ref 10–15)
GFR SERPL CREATININE-BSD FRML MDRD: 78 ML/MIN/{1.73_M2}
GLUCOSE SERPL-MCNC: 86 MG/DL (ref 70–99)
HAV IGG SER QL IA: NONREACTIVE
HBV SURFACE AB SERPL IA-ACNC: 0 M[IU]/ML
HBV SURFACE AG SERPL QL IA: NONREACTIVE
HCT VFR BLD AUTO: 42.1 % (ref 35–47)
HGB BLD-MCNC: 13 G/DL (ref 11.7–15.7)
IMM GRANULOCYTES # BLD: 0 10E9/L (ref 0–0.4)
IMM GRANULOCYTES NFR BLD: 0.2 %
LYMPHOCYTES # BLD AUTO: 1.6 10E9/L (ref 0.8–5.3)
LYMPHOCYTES NFR BLD AUTO: 31.5 %
MCH RBC QN AUTO: 28.3 PG (ref 26.5–33)
MCHC RBC AUTO-ENTMCNC: 30.9 G/DL (ref 31.5–36.5)
MCV RBC AUTO: 92 FL (ref 78–100)
MONOCYTES # BLD AUTO: 0.3 10E9/L (ref 0–1.3)
MONOCYTES NFR BLD AUTO: 6.6 %
NEUTROPHILS # BLD AUTO: 2.8 10E9/L (ref 1.6–8.3)
NEUTROPHILS NFR BLD AUTO: 54.1 %
NRBC # BLD AUTO: 0 10*3/UL
NRBC BLD AUTO-RTO: 0 /100
PLATELET # BLD AUTO: 214 10E9/L (ref 150–450)
POTASSIUM SERPL-SCNC: 4.1 MMOL/L (ref 3.4–5.3)
PROT SERPL-MCNC: 6.9 G/DL (ref 6.8–8.8)
RBC # BLD AUTO: 4.59 10E12/L (ref 3.8–5.2)
SODIUM SERPL-SCNC: 138 MMOL/L (ref 133–144)
WBC # BLD AUTO: 5.2 10E9/L (ref 4–11)

## 2019-04-11 RX ORDER — PROPRANOLOL HYDROCHLORIDE 120 MG/1
120 CAPSULE, EXTENDED RELEASE ORAL AT BEDTIME
Qty: 90 CAPSULE | Refills: 3 | Status: SHIPPED | OUTPATIENT
Start: 2019-04-11 | End: 2019-10-07 | Stop reason: DRUGHIGH

## 2019-04-11 ASSESSMENT — PATIENT HEALTH QUESTIONNAIRE - PHQ9
SUM OF ALL RESPONSES TO PHQ QUESTIONS 1-9: 5
5. POOR APPETITE OR OVEREATING: SEVERAL DAYS

## 2019-04-11 ASSESSMENT — MIFFLIN-ST. JEOR: SCORE: 1340.64

## 2019-04-11 ASSESSMENT — ANXIETY QUESTIONNAIRES
GAD7 TOTAL SCORE: 2
IF YOU CHECKED OFF ANY PROBLEMS ON THIS QUESTIONNAIRE, HOW DIFFICULT HAVE THESE PROBLEMS MADE IT FOR YOU TO DO YOUR WORK, TAKE CARE OF THINGS AT HOME, OR GET ALONG WITH OTHER PEOPLE: NOT DIFFICULT AT ALL
3. WORRYING TOO MUCH ABOUT DIFFERENT THINGS: NOT AT ALL
6. BECOMING EASILY ANNOYED OR IRRITABLE: SEVERAL DAYS
2. NOT BEING ABLE TO STOP OR CONTROL WORRYING: NOT AT ALL
5. BEING SO RESTLESS THAT IT IS HARD TO SIT STILL: NOT AT ALL
1. FEELING NERVOUS, ANXIOUS, OR ON EDGE: NOT AT ALL
7. FEELING AFRAID AS IF SOMETHING AWFUL MIGHT HAPPEN: NOT AT ALL

## 2019-04-11 ASSESSMENT — PAIN SCALES - GENERAL: PAINLEVEL: NO PAIN (0)

## 2019-04-11 NOTE — Clinical Note
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

## 2019-04-11 NOTE — PATIENT INSTRUCTIONS
I recommended she avoid artifical sugar, high fructose corn syrup, MSG, red wine, or preservatives. Nunu will stop Estrin, continue to hydrate and have normal sleep patterns.

## 2019-04-11 NOTE — NURSING NOTE
"Chief Complaint   Patient presents with     Headache     \" My migraines have been getting worse in the last month.\"   Cheryl Torre LPN 7:27 AM on 4/11/2019      Has been screened for HIV and was negative.Cheryl Torre LPN 7:29 AM on 4/11/2019    "

## 2019-04-12 ASSESSMENT — ANXIETY QUESTIONNAIRES: GAD7 TOTAL SCORE: 2

## 2019-04-16 ENCOUNTER — OFFICE VISIT (OUTPATIENT)
Dept: OPHTHALMOLOGY | Facility: CLINIC | Age: 59
End: 2019-04-16
Attending: INTERNAL MEDICINE
Payer: COMMERCIAL

## 2019-04-16 DIAGNOSIS — H52.13 MYOPIA OF BOTH EYES: ICD-10-CM

## 2019-04-16 DIAGNOSIS — D35.2 PITUITARY ADENOMA (H): Primary | ICD-10-CM

## 2019-04-16 DIAGNOSIS — H35.3132 INTERMEDIATE STAGE NONEXUDATIVE AGE-RELATED MACULAR DEGENERATION OF BOTH EYES: ICD-10-CM

## 2019-04-16 RX ORDER — ANTIOX #8/OM3/DHA/EPA/LUT/ZEAX 250-2.5 MG
1 CAPSULE ORAL 2 TIMES DAILY
Qty: 120 CAPSULE | Refills: 11 | Status: SHIPPED | OUTPATIENT
Start: 2019-04-16 | End: 2020-01-06

## 2019-04-16 ASSESSMENT — TONOMETRY
OS_IOP_MMHG: 13
IOP_METHOD: ICARE
OD_IOP_MMHG: 11

## 2019-04-16 ASSESSMENT — CUP TO DISC RATIO
OD_RATIO: 0.25
OS_RATIO: 0.25

## 2019-04-16 ASSESSMENT — EXTERNAL EXAM - LEFT EYE: OS_EXAM: NORMAL

## 2019-04-16 ASSESSMENT — VISUAL ACUITY
CORRECTION_TYPE: GLASSES
OD_CC+: -
OS_CC: 20/25
METHOD: SNELLEN - LINEAR
OD_CC: 20/40

## 2019-04-16 ASSESSMENT — REFRACTION_MANIFEST
OS_SPHERE: -0.50
OS_CYLINDER: SPHERE
OS_ADD: +2.00
OD_CYLINDER: SPHERE
OD_ADD: +2.00
OD_SPHERE: PLANO

## 2019-04-16 ASSESSMENT — REFRACTION_WEARINGRX
OD_SPHERE: -0.75
OS_CYLINDER: +0.25
OS_SPHERE: -0.75
OD_CYLINDER: +0.25
SPECS_TYPE: SVL
OS_AXIS: 036
OD_AXIS: 155

## 2019-04-16 ASSESSMENT — CONF VISUAL FIELD
METHOD: COUNTING FINGERS
OD_NORMAL: 1
OS_NORMAL: 1

## 2019-04-16 ASSESSMENT — SLIT LAMP EXAM - LIDS
COMMENTS: NORMAL
COMMENTS: NORMAL

## 2019-04-16 ASSESSMENT — EXTERNAL EXAM - RIGHT EYE: OD_EXAM: NORMAL

## 2019-04-16 NOTE — NURSING NOTE
Chief Complaints and History of Present Illnesses   Patient presents with     Annual Eye Exam     Chief Complaint(s) and History of Present Illness(es)     Annual Eye Exam     Laterality: both eyes    Onset: years ago    Frequency: constantly    Course: stable    Associated symptoms: Negative for eye pain    Treatments tried: no treatments    Pain scale: 0/10              Comments     Pt has pituitary tumor. Fx of macular degeneration, pt has drusen. Not taking eye vitamins, possibly causing migraines.   Glasses working well. Patient denies eye pain or irritation. Does not use any eye drops.    Faina Masters COT 7:05 AM April 16, 2019

## 2019-04-16 NOTE — PROGRESS NOTES
History  HPI     Annual Eye Exam     In both eyes.  This started years ago.  Occurring constantly.  Since onset it is stable.  Associated symptoms include Negative for eye pain.  Treatments tried include no treatments.  Pain was noted as 0/10.              Comments     Pt has pituitary tumor. Fx of macular degeneration, pt has drusen. Not taking eye vitamins, possibly causing migraines.   Glasses working well. Patient denies eye pain or irritation. Does not use any eye drops.    Faina Masters COT 7:05 AM April 16, 2019               Last edited by Faina Masters on 4/16/2019  7:05 AM. (History)          Assessment/Plan  (D35.2) Pituitary adenoma (H)  (primary encounter diagnosis)  Comment: No visual field defect  Plan: Glaucoma Top OU         Educated patient on clinical findings and the importance of continued management with primary care physician. Continue management as directed and return to clinic in 1 year for dilated exam, or sooner, as needed. Copy of chart sent to Dr. Del Castillo.    (D10.9993) Intermediate stage nonexudative age-related macular degeneration of both eyes  Comment: Extensive family history, baseline OCT obtained  Plan: OCT Retina Spectralis OU (both eyes), Multiple Vitamins-Minerals (PRESERVISION AREDS 2) CAPS         Provided patient with [employer]msler grid for weekly monitoring, return immediately if changes are noted. Prescribed AREDS 2 twice each day. Monitor in 1 year.    (H52.13) Myopia of both eyes  Comment: Myopia both eyes   Plan: REFRACTION         Dispensed spectacle prescription for as-needed wear. Educated patient on possibility of adaptation period, if symptoms do not improve return to clinic for further testing.    Return to clinic in 1 year for comprehensive eye exam.    Complete documentation of historical and exam elements from today's encounter can  be found in the full encounter summary report (not reduplicated in this progress  note). I personally obtained the chief complaint(s) and  history of present illness. I  confirmed and edited as necessary the review of systems, past medical/surgical  history, family history, social history, and examination findings as documented by  others; and I examined the patient myself. I personally reviewed the relevant tests,  images, and reports as documented above. I formulated and edited as necessary the  assessment and plan and discussed the findings and management plan with the  patient and family.    Aj Luna OD, FAAO

## 2019-04-16 NOTE — Clinical Note
Thank you for the referral.No visual field defect secondary to pituitary adenoma.Diagnosed with dry macular degeneration.Prescribed AREDs 2 supplement.Recommended repeat evaluation in 1 year.Please contact me with any questions.Aj Luna, OD on 4/16/2019 at 8:20 AM

## 2019-04-17 ENCOUNTER — MYC MEDICAL ADVICE (OUTPATIENT)
Dept: INTERNAL MEDICINE | Facility: CLINIC | Age: 59
End: 2019-04-17

## 2019-04-30 ENCOUNTER — OFFICE VISIT (OUTPATIENT)
Dept: INTERNAL MEDICINE | Facility: CLINIC | Age: 59
End: 2019-04-30
Payer: COMMERCIAL

## 2019-04-30 VITALS
HEART RATE: 51 BPM | DIASTOLIC BLOOD PRESSURE: 66 MMHG | OXYGEN SATURATION: 100 % | WEIGHT: 162.9 LBS | SYSTOLIC BLOOD PRESSURE: 102 MMHG | RESPIRATION RATE: 20 BRPM | BODY MASS INDEX: 25.51 KG/M2

## 2019-04-30 DIAGNOSIS — D35.2 PITUITARY ADENOMA (H): Primary | ICD-10-CM

## 2019-04-30 DIAGNOSIS — G44.89 OTHER HEADACHE SYNDROME: ICD-10-CM

## 2019-04-30 DIAGNOSIS — Z12.31 VISIT FOR SCREENING MAMMOGRAM: ICD-10-CM

## 2019-04-30 ASSESSMENT — PAIN SCALES - GENERAL: PAINLEVEL: MODERATE PAIN (5)

## 2019-04-30 NOTE — NURSING NOTE
Chief Complaint   Patient presents with     Headache     follow up migraines   Cheryl Torre LPN 7:47 AM on 4/30/2019    Has been screened for HIV and was negative..Cheryl Torre LPN 7:48 AM on 4/30/2019

## 2019-04-30 NOTE — PROGRESS NOTES
HPI:    Pt. Comes in for follow up today. She has moved from [facility] recently. She has h/o non-functioning pituitary adenoma s/p resection. She is followed by Dr. Herron, Endocrine and last seen 11/20/2018 with lab tests. She declines influenza vaccine but will get Tdap done 12/19/2018.  She does not smoke nor abuse EtOH. She remains active. She has h/o migraines stable on medications. She has fam. Hx. Migraines. She would like a dermatology evaluation for skin check. No other new HEENT, cardiopulmonary, abdominal, , GYN, neurological complaints. She states her 22 y/o daughter was dismissed from [employer] last margie (she is home now in [place]). Apparently, she could not attend classes. Horace has stress related to this.      PE:    Vitals noted gen nad cooperative alert, HEENT, oropharynx clear no exudate, neck supple nl rom, LCTA, B good air movement, RRR, S1, S2, no MRG, abdomen, no acute findings. Grossly normal neurological exam    A/P:    1. Pituitary adenoma. Seen in Endo by Dr. Herron 11/20/2018 with lab tests. She had outside MRI imaging 6/18/2018. She had appt. With  Dr. Hunter; neurosurgery 2/20/2019 that was cancelled and placed referral again today 4/30/2019  2. She has some concerns about possible altered renal function. She had normal renal U/S study 11/21/2018.  Checked urine protein 12/19/2018 (normal).  Cystain C lab test and reviewed with Nephrology and no concern  3. Seen in  Dermatology for skin check 1/3/2019  4. Seen in  GYN for use of estring 1/10/2019 abnormal PAP and she had follow up 1/25/2019  5. Ordered DEXA scan for baseline bone density 12/19/2018. Vit D checked 4/11/2019 at 30   6. Referred to optho for eye exam (also given h/o pituitary surgery)   7. Migraines; she is on daily Propanolol and PRN Zomig (refilled Zomig  1/21/2019)  8. She declines influenza vaccination but Tdap  9. Mammogram done outside 6/7/2018 and ordered future and scheduled 9/2019  10. Colonoscopy done  outside ([facility]) 9/18/2013 but not noted when to repeat.  FIT test negative 1/30/2019  11. Lipids done 10/25/2018;  and HDL 55  12. She had laparoscopic cholecystectomy with Dr. Snyder 2/18/2019  13. She has Health Psychology appt. With Dr. Hitchcock 5/1/2019    Total time spent 25  minutes.  More than 50% of the time spent with Ms. Casper on counseling / coordinating her care

## 2019-05-01 ENCOUNTER — OFFICE VISIT (OUTPATIENT)
Dept: PSYCHOLOGY | Facility: CLINIC | Age: 59
End: 2019-05-01
Payer: COMMERCIAL

## 2019-05-01 DIAGNOSIS — F33.0 MAJOR DEPRESSIVE DISORDER, RECURRENT EPISODE, MILD (H): Primary | ICD-10-CM

## 2019-05-01 ASSESSMENT — ANXIETY QUESTIONNAIRES
GAD7 TOTAL SCORE: 2
4. TROUBLE RELAXING: SEVERAL DAYS
2. NOT BEING ABLE TO STOP OR CONTROL WORRYING: NOT AT ALL
5. BEING SO RESTLESS THAT IT IS HARD TO SIT STILL: NOT AT ALL
1. FEELING NERVOUS, ANXIOUS, OR ON EDGE: NOT AT ALL
6. BECOMING EASILY ANNOYED OR IRRITABLE: SEVERAL DAYS
7. FEELING AFRAID AS IF SOMETHING AWFUL MIGHT HAPPEN: NOT AT ALL
3. WORRYING TOO MUCH ABOUT DIFFERENT THINGS: NOT AT ALL
GAD7 TOTAL SCORE: 2
7. FEELING AFRAID AS IF SOMETHING AWFUL MIGHT HAPPEN: NOT AT ALL
GAD7 TOTAL SCORE: 2

## 2019-05-01 ASSESSMENT — PATIENT HEALTH QUESTIONNAIRE - PHQ9
SUM OF ALL RESPONSES TO PHQ QUESTIONS 1-9: 4
SUM OF ALL RESPONSES TO PHQ QUESTIONS 1-9: 4

## 2019-05-01 NOTE — PROGRESS NOTES
Health Psychology                  Clinic    Department of Medicine  Katina Josue, Ph.D., L.P. [phone]                          Clinics and Surgery Center  [facility] Leonor Jarvis, Ph.D.,  L.P. [phone]                 3rd Floor  [place] [address]   [address] Lorenzo, Ph.D., L.P. [phone]    [address] Brett Hitchcock, Ph.D., A.B.P.P., L.P. [phone]      [place] [address]          Janelle Fairchild, Ph.D., L.P. [phone]        Confidential Summary of Health Psychology Evaluation*    Referral Source: Arnaud Del Castillo M.D.    Reason for Referral: Ms. Casper requested a referral to see a therapist due to multiple stressors within her family.    History of Presenting Concerns: Ms. Casper has experienced considerable change in loss in the past year.  She was feeling lonely living in [place], away from family,  so moved to the [facility] this past year, and started a new job.  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  She feels depleted at the end of the day especially if it is a slow commute.  Last year her oldest brother  in a single vehicle accident after at least 5 years of decline due to substance use disorder.  Her mother is currently posed to enter hospice at age 92 due to multiple health problems.  She is also concerned about both of her children.  Her daughter became dysfunctional last semester during her parish year at [employer].  She she was dismissed from school and has started psychiatric care at the [facility].  She is not aware of all of the details.  She believes her daughter is now on Zoloft.  She believes that daughter is undergoing more extensive evaluation and they hope tohave a better handle on the nature of the difficulties in coming weeks.  In addition,  "her son recently learned that his wife's infertility challenges., which likely mean that they will need to adopt.  Given all the changes are happening to herself and those around her, she is feeling over overwhelmed to some extent, tired, and uncentered.      Medical History: Ms. Casper has chronic migraines which appear to have worsened over the past month.  She underwent cholecystectomy and hernia repair since moving to the [facility].  Alcohol use is described as half a sip of alcohol once a month.  She denies use of tobacco products.  Caffeine is used \"medicinally\" and rarely.  She denies use of street drugs.  In general she has liked exercise, especially when she can be out of doors but has not gotten into a routine since moving  to the [facility].    Past Medical History:   Diagnosis Date     Anemia      Atrophic vaginitis      Chronic kidney disease 2018    labs     Drusen (degenerative) of macula, bilateral      Hx of previous reproductive problem      Insomnia      Migraines      Myopia      Pituitary macroadenoma (H)      Restless legs      Past Surgical History:   Procedure Laterality Date      SECTION        SECTION       colonscopy       ENT SURGERY  pituitary tumor removal through nose    ,      LAPAROSCOPIC CHOLECYSTECTOMY N/A 2019    Procedure: Laparoscopic Cholecystectomy, umbilical hernia repair;  Surgeon: Sampson Snyder MD;  [facility]     LAPAROSCOPY DIAGNOSTIC (GENERAL)       Current Outpatient Medications   Medication     Multiple Vitamins-Minerals (PRESERVISION AREDS 2) CAPS     pramipexole (MIRAPEX) 1 MG tablet     propranolol ER (INDERAL LA) 120 MG 24 hr capsule     Vitamin D, Cholecalciferol, 1000 units TABS     ZOLMitriptan (ZOMIG) 5 MG tablet     No current facility-administered medications for this visit.      Wt Readings from Last 4 Encounters:   19 73.9 kg (162 lb 14.4 oz)   19 73.3 kg (161 lb 9.6 oz) " "  19 72.6 kg (160 lb)   19 73 kg (160 lb 14.4 oz)     Estimated body mass index is 25.51 kg/m  as calculated from the following:    Height as of 19: 1.702 m (5' 7\").    Weight as of 19: 73.9 kg (162 lb 14.4 oz).    Social History: Ms. Casper is the sixth of eight children in her family of origin.  She grew up in [place].  Her mother was a nurse and then became homemaker.  Her father was a radiologist.  He  10 years ago pancreatic cancer.  She reports that her older sister  of cancer years ago and her oldest brother  in an auto accident a year ago after several difficult years of decline related to substance abuse.  She feels close to some of her other siblings.  She is concerned about mental illness in others.  They have been having difficulty agreeing on issues related to her mother's health care and likely entry into hospice.  She attended [employer] schools including [employer] and [employer].  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  She currently likes her job but finds it stressful.    Ms. Casper was  for decades to her ,  about 11 years ago. Early in the marriage they moved up north to \"[facility] in [place], [place].  More recently she had been living in an [place].  She states that her ex- has been unemployed for the past year due to depression.  In the past he is worked as a paraprofessional in schools and as a counselor for at-risk students.  He is currently participating in some type of outpatient mental health program at the [place].  Her 30-year-old son is .  She describes them as the steady person in the family.  However,  currently he is dealing with challenges related infertility, so she is worried about him.  He is  to a therapist. Her 21-year-old daughter, Nae, was " "valedictorian of her  high school, active in sports and had a full scholarship to [employer].  She did well the first couple years at school, but decompensated last semester, often having difficulty getting out of bed or attending classes.  She sought some mental health care at school, and appeared to be doing well over the vacation, but they learned that she been dismissed from school via a letter received from the school 2 days before she was planning on returning.  She is currently taking sertraline.  She believes she is seeing both a psychiatrist and therapist, but can't name them.    Ms. Casper describes herself as a spiritual person who is extroverted.  She had  been feeling alone in [place] and wanted to get closer to family so moved  to the [facility].  She is trying to reconnect with old  friends but has lived away for about 30 years.  She is currently living in an apartment in a former schoolmate's house in [place].  That seems to be going well.  She continues to feel lonely and is trying to reach out to others.  She has joined a small Universalist Judaism in [place].  She is considering Meet Up groups.  Given all the stressors with which she has reckoning, dating is not currently a priority.    Psychiatric History: Ms. Casper reports that she has seen therapists at various times in her life.  She considers therapy \"a way of life.\"  She first saw a therapist in her 20s.  Most recently she saw a therapist after her brother  and to address issues related to the end of a dating relationship.  She denies any personal history of psychiatric hospitalizations, chemical dependency treatment, or suicide attempts.  She believes she has had a diagnosis of depression past.  The family history is remarkable for her brother's multiple inpatient chemical dependency treatmentd.  Also, her ex- was psychiatrically hospitalized twice, around the time of the divorce.    Psychological Screening: Ms. Casper " completed the following four screening measures.    CAGE-AID Score:  CAGE-AID Total Score 5/1/2019   Total Score 0   Total Score MyChart 0 (A total score of 2 or greater is considered clinically significant)       CAGE-AID score  > 1 is a positive screen, suggesting further discussion is needed to determine if evaluation for alcohol or substance abuse is appropriate.  A score > 2 is considered clinically significant, suggesting further evaluation of alcohol or substance-related problems is indicated.    BONITA-7 Score:  BONITA-7 SCORE 1/7/2019 4/11/2019 5/1/2019   Total Score 1 (minimal anxiety) - 2 (minimal anxiety)   Total Score 1 2 2     PHQ-9 Score:  PHQ-9 SCORE 4/11/2019 5/1/2019   PHQ-9 Total Score MyChart - 4 (Minimal depression)   PHQ-9 Total Score 5 4     WHODAS 2.0 Total Score 5/1/2019   Total Score 14   Total Score MyChart 14     Mental Status/Interview: Ms. Casper was punctually for today's intake session.  She had been looking forward to meeting.  Affect was generally positive with some tearfulness related to discussion of family and work issues.  She appears to be an excellent historian despite not being able to name her daughter's mental health providers.  She believes that she currently seems upbeat at work, but at times feels down.  She is able to enjoy activities but not to the extent that she usually does.  She denies feeling hopeless, helpless, worthless, or guilty.  Energy is mildly diminished.  She reports sleep has historically been bad and estimates she generally gets 5 to 6 hours of sleep.  This pattern has not changed.  Appetite is good.  She describes mild difficulties with concentration and memory.  She feels she can make good decisions.  She denies feeling homicidal or suicidal.  She denies hallucinations or delusions.  Overall, she would like to feel less tired, and recognizes a need to seek out  additional supports and social interactions.  She was interviewed for 70 minutes.  Rapport was  positive.    Impression: Ms. Casper is contending with multiple stressors affecting close family members, as well as acclimating back to the [place] and to a new job.  She seems very responsive to support and open to pursuing additional avenues of support.    Diagnosis:  Major depression, recurrent mild (F33.0)    Recommendations/Plan: Ms. Casper will return to clinic May 15 at 8 AM to initiate problem-solving and supportive psychotherapy.  A treatment plan will be completed at that time.  She has been encouraged to increase exercise and to explore options for additional support through [facility] as well as general social activity.  I have also given her resources addressing infertility which may be helpful to discuss with her son.    Thank you for this opportunity to participate in your care of this patient.    Brett Hitchcock, Ph.D., A.B.P.P., L.P.  Director, Health Psychology    cc: Arnaud Del Castillo M.D.    *In accordance with the Rules of the [place] Board of Psychology, it is noted that psychological descriptions and scientific procedures underlying psychological evaluations have limitations.  Absolute predictions cannot be made based on information in this report. Note is dictated utilizing voice recognition software. Unfortunately this leads to occasional typographical errors. I apologize in advance if this occurs.  If questions arise, please do not hesitate to contact the author.

## 2019-05-02 ASSESSMENT — PATIENT HEALTH QUESTIONNAIRE - PHQ9: SUM OF ALL RESPONSES TO PHQ QUESTIONS 1-9: 4

## 2019-05-02 ASSESSMENT — ANXIETY QUESTIONNAIRES: GAD7 TOTAL SCORE: 2

## 2019-05-15 ENCOUNTER — OFFICE VISIT (OUTPATIENT)
Dept: PSYCHOLOGY | Facility: CLINIC | Age: 59
End: 2019-05-15
Payer: COMMERCIAL

## 2019-05-15 DIAGNOSIS — Z63.9 RELATIONAL PROBLEM RELATED TO A MENTAL DISORDER OR MEDICAL CONDITION: ICD-10-CM

## 2019-05-15 DIAGNOSIS — F33.0 MAJOR DEPRESSIVE DISORDER, RECURRENT EPISODE, MILD (H): Primary | ICD-10-CM

## 2019-05-15 SDOH — SOCIAL STABILITY - SOCIAL INSECURITY: PROBLEM RELATED TO PRIMARY SUPPORT GROUP, UNSPECIFIED: Z63.9

## 2019-05-15 NOTE — PROGRESS NOTES
Health Psychology                  Clinic    Department of Medicine  Katina Josue, Ph.D., L.P. [phone]                          Clinics and Surgery Center  [facility] Leonor Jarvis, Ph.D.,  L.P. [phone]                 [facility] Mail [address]   [address] Lorenzo, Ph.D., L.P. [phone]     [address] Brett Hitchcock, Ph.D., A.B.P.P., L.P. [phone]      [place] [address]          Janelle Fairchild, Ph.D., L.P. [phone]          Health Psychology Follow-Up Note    Referral Source: Arnaud Del Castillo M.D.    Reason for Referral: Ms. Casper requested a referral to see a therapist due to multiple stressors within her family.    History of Presenting Concerns: Ms. Casper has experienced considerable change in loss in the past year.  She was feeling lonely living in [place], away from family,  so moved to the [facility] this past year, and started a new job.  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  She feels depleted at the end of the day especially if it is a slow commute.  Last year her oldest brother  in a single vehicle accident after at least 5 years of decline due to substance use disorder.  Her mother is currently posed to enter hospice at age 92 due to multiple health problems.  She is also concerned about both of her children.  Her daughter became dysfunctional last semester during her parish year at [employer].  She she was dismissed from school and has started psychiatric care at the [facility].  She is not aware of all of the details.  She believes her daughter is now on Zoloft.  She believes that daughter is undergoing more extensive evaluation and they hope tohave a better handle on the nature of the difficulties in coming weeks.  In addition, her son recently  "learned that his wife's infertility challenges., which likely mean that they will need to adopt.  Given all the changes are happening to herself and those around her, she is feeling over overwhelmed to some extent, tired, and uncentered.      Medical History: Ms. Casper has chronic migraines which appear to have worsened over the past month.  She underwent cholecystectomy and hernia repair since moving to the [facility].  Alcohol use is described as half a sip of alcohol once a month.  She denies use of tobacco products.  Caffeine is used \"medicinally\" and rarely.  She denies use of street drugs.  In general she has liked exercise, especially when she can be out of doors but has not gotten into a routine since moving  to the [facility].    Past Medical History:   Diagnosis Date     Anemia      Atrophic vaginitis      Chronic kidney disease 2018    labs     Drusen (degenerative) of macula, bilateral      Hx of previous reproductive problem      Insomnia      Migraines      Myopia      Pituitary macroadenoma (H)      Restless legs      Past Surgical History:   Procedure Laterality Date      SECTION        SECTION       colonscopy       ENT SURGERY  pituitary tumor removal through nose    2010     LAPAROSCOPIC CHOLECYSTECTOMY N/A 2019    Procedure: Laparoscopic Cholecystectomy, umbilical hernia repair;  Surgeon: Sampson Snyder MD;  [facility]     LAPAROSCOPY DIAGNOSTIC (GENERAL)       Current Outpatient Medications   Medication     Multiple Vitamins-Minerals (PRESERVISION AREDS 2) CAPS     pramipexole (MIRAPEX) 1 MG tablet     propranolol ER (INDERAL LA) 120 MG 24 hr capsule     Vitamin D, Cholecalciferol, 1000 units TABS     ZOLMitriptan (ZOMIG) 5 MG tablet     No current facility-administered medications for this visit.      Wt Readings from Last 4 Encounters:   19 73.9 kg (162 lb 14.4 oz)   19 73.3 kg (161 lb 9.6 oz)   19 72.6 kg " "(160 lb)   19 73 kg (160 lb 14.4 oz)     Estimated body mass index is 25.51 kg/m  as calculated from the following:    Height as of 19: 1.702 m (5' 7\").    Weight as of 19: 73.9 kg (162 lb 14.4 oz).    Social History: Ms. Casper is the sixth of eight children in her family of origin.  She grew up in [place].  Her mother was a nurse and then became homemaker.  Her father was a radiologist.  He  10 years ago pancreatic cancer.  She reports that her older sister  of cancer years ago and her oldest brother  in an auto accident a year ago after several difficult years of decline related to substance abuse.  She feels close to some of her other siblings.  She is concerned about mental illness in others.  They have been having difficulty agreeing on issues related to her mother's health care and likely entry into hospice.  She attended [employer] including [employer].  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  She currently likes her job but finds it stressful.    Ms. Casper was  for decades to her ,  about 11 years ago. Early in the marriage they moved up north to \"[facility] in [place], [place].  More recently she had been living in an [place].  She states that her ex- has been unemployed for the past year due to depression.  In the past he is worked as a paraprofessional in schools and as a counselor for at-risk students.  He is currently participating in some type of outpatient mental health program at the [place].  Her 30-year-old son is .  She describes them as the steady person in the family.  However,  currently he is dealing with challenges related infertility, so she is worried about him.  He is  to a therapist. Her 21-year-old daughter, Nae, was valedictorian of her  high " "school, active in sports and had a full scholarship to [employer].  She did well the first couple years at school, but decompensated last semester, often having difficulty getting out of bed or attending classes.  She sought some mental health care at school, and appeared to be doing well over the vacation, but they learned that she been dismissed from school via a letter received from the school 2 days before she was planning on returning.  She is currently taking sertraline.  She believes she is seeing both a psychiatrist and therapist, but can't name them.    Ms. Casper describes herself as a spiritual person who is extroverted.  She had  been feeling alone in [place] and wanted to get closer to family so moved  to the [facility].  She is trying to reconnect with old  friends but has lived away for about 30 years.  She is currently living in an apartment in a former schoolmate's house in [place].  That seems to be going well.  She continues to feel lonely and is trying to reach out to others.  She has joined a small Universalist Sikh in [place].  She is considering Meet Up groups.  Given all the stressors with which she has reckoning, dating is not currently a priority.    Psychiatric History: Ms. Casper reports that she has seen therapists at various times in her life.  She considers therapy \"a way of life.\"  She first saw a therapist in her 20s.  Most recently she saw a therapist after her brother  and to address issues related to the end of a dating relationship.  She denies any personal history of psychiatric hospitalizations, chemical dependency treatment, or suicide attempts.  She believes she has had a diagnosis of depression past.  The family history is remarkable for her brother's multiple inpatient chemical dependency treatmentd.  Also, her ex- was psychiatrically hospitalized twice, around the time of the divorce.    Psychological Screening: Ms. Casper completed the following four " screening measures.    CAGE-AID Score:  CAGE-AID Total Score 5/1/2019   Total Score 0   Total Score MyChart 0 (A total score of 2 or greater is considered clinically significant)       CAGE-AID score  > 1 is a positive screen, suggesting further discussion is needed to determine if evaluation for alcohol or substance abuse is appropriate.  A score > 2 is considered clinically significant, suggesting further evaluation of alcohol or substance-related problems is indicated.    BONITA-7 Score:  BONITA-7 SCORE 1/7/2019 4/11/2019 5/1/2019   Total Score 1 (minimal anxiety) - 2 (minimal anxiety)   Total Score 1 2 2     PHQ-9 Score:  PHQ-9 SCORE 4/11/2019 5/1/2019   PHQ-9 Total Score MyChart - 4 (Minimal depression)   PHQ-9 Total Score 5 4     WHODAS 2.0 Total Score 5/1/2019   Total Score 14   Total Score MyChart 14     Mental Status/Interview: Ms. Casper was punctually for today's intake session.  She had been looking forward to meeting.  Affect was positive with no tearfulness related to discussion of family and work issues.  She isan excellent historian despite not being able to name her daughter's mental health providers.    We discussed her daughter's psychiatric issues at length. She has seen Dr. Alvarenga and a student named Wilber.  The plan is not clear to Horace. We discussed tryin to attend meetings. We also discussed getting to [facility] parent and general meetings.  She has gone to [facility] in the past.  Part of the session focused on her older sister who is volatile and at times scary.  She blocked her as she was accusatory re: her mother's decision to enter hospice.  We discussed strategies for dealing with her.  Currently she has blocked phone calls from her.    She participated fully and appeared to derive benefit.  Rapport was excellent.    Extended session due to complexity of case and length of interval.  A treatment plan was completed.    Impression: Ms. Casper is contending with multiple stressors affecting close  family members, as well as acclimating back to the [place] and to a new job.  She seems very responsive to support and open to pursuing additional avenues of support.    Diagnosis:  Major depression, recurrent mild (F33.0)  Relatinoal problm related to a mental disorder (Z63.9)    Time in:  8:01 (arrived before entered into the system)         Time out:  8:59           Plan: Ms. Casper will return to clinic May 29 at 8 AM to initiate problem-solving and supportive psychotherapy consistent withA treatment plan will be completed at that time.  She has been encouraged to increase exercise and to explore options for additional support through [facility] as well as general social activity.  I have also given her resources addressing infertility which may be helpful to discuss with her son.    Last treatment plan signed: 5/15/2019  Treatment plan due: 5/15/2020                             Brett Hitchcock, Ph.D., A.B.P.P., L.P.  Director, Health Psychology

## 2019-05-29 ENCOUNTER — OFFICE VISIT (OUTPATIENT)
Dept: PSYCHOLOGY | Facility: CLINIC | Age: 59
End: 2019-05-29
Payer: COMMERCIAL

## 2019-05-29 DIAGNOSIS — Z63.9 RELATIONAL PROBLEM RELATED TO A MENTAL DISORDER OR MEDICAL CONDITION: ICD-10-CM

## 2019-05-29 DIAGNOSIS — F33.0 MAJOR DEPRESSIVE DISORDER, RECURRENT EPISODE, MILD (H): Primary | ICD-10-CM

## 2019-05-29 SDOH — SOCIAL STABILITY - SOCIAL INSECURITY: PROBLEM RELATED TO PRIMARY SUPPORT GROUP, UNSPECIFIED: Z63.9

## 2019-05-29 NOTE — PROGRESS NOTES
Health Psychology                  Clinic    Department of Medicine  Katina Josue, Ph.D., L.P. [phone]                          Clinics [facility] Center  [facility] Leonor Jarvis, Ph.D.,  L.P. [phone]                 3rd [facility] Mail Code 425   tC Lorenzo, Ph.D., L.P. [phone]     [address] Brett Hitchcock, Ph.D., A.B.P.P., L.P. [phone]      [place] [address]          Janelle Fairchild, Ph.D., L.P. [phone]          Health Psychology Follow-Up Note    Referral Source: Arnaud Del Castillo M.D.    Reason for Referral: Ms. Casper requested a referral to see a therapist due to multiple stressors within her family.    History of Presenting Concerns: Ms. Casper has experienced considerable change in loss in the past year.  She was feeling lonely living in [place], away from family,  so moved to the [facility] this past year, and started a new job.  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  She feels depleted at the end of the day especially if it is a slow commute.  Last year her oldest brother  in a single vehicle accident after at least 5 years of decline due to substance use disorder.  Her mother is currently posed to enter hospice at age 92 due to multiple health problems.  She is also concerned about both of her children.  Her daughter became dysfunctional last semester during her parish year at [employer].  She was dismissed from school and has started psychiatric care at the [facility].  She is not aware of all of the details.  She believes her daughter is now on Zoloft.  She believes that daughter is undergoing more extensive evaluation and they hope tohave a better handle on the nature of the difficulties in coming weeks.  In addition, her son recently  "learned that his wife's infertility challenges, which likely mean that they will need to adopt.  Given all the changes are happening to herself and those around her, she is feeling over overwhelmed to some extent, tired, and uncentered.      Medical History: Ms. Casper has chronic migraines which appear to have worsened over the past month.  She underwent cholecystectomy and hernia repair since moving to the [facility].  Alcohol use is described as half a sip of alcohol once a month.  She denies use of tobacco products.  Caffeine is used \"medicinally\" and rarely.  She denies use of street drugs.  In general she has liked exercise, especially when she can be out of doors but has not gotten into a routine since moving  to the [facility].    Past Medical History:   Diagnosis Date     Anemia      Atrophic vaginitis      Chronic kidney disease 2018    labs     Drusen (degenerative) of macula, bilateral      Hx of previous reproductive problem      Insomnia      Migraines      Myopia      Pituitary macroadenoma (H)      Restless legs      Past Surgical History:   Procedure Laterality Date      SECTION        SECTION       colonscopy       ENT SURGERY  pituitary tumor removal through nose    2010     LAPAROSCOPIC CHOLECYSTECTOMY N/A 2019    Procedure: Laparoscopic Cholecystectomy, umbilical hernia repair;  Surgeon: Sampson Snyder MD;  Location: [facility] OR     LAPAROSCOPY DIAGNOSTIC (GENERAL)       Current Outpatient Medications   Medication     Multiple Vitamins-Minerals (PRESERVISION AREDS 2) CAPS     pramipexole (MIRAPEX) 1 MG tablet     propranolol ER (INDERAL LA) 120 MG 24 hr capsule     Vitamin D, Cholecalciferol, 1000 units TABS     ZOLMitriptan (ZOMIG) 5 MG tablet     No current facility-administered medications for this visit.      Wt Readings from Last 4 Encounters:   19 73.9 kg (162 lb 14.4 oz)   19 73.3 kg (161 lb 9.6 oz)   19 72.6 kg " "(160 lb)   19 73 kg (160 lb 14.4 oz)     Estimated body mass index is 25.51 kg/m  as calculated from the following:    Height as of 19: 1.702 m (5' 7\").    Weight as of 19: 73.9 kg (162 lb 14.4 oz).    Social History: Ms. Casper is the sixth of eight children in her family of origin.  She grew up in [place].  Her mother was a nurse and then became homemaker.  Her father was a radiologist.  He  10 years ago of pancreatic cancer.  She reports that her older sister  of cancer years ago and her oldest brother  in an auto accident a year ago after several difficult years of decline related to substance abuse.  She feels close to some of her other siblings.  She is concerned about mental illness in others.  They have been having difficulty agreeing on issues related to her mother's health care and likely entry into hospice.  She attended [employer] including [employer] and [email or web address].  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  She currently likes her job but finds it stressful.    Ms. Casper was  for decades to her ,  about 11 years ago. Early in the marriage they moved up north to \"[facility]\" in [place], [place].  More recently she had been living in an [place].  She states that her ex- has been unemployed for the past year due to depression.  In the past he is worked as a paraprofessional in schools and as a counselor for at-risk students.  He is currently participating in some type of outpatient mental health program at the [facility].  Her 30-year-old son is .  She describes him as the steady person in the family.  However,  currently he is dealing with challenges related infertility, so she is worried about him.  He is  to a therapist. Her 21-year-old daughter, Nae, was valedictorian of her  " "high school, active in sports and had a full scholarship to [employer].  She did well the first couple years at school, but decompensated last semester, often having difficulty getting out of bed or attending classes.  She sought some mental health care at school, and appeared to be doing well over the vacation, but they learned that she been dismissed from school via a letter received from the school 2 days before she was planning on returning.  She is currently taking sertraline.  She believes she is seeing both a psychiatrist and therapist, but can't name them.    Ms. Casper describes herself as a spiritual person who is extroverted.  She had  been feeling alone in [place] and wanted to get closer to family so moved  to the [facility].  She is trying to reconnect with old  friends but has lived away for about 30 years.  She is currently living in an apartment in a former schoolmate's house in [place].  That seems to be going well.  She continues to feel lonely and is trying to reach out to others.  She has joined a small [employer] in [place].  She is considering Meet Up groups.  Given all the stressors with which she has reckoning, dating is not currently a priority.    Psychiatric History: Ms. Casper reports that she has seen therapists at various times in her life.  She considers therapy \"a way of life.\"  She first saw a therapist in her 20s.  Most recently she saw a therapist after her brother  and to address issues related to the end of a dating relationship.  She denies any personal history of psychiatric hospitalizations, chemical dependency treatment, or suicide attempts.  She believes she has had a diagnosis of depression past.  The family history is remarkable for her brother's multiple inpatient chemical dependency treatmentd.  Also, her ex- was psychiatrically hospitalized twice, around the time of the divorce.    Psychological Screening: Ms. Casper completed the following " four screening measures.    CAGE-AID Score:  CAGE-AID Total Score 5/1/2019   Total Score 0   Total Score MyChart 0 (A total score of 2 or greater is considered clinically significant)       CAGE-AID score  > 1 is a positive screen, suggesting further discussion is needed to determine if evaluation for alcohol or substance abuse is appropriate.  A score > 2 is considered clinically significant, suggesting further evaluation of alcohol or substance-related problems is indicated.    BONITA-7 Score:  BONITA-7 SCORE 1/7/2019 4/11/2019 5/1/2019   Total Score 1 (minimal anxiety) - 2 (minimal anxiety)   Total Score 1 2 2     PHQ-9 Score:  PHQ-9 SCORE 4/11/2019 5/1/2019   PHQ-9 Total Score MyChart - 4 (Minimal depression)   PHQ-9 Total Score 5 4     WHODAS 2.0 Total Score 5/1/2019   Total Score 14   Total Score MyChart 14     Mental Status/Interview: Ms. Casper was here but stuck on line so there was a delayed start for today's intake session.  She had been looking forward to meeting.  Affect was positive with no tearfulness related to discussion of family and work issues.  She isan excellent historian despite not being able to name her daughter's mental health providers.    We discussed her daughter's psychiatric issues at length. She has seen Dr. Alvarenga and a student named Wilber.  She attended a feedback session and is now trying to organize work with a new therapist.  I gave her several suggestions.  We discussed some of the findings based on a feedback session.    We discussed her efforts to help her daughter, and her feeling overwhelmed by the multiple stressors.  She wants to cut back on hours at work.  She has difficulty making calls of a personal nature at work.  She brought up at the end of the session that coming here is difficult, so she is going to consider seeing a therapist closer to home or work.         She participated fully and appeared to derive benefit.  Rapport was excellent.    Extended session due to  complexity of case and length of interval.    Impression: Ms. Casper is contending with multiple stressors affecting close family members, as well as acclimating back to the [place] and to a new job.  She seems very responsive to support and open to pursuing additional avenues of support.    Diagnosis:  Major depression, recurrent mild (F33.0)  Relatinoal problm related to a mental disorder (Z63.9)    Time in:  8:05     Time out:  9:04          Plan: Ms. Casper will call if wishing return to clinic to continue problem-solving and supportive psychotherapy consistent with treatment plan.  She feels she needs to take a break as it is hard to get to these sessions.  She is encouraged to find alternatives and may return here if her work schedule changes.  She will look into [facility].    Last treatment plan signed: 5/15/2019  Treatment plan due: 5/15/2020                             Brett Hitchcock, Ph.D., A.B.P.P., L.P.  Director, Health Psychology

## 2019-06-13 ENCOUNTER — TELEPHONE (OUTPATIENT)
Dept: PHARMACY | Facility: CLINIC | Age: 59
End: 2019-06-13

## 2019-06-13 NOTE — TELEPHONE ENCOUNTER
Called patient at request of Nicole Adamson CNM to discuss use of vaginal estrogen.       Educated patient that her h/o migraines with aura may increase her risk of stroke;   It's possible that estrogen could add slightly to that risk, but this risk is primarily in premenopausal women on higher doses of estrogen for contraception.  In addition the systemic absorption of vaginal estrogen is low.      Discussed with Nicole Adamson, and we agreed that restarting the estring is a reasonable option, as patient has had a lot of vaginal discomfort daily since she stopped the estring.    Rx sent per Nicole Adamson.  Patient comfortable and appreciative of plan.    Dara An, Pharm.D., BCPS

## 2019-06-14 NOTE — TELEPHONE ENCOUNTER
Patient called and stated that Medina Rolon ordered a leg art and a follow up for patient. She scheduled it for December and Medina Rolon had told her that she could do it now if she wanted to. I just wanted to check with you to see if this was fine to move up appts.
Patient needs to have ultrasound in 6 months from her appointment and it is with exercise at that time.
Attending Attestation (For Attendings USE Only)...

## 2019-07-05 ENCOUNTER — TELEPHONE (OUTPATIENT)
Dept: INTERNAL MEDICINE | Facility: CLINIC | Age: 59
End: 2019-07-05

## 2019-07-05 NOTE — TELEPHONE ENCOUNTER
Patient transferred to RN Line. Patient was calling to ask if she could remove a deer tick that is embedded in her skin, herself. Writer advised patient that a medical professional should evaluate and safely remove the deer tick, to eliminate adverse effects. Patient states she can't get an appointment and that Urgent Care is 3 hours behind and needs to go somewhere now. Writer advised patient that she could reach out to a different [facility] location to see if any openings/decreased wait times. Patient asking if she can take it out herself - writer again strongly advised to not have her take it out herself, but to seek medical care for removal. She states understanding and reports she will contact the [facility] clinics for appointment next. Patient ended phone call.     Freya Bourgeois, JUSTINN, RN

## 2019-07-08 DIAGNOSIS — D35.2 PITUITARY ADENOMA (H): Primary | ICD-10-CM

## 2019-08-20 ENCOUNTER — MYC MEDICAL ADVICE (OUTPATIENT)
Dept: INTERNAL MEDICINE | Facility: CLINIC | Age: 59
End: 2019-08-20

## 2019-08-20 DIAGNOSIS — R93.0 ABNORMAL HEAD MRI: Primary | ICD-10-CM

## 2019-08-22 ENCOUNTER — TELEPHONE (OUTPATIENT)
Dept: INTERNAL MEDICINE | Facility: CLINIC | Age: 59
End: 2019-08-22

## 2019-08-22 RX ORDER — LORAZEPAM 0.5 MG/1
TABLET ORAL
Qty: 1 TABLET | Refills: 0 | Status: SHIPPED | OUTPATIENT
Start: 2019-08-22 | End: 2019-10-07

## 2019-08-27 ENCOUNTER — MYC MEDICAL ADVICE (OUTPATIENT)
Dept: INTERNAL MEDICINE | Facility: CLINIC | Age: 59
End: 2019-08-27

## 2019-08-27 DIAGNOSIS — F41.0 ANXIETY ATTACK: Primary | ICD-10-CM

## 2019-08-27 RX ORDER — ALPRAZOLAM 0.5 MG
TABLET ORAL
Qty: 1 TABLET | Refills: 0 | Status: SHIPPED | OUTPATIENT
Start: 2019-08-27 | End: 2022-01-17

## 2019-08-27 NOTE — TELEPHONE ENCOUNTER
Faxed xanax for MRI. To [facility] pharmacy [phone] Carina Jacome Paramedic on 8/27/2019 at 4:49 PM

## 2019-09-03 ENCOUNTER — ANCILLARY PROCEDURE (OUTPATIENT)
Dept: MRI IMAGING | Facility: CLINIC | Age: 59
End: 2019-09-03
Attending: NEUROLOGICAL SURGERY
Payer: COMMERCIAL

## 2019-09-03 ENCOUNTER — OFFICE VISIT (OUTPATIENT)
Dept: NEUROSURGERY | Facility: CLINIC | Age: 59
End: 2019-09-03
Payer: COMMERCIAL

## 2019-09-03 VITALS
HEART RATE: 51 BPM | SYSTOLIC BLOOD PRESSURE: 118 MMHG | TEMPERATURE: 97.7 F | RESPIRATION RATE: 16 BRPM | DIASTOLIC BLOOD PRESSURE: 71 MMHG | HEIGHT: 67 IN | BODY MASS INDEX: 25.88 KG/M2 | OXYGEN SATURATION: 98 % | WEIGHT: 164.9 LBS

## 2019-09-03 DIAGNOSIS — D35.2 PITUITARY ADENOMA (H): Primary | ICD-10-CM

## 2019-09-03 DIAGNOSIS — D35.2 PITUITARY ADENOMA (H): ICD-10-CM

## 2019-09-03 RX ORDER — GADOBUTROL 604.72 MG/ML
10 INJECTION INTRAVENOUS ONCE
Status: COMPLETED | OUTPATIENT
Start: 2019-09-03 | End: 2019-09-03

## 2019-09-03 RX ADMIN — GADOBUTROL 7.5 ML: 604.72 INJECTION INTRAVENOUS at 08:17

## 2019-09-03 ASSESSMENT — PAIN SCALES - GENERAL: PAINLEVEL: SEVERE PAIN (6)

## 2019-09-03 ASSESSMENT — MIFFLIN-ST. JEOR: SCORE: 1355.61

## 2019-09-03 NOTE — PROGRESS NOTES
Dear Dr. Del Castillo:    We saw Ms. Katie Casper back in [facility] today for MRI follow-up of her pituitary macroadenoma which was resected through an endoscopic transnasal approach in 2007 and again in 2010 due to recurrence. We have been following her for the past 5 years with MRIs and by phone. This is the first time we have seen her since then. She has moved back to the area.  Currently, she is doing well. She reports migraines upon waking up. Her last formal eye exam with Dr. Luna in 04/2019 was normal, barring the macular degeneration. Her olfaction and gustation are intact. She denies polyuria or increased thirst. She is post-menopausal. She is on Estring but no other hormone replacement therapy.  On exam, her general appearance is very good. We did not check her vision today as she had an eye exam a few months ago. Her neurological examination is normal otherwise.  We reviewed her MRI from today and compared it to studies from the past few years. There remains a few millimeters of hypoenhancement in the left side of the sella, extending into the left parasellar space. This area appears rhomboid-shaped on the coronal images. This area appears unchanged for the past few years. The optic apparatus remains completely decompressed. The pituitary gland remains anatomically intact and is best seen on the sagittal images, displaced to the right. Overall, her imaging looks favorable.  We are pleased to see she is doing well clinically. Based on her clinical and radiographic stability, we will see her back in 2 years with a follow-up MRI. She will continue to follow with Dr. Herron as she determines.    Please do not hesitate to contact us with questions. We will keep you informed of her progress.     MD JOSE DAVID SANCHEZ Asef Chowdhury, am serving as a scribe to document services personally performed by Sergey Hunter MD, based upon my observations and the provider's statements to me.  All documentation has been reviewed by the aforementioned doctor prior to being entered into the official medical record.

## 2019-09-03 NOTE — LETTER
9/3/2019      RE: Katie Casper  [address] [facility] [address]       Dear Dr. Del Castillo:  We saw Ms. Katie Casper back in [facility] Clinic today for MRI follow-up of her pituitary macroadenoma which was resected through an endoscopic transnasal approach in 2007 and again in 2010 due to recurrence. We have been following her for the past 5 years with MRIs and by phone. This is the first time we have seen her since then. She has moved back to the area.  Currently, she is doing well. She reports migraines upon waking up. Her last formal eye exam with Dr. Luna in 04/2019 was normal, barring the macular degeneration. Her olfaction and gustation are intact. She denies polyuria or increased thirst. She is post-menopausal. She is on Estring but no other hormone replacement therapy.  On exam, her general appearance is very good. We did not check her vision today as she had an eye exam a few months ago. Her neurological examination is normal otherwise.  We reviewed her MRI from today and compared it to studies from the past few years. There remains a few millimeters of hypoenhancement in the left side of the sella, extending into the left parasellar space. This area appears rhomboid-shaped on the coronal images. This area appears unchanged for the past few years. The optic apparatus remains completely decompressed. The pituitary gland remains anatomically intact and is best seen on the sagittal images, displaced to the right. Overall, her imaging looks favorable.  We are pleased to see she is doing well clinically. Based on her clinical and radiographic stability, we will see her back in 2 years with a follow-up MRI. She will continue to follow with Dr. Herron as she determines.  Please do not hesitate to contact us with questions. We will keep you informed of her progress.   MD JOSE DAVID SANCHEZ, Ricci Chávez, am serving as a scribe to document services personally performed by Sergey  MD Elsa, based upon my observations and the provider's statements to me. All documentation has been reviewed by the aforementioned doctor prior to being entered into the official medical record.

## 2019-09-03 NOTE — NURSING NOTE
Chief Complaint   Patient presents with     PITUITARY ADENOMA     [facility] RETURN     Shell Baxter, CMA

## 2019-09-13 ENCOUNTER — HOSPITAL ENCOUNTER (OUTPATIENT)
Dept: CT IMAGING | Age: 59
Discharge: HOME | End: 2019-09-13
Attending: INTERNAL MEDICINE
Payer: COMMERCIAL

## 2019-09-13 DIAGNOSIS — R11.0 NAUSEA: ICD-10-CM

## 2019-09-13 DIAGNOSIS — K64.9 HEMORRHOIDS: ICD-10-CM

## 2019-09-13 DIAGNOSIS — K59.03 DRUG-INDUCED CONSTIPATION: ICD-10-CM

## 2019-09-13 DIAGNOSIS — K21.9 GASTROESOPHAGEAL REFLUX DISEASE: ICD-10-CM

## 2019-09-13 DIAGNOSIS — R10.84 GENERALIZED ABDOMINAL PAIN: ICD-10-CM

## 2019-09-13 DIAGNOSIS — R19.4 CHANGE IN BOWEL HABITS: ICD-10-CM

## 2019-09-13 DIAGNOSIS — Z80.0 FAMILY HISTORY OF MALIGNANT NEOPLASM OF DIGESTIVE ORGAN: ICD-10-CM

## 2019-09-13 DIAGNOSIS — K62.5 RECTAL BLEEDING: ICD-10-CM

## 2019-09-13 DIAGNOSIS — Z86.010 PERSONAL HISTORY OF COLONIC POLYPS: ICD-10-CM

## 2019-09-13 LAB — CREAT UR-MCNC: 0.4 MG/DL (ref 0.6–1.3)

## 2019-09-13 PROCEDURE — 74011636320 HC RX REV CODE- 636/320: Performed by: INTERNAL MEDICINE

## 2019-09-13 PROCEDURE — 74177 CT ABD & PELVIS W/CONTRAST: CPT

## 2019-09-13 PROCEDURE — 82565 ASSAY OF CREATININE: CPT

## 2019-09-13 RX ADMIN — IOPAMIDOL 75 ML: 612 INJECTION, SOLUTION INTRAVENOUS at 07:07

## 2019-09-24 ENCOUNTER — ANCILLARY PROCEDURE (OUTPATIENT)
Dept: MAMMOGRAPHY | Facility: CLINIC | Age: 59
End: 2019-09-24
Attending: FAMILY MEDICINE
Payer: COMMERCIAL

## 2019-09-24 DIAGNOSIS — Z12.31 VISIT FOR SCREENING MAMMOGRAM: ICD-10-CM

## 2019-09-25 ENCOUNTER — HOSPICE ADMISSION (OUTPATIENT)
Dept: HOSPICE | Facility: HOSPICE | Age: 59
End: 2019-09-25

## 2019-09-26 ENCOUNTER — HOME CARE VISIT (OUTPATIENT)
Dept: HOSPICE | Facility: HOSPICE | Age: 59
End: 2019-09-26

## 2019-09-28 ENCOUNTER — HEALTH MAINTENANCE LETTER (OUTPATIENT)
Age: 59
End: 2019-09-28

## 2019-09-30 ENCOUNTER — HOME CARE VISIT (OUTPATIENT)
Dept: HOSPICE | Facility: HOSPICE | Age: 59
End: 2019-09-30

## 2019-10-07 ENCOUNTER — OFFICE VISIT (OUTPATIENT)
Dept: NEUROLOGY | Facility: CLINIC | Age: 59
End: 2019-10-07
Attending: INTERNAL MEDICINE
Payer: COMMERCIAL

## 2019-10-07 ENCOUNTER — TELEPHONE (OUTPATIENT)
Dept: NEUROLOGY | Facility: CLINIC | Age: 59
End: 2019-10-07

## 2019-10-07 VITALS
DIASTOLIC BLOOD PRESSURE: 59 MMHG | HEART RATE: 64 BPM | SYSTOLIC BLOOD PRESSURE: 95 MMHG | WEIGHT: 165 LBS | BODY MASS INDEX: 25.9 KG/M2 | HEIGHT: 67 IN

## 2019-10-07 DIAGNOSIS — G43.719 INTRACTABLE CHRONIC MIGRAINE WITHOUT AURA AND WITHOUT STATUS MIGRAINOSUS: Primary | ICD-10-CM

## 2019-10-07 RX ORDER — PROPRANOLOL HYDROCHLORIDE 80 MG/1
80 CAPSULE, EXTENDED RELEASE ORAL DAILY
Qty: 30 CAPSULE | Refills: 3 | Status: SHIPPED | OUTPATIENT
Start: 2019-10-07 | End: 2019-11-26

## 2019-10-07 RX ORDER — ZOLMITRIPTAN 2.5 MG/1
2.5 SPRAY, METERED NASAL
Qty: 6 EACH | Refills: 8 | Status: SHIPPED | OUTPATIENT
Start: 2019-10-07 | End: 2020-02-27

## 2019-10-07 ASSESSMENT — PAIN SCALES - GENERAL: PAINLEVEL: NO PAIN (0)

## 2019-10-07 ASSESSMENT — MIFFLIN-ST. JEOR: SCORE: 1356.07

## 2019-10-07 NOTE — PROGRESS NOTES
"Re: Katie Casper      MRN# [id number]  YOB: 1960  Date of Visit:10/7/2019     OUTPATIENT NEUROLOGY VISIT NOTE    Chief Complaint:  Headache evaluation    History of Present Illness  Katie Casper is a 58-year-old right-handed presents to the clinic today for headache evaluation  pituitary macroadenoma which was resected through an endoscopic transnasal approach in 2007 and again in 2010 due to recurrence.  Headache History:      Onset History: since 19 years old +\"every single person\" in the family all have headaches -on the \"mother's side\"    Current Headache Pattern:      Frequency (How many headache days per month?): 10-18 per month and more headaches after age of 40     Duration of Headache:      Aura: none   Associated Symptoms:  Sound>light sensitivity, nausea rare and no usual vomiting, fatigue, irritability to sound and tired feeling       Description of Headache Pain & Location: Pulsing pain Almost always right side and sometimes right face or tip of the nose and in the back of her head and a whole neck and rates pain 6-7/10 and sometimes 5/10 or sometimes 9/10        Do headaches interfere with or prevent usual activities or diminish your productivity at home or work? May be 1-2 days missed and sometimes \"coming late\" to work if zomig works    Headache Treatments Tried:    Zomig oral and helps in 2 hours but it has been less effective and works some  Sumatriptan -was not effective  Rizatriptan -worked partially  Propranolol 120 mg and it does not appear to be as effective anymore  Topiramate for a long time and did not take any difference and was on 5-6 years  Amitriptyline caused side effects-dizziness, not able to function and did not work  Gabapentin and did not help  Verapamil but she was not on verapamil for two long  Ibuprofen or tylenol never works alone and does not take both frequently for headache  Physical therapy in the past for hedaches about 15 years ago and did " biofeedback and newest complimentary treatments    Have you needed to utilize the Emergency Room to treat your headache symptoms?  No  What makes your headaches better?  prescription medication: zomig and quiet room    What makes your headaches worse or triggers your headaches? Menstrual cycle in the past but not sure anymore, weather     Insomnia has improved and was worse before and average she sleeps 5-6 hours.   Multiple stressors within her family and saw Brett Baker and currently someone out in [place]   Sleep study and no GLENDA and just restless legs  Reports that she stays away from artificial sweetener, wine and MSG ,   Caffeine-only drinks medicinally or very tired and just to get thru and one cup every couple of weeks    Denies history of head or neck trauma, dizziness, vertigo, loss of consciousness, seizure, double vision, blurred vision, hearing difficulty, speech or swallowing difficulty, weakness or numbness in face, arms or legs, urinary or bowel incontinence, coordination problems or gait difficulty, fever or chills.    Neurodiagnostic Testing  MRI brain reviewed  MR BRAIN W/O & W CONTRAST 9/3/2019 9:01 AM        Contrast: 7.5 ml Gadavist     Findings:  Postsurgical changes of transnasal and transsphenoidal  approach pituitary adenoma resection. There is a lesion within the  left sella, which is hypoenhancing superiorly and necrotic inferiorly,  which measures 1.2 x 0.7 cm and is not significantly changed since  12/1/2014. The pituitary infundibulum is minimally deviated to the  right. The optic apparatus is intact without mass effect. The  cavernous carotid flow voids appear patent.      There is no mass effect, midline shift or extra-axial fluid  collection. Ventricles are proportionate to the cerebral sulci.  Diffusion-weighted images reveal no abnormal reduced diffusion.  Postcontrast images demonstrate no abnormal intracranial enhancement.  . Flow voids within the major intracranial  vessels are present. The  visualized portions of the paranasal sinuses and mastoid air cells are  clear. The orbits appear normal.                                                                      Impression:  Postsurgical changes of transnasal pituitary adenoma  resection. There is no significant change of a hypoenhancing and  centrally necrotic lesion within the left posterior sella since  12/1/2014, which may represent postsurgical changes or possibly  residual adenoma. Attention is recommended on follow-up imaging.     Labs reviewed  Results for CURTIS BOYD (MRN [id number]) as of 10/7/2019 13:42   Ref. Range 12/19/2018 08:35   Sodium Latest Ref Range: 133 - 144 mmol/L 140   Potassium Latest Ref Range: 3.4 - 5.3 mmol/L 4.2   Chloride Latest Ref Range: 94 - 109 mmol/L 107   Carbon Dioxide Latest Ref Range: 20 - 32 mmol/L 28   Urea Nitrogen Latest Ref Range: 7 - 30 mg/dL 21   Creatinine Latest Ref Range: 0.52 - 1.04 mg/dL 0.90   GFR Estimate Latest Ref Range: >60 mL/min/1.73_m2 70   GFR Estimate If Black Latest Ref Range: >60 mL/min/1.73_m2 81   Calcium Latest Ref Range: 8.5 - 10.1 mg/dL 8.7   Anion Gap Latest Ref Range: 3 - 14 mmol/L 5   Albumin Latest Ref Range: 3.4 - 5.0 g/dL 3.6   Protein Total Latest Ref Range: 6.8 - 8.8 g/dL 6.6 (L)   Bilirubin Total Latest Ref Range: 0.2 - 1.3 mg/dL 0.4   Alkaline Phosphatase Latest Ref Range: 40 - 150 U/L 80   ALT Latest Ref Range: 0 - 50 U/L 27   AST Latest Ref Range: 0 - 45 U/L 22     PaResults for CURTIS BOYD (MRN [id number]) as of 10/7/2019 13:42   Ref. Range 12/19/2018 08:35   TSH Latest Ref Range: 0.40 - 4.00 mU/L 1.92   st Medical History reviewed and verified with the patient  Past Medical History:   Diagnosis Date     Anemia menstrual related and improved and low ferritin  2006     Atrophic vaginitis      Hormone replacement therapy for 3-4 years      Drusen (degenerative) of macula, bilateral      Hx of previous reproductive problem 1992      "Insomnia      Migraines since 20s       Myopia      Pituitary macroadenoma (H)      Restless legs and on Mirapex        Past Surgical History reviewed and verified with the patient  Past Surgical History:   Procedure Laterality Date      SECTION        SECTION       colonscopy       ENT SURGERY  pituitary tumor removal through nose    ,      LAPAROSCOPIC CHOLECYSTECTOMY N/A 2019    Procedure: Laparoscopic Cholecystectomy, umbilical hernia repair;  Surgeon: Sampson Snyder MD;  Location: [facility] OR     LAPAROSCOPY DIAGNOSTIC (GENERAL)         Family History reviewed and verified with the patient  \"every single person\" in the family all have headaches -on the \"mother's side\"  Brother-epilepsy  Sister-LE and partial complex seizures  Social History:  2 kids-21 and 31 years old, works at [employer] in [employer] -activities  Social History     Tobacco Use     Smoking status: Never Smoker     Smokeless tobacco: Never Used   Substance Use Topics     Alcohol use: No    reviewed and verified with the patient     Allergies   Allergen Reactions     Ibuprofen Other (See Comments)     Decreased kidney function - asked not to take      Sulfa Drugs Rash       Current Outpatient Medications   Medication Sig Dispense Refill     ALPRAZolam (XANAX) 0.5 MG tablet Take one pill 1/2 hour before MRI imaging 1 tablet 0     ESTRING 2 MG vaginal ring INSERT 1 RING VAGINALLY EVERY 3 MONTHS 1 each 3     Multiple Vitamins-Minerals (PRESERVISION AREDS 2) CAPS Take 1 capsule by mouth 2 times daily 120 capsule 11     pramipexole (MIRAPEX) 1 MG tablet Take 1 mg by mouth At Bedtime       propranolol ER (INDERAL LA) 120 MG 24 hr capsule Take 1 capsule (120 mg) by mouth At Bedtime 90 capsule 3     Vitamin D, Cholecalciferol, 1000 units TABS Take 1 tablet by mouth At Bedtime        ZOLMitriptan (ZOMIG) 5 MG tablet Take 1 tablet (5 mg) by mouth as needed 9 tablet 3   reviewed and verified " "with the patient    Review of Systems:  A 12-point ROS including constitutional, eyes, ENT, respiratory, cardiovascular, gastroenterology, genitourinary, integumentary, musculoskeletal, neurology, hematology and psychiatric were all reviewed with the patient and completed at the [facility] and as mentioned in the HPI.   General Exam:   BP 95/59 (BP Location: Left arm, Patient Position: Chair, Cuff Size: Adult Large)   Pulse 64   Ht 1.702 m (5' 7\")   Wt 74.8 kg (165 lb)   BMI 25.84 kg/m    GEN: Awake, NAD; good eye contact, responses appropriately, healthy appearing, no headache today   HEENT: Head atraumatic/Normocephalic. Scalp normal. Pupils equally round, 4 mm, reactive to light and accommodation, sclera and conjunctiva normal. Fundoscopic examination reveals normal vessels no papilledema.   Neck: Easily moveable without resistance  Heart: S1/S2 appreciated, RRR, no m/r/g, no carotid bruits  Lungs:Lungs are clear to auscultation bilaterally, no wheezes or crackles.   Neurological Examination:  The patient is alert and oriented times four. Has good attention and concentration.Speech is fluent without dysarthria.   Cranial nerves:  CN I deferred.   CN II: Intact and full visual fields to confrontation bilaterally. CN III, IV, VI: EOM intact. There is no nystagmus. Has conjugated gaze. Intact direct and consensual pupillary light reflexes.   CN V: Intact and symmetrical to facial sensation in the V1 through V3 bilaterally.   CN VII: Intact and symmetrical eyebrow and lid raise and eyelid closure, smiles and frown.   CN VIII: Intact to finger rub bilaterally.   CN IX and X: The palates elevates symmetrical. The uvula is midline.   CN XII: The tongue protrudes midline with no atrophy or fasciculations.   Motor exam: The patient has a normal bulk and tone throughout. There is no atrophy, fasciculations, clonus, or abnormal movements appreciated.   Strength Exam:  5/5 strength at shoulder " abduction, elbow flexion or extension, wrist flexion or extension, finger abduction, , hip flexion and extension, knee flexion and extension, and dorsiflexion and plantarflexion bilaterally.   Sensation is intact to light touch and pinprick throughout. Has good vibration and proprioception sensation at great toes bilaterally.   Reflexes are 2+ and symmetrical at biceps, triceps, brachioradialis, patellar, and Achilles.   Negative Babinski with downgoing toes bilaterally.   Coordination reveals finger-nose-finger with normal speed and accuracy.   Station and gait is normal. There is no ataxia. Can walk on the toes, heels, and tandem walk without difficulty. Has good postural reflexes.Has no drift and a negative Romberg. Lhermitte's negative          Assessment and Plan:  Headache appears to be migraine phenotype and intractable chronic migraine  Discussed acute and preventive headache treatment. A trial of emgality discussed, including known and unknown side effects.     Plan:  Headache prevention-a trial of Emgality -initial loading dose of 240 mg subcutaneous and then 120 mg every 28 days subcutaneous.  Decrease propranolol dose to 80 mg ER due to low blood pressure and recheck your blood pressure. Will decrease propranolol even further after starting emgality.  Acute migraine headache treatment -a trial of nasal zomig to see if it works faster with alleviating migraine headache.   Follow up in 3 months after starting Emgality      Prescription for Emgality and zomig nasal provided. Correct use and course provided. Expected benefits and typical side effects reviewed. Safety of concomitant medications and interactions reviewed. Patient taught signs and symptoms of adverse reactions and allergies. Patient understands teaching and accepts risks of prescribed medication regimen.      I discussed all my recommendation with Katie Casper. The patient verbalizes understanding and comfortable with the plan. The  patient has our clinic phone number to call with any questions or concerns. All of the patient's questions were answered from the best of my current knowledge.     Thank you for letting me be a part of the treatment team for Katie Casper      Time spent with pt answering questions, discussing findings, counseling and coordinating care was more than 50% the appointment time, 54  minutes.         JIM Rosales, formerly [facility]

## 2019-10-07 NOTE — PATIENT INSTRUCTIONS
Plan:  Headache prevention-a trial of Emgality -initial loading dose of 240 mg subcutaneous and then 120 mg every 28 days subcutaneous.  Decrease propranolol dose to 80 mg ER due to low blood pressure and recheck your blood pressure. Will decrease propranolol even further after starting emgality.  Acute migraine headache treatment -a trial of nasal zomig to see if it works faster with alleviating migraine headache.   Follow up in 3 months after starting Emgality      Patient Education     Galcanezumab injection  Brand Name: Emgality  What is this medicine?  GALCANEZUMAB (gal ka CLARA ue mab) is used to prevent migraine headaches.  How should I use this medicine?  This medicine is for injection under the skin. You will be taught how to prepare and give this medicine. Use exactly as directed. Take your medicine at regular intervals. Do not take your medicine more often than directed.  It is important that you put your used needles and syringes in a special sharps container. Do not put them in a trash can. If you do not have a sharps container, call your pharmacist or healthcare provider to get one.  Talk to your pediatrician regarding the use of this medicine in children. Special care may be needed.  What side effects may I notice from receiving this medicine?  Side effects that you should report to your doctor or health care professional as soon as possible:    allergic reactions like skin rash, itching or hives, swelling of the face, lips, or tongue  Side effects that usually do not require medical attention (report these to your doctor or health care professional if they continue or are bothersome):    pain, redness, or irritation at site where injected  What may interact with this medicine?  Interactions are not expected.  What if I miss a dose?  If you miss a dose, take it as soon as you can. If it is almost time for your next dose, take only that dose. Do not take double or extra doses.  Where should I keep my  medicine?  Keep out of the reach of children.    You will be instructed on how to store this medicine. Throw away any unused medicine after the expiration date on the label.  What should I tell my health care provider before I take this medicine?  They need to know if you have any of these conditions:    an unusual or allergic reaction to galcanezumab, other medicines, foods, dyes, or preservatives    pregnant or trying to get pregnant    breast-feeding  What should I watch for while using this medicine?  Tell your doctor or healthcare professional if your symptoms do not start to get better or if they get worse.  NOTE:This sheet is a summary. It may not cover all possible information. If you have questions about this medicine, talk to your doctor, pharmacist, or health care provider. Copyright  2019 [employer]

## 2019-10-07 NOTE — LETTER
"10/7/2019       RE: Katie Casper  [address]  [place] [id number]     Dear Colleague,    Thank you for referring your patient, Katie Casper, to the [facility] NEUROLOGY at [facility]. Please see a copy of my visit note below.    Re: Katie Casper      MRN# [id number]  YOB: 1960  Date of Visit:10/7/2019     OUTPATIENT NEUROLOGY VISIT NOTE    Chief Complaint:  Headache evaluation    History of Present Illness  Katie Casper is a 58-year-old right-handed presents to the clinic today for headache evaluation  pituitary macroadenoma which was resected through an endoscopic transnasal approach in 2007 and again in 2010 due to recurrence.  Headache History:      Onset History: since 19 years old +\"every single person\" in the family all have headaches -on the \"mother's side\"    Current Headache Pattern:      Frequency (How many headache days per month?): 10-18 per month and more headaches after age of 40     Duration of Headache:      Aura: none   Associated Symptoms:  Sound>light sensitivity, nausea rare and no usual vomiting, fatigue, irritability to sound and tired feeling       Description of Headache Pain & Location: Pulsing pain Almost always right side and sometimes right face or tip of the nose and in the back of her head and a whole neck and rates pain 6-7/10 and sometimes 5/10 or sometimes 9/10        Do headaches interfere with or prevent usual activities or diminish your productivity at home or work? May be 1-2 days missed and sometimes \"coming late\" to work if zomig works    Headache Treatments Tried:    Zomig oral and helps in 2 hours but it has been less effective and works some  Sumatriptan -was not effective  Rizatriptan -worked partially  Propranolol 120 mg and it does not appear to be as effective anymore  Topiramate for a long time and did not take any difference and was on 5-6 years  Amitriptyline caused side effects-dizziness, " not able to function and did not work  Gabapentin and did not help  Verapamil but she was not on verapamil for two long  Ibuprofen or tylenol never works alone and does not take both frequently for headache  Physical therapy in the past for hedaches about 15 years ago and did biofeedback and newest complimentary treatments    Have you needed to utilize the Emergency Room to treat your headache symptoms?  No  What makes your headaches better?  prescription medication: zomig and quiet room    What makes your headaches worse or triggers your headaches? Menstrual cycle in the past but not sure anymore, weather     Insomnia has improved and was worse before and average she sleeps 5-6 hours.   Multiple stressors within her family and saw Brett Baker and currently someone out in [place]   Sleep study and no GLENDA and just restless legs  Reports that she stays away from artificial sweetener, wine and MSG ,   Caffeine-only drinks medicinally or very tired and just to get thru and one cup every couple of weeks    Denies history of head or neck trauma, dizziness, vertigo, loss of consciousness, seizure, double vision, blurred vision, hearing difficulty, speech or swallowing difficulty, weakness or numbness in face, arms or legs, urinary or bowel incontinence, coordination problems or gait difficulty, fever or chills.    Neurodiagnostic Testing  MRI brain reviewed  MR BRAIN W/O & W CONTRAST 9/3/2019 9:01 AM        Contrast: 7.5 ml Gadavist     Findings:  Postsurgical changes of transnasal and transsphenoidal  approach pituitary adenoma resection. There is a lesion within the  left sella, which is hypoenhancing superiorly and necrotic inferiorly,  which measures 1.2 x 0.7 cm and is not significantly changed since  12/1/2014. The pituitary infundibulum is minimally deviated to the  right. The optic apparatus is intact without mass effect. The  cavernous carotid flow voids appear patent.      There is no mass effect,  midline shift or extra-axial fluid  collection. Ventricles are proportionate to the cerebral sulci.  Diffusion-weighted images reveal no abnormal reduced diffusion.  Postcontrast images demonstrate no abnormal intracranial enhancement.  . Flow voids within the major intracranial vessels are present. The  visualized portions of the paranasal sinuses and mastoid air cells are  clear. The orbits appear normal.                                                                      Impression:  Postsurgical changes of transnasal pituitary adenoma  resection. There is no significant change of a hypoenhancing and  centrally necrotic lesion within the left posterior sella since  12/1/2014, which may represent postsurgical changes or possibly  residual adenoma. Attention is recommended on follow-up imaging.     Labs reviewed  Results for CURTIS BOYD (MRN [id number]) as of 10/7/2019 13:42   Ref. Range 12/19/2018 08:35   Sodium Latest Ref Range: 133 - 144 mmol/L 140   Potassium Latest Ref Range: 3.4 - 5.3 mmol/L 4.2   Chloride Latest Ref Range: 94 - 109 mmol/L 107   Carbon Dioxide Latest Ref Range: 20 - 32 mmol/L 28   Urea Nitrogen Latest Ref Range: 7 - 30 mg/dL 21   Creatinine Latest Ref Range: 0.52 - 1.04 mg/dL 0.90   GFR Estimate Latest Ref Range: >60 mL/min/1.73_m2 70   GFR Estimate If Black Latest Ref Range: >60 mL/min/1.73_m2 81   Calcium Latest Ref Range: 8.5 - 10.1 mg/dL 8.7   Anion Gap Latest Ref Range: 3 - 14 mmol/L 5   Albumin Latest Ref Range: 3.4 - 5.0 g/dL 3.6   Protein Total Latest Ref Range: 6.8 - 8.8 g/dL 6.6 (L)   Bilirubin Total Latest Ref Range: 0.2 - 1.3 mg/dL 0.4   Alkaline Phosphatase Latest Ref Range: 40 - 150 U/L 80   ALT Latest Ref Range: 0 - 50 U/L 27   AST Latest Ref Range: 0 - 45 U/L 22     PaResults for CURTIS BOYD (MRN [id number]) as of 10/7/2019 13:42   Ref. Range 12/19/2018 08:35   TSH Latest Ref Range: 0.40 - 4.00 mU/L 1.92   st Medical History reviewed and verified with the  "patient  Past Medical History:   Diagnosis Date     Anemia menstrual related and improved and low ferritin  2006     Atrophic vaginitis      Hormone replacement therapy for 3-4 years      Drusen (degenerative) of macula, bilateral      Hx of previous reproductive problem      Insomnia      Migraines since 20s       Myopia      Pituitary macroadenoma (H)      Restless legs and on Mirapex        Past Surgical History reviewed and verified with the patient  Past Surgical History:   Procedure Laterality Date      SECTION        SECTION       colonscopy       ENT SURGERY  pituitary tumor removal through nose    ,      LAPAROSCOPIC CHOLECYSTECTOMY N/A 2019    Procedure: Laparoscopic Cholecystectomy, umbilical hernia repair;  Surgeon: Sampson Snyder MD;  Location: [facility] OR     LAPAROSCOPY DIAGNOSTIC (GENERAL)         Family History reviewed and verified with the patient  \"every single person\" in the family all have headaches -on the \"mother's side\"  Brother-epilepsy  Sister-LE and partial complex seizures  Social History:  2 kids-21 and 31 years old, works at [employer] in [employer] on [employer] -activities  Social History     Tobacco Use     Smoking status: Never Smoker     Smokeless tobacco: Never Used   Substance Use Topics     Alcohol use: No    reviewed and verified with the patient     Allergies   Allergen Reactions     Ibuprofen Other (See Comments)     Decreased kidney function - asked not to take      Sulfa Drugs Rash       Current Outpatient Medications   Medication Sig Dispense Refill     ALPRAZolam (XANAX) 0.5 MG tablet Take one pill 1/2 hour before MRI imaging 1 tablet 0     ESTRING 2 MG vaginal ring INSERT 1 RING VAGINALLY EVERY 3 MONTHS 1 each 3     Multiple Vitamins-Minerals (PRESERVISION AREDS 2) CAPS Take 1 capsule by mouth 2 times daily 120 capsule 11     pramipexole (MIRAPEX) 1 MG tablet Take 1 mg by mouth At Bedtime       propranolol ER (INDERAL " "LA) 120 MG 24 hr capsule Take 1 capsule (120 mg) by mouth At Bedtime 90 capsule 3     Vitamin D, Cholecalciferol, 1000 units TABS Take 1 tablet by mouth At Bedtime        ZOLMitriptan (ZOMIG) 5 MG tablet Take 1 tablet (5 mg) by mouth as needed 9 tablet 3   reviewed and verified with the patient    Review of Systems:  A 12-point ROS including constitutional, eyes, ENT, respiratory, cardiovascular, gastroenterology, genitourinary, integumentary, musculoskeletal, neurology, hematology and psychiatric were all reviewed with the patient and completed at the Neuroscience Services Question narshyla and as mentioned in the HPI.   General Exam:   BP 95/59 (BP Location: Left arm, Patient Position: Chair, Cuff Size: Adult Large)   Pulse 64   Ht 1.702 m (5' 7\")   Wt 74.8 kg (165 lb)   BMI 25.84 kg/m     GEN: Awake, NAD; good eye contact, responses appropriately, healthy appearing, no headache today   HEENT: Head atraumatic/Normocephalic. Scalp normal. Pupils equally round, 4 mm, reactive to light and accommodation, sclera and conjunctiva normal. Fundoscopic examination reveals normal vessels no papilledema.   Neck: Easily moveable without resistance  Heart: S1/S2 appreciated, RRR, no m/r/g, no carotid bruits  Lungs:Lungs are clear to auscultation bilaterally, no wheezes or crackles.   Neurological Examination:  The patient is alert and oriented times four. Has good attention and concentration.Speech is fluent without dysarthria.   Cranial nerves:  CN I deferred.   CN II: Intact and full visual fields to confrontation bilaterally. CN III, IV, VI: EOM intact. There is no nystagmus. Has conjugated gaze. Intact direct and consensual pupillary light reflexes.   CN V: Intact and symmetrical to facial sensation in the V1 through V3 bilaterally.   CN VII: Intact and symmetrical eyebrow and lid raise and eyelid closure, smiles and frown.   CN VIII: Intact to finger rub bilaterally.   CN IX and X: The palates elevates symmetrical. The " uvula is midline.   CN XII: The tongue protrudes midline with no atrophy or fasciculations.   Motor exam: The patient has a normal bulk and tone throughout. There is no atrophy, fasciculations, clonus, or abnormal movements appreciated.   Strength Exam:  5/5 strength at shoulder abduction, elbow flexion or extension, wrist flexion or extension, finger abduction, , hip flexion and extension, knee flexion and extension, and dorsiflexion and plantarflexion bilaterally.   Sensation is intact to light touch and pinprick throughout. Has good vibration and proprioception sensation at great toes bilaterally.   Reflexes are 2+ and symmetrical at biceps, triceps, brachioradialis, patellar, and Achilles.   Negative Babinski with downgoing toes bilaterally.   Coordination reveals finger-nose-finger with normal speed and accuracy.   Station and gait is normal. There is no ataxia. Can walk on the toes, heels, and tandem walk without difficulty. Has good postural reflexes.Has no drift and a negative Romberg. Lhermitte's negative          Assessment and Plan:  Headache appears to be migraine phenotype and intractable chronic migraine  Discussed acute and preventive headache treatment. A trial of emgality discussed, including known and unknown side effects.     Plan:  Headache prevention-a trial of Emgality -initial loading dose of 240 mg subcutaneous and then 120 mg every 28 days subcutaneous.  Decrease propranolol dose to 80 mg ER due to low blood pressure and recheck your blood pressure. Will decrease propranolol even further after starting emgality.  Acute migraine headache treatment -a trial of nasal zomig to see if it works faster with alleviating migraine headache.   Follow up in 3 months after starting Emgality    Prescription for Emgality and zomig nasal provided. Correct use and course provided. Expected benefits and typical side effects reviewed. Safety of concomitant medications and interactions reviewed. Patient  taught signs and symptoms of adverse reactions and allergies. Patient understands teaching and accepts risks of prescribed medication regimen.    I discussed all my recommendation with Katie Casper. The patient verbalizes understanding and comfortable with the plan. The patient has our clinic phone number to call with any questions or concerns. All of the patient's questions were answered from the best of my current knowledge.     Thank you for letting me be a part of the treatment team for Katie Casper    Time spent with pt answering questions, discussing findings, counseling and coordinating care was more than 50% the appointment time, 54  minutes.     JIM Rosales, CNP  [facility]

## 2019-10-07 NOTE — TELEPHONE ENCOUNTER
Prior Authorization Retail Medication Request    Medication/Dose: Inject 240 mg subcutaneous initially, then 120 mg subcutaneous every 28 days    Previously Tried and Failed:    Zomig oral and helps in 2 hours but it has been less effective and works some  Sumatriptan -was not effective  Rizatriptan -worked partially  Propranolol 120 mg and it does not appear to be as effective anymore  Topiramate for a long time and did not take any difference and was on 5-6 years  Amitriptyline caused side effects-dizziness, not able to function and did not work  Gabapentin and did not help  Verapamil but she was not on verapamil for two long  Ibuprofen or tylenol never works alone and does not take both frequently for headache  Physical therapy in the past for hedaches about 15 years ago and did biofeedback and newest complimentary treatments    Rationale:  Intractable chronic migraine without aura and without status migrainosus. 10-18 headache days per month and increased headaches after age of 39.    Insurance Name:  [employer]  Insurance ID:  [id number]

## 2019-10-07 NOTE — NURSING NOTE
Chief Complaint   Patient presents with     Consult     [facility] HEADACHE     Yuni Dudley MA

## 2019-10-09 ENCOUNTER — HOME CARE VISIT (OUTPATIENT)
Dept: HOSPICE | Facility: HOSPICE | Age: 59
End: 2019-10-09

## 2019-11-05 ENCOUNTER — MYC MEDICAL ADVICE (OUTPATIENT)
Dept: INTERNAL MEDICINE | Facility: CLINIC | Age: 59
End: 2019-11-05

## 2019-11-10 DIAGNOSIS — G43.C0 PERIODIC HEADACHE SYNDROME, NOT INTRACTABLE: ICD-10-CM

## 2019-11-11 RX ORDER — ZOLMITRIPTAN 5 MG/1
5 TABLET, FILM COATED ORAL PRN
Qty: 9 TABLET | Refills: 3 | Status: SHIPPED | OUTPATIENT
Start: 2019-11-11 | End: 2020-04-08

## 2019-11-11 NOTE — TELEPHONE ENCOUNTER
Rx Authorization:    Requested Medication/ Dose 5mg    Date last refill ordered: 10/7/19    Quantity ordered: 6each    # refills: 8    Date of last clinic visit with ordering provider: 10/7/19    Date of next clinic visit with ordering provider: f/u 1 year    All pertinent protocol data (lab date/result):     Include pertinent information from patients message:

## 2019-11-26 DIAGNOSIS — G43.719 INTRACTABLE CHRONIC MIGRAINE WITHOUT AURA AND WITHOUT STATUS MIGRAINOSUS: ICD-10-CM

## 2019-11-26 RX ORDER — PROPRANOLOL HYDROCHLORIDE 80 MG/1
CAPSULE, EXTENDED RELEASE ORAL
Qty: 90 CAPSULE | Refills: 1 | Status: SHIPPED | OUTPATIENT
Start: 2019-11-26 | End: 2020-02-27 | Stop reason: DRUGHIGH

## 2019-11-26 NOTE — TELEPHONE ENCOUNTER
Rx Authorization:    Requested Medication/ Dose propranolol ER (INDERAL LA) 80 MG 24 hr capsule    Date last refill ordered: 10/7/2019    Quantity ordered: 30    # refills: 3    Date of last clinic visit with ordering provider: 10/7/2019    Date of next clinic visit with ordering provider: 3 month follow up No scheduled appointment    All pertinent protocol data (lab date/result):     Include pertinent information from patients message:

## 2019-11-29 ENCOUNTER — HOSPITAL ENCOUNTER (OUTPATIENT)
Dept: GENERAL RADIOLOGY | Age: 59
Discharge: HOME | End: 2019-11-29
Attending: NURSE PRACTITIONER
Payer: COMMERCIAL

## 2019-11-29 ENCOUNTER — TELEPHONE (OUTPATIENT)
Dept: NEUROLOGY | Facility: CLINIC | Age: 59
End: 2019-11-29

## 2019-11-29 DIAGNOSIS — K21.9 GASTROESOPHAGEAL REFLUX DISEASE: ICD-10-CM

## 2019-11-29 PROCEDURE — 74247 XR UPPER GI W KUB AIR CONT: CPT

## 2019-11-29 PROCEDURE — 74011000255 HC RX REV CODE- 255: Performed by: NURSE PRACTITIONER

## 2019-11-29 PROCEDURE — 74011000250 HC RX REV CODE- 250: Performed by: NURSE PRACTITIONER

## 2019-11-29 RX ADMIN — ANTACID/ANTIFLATULENT 4 G: 380; 550; 10; 10 GRANULE, EFFERVESCENT ORAL at 07:45

## 2019-11-29 RX ADMIN — BARIUM SULFATE 65 ML: 980 POWDER, FOR SUSPENSION ORAL at 07:45

## 2019-11-29 NOTE — TELEPHONE ENCOUNTER
Central Prior Authorization Team   Phone: [phone]    PA Initiation    Medication: galcanezumab-gnlm (EMGALITY) 120 MG/ML injection  Insurance Company: [employer] - Phone [phone] Fax [phone]  Pharmacy Filling the Rx: [id number] - [place], [place] - [address] [place] [address]  Filling Pharmacy Phone: [phone]  Filling Pharmacy Fax:    Start Date: 11/29/2019

## 2019-12-10 NOTE — TELEPHONE ENCOUNTER
Prior Authorization Approval    Authorization Effective Date: 12/10/2019  Authorization Expiration Date: 6/10/2020  Medication: galcanezumab-gnlm (EMGALITY) 120 MG/ML injection-PA approved  Approved Dose/Quantity:   Reference #: [id number]   Insurance Company: [employer] - Phone [phone] Fax [phone]  Expected CoPay:       CoPay Card Available:      Foundation Assistance Needed:    Which Pharmacy is filling the prescription (Not needed for infusion/clinic administered): [facility] [id number] [place] [address]  Pharmacy Notified: Yes  Patient Notified: No-Pharmacy will contact

## 2019-12-11 ENCOUNTER — HOSPITAL ENCOUNTER (OUTPATIENT)
Dept: GENERAL RADIOLOGY | Age: 59
Discharge: HOME | End: 2019-12-11
Payer: COMMERCIAL

## 2019-12-11 DIAGNOSIS — R05.9 COUGH: ICD-10-CM

## 2019-12-11 PROCEDURE — 71046 X-RAY EXAM CHEST 2 VIEWS: CPT

## 2019-12-18 ENCOUNTER — HOSPITAL ENCOUNTER (OUTPATIENT)
Dept: CT IMAGING | Age: 59
Discharge: HOME | End: 2019-12-18
Attending: NURSE PRACTITIONER

## 2019-12-18 DIAGNOSIS — K62.5 RECTAL BLEEDING: ICD-10-CM

## 2019-12-18 DIAGNOSIS — R63.4 ABNORMAL LOSS OF WEIGHT: ICD-10-CM

## 2019-12-18 DIAGNOSIS — R10.84 GENERALIZED ABDOMINAL PAIN: ICD-10-CM

## 2019-12-18 DIAGNOSIS — K64.9 HEMORRHOIDS: ICD-10-CM

## 2019-12-18 DIAGNOSIS — R11.0 NAUSEA: ICD-10-CM

## 2019-12-18 DIAGNOSIS — K59.03 DRUG INDUCED CONSTIPATION: ICD-10-CM

## 2019-12-18 DIAGNOSIS — Z80.0 FAMILY HISTORY OF MALIGNANT NEOPLASM OF GASTROINTESTINAL TRACT: ICD-10-CM

## 2019-12-18 DIAGNOSIS — R10.13 ABDOMINAL PAIN, EPIGASTRIC: ICD-10-CM

## 2019-12-18 DIAGNOSIS — R19.4 CHANGE IN BOWEL HABITS: ICD-10-CM

## 2019-12-18 DIAGNOSIS — Z86.010 PERSONAL HISTORY OF COLONIC POLYPS: ICD-10-CM

## 2019-12-26 ENCOUNTER — HOSPITAL ENCOUNTER (OUTPATIENT)
Dept: GENERAL RADIOLOGY | Age: 59
Discharge: HOME | End: 2019-12-26
Attending: NURSE PRACTITIONER
Payer: COMMERCIAL

## 2019-12-26 DIAGNOSIS — J44.9 COPD (CHRONIC OBSTRUCTIVE PULMONARY DISEASE) (HCC): ICD-10-CM

## 2019-12-26 DIAGNOSIS — R13.10 DYSPHAGIA, UNSPECIFIED TYPE: ICD-10-CM

## 2019-12-26 DIAGNOSIS — K59.09 CHRONIC CONSTIPATION: ICD-10-CM

## 2019-12-26 DIAGNOSIS — K59.03 DRUG INDUCED CONSTIPATION: ICD-10-CM

## 2019-12-26 DIAGNOSIS — K62.5 RECTAL BLEEDING: ICD-10-CM

## 2019-12-26 DIAGNOSIS — R13.10 DYSPHAGIA: ICD-10-CM

## 2019-12-26 DIAGNOSIS — R10.84 GENERALIZED ABDOMINAL PAIN: ICD-10-CM

## 2019-12-26 DIAGNOSIS — R04.2 HEMOPTYSIS: ICD-10-CM

## 2019-12-26 DIAGNOSIS — K21.9 GASTROESOPHAGEAL REFLUX DISEASE: ICD-10-CM

## 2019-12-26 PROCEDURE — 74230 X-RAY XM SWLNG FUNCJ C+: CPT

## 2019-12-26 PROCEDURE — 74011000255 HC RX REV CODE- 255: Performed by: NURSE PRACTITIONER

## 2019-12-26 PROCEDURE — 92611 MOTION FLUOROSCOPY/SWALLOW: CPT

## 2019-12-26 RX ADMIN — BARIUM SULFATE 30 ML: 400 SUSPENSION ORAL at 13:20

## 2019-12-26 RX ADMIN — BARIUM SULFATE 700 MG: 700 TABLET ORAL at 13:20

## 2019-12-26 RX ADMIN — BARIUM SULFATE 30 G: 960 POWDER, FOR SUSPENSION ORAL at 13:20

## 2019-12-26 RX ADMIN — BARIUM SULFATE 30 ML: 400 PASTE ORAL at 13:20

## 2019-12-26 NOTE — PROGRESS NOTES
[address] OUTPATIENT MODIFIED BARIUM SWALLOW STUDY    Patient: Harshad Woodward (70 y.o. female)  Date: 12/26/2019  Primary Diagnosis: Dysphagia [R13.10]  COPD (chronic obstructive pulmonary disease) (HCC) [J44.9]  Drug induced constipation [K59.03]  Gastroesophageal reflux disease [K21.9]  Hemoptysis [R04.2]  Rectal bleeding [K62.5]  Chronic constipation [K59.09]  Generalized abdominal pain [R10.84]  Precautions: Aspiration       ASSESSMENT :  MBS completed with pt seated upright in flouro chair. Pt with portable oxygen in place, reporting \"today is not a good day with my breathing\". Pt reporting aspiration during upper GI series and reporting \"sometimes I cough when I eat\". Based on the objective data described below, the patient presents with moderately severe pharyngeal dysphagia characterized by silent laryngeal penetration to the cords during the swallow with thin, NTL and HTL that was not resolved with chin tuck, bolus hold/swallow or effortful swallow. Pt able to clear majority of potential aspirate with cued cough/throat clear. Pt able to tolerate pudding, regular solids and 13 mm Ba pill with pudding wash with positive airway protection across multiple trials. Pt demo adequate oral prep phase and timely swallow initiation. Demo incomplete laryngeal closure with vestibule gap, decreased laryngeal sensation and incomplete epiglottic inversion across all intake. Pt with increased shortness of breath following penetration events requesting break to catch breath. Recommend regular diet with pudding thick liquids with outpatient SLP to address dysphagia. Further recommend throat clear/cough post liquid intake. Strict aspiration precautions including upright for all meals and for at least 30 min after meals, small bites/sips and slow rate of intake with rest breaks during all meals. Recommend meds in pudding or applesauce, one at a time. Results/recommendations including risk of aspiration discussed with pt with video feedback. Thickening agent information provided. Pt able to verbalize understanding. PLAN :    Recommendations:  Regular diet with pudding thick liquids  Meds with pudding or applesauce   Throat clear/cough with each sip   Aspiration precautions  HOB >45 during po intake, remain >30 for 30-45 minutes after po   Small bites/sips; alternate liquid/solid with slow feeding rate   Oral care TID  *Outpatient SLP to address dysphagia     SUBJECTIVE:   Patient stated Can I just clear my throat if I drink water?     OBJECTIVE:     Past Medical History:   Diagnosis Date    Adverse effect of anesthesia     delayed waking up    Anxiety     Arthritis     Asthma     Bipolar disorder ([address]     Chronic pain     neck, shoulders, back, hands, knees    COPD     Emphysema     GERD (gastroesophageal reflux disease)     On home O2     Pain of right thumb     Psychiatric disorder     bipolar    Restless leg syndrome     Restless legs syndrome     Sleep apnea     uses cpap machine & oxygen 2L NC    Thromboembolus ([address] 2009    right leg s/p knee revision    Unspecified adverse effect of anesthesia     reported lung accidentally punctured Nov. 2012 during block to left arm     Past Surgical History:   Procedure Laterality Date    HX HEENT  2003, 2009    LESIONS REMOVED FROM VOCAL CORDS x 2    HX KNEE ARTHROSCOPY      LEFT & RIGHT    HX KNEE REPLACEMENT      RIGHT (3RD REPLACEMENT ON THIS KNEE)    HX OTHER SURGICAL      RIGHT GROIN LYMPH NODE    HX SEPTOPLASTY      HX TONSILLECTOMY      HX TUMOR REMOVAL      NECK    IR TUBE THORACOSTOMY  11/2012    THUMB TENDON TRANSFER,GRAFT  11/13/2012    left thumb     Prior Level of Function/Home Situation:  Current Diet:  Regular diet with thin liquids    Radiology:  Film Views: Lateral;Fluoro  Patient Position: 90 in chair    Trial 1: Trial 2:   Consistency Presented: Thin liquid; Nectar thick liquid;Honey thick liquid Consistency Presented: Pudding; Solid(13 mm Ba pill with pudding wash)   How Presented: Self-fed/presented;Cup/sip;Spoon How Presented: Self-fed/presented;Cup/sip;Spoon   Bolus Acceptance: No impairment Bolus Acceptance: No impairment   Bolus Formation/Control: No impairment:   Bolus Formation/Control: No impairment:     Propulsion: No impairment Propulsion: No impairment   Oral Residue: None Oral Residue: None   Initiation of Swallow: No impairment     Timing: No impairment Timing: No impairment   Penetration: To cords;During swallow;From initial swallow Penetration: None   Aspiration/Timing: No evidence of aspiration Aspiration/Timing: No evidence of aspiration   Pharyngeal Clearance: No residue Pharyngeal Clearance: No residue   Attempted Modifications: Small sips and bites; Spoon; Chin tuck(Bolus hold/swallow)     Effective Modifications: None     Cues for Modifications: Minimal       Decreased Tongue Base Retraction?: No  Laryngeal Elevation: Incomplete laryngeal closure; Reduced excursion with laryngeal vestibule gap  Aspiration/Penetration Score: 6 (Aspiration-Contrast passes below the cords/glottis, and is cleared)   Pharyngeal Symmetry: Not assessed  Pharyngeal-Esophageal Segment: No impairment  Pharyngeal Dysfunction: Decreased elevation/closure;Decreased strength  Oral Phase Severity: No impairment  Pharyngeal Phase Severity: Moderately severe    8-point Penetration-Aspiration Scale: Score 6    PAIN:  Pt reports 0/10 pain or discomfort prior to MBS. Pt reports 0/10 pain or discomfort post MBS. COMMUNICATION/EDUCATION:   [x]  Education provided post diagnostic testing including oropharyngeal anatomy/physiology, MBS results, diet recommendations and        compensatory strategies/positioning. [x]  Video feedback utilized. [x]  Handout regarding diet recommendations and thickener instructions provided.   [x]  Patient/family have participated as able in goal setting and plan of care. []  Patient/family agree to work toward stated goals and plan of care. []  Patient understands intent and goals of therapy, but is neutral about his/her participation. []  Patient is unable to participate in goal setting and plan of care.     Thank you for this referral,  Estela Fragoso M.S., [id number] [facility]  Speech-Language Pathologist

## 2019-12-29 ENCOUNTER — MYC MEDICAL ADVICE (OUTPATIENT)
Dept: OPHTHALMOLOGY | Facility: CLINIC | Age: 59
End: 2019-12-29

## 2019-12-29 DIAGNOSIS — H35.3132 INTERMEDIATE STAGE NONEXUDATIVE AGE-RELATED MACULAR DEGENERATION OF BOTH EYES: ICD-10-CM

## 2020-01-06 RX ORDER — ANTIOX #8/OM3/DHA/EPA/LUT/ZEAX 250-2.5 MG
1 CAPSULE ORAL 2 TIMES DAILY
Qty: 120 CAPSULE | Refills: 11 | Status: SHIPPED | OUTPATIENT
Start: 2020-01-06 | End: 2021-01-11

## 2020-01-06 NOTE — TELEPHONE ENCOUNTER
Patient requested prescription be written for AREDS 2 (which was prescribed at most recent eye exam).    Prescription was reordered and sent to preferred pharmacy.

## 2020-02-25 ENCOUNTER — HOSPITAL ENCOUNTER (OUTPATIENT)
Dept: VASCULAR SURGERY | Age: 60
Discharge: HOME | End: 2020-02-25
Attending: INTERNAL MEDICINE
Payer: COMMERCIAL

## 2020-02-25 DIAGNOSIS — K21.9 GASTRIC REFLUX: ICD-10-CM

## 2020-02-25 DIAGNOSIS — K59.03 DRUG-INDUCED CONSTIPATION: ICD-10-CM

## 2020-02-25 DIAGNOSIS — J44.9 OBSTRUCTIVE CHRONIC BRONCHITIS WITHOUT EXACERBATION (HCC): ICD-10-CM

## 2020-02-25 PROCEDURE — 93975 VASCULAR STUDY: CPT

## 2020-02-26 LAB
ABDOMINAL PROX AORTA VEL: 65.6 CM/S
CELIAC PSV: 142.4 CM/S
COMMON HEPATIC PSV: 94.2 CM/S
DIST SMA PSV: 116.7 CM/S
IMA PSV: 121.2 CM/S
MID SMA PSV: 132.5 CM/S
ORIGIN SMA PSV: 171.6 CM/S
PROX SMA PSV: 189 CM/S
SPLENIC PSV: 163 CM/S

## 2020-02-27 ENCOUNTER — OFFICE VISIT (OUTPATIENT)
Dept: NEUROLOGY | Facility: CLINIC | Age: 60
End: 2020-02-27
Payer: COMMERCIAL

## 2020-02-27 VITALS
HEART RATE: 50 BPM | WEIGHT: 160.1 LBS | BODY MASS INDEX: 25.08 KG/M2 | DIASTOLIC BLOOD PRESSURE: 73 MMHG | SYSTOLIC BLOOD PRESSURE: 117 MMHG | OXYGEN SATURATION: 100 %

## 2020-02-27 DIAGNOSIS — G43.719 INTRACTABLE CHRONIC MIGRAINE WITHOUT AURA AND WITHOUT STATUS MIGRAINOSUS: Primary | ICD-10-CM

## 2020-02-27 RX ORDER — PROPRANOLOL HCL 60 MG
60 CAPSULE, EXTENDED RELEASE 24HR ORAL DAILY
Qty: 30 CAPSULE | Refills: 6 | Status: SHIPPED | OUTPATIENT
Start: 2020-02-27 | End: 2020-05-12

## 2020-02-27 ASSESSMENT — PAIN SCALES - GENERAL: PAINLEVEL: NO PAIN (0)

## 2020-02-27 ASSESSMENT — PATIENT HEALTH QUESTIONNAIRE - PHQ9: SUM OF ALL RESPONSES TO PHQ QUESTIONS 1-9: 4

## 2020-02-27 NOTE — LETTER
2/27/2020       RE: Katie Casper  [id number]  [facility] [address]     Dear Colleague,    Thank you for referring your patient, Katie Casper, to [facility] at [facility]. Please see a copy of my visit note below.    Re: Katie Casper      MRN# [id number]  YOB: 1960  Date of Visit:2/27/2020     OUTPATIENT NEUROLOGY VISIT NOTE    Reason for Visit:  Headache evaluation    Interval History:  Katie Casper is a 59-year-old female presents to the clinic today for headache evaluation.   Initial Headache Clinic visit on 10/7/2019, see note for details.   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  Headache Treatments Tried:    Zomig oral and helps in 2 hours but it has been less effective and works some reports that zomig nasal did not work as well as the pills.  Sumatriptan nasal  -was not effective but did not try injectable  Rizatriptan -worked partially  Propranolol 120 mg and it does not appear to be as effective anymore and went down to 80 ER mg now   Topiramate for a long time and did not take any difference and was on 5-6 years  Amitriptyline caused side effects-dizziness, not able to function and did not work  Gabapentin and did not help  Verapamil but she was not on verapamil for two long  Ibuprofen or tylenol never works alone and does not take both frequently for headache  Physical therapy in the past for hedaches about 15 years ago and did biofeedback and newest complimentary treatments    Today patient reports Galcanezumab (Emgality) started December 7 or 8th  and has been taking 3 doses but not quit 3 months yet.   Patient reports that when migraine starts with a facial pain and feeling fatigue and eventually moves to the right side and feeling of painful fatigue and gradual in onset with a gradual intensifying pain. Patient  reports that typically in the afternoon and waits about 4-5 hours before taking zolmitriptan and possibly too late.   Average headache frequency 8-9 per month vs 10-18 per month reported during initial visit but takes zolmitriptan after waiting for at least 4-5 hours.   Zolmitriptan 2.5 mg typically and occasionally takes 5 mg at onset and both doses take about 2 hours.       Headache Plan discussed:  Headache log for frequency, duration, response to zomig.  Acute migraine treatment-take zomig 5 mg at headache onset and may repeat in 2 hours as need. Max 10 mg in 24 hours. Limit use to no more than 9 days per month.   We could try sumatriptan injectable or eletriptan pill if zolmitriptan does not appear to be effective.   Migraine prevention-continue Galcanezumab (Emgality).   May try to decrease propranolol to 60 mg ER for 2-3 weeks and if no difference you can try to stop it.   Stay hydrated and exercise any kind   Follow up in 3 months or sooner if needed      Past Medical History reviewed   Past Medical History:   Diagnosis Date     Anemia 2006     Chronic kidney disease October 2018    labs     Drusen (degenerative) of macula, bilateral      Insomnia      Migraines      Myopia      Pituitary macroadenoma (H)      Restless legs        Social History     Tobacco Use     Smoking status: Never Smoker     Smokeless tobacco: Never Used   Substance Use Topics     Alcohol use: No    reviewed and verified with the patient     Allergies   Allergen Reactions     Ibuprofen Other (See Comments)     Decreased kidney function - asked not to take      Sulfa Drugs Rash       Current Outpatient Medications   Medication Sig Dispense Refill     ALPRAZolam (XANAX) 0.5 MG tablet Take one pill 1/2 hour before MRI imaging 1 tablet 0     ESTRING 2 MG vaginal ring INSERT 1 RING VAGINALLY EVERY 3 MONTHS 1 each 3     galcanezumab-gnlm (EMGALITY) 120 MG/ML injection Inject 240 mg subcutaneous initially, then 120 mg subcutaneous every 28  days 2 mL 11     Multiple Vitamins-Minerals (PRESERVISION AREDS 2) CAPS Take 1 capsule by mouth 2 times daily 120 capsule 11     pramipexole (MIRAPEX) 1 MG tablet Take 1 mg by mouth At Bedtime       propranolol ER (INDERAL LA) 80 MG 24 hr capsule TAKE 1 CAPSULE BY MOUTH EVERY DAY 90 capsule 1     Vitamin D, Cholecalciferol, 1000 units TABS Take 1 tablet by mouth At Bedtime        ZOLMitriptan (ZOMIG) 2.5 MG solution Spray 1 spray (2.5 mg) in nostril once as needed (at migraine onset) May repeat in 2 hours. Max 4 sprays/24 hours. 6 each 8     ZOLMitriptan (ZOMIG) 5 MG tablet TAKE 1 TABLET (5 MG) BY MOUTH AS NEEDED 9 tablet 3   reviewed and verified with the patient    Review of Systems:   A 10-point ROS including constitutional, eyes, respiratory, cardiovascular, gastroenterology, genitourinary, integumentary, musculoskeletal, neurology and psychiatric were all negative except as mentioned in the interval history.      General Exam:   There were no vitals taken for this visit.  GEN: Awake, NAD; good eye contact, responses appropriately, healthy appearing. Speech is fluent without dysarthria.  Face is symmetrical. Intact and symmetrical eyebrow and lid raise and  smiles. Hearing Intact to conversation speech.  Normal casual gait.    Assessment and Plan:  See Interval History for our discussion and plan    I discussed all my recommendation with Katie Casper. The patient verbalizes understanding and comfortable with the plan. The patient has our clinic phone number to call with any questions or concerns. All of the patient's questions were answered from the best of my current knowledge.     Time spent with pt answering questions, discussing findings, counseling and coordinating care was more than 50% the appointment time, 15 minutes.         JIM Rosales, CNP  [facility]

## 2020-02-27 NOTE — PATIENT INSTRUCTIONS
Plan:  Headache log for frequency, duration, response to zomig.  Acute migraine treatment-take zomig 5 mg at headache onset and may repeat in 2 hours as need. Max 10 mg in 24 hours. Limit use to no more than 9 days per month.   We could try sumatriptan injectable or eletriptan pill if zolmitriptan does not appear to be effective.   Migraine prevention-continue Galcanezumab (Emgality).   May try to decrease propranolol to 60 mg ER for 2-3 weeks and if no difference you can try to stop it.   Stay hydrated and exercise any kind   Follow up in 3 months or sooner if needed

## 2020-02-27 NOTE — NURSING NOTE
Chief Complaint   Patient presents with     Headache     [facility] RETURN HEADACE - F/U      Cyndi Lange, EMT

## 2020-02-27 NOTE — PROGRESS NOTES
Re: Katie Casper      MRN# [id number]  YOB: 1960  Date of Visit:2/27/2020     OUTPATIENT NEUROLOGY VISIT NOTE    Reason for Visit:  Headache evaluation    Interval History:  Katie Casper is a 59-year-old female presents to the clinic today for headache evaluation.   Initial Headache Clinic visit on 10/7/2019, see note for details.   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  Headache Treatments Tried:    Zomig oral and helps in 2 hours but it has been less effective and works some reports that zomig nasal did not work as well as the pills.  Sumatriptan nasal  -was not effective but did not try injectable  Rizatriptan -worked partially  Propranolol 120 mg and it does not appear to be as effective anymore and went down to 80 ER mg now   Topiramate for a long time and did not take any difference and was on 5-6 years  Amitriptyline caused side effects-dizziness, not able to function and did not work  Gabapentin and did not help  Verapamil but she was not on verapamil for two long  Ibuprofen or tylenol never works alone and does not take both frequently for headache  Physical therapy in the past for hedaches about 15 years ago and did biofeedback and newest complimentary treatments    Today patient reports Galcanezumab (Emgality) started December 7 or 8th  and has been taking 3 doses but not quit 3 months yet.   Patient reports that when migraine starts with a facial pain and feeling fatigue and eventually moves to the right side and feeling of painful fatigue and gradual in onset with a gradual intensifying pain. Patient reports that typically in the afternoon and waits about 4-5 hours before taking zolmitriptan and possibly too late.   Average headache frequency 8-9 per month vs 10-18 per month reported during initial visit but takes zolmitriptan after waiting for at least 4-5 hours.    Zolmitriptan 2.5 mg typically and occasionally takes 5 mg at onset and both doses take about 2 hours.       Headache Plan discussed:  Headache log for frequency, duration, response to zomig.  Acute migraine treatment-take zomig 5 mg at headache onset and may repeat in 2 hours as need. Max 10 mg in 24 hours. Limit use to no more than 9 days per month.   We could try sumatriptan injectable or eletriptan pill if zolmitriptan does not appear to be effective.   Migraine prevention-continue Galcanezumab (Emgality).   May try to decrease propranolol to 60 mg ER for 2-3 weeks and if no difference you can try to stop it.   Stay hydrated and exercise any kind   Follow up in 3 months or sooner if needed      Past Medical History reviewed   Past Medical History:   Diagnosis Date     Anemia 2006     Chronic kidney disease October 2018    labs     Drusen (degenerative) of macula, bilateral      Insomnia      Migraines      Myopia      Pituitary macroadenoma (H)      Restless legs        Social History     Tobacco Use     Smoking status: Never Smoker     Smokeless tobacco: Never Used   Substance Use Topics     Alcohol use: No    reviewed and verified with the patient     Allergies   Allergen Reactions     Ibuprofen Other (See Comments)     Decreased kidney function - asked not to take      Sulfa Drugs Rash       Current Outpatient Medications   Medication Sig Dispense Refill     ALPRAZolam (XANAX) 0.5 MG tablet Take one pill 1/2 hour before MRI imaging 1 tablet 0     ESTRING 2 MG vaginal ring INSERT 1 RING VAGINALLY EVERY 3 MONTHS 1 each 3     galcanezumab-gnlm (EMGALITY) 120 MG/ML injection Inject 240 mg subcutaneous initially, then 120 mg subcutaneous every 28 days 2 mL 11     Multiple Vitamins-Minerals (PRESERVISION AREDS 2) CAPS Take 1 capsule by mouth 2 times daily 120 capsule 11     pramipexole (MIRAPEX) 1 MG tablet Take 1 mg by mouth At Bedtime       propranolol ER (INDERAL LA) 80 MG 24 hr capsule TAKE 1 CAPSULE BY MOUTH  EVERY DAY 90 capsule 1     Vitamin D, Cholecalciferol, 1000 units TABS Take 1 tablet by mouth At Bedtime        ZOLMitriptan (ZOMIG) 2.5 MG solution Spray 1 spray (2.5 mg) in nostril once as needed (at migraine onset) May repeat in 2 hours. Max 4 sprays/24 hours. 6 each 8     ZOLMitriptan (ZOMIG) 5 MG tablet TAKE 1 TABLET (5 MG) BY MOUTH AS NEEDED 9 tablet 3   reviewed and verified with the patient    Review of Systems:   A 10-point ROS including constitutional, eyes, respiratory, cardiovascular, gastroenterology, genitourinary, integumentary, musculoskeletal, neurology and psychiatric were all negative except as mentioned in the interval history.      General Exam:   There were no vitals taken for this visit.  GEN: Awake, NAD; good eye contact, responses appropriately, healthy appearing. Speech is fluent without dysarthria.  Face is symmetrical. Intact and symmetrical eyebrow and lid raise and  smiles. Hearing Intact to conversation speech.  Normal casual gait.    Assessment and Plan:  See Interval History for our discussion and plan    I discussed all my recommendation with Katie Casper. The patient verbalizes understanding and comfortable with the plan. The patient has our clinic phone number to call with any questions or concerns. All of the patient's questions were answered from the best of my current knowledge.     Time spent with pt answering questions, discussing findings, counseling and coordinating care was more than 50% the appointment time, 15 minutes.         JIM Rosales, CNP  [facility]

## 2020-03-04 ENCOUNTER — APPOINTMENT (OUTPATIENT)
Dept: PHYSICAL THERAPY | Age: 60
End: 2020-03-04

## 2020-03-15 ENCOUNTER — HEALTH MAINTENANCE LETTER (OUTPATIENT)
Age: 60
End: 2020-03-15

## 2020-04-04 DIAGNOSIS — G43.C0 PERIODIC HEADACHE SYNDROME, NOT INTRACTABLE: ICD-10-CM

## 2020-04-08 RX ORDER — ZOLMITRIPTAN 5 MG/1
5 TABLET, FILM COATED ORAL PRN
Qty: 9 TABLET | Refills: 3 | Status: SHIPPED | OUTPATIENT
Start: 2020-04-08 | End: 2020-06-29

## 2020-04-08 NOTE — TELEPHONE ENCOUNTER
ZOLMITRIPTAN 5 MG TABLET     Last Written Prescription Date:  11/11/2019  Last Fill Quantity: 9,   # refills: 3  Last Office Visit : 4/30/2019  Future Office visit:  None  9 Tabs, 3 Refills sent to pharm 4/8/2020    Suzy Santa RN  Central Triage Red Flags/Med Refills

## 2020-05-12 ENCOUNTER — VIRTUAL VISIT (OUTPATIENT)
Dept: NEUROLOGY | Facility: CLINIC | Age: 60
End: 2020-05-12
Payer: COMMERCIAL

## 2020-05-12 ENCOUNTER — TELEPHONE (OUTPATIENT)
Dept: NEUROLOGY | Facility: CLINIC | Age: 60
End: 2020-05-12

## 2020-05-12 DIAGNOSIS — G43.719 INTRACTABLE CHRONIC MIGRAINE WITHOUT AURA AND WITHOUT STATUS MIGRAINOSUS: Primary | ICD-10-CM

## 2020-05-12 RX ORDER — ELETRIPTAN HYDROBROMIDE 20 MG/1
20-40 TABLET, FILM COATED ORAL
Qty: 18 TABLET | Refills: 6 | Status: SHIPPED | OUTPATIENT
Start: 2020-05-12 | End: 2022-01-17

## 2020-05-12 RX ORDER — VENLAFAXINE HYDROCHLORIDE 37.5 MG/1
37.5 CAPSULE, EXTENDED RELEASE ORAL DAILY
Qty: 30 CAPSULE | Refills: 3 | Status: SHIPPED | OUTPATIENT
Start: 2020-05-12 | End: 2020-08-04 | Stop reason: DRUGHIGH

## 2020-05-12 RX ORDER — ERENUMAB-AOOE 70 MG/ML
70 INJECTION SUBCUTANEOUS
Qty: 1 ML | Refills: 11 | Status: SHIPPED | OUTPATIENT
Start: 2020-05-12 | End: 2020-06-29

## 2020-05-12 NOTE — PROGRESS NOTES
"Katie Casper is a 59 year old female who is being evaluated via a billable video visit.      The patient has been notified of following:     \"This video visit will be conducted via a call between you and your physician/provider. We have found that certain health care needs can be provided without the need for an in-person physical exam.  This service lets us provide the care you need with a video conversation.  If a prescription is necessary we can send it directly to your pharmacy.  If lab work is needed we can place an order for that and you can then stop by our lab to have the test done at a later time.    Video visits are billed at different rates depending on your insurance coverage.  Please reach out to your insurance provider with any questions.    If during the course of the call the physician/provider feels a video visit is not appropriate, you will not be charged for this service.\"    Patient has given verbal consent for Video visit? YES    How would you like to obtain your AVS? Charmaine    Patient would like the video invitation sent by: Email [email or web address]    Will anyone else be joining your video visit? no        Video-Visit Details    Type of service:  Video Visit    Video Start Time: 8:52 AM    Video End Time:9:29 AM       Originating Location (pt. Location): Home    Distant Location (provider location):  [facility] NEUROLOGY     Platform used for Video Visit: HENRY Harper      OUTPATIENT NEUROLOGY VISIT NOTE    Reason for Visit:  Headache follow-up. This visit was conducted via synchronous video visit due to the current COVID-19 crisis to reduce patient risk.  Verbal consent was obtained.     Interval History:  Katie Casper is a 59-year-old female  presents to the clinic today for  Initial Headache Clinic visit on 10/7/2019, see note for details.   History of pituitary macroadenoma which was resected through an endoscopic transnasal approach in 2007 and again in " "2010 due to recurrence.  Headache Treatments Tried:    Zomig oral and helps in 2 hours but it has been less effective and works some reports that zomig nasal did not work as well as the pills.  Sumatriptan nasal  -was not effective but did not try injectable  Rizatriptan -worked partially  Propranolol 120 mg and it does not appear to be as effective anymore and went down to 80 ER mg now   Topiramate for a long time and did not take any difference and was on 5-6 years  Amitriptyline caused side effects-dizziness, not able to function and did not work, \"major side effect\"  Gabapentin and did not help  Verapamil but she was not on verapamil for two long  Ibuprofen or tylenol never works alone and does not take both frequently for headache  Physical therapy in the past for hedaches about 15 years ago and did biofeedback and newest complimentary treatments   Galcanezumab (Emgality) started December 7 or 8th 2019 and has been for about 6 months and not helping with headaches. Reports that headaches were increasing in frequency for the last 3 month 10-15 headaches per month but reports that she never any significant relief in headache frequency.   The majority of headaches frontal/ facial and on the right side and ranges 6-8/10 on the numeric pain scale. Patient reports that she takes zolmitriptan but it has not been helping lately. Patient reports that she stopped propranolol a couple weeks ago and hard to say if any difference. Patient denies use of any NSAIDs or acetaminophen or Excedrin  Patient reports that work has been busy and stressful.   Patient reports that she would like to try a different treatment approach.   Cefaly device discussed and cons and pros.    Discussed a trial of Aimovig hypertension, constipation, pain in the injection side, allergic reaction    Headache treatment plan discussed with the patient:  Headache log for frequency, severity and duration  Stop zomig and Emgality  A trial of venlafaxine " 37.5 mg daily for headaches and anxiety  A trial of Aimovig 70 mg subcutaneous every 30 days.   Acute migraine treatment -may try eletriptan as needed. Limit use to no more than 9 days per month. As needed -may try 1-2 tabs OTC of naproxen every 12 hours for mild to moderate headache and limit use to no more than 10 days per month.   Follow up in 4-6 weeks after starting venlafaxine.     DATA reviewed  Labs reviewed  Results for CURTIS BOYD (MRN [id number]) as of 5/12/2020 09:09   Ref. Range 4/11/2019 08:58   Sodium Latest Ref Range: 133 - 144 mmol/L 138   Potassium Latest Ref Range: 3.4 - 5.3 mmol/L 4.1   Chloride Latest Ref Range: 94 - 109 mmol/L 107   Carbon Dioxide Latest Ref Range: 20 - 32 mmol/L 28   Urea Nitrogen Latest Ref Range: 7 - 30 mg/dL 19   Creatinine Latest Ref Range: 0.52 - 1.04 mg/dL 0.82   GFR Estimate Latest Ref Range: >60 mL/min/1.73_m2 78   GFR Estimate If Black Latest Ref Range: >60 mL/min/1.73_m2 90   Calcium Latest Ref Range: 8.5 - 10.1 mg/dL 9.1   Anion Gap Latest Ref Range: 3 - 14 mmol/L 3   Albumin Latest Ref Range: 3.4 - 5.0 g/dL 3.7   Protein Total Latest Ref Range: 6.8 - 8.8 g/dL 6.9   Bilirubin Total Latest Ref Range: 0.2 - 1.3 mg/dL 0.3   Alkaline Phosphatase Latest Ref Range: 40 - 150 U/L 88   ALT Latest Ref Range: 0 - 50 U/L 25   AST Latest Ref Range: 0 - 45 U/L 19   CRP Inflammation Latest Ref Range: 0.0 - 8.0 mg/L <2.9     Past Medical History reviewed   Social History     Tobacco Use     Smoking status: Never Smoker     Smokeless tobacco: Never Used   Substance Use Topics     Alcohol use: No    reviewed and verified with the patient     Allergies   Allergen Reactions     Ibuprofen Other (See Comments)     Decreased kidney function - asked not to take      Sulfa Drugs Rash       Current Outpatient Medications   Medication Sig Dispense Refill     ESTRING 2 MG vaginal ring INSERT 1 RING VAGINALLY EVERY 3 MONTHS 1 each 3     galcanezumab-gnlm (EMGALITY) 120 MG/ML injection  Inject 240 mg subcutaneous initially, then 120 mg subcutaneous every 28 days 2 mL 11     pramipexole (MIRAPEX) 1 MG tablet Take 1 mg by mouth At Bedtime       Vitamin D, Cholecalciferol, 1000 units TABS Take 1 tablet by mouth At Bedtime        ZOLMitriptan (ZOMIG) 5 MG tablet Take 1 tablet (5 mg) by mouth as needed for migraine 9 tablet 3     ALPRAZolam (XANAX) 0.5 MG tablet Take one pill 1/2 hour before MRI imaging (Patient not taking: Reported on 2/27/2020) 1 tablet 0     Multiple Vitamins-Minerals (PRESERVISION AREDS 2) CAPS Take 1 capsule by mouth 2 times daily (Patient not taking: Reported on 2/27/2020) 120 capsule 11     propranolol ER (INDERAL LA) 60 MG 24 hr capsule Take 1 capsule (60 mg) by mouth daily (Patient not taking: Reported on 5/12/2020) 30 capsule 6   reviewed and verified with the patient    Review of Systems:   A 10-point ROS including constitutional, eyes, respiratory, cardiovascular, gastroenterology, genitourinary, integumentary, musculoskeletal, neurology and psychiatric were reviewed and as mentioned in the interval history.      General Exam:   There were no vitals taken for this visit.  GEN: Awake, NAD; good eye contact, responses appropriately healthy appearing Speech is fluent without dysarthria. EOM intact.  Face is symmetrical. Intact and symmetrical eyebrow and lid raise and eyelid closure, smiles. Hearing Intact to conversation speech.    Assessment and Plan:  See Interval History for our discussion and plan    Prescriptions for venlafaxine, eletriptan and erenumab provided. Correct use and course provided. Expected benefits and typical side effects reviewed. Safety of concomitant medications and interactions reviewed. Patient taught signs and symptoms of adverse reactions and allergies. Patient understands teaching and accepts risks of prescribed medication regimen.    I discussed all my recommendation with Katie Casper. The patient verbalizes understanding and comfortable with  the plan. The patient has our clinic phone number to call with any questions or concerns. All of the patient's questions were answered from the best of my current knowledge.     Time spent with pt answering questions, discussing findings, counseling and coordinating care was more than 50% the appointment time, 26  minutes.         JIM Rosales, [facility]

## 2020-05-12 NOTE — LETTER
"5/12/2020     RE: Katie Casper  [address]  [place] [address]     Dear Colleague,    Thank you for referring your patient, Katie Casper, to the [facility] NEUROLOGY at [facility]. Please see a copy of my visit note below.    Katie Casper is a 59 year old female who is being evaluated via a billable video visit.      The patient has been notified of following:     \"This video visit will be conducted via a call between you and your physician/provider. We have found that certain health care needs can be provided without the need for an in-person physical exam.  This service lets us provide the care you need with a video conversation.  If a prescription is necessary we can send it directly to your pharmacy.  If lab work is needed we can place an order for that and you can then stop by our lab to have the test done at a later time.    Video visits are billed at different rates depending on your insurance coverage.  Please reach out to your insurance provider with any questions.    If during the course of the call the physician/provider feels a video visit is not appropriate, you will not be charged for this service.\"    Patient has given verbal consent for Video visit? YES    How would you like to obtain your AVS? MyChart    Patient would like the video invitation sent by: Email [email or web address]    Will anyone else be joining your video visit? no        Video-Visit Details    Type of service:  Video Visit    Video Start Time: 8:52 AM    Video End Time:9:29 AM       Originating Location (pt. Location): Home    Distant Location (provider location):  [facility] NEUROLOGY     Platform used for Video Visit: HENRY Harper      OUTPATIENT NEUROLOGY VISIT NOTE    Reason for Visit:  Headache follow-up. This visit was conducted via synchronous video visit due to the current COVID-19 crisis to reduce patient risk.  Verbal consent was obtained. " "    Interval History:  Katie Casper is a 59-year-old female  presents to the clinic today for  Initial Headache Clinic visit on 10/7/2019, see note for details.   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  Headache Treatments Tried:    Zomig oral and helps in 2 hours but it has been less effective and works some reports that zomig nasal did not work as well as the pills.  Sumatriptan nasal  -was not effective but did not try injectable  Rizatriptan -worked partially  Propranolol 120 mg and it does not appear to be as effective anymore and went down to 80 ER mg now   Topiramate for a long time and did not take any difference and was on 5-6 years  Amitriptyline caused side effects-dizziness, not able to function and did not work, \"major side effect\"  Gabapentin and did not help  Verapamil but she was not on verapamil for two long  Ibuprofen or tylenol never works alone and does not take both frequently for headache  Physical therapy in the past for hedaches about 15 years ago and did biofeedback and newest complimentary treatments   Galcanezumab (Emgality) started December 7 or 8th 2019 and has been for about 6 months and not helping with headaches. Reports that headaches were increasing in frequency for the last 3 month 10-15 headaches per month but reports that she never any significant relief in headache frequency.   The majority of headaches frontal/ facial and on the right side and ranges 6-8/10 on the numeric pain scale. Patient reports that she takes zolmitriptan but it has not been helping lately. Patient reports that she stopped propranolol a couple weeks ago and hard to say if any difference. Patient denies use of any NSAIDs or acetaminophen or Excedrin  Patient reports that work has been busy and stressful.   Patient reports that she would like to try a different treatment approach.   Cefaly device " discussed and cons and pros.    Discussed a trial of Aimovig hypertension, constipation, pain in the injection side, allergic reaction    Headache treatment plan discussed with the patient:  Headache log for frequency, severity and duration  Stop zomig and Emgality  A trial of venlafaxine 37.5 mg daily for headaches and anxiety  A trial of Aimovig 70 mg subcutaneous every 30 days.   Acute migraine treatment -may try eletriptan as needed. Limit use to no more than 9 days per month. As needed -may try 1-2 tabs OTC of naproxen every 12 hours for mild to moderate headache and limit use to no more than 10 days per month.   Follow up in 4-6 weeks after starting venlafaxine.     DATA reviewed  Labs reviewed  Results for CURTIS BOYD (MRN [id number]) as of 5/12/2020 09:09   Ref. Range 4/11/2019 08:58   Sodium Latest Ref Range: 133 - 144 mmol/L 138   Potassium Latest Ref Range: 3.4 - 5.3 mmol/L 4.1   Chloride Latest Ref Range: 94 - 109 mmol/L 107   Carbon Dioxide Latest Ref Range: 20 - 32 mmol/L 28   Urea Nitrogen Latest Ref Range: 7 - 30 mg/dL 19   Creatinine Latest Ref Range: 0.52 - 1.04 mg/dL 0.82   GFR Estimate Latest Ref Range: >60 mL/min/1.73_m2 78   GFR Estimate If Black Latest Ref Range: >60 mL/min/1.73_m2 90   Calcium Latest Ref Range: 8.5 - 10.1 mg/dL 9.1   Anion Gap Latest Ref Range: 3 - 14 mmol/L 3   Albumin Latest Ref Range: 3.4 - 5.0 g/dL 3.7   Protein Total Latest Ref Range: 6.8 - 8.8 g/dL 6.9   Bilirubin Total Latest Ref Range: 0.2 - 1.3 mg/dL 0.3   Alkaline Phosphatase Latest Ref Range: 40 - 150 U/L 88   ALT Latest Ref Range: 0 - 50 U/L 25   AST Latest Ref Range: 0 - 45 U/L 19   CRP Inflammation Latest Ref Range: 0.0 - 8.0 mg/L <2.9     Past Medical History reviewed   Social History     Tobacco Use     Smoking status: Never Smoker     Smokeless tobacco: Never Used   Substance Use Topics     Alcohol use: No    reviewed and verified with the patient     Allergies   Allergen Reactions     Ibuprofen Other  (See Comments)     Decreased kidney function - asked not to take      Sulfa Drugs Rash       Current Outpatient Medications   Medication Sig Dispense Refill     ESTRING 2 MG vaginal ring INSERT 1 RING VAGINALLY EVERY 3 MONTHS 1 each 3     galcanezumab-gnlm (EMGALITY) 120 MG/ML injection Inject 240 mg subcutaneous initially, then 120 mg subcutaneous every 28 days 2 mL 11     pramipexole (MIRAPEX) 1 MG tablet Take 1 mg by mouth At Bedtime       Vitamin D, Cholecalciferol, 1000 units TABS Take 1 tablet by mouth At Bedtime        ZOLMitriptan (ZOMIG) 5 MG tablet Take 1 tablet (5 mg) by mouth as needed for migraine 9 tablet 3     ALPRAZolam (XANAX) 0.5 MG tablet Take one pill 1/2 hour before MRI imaging (Patient not taking: Reported on 2/27/2020) 1 tablet 0     Multiple Vitamins-Minerals (PRESERVISION AREDS 2) CAPS Take 1 capsule by mouth 2 times daily (Patient not taking: Reported on 2/27/2020) 120 capsule 11     propranolol ER (INDERAL LA) 60 MG 24 hr capsule Take 1 capsule (60 mg) by mouth daily (Patient not taking: Reported on 5/12/2020) 30 capsule 6   reviewed and verified with the patient    Review of Systems:   A 10-point ROS including constitutional, eyes, respiratory, cardiovascular, gastroenterology, genitourinary, integumentary, musculoskeletal, neurology and psychiatric were reviewed and as mentioned in the interval history.      General Exam:   There were no vitals taken for this visit.  GEN: Awake, NAD; good eye contact, responses appropriately healthy appearing Speech is fluent without dysarthria. EOM intact.  Face is symmetrical. Intact and symmetrical eyebrow and lid raise and eyelid closure, smiles. Hearing Intact to conversation speech.    Assessment and Plan:  See Interval History for our discussion and plan    Prescriptions for venlafaxine, eletriptan and erenumab provided. Correct use and course provided. Expected benefits and typical side effects reviewed. Safety of concomitant medications and  interactions reviewed. Patient taught signs and symptoms of adverse reactions and allergies. Patient understands teaching and accepts risks of prescribed medication regimen.    I discussed all my recommendation with Katie Casper. The patient verbalizes understanding and comfortable with the plan. The patient has our clinic phone number to call with any questions or concerns. All of the patient's questions were answered from the best of my current knowledge.     Time spent with pt answering questions, discussing findings, counseling and coordinating care was more than 50% the appointment time, 26  minutes.       JIM Rosales, [facility]

## 2020-05-12 NOTE — TELEPHONE ENCOUNTER
PA Initiation    Medication: Aimovig 70 mg every month  Insurance Company: [employer] - Phone [phone] Fax [phone]  Pharmacy Filling the Rx: [id number] - [place], [place] - [address] AT [facility] [address]  Filling Pharmacy Phone: [phone]  Filling Pharmacy Fax:    Start Date: 5/12/2020    Central Prior Authorization Team   Phone: [phone]    Tracking Number is [id number]

## 2020-05-12 NOTE — PATIENT INSTRUCTIONS
Plan:  Headache log for frequency, severity and duration  Stop zomig and Emgality  A trial of venlafaxine 37.5 mg daily for headaches and anxiety  A trial of Aimovig 70 mg subcutaneous every 30 days.   Acute migraine treatment -may try eletriptan as needed. Limit use to no more than 9 days per month. As needed -may try 1-2 tabs OTC of naproxen every 12 hours for mild to moderate headache and limit use to no more than 10 days per month.   Follow up in 4-6 weeks after starting venlafaxine.         Patient Education     Venlafaxine extended-release capsules  Brand Name: Effexor XR  What is this medicine?  VENLAFAXINE(MEL la fax een) is used to treat depression, anxiety and panic disorder.  How should I use this medicine?  Take this medicine by mouth with a full glass of water. Follow the directions on the prescription label. Do not cut, crush, or chew this medicine. Take it with food. If needed, the capsule may be carefully opened and the entire contents sprinkled on a spoonful of cool applesauce. Swallow the applesauce/pellet mixture right away without chewing and follow with a glass of water to ensure complete swallowing of the pellets. Try to take your medicine at about the same time each day. Do not take your medicine more often than directed. Do not stop taking this medicine suddenly except upon the advice of your doctor. Stopping this medicine too quickly may cause serious side effects or your condition may worsen.  A special MedGuide will be given to you by the pharmacist with each prescription and refill. Be sure to read this information carefully each time.  Talk to your pediatrician regarding the use of this medicine in children. Special care may be needed.  What side effects may I notice from receiving this medicine?  Side effects that you should report to your doctor or health care professional as soon as possible:    allergic reactions like skin rash, itching or hives, swelling of the face, lips, or  tongue    anxious    breathing problems    confusion    changes in vision    chest pain    confusion    elevated mood, decreased need for sleep, racing thoughts, impulsive behavior    eye pain    fast, irregular heartbeat    feeling faint or lightheaded, falls    feeling agitated, angry, or irritable    hallucination, loss of contact with reality    high blood pressure    loss of balance or coordination    palpitations    redness, blistering, peeling or loosening of the skin, including inside the mouth    restlessness, pacing, inability to keep still    seizures    stiff muscles    suicidal thoughts or other mood changes    trouble passing urine or change in the amount of urine    trouble sleeping    unusual bleeding or bruising    unusually weak or tired    vomiting  Side effects that usually do not require medical attention (report to your doctor or health care professional if they continue or are bothersome):    change in sex drive or performance    change in appetite or weight    constipation    dizziness    dry mouth    headache    increased sweating    nausea    tired  What may interact with this medicine?  Do not take this medicine with any of the following medications:    certain medicines for fungal infections like fluconazole, itraconazole, ketoconazole, posaconazole, voriconazole    cisapride    desvenlafaxine    dofetilide    dronedarone    duloxetine    levomilnacipran    linezolid    MAOIs like Carbex, Eldepryl, Marplan, Nardil, and Parnate    methylene blue (injected into a vein)    milnacipran    pimozide    thioridazine    ziprasidone  This medicine may also interact with the following medications:    amphetamines    aspirin and aspirin-like medicines    certain medicines for depression, anxiety, or psychotic disturbances    certain medicines for migraine headaches like almotriptan, eletriptan, frovatriptan, naratriptan, rizatriptan, sumatriptan, zolmitriptan    certain medicines for  sleep    certain medicines that treat or prevent blood clots like dalteparin, enoxaparin, warfarin    cimetidine    clozapine    diuretics    fentanyl    furazolidone    indinavir    isoniazid    lithium    metoprolol    NSAIDS, medicines for pain and inflammation, like ibuprofen or naproxen    other medicines that prolong the QT interval (cause an abnormal heart rhythm)    procarbazine    rasagiline    supplements like Brandon's wort, kava kava, valerian    tramadol    tryptophan  What if I miss a dose?  If you miss a dose, take it as soon as you can. If it is almost time for your next dose, take only that dose. Do not take double or extra doses.  Where should I keep my medicine?  Keep out of the reach of children.  Store at a controlled temperature between 20 and 25 degrees C (68 degrees and 77 degrees F), in a dry place. Throw away any unused medicine after the expiration date.  What should I tell my health care provider before I take this medicine?  They need to know if you have any of these conditions:    bleeding disorders    glaucoma    heart disease    high blood pressure    high cholesterol    kidney disease    liver disease    low levels of sodium in the blood    kai or bipolar disorder    seizures    suicidal thoughts, plans, or attempt; a previous suicide attempt by you or a family    take medicines that treat or prevent blood clots    thyroid disease    an unusual or allergic reaction to venlafaxine, desvenlafaxine, other medicines, foods, dyes, or preservatives    pregnant or trying to get pregnant    breast-feeding  What should I watch for while using this medicine?  Tell your doctor if your symptoms do not get better or if they get worse. Visit your doctor or health care professional for regular checks on your progress. Because it may take several weeks to see the full effects of this medicine, it is important to continue your treatment as prescribed by your doctor.  Patients and their families  should watch out for new or worsening thoughts of suicide or depression. Also watch out for sudden changes in feelings such as feeling anxious, agitated, panicky, irritable, hostile, aggressive, impulsive, severely restless, overly excited and hyperactive, or not being able to sleep. If this happens, especially at the beginning of treatment or after a change in dose, call your health care professional.  This medicine can cause an increase in blood pressure. Check with your doctor for instructions on monitoring your blood pressure while taking this medicine.  You may get drowsy or dizzy. Do not drive, use machinery, or do anything that needs mental alertness until you know how this medicine affects you. Do not stand or sit up quickly, especially if you are an older patient. This reduces the risk of dizzy or fainting spells. Alcohol may interfere with the effect of this medicine. Avoid alcoholic drinks.  Your mouth may get dry. Chewing sugarless gum, sucking hard candy and drinking plenty of water will help. Contact your doctor if the problem does not go away or is severe.  NOTE:This sheet is a summary. It may not cover all possible information. If you have questions about this medicine, talk to your doctor, pharmacist, or health care provider. Copyright  2019 [employer]           Patient Education     Eletriptan tablets  Brand Name: Relpax  What is this medicine?  ELETRIPTAN (el ih TRIP tan) is used to treat migraines with or without aura. An aura is a strange feeling or visual disturbance that warns you of an attack. It is not used to prevent migraines.  How should I use this medicine?  Take this medicine by mouth with a glass of water. Follow the directions on the prescription label. This medicine is taken at the first symptoms of a migraine. It is not for everyday use. If your migraine headache returns after one dose, you can take another dose as directed. You must leave at least 2 hours between doses, and do not  take more than 40 mg as a single dose. Do not take more than 80 mg total in any 24 hour period. If there is no improvement at all after the first dose, do not take a second dose without talking to your doctor or health care professional. Do not take your medicine more often than directed.  Talk to your pediatrician regarding the use of this medicine in children. Special care may be needed.  What side effects may I notice from receiving this medicine?  Side effects that you should report to your doctor or health care professional as soon as possible:    allergic reactions like skin rash, itching or hives, swelling of the face, lips, or tongue    fast, slow, or irregular heart beat    increased or decreased blood pressure    seizures    severe stomach pain and cramping, bloody diarrhea    signs and symptoms of a blood clot such as breathing problems; changes in vision; chest pain; severe, sudden headache; pain, swelling, warmth in the leg; trouble speaking; sudden numbness or weakness of the face, arm or leg    tingling, pain, or numbness in the face, hands, or feet  Side effects that usually do not require medical attention (report to your doctor or health care professional if they continue or are bothersome):    drowsiness    feeling warm, flushing, or redness of the face    headache    muscle cramps, pain    nausea, vomiting    unusually weak or tired  What may interact with this medicine?  Do not take this medicine with any of the following medications:    antiviral medicines for HIV or AIDS    certain antibiotics like clarithromycin, erythromycin, telithromycin    cimetidine    conivaptan    dalfopristin; quinupristin    diltiazem    ergot alkaloids like dihydroergotamine, ergonovine, ergotamine, methylergonovine    fluvoxamine    idelalisib    imatinib    medicines for fungal infections like fluconazole, itraconazole, ketoconazole, and voriconazole    mifepristone    nefazodone    stimulant medicines for  attention disorders, weight loss, or to stay awake    verapamil    zafirlukast  This medicine may also interact with the following medications:    certain medicines for depression, anxiety, or psychotic disturbances  What if I miss a dose?  This does not apply; this medicine is not for regular use.  Where should I keep my medicine?  Keep out of the reach of children.  Store at room temperature between 15 and 30 degrees C (59 and 86 degrees F). Throw away any unused medicine after the expiration date.  What should I tell my health care provider before I take this medicine?  They need to know if you have any of these conditions:    bowel disease or colitis    diabetes    family history of heart disease    fast or irregular heart beat    heart or blood vessel disease, angina (chest pain), or previous heart attack    high blood pressure    high cholesterol    history of stroke, transient ischemic attacks (TIAs or mini-strokes), or intracranial bleeding    kidney or liver disease    overweight    poor circulation    postmenopausal or surgical removal of uterus and ovaries    Raynaud's disease    seizure disorder    an unusual or allergic reaction to eletriptan, other medicines, foods, dyes, or preservatives    pregnant or trying to get pregnant    breast-feeding  What should I watch for while using this medicine?  Only take this medicine for a migraine headache. Take it if you get warning symptoms or at the start of a migraine attack. It is not for regular use to prevent migraine attacks.  You may get drowsy or dizzy. Do not drive, use machinery, or do anything that needs mental alertness until you know how this medicine affects you. To reduce dizzy or fainting spells, do not sit or stand up quickly, especially if you are an older patient. Alcohol can increase drowsiness, dizziness and flushing. Avoid alcoholic drinks.  Smoking cigarettes may increase the risk of heart-related side effects from using this medicine.  If  you take migraine medicines for 10 or more days a month, your migraines may get worse. Keep a diary of headache days and medicine use. Contact your healthcare professional if your migraine attacks occur more frequently.  NOTE:This sheet is a summary. It may not cover all possible information. If you have questions about this medicine, talk to your doctor, pharmacist, or health care provider. Copyright  2019 [employer]           Patient Education     Erenumab injection  Brand Name: Aimovig  What is this medicine?  ERENUMAB (e TARA ue mab) is used to prevent migraine headaches.  How should I use this medicine?  This medicine is for injection under the skin. You will be taught how to prepare and give this medicine. Use exactly as directed. Take your medicine at regular intervals. Do not take your medicine more often than directed.  It is important that you put your used needles and syringes in a special sharps container. Do not put them in a trash can. If you do not have a sharps container, call your pharmacist or healthcare provider to get one.  Talk to your pediatrician regarding the use of this medicine in children. Special care may be needed.  What side effects may I notice from receiving this medicine?  Side effects that you should report to your doctor or health care professional as soon as possible:    allergic reactions like skin rash, itching or hives, swelling of the face, lips, or tongue  Side effects that usually do not require medical attention (report these to your doctor or health care professional if they continue or are bothersome):    constipation    muscle cramps    pain, redness, or irritation at site where injected  What may interact with this medicine?  Interactions are not expected.  What if I miss a dose?  If you miss a dose, take it as soon as you can. If it is almost time for your next dose, take only that dose. Do not take double or extra doses.  Where should I keep my medicine?  Keep out of the  reach of children.  You will be instructed on how to store this medicine. Throw away any unused medicine after the expiration date on the label.  What should I tell my health care provider before I take this medicine?  They need to know if you have any of these conditions:    an unusual or allergic reaction to erenumab, latex, other medicines, foods, dyes, or preservatives    pregnant or trying to get pregnant    breast-feeding  What should I watch for while using this medicine?  Tell your doctor or healthcare professional if your symptoms do not start to get better or if they get worse.  NOTE:This sheet is a summary. It may not cover all possible information. If you have questions about this medicine, talk to your doctor, pharmacist, or health care provider. Copyright  2019 [employer]

## 2020-05-12 NOTE — TELEPHONE ENCOUNTER
"Prior Authorization Retail Medication Request    Medication/Dose: Aimovig 70 mg every month  ICD code (if different than what is on RX):  Chronic migraine    Previously Tried and Failed:  Zomig oral and helps in 2 hours but it has been less effective and works some reports that zomig nasal did not work as well as the pills.  Sumatriptan nasal  -was not effective but did not try injectable  Rizatriptan -worked partially  Propranolol 120 mg and it does not appear to be as effective anymore and went down to 80 ER mg now   Topiramate for a long time and did not take any difference and was on 5-6 years  Amitriptyline caused side effects-dizziness, not able to function and did not work, \"major side effect\"  Gabapentin and did not help  Verapamil but she was not on verapamil for two long  Ibuprofen or tylenol never works alone and does not take both frequently for headache  Physical therapy in the past for hedaches about 15 years ago and did biofeedback and newest complimentary treatments   Galcanezumab (Emgality) started December 7 or 8th 2019 and has been for about 6 months and not helping with headaches.     Rational: Reports that headaches were increasing in frequency for the last 3 month 10-15 headaches per month but reports that she never any significant relief in headache frequency.   The majority of headaches frontal/ facial and on the right side and ranges 6-8/10 on the numeric pain scale. Patient reports that she takes zolmitriptan but it has not been helping lately. Patient reports that she stopped propranolol a couple weeks ago and hard to say if any difference. Patient denies use of any NSAIDs or acetaminophen or Excedrin        Insurance Name:  [facility]  Insurance ID:  [id number]       Pharmacy Information (if different than what is on RX)  Name:    Phone:    "

## 2020-05-15 NOTE — TELEPHONE ENCOUNTER
Prior Authorization Approval    Authorization Effective Date: 5/13/2020  Authorization Expiration Date: 5/12/2021  Medication: Aimovig 70 mg every month  Approved Dose/Quantity: 1  Reference #: [id number]   Insurance Company: [employer] - Phone [phone] Fax [phone]  Which Pharmacy is filling the prescription (Not needed for infusion/clinic administered): [id number] - [place], [place] - [address] AT [facility] [address]  Pharmacy Notified: Yes  Patient Notified:  **Instructed pharmacy to notify patient when script is ready to /ship.**

## 2020-05-19 ENCOUNTER — MYC MEDICAL ADVICE (OUTPATIENT)
Dept: OPHTHALMOLOGY | Facility: CLINIC | Age: 60
End: 2020-05-19

## 2020-05-19 NOTE — TELEPHONE ENCOUNTER
Spoke to pt at 1722  Left eye stringy floater and many small floaters today.  Pt did notice flash in periphery today  Visual acuity stable, peripheral stable, except intermittent blurred area    Scheduled appt tomorrow at 11 AM with Dr. Neely  Pt aware of date/time/location at [facility]  Jason Salinas RN 5:23 PM 05/19/20

## 2020-05-20 ENCOUNTER — OFFICE VISIT (OUTPATIENT)
Dept: OPHTHALMOLOGY | Facility: CLINIC | Age: 60
End: 2020-05-20
Attending: OPTOMETRIST
Payer: COMMERCIAL

## 2020-05-20 DIAGNOSIS — H35.3132 INTERMEDIATE STAGE NONEXUDATIVE AGE-RELATED MACULAR DEGENERATION OF BOTH EYES: Primary | ICD-10-CM

## 2020-05-20 DIAGNOSIS — H25.13 NUCLEAR SENILE CATARACT OF BOTH EYES: ICD-10-CM

## 2020-05-20 DIAGNOSIS — H43.812 VITREOUS DETACHMENT OF LEFT EYE: ICD-10-CM

## 2020-05-20 DIAGNOSIS — H43.821 VITREOMACULAR TRACTION SYNDROME OF RIGHT EYE: ICD-10-CM

## 2020-05-20 PROCEDURE — 92133 CPTRZD OPH DX IMG PST SGM ON: CPT | Mod: ZF | Performed by: OPTOMETRIST

## 2020-05-20 PROCEDURE — 92134 CPTRZ OPH DX IMG PST SGM RTA: CPT | Mod: ZF | Performed by: OPTOMETRIST

## 2020-05-20 ASSESSMENT — TONOMETRY
OD_IOP_MMHG: 16
OS_IOP_MMHG: 16
IOP_METHOD: APPLANATION

## 2020-05-20 ASSESSMENT — EXTERNAL EXAM - LEFT EYE: OS_EXAM: NORMAL

## 2020-05-20 ASSESSMENT — CONF VISUAL FIELD
OS_NORMAL: 1
OD_NORMAL: 1

## 2020-05-20 ASSESSMENT — VISUAL ACUITY
METHOD: SNELLEN - LINEAR
OS_SC: 20/20
OD_SC: 20/20

## 2020-05-20 ASSESSMENT — SLIT LAMP EXAM - LIDS
COMMENTS: NORMAL
COMMENTS: NORMAL

## 2020-05-20 ASSESSMENT — CUP TO DISC RATIO
OD_RATIO: 0.25
OS_RATIO: 0.25

## 2020-05-20 ASSESSMENT — EXTERNAL EXAM - RIGHT EYE: OD_EXAM: NORMAL

## 2020-05-20 NOTE — PROGRESS NOTES
"HPI:  Patient has a history of pituitary adenoma, s/p resection in 2007 and again in 2010 due to recurrence. Patient complains of flashes and floaters in the left eye. The floaters looks like \"gnats.\"    MRI 09/03/2019  Impression:  Postsurgical changes of transnasal pituitary adenoma  resection. There is no significant change of a hypoenhancing and  centrally necrotic lesion within the left posterior sella since  12/1/2014, which may represent postsurgical changes or possibly  residual adenoma. Attention is recommended on follow-up imaging.     I have personally reviewed the examination and initial interpretation  and I agree with the findings.      Pertinent Medical History:    Pituitary Adenoma    Migraine    Chronic kidney disease    Anemia    Ocular History:    Age Related Macular Degeneration, both eyes.     Family history of macular degeneration - mother, sister, and brother.     Eye Medications:    None    Assessment and Plan:  1.   Posterior Vitreous Detachment, left eye. Retina attached.     Educated on signs and symptoms of a retinal detachment to be seen immediately.     Patient also seen by Dr. Clemons today.     2.   Retinal Hemorrhage, left eye.     Blood sugar, blood pressure, and cholesterol levels are normal.    Can consider repeat CBC with platelets and follow up with PCP if retinal hemorrhage does not resolve.     Patient also seen by Dr. Clemons today.     3.   Dry Age Related Macular Degeneration, both eyes.     Recommends ARED's 2 PO BID. Patient mentioned vitamins may trigger migraines and she is not taking ARED's. I encourage her to work with neurology to find a medication of migraine and see if she can resume ARED's vitamins.     Recommends fish and green leafy vegetables.     Recommends UV protection.    No smoking.    Return immediately if there are changes to amsler grid.    4.   Pituitary Adenoma    Patient has a history of pituitary adenoma, s/p resection in 2007 and again in 2010 due to " recurrence. Patient follows neurology.     Baseline visual field was done 2019.     Optic nerve head OCT was done today 05/20/2020. No optic neuropathy seen on exam and imaging today 05/20/2020    5.   Vitreomacular Traction, right eye.     Return immediately if there are distortions seen on amsler grid    6.   Cataract, both eyes.     Not visually significant. Monitor.       Medical History:  Past Medical History:   Diagnosis Date     Anemia 2006     Atrophic vaginitis      Chronic kidney disease October 2018    labs     Drusen (degenerative) of macula, bilateral      Hx of previous reproductive problem 1992     Insomnia      Migraines      Myopia      Pituitary macroadenoma (H)      Restless legs        Medications:  Current Outpatient Medications   Medication Sig Dispense Refill     ALPRAZolam (XANAX) 0.5 MG tablet Take one pill 1/2 hour before MRI imaging (Patient not taking: Reported on 2/27/2020) 1 tablet 0     eletriptan (RELPAX) 20 MG tablet Take 1-2 tablets (20-40 mg) by mouth at onset of headache for migraine (may repeat in 2 hours as needed. Max 80 mg in 24 hours) 18 tablet 6     erenumab-aooe (AIMOVIG) 70 MG/ML injection Inject 1 mL (70 mg) Subcutaneous every 30 days 1 mL 11     ESTRING 2 MG vaginal ring INSERT 1 RING VAGINALLY EVERY 3 MONTHS 1 each 3     Multiple Vitamins-Minerals (PRESERVISION AREDS 2) CAPS Take 1 capsule by mouth 2 times daily (Patient not taking: Reported on 2/27/2020) 120 capsule 11     pramipexole (MIRAPEX) 1 MG tablet Take 1 mg by mouth At Bedtime       venlafaxine (EFFEXOR-XR) 37.5 MG 24 hr capsule Take 1 capsule (37.5 mg) by mouth daily 30 capsule 3     Vitamin D, Cholecalciferol, 1000 units TABS Take 1 tablet by mouth At Bedtime        ZOLMitriptan (ZOMIG) 5 MG tablet Take 1 tablet (5 mg) by mouth as needed for migraine 9 tablet 3

## 2020-05-29 DIAGNOSIS — N95.2 ATROPHIC VAGINITIS: ICD-10-CM

## 2020-05-29 RX ORDER — ESTRADIOL 2 MG/1
RING VAGINAL
Qty: 1 EACH | Refills: 3 | Status: SHIPPED | OUTPATIENT
Start: 2020-05-29 | End: 2021-01-11

## 2020-05-29 NOTE — TELEPHONE ENCOUNTER
Received refill request for Quisic.  Last in clinic 01/2019.   Hx of abnormal pap.  Lab Results   Component Value Date    PAP LSIL 01/10/2019     No follow up appts scheduled    Forwarded to Midwife pool for review.  Thank you

## 2020-06-18 NOTE — PROGRESS NOTES
"HPI:  Patient has a history of pituitary adenoma, s/p resection in 2007 and again in 2010 due to recurrence. Patient complains of flashes and floaters in the left eye. The floaters looks like \"gnats.\"    Today: Patient presents for follow up of PVD in the left eye. The floaters have no subsided. Vision is stable.     MRI 09/03/2019  Impression:  Postsurgical changes of transnasal pituitary adenoma  resection. There is no significant change of a hypoenhancing and  centrally necrotic lesion within the left posterior sella since  12/1/2014, which may represent postsurgical changes or possibly  residual adenoma. Attention is recommended on follow-up imaging.     I have personally reviewed the examination and initial interpretation  and I agree with the findings.      Pertinent Medical History:    Pituitary Adenoma    Migraine    Chronic kidney disease    Anemia    Ocular History:    Age Related Macular Degeneration, both eyes.     Family history of macular degeneration - mother, sister, and brother.     Eye Medications:    None    Assessment and Plan:  1.   Posterior Vitreous Detachment, left eye. Retina attached.     Educated on signs and symptoms of a retinal detachment to be seen immediately.     Patient also seen by Dr. Clemons on 05/20/2020    2.   Retinal Hemorrhage, left eye.     Larger hemorrhage seen today - about 1 DD round.    Blood sugar, blood pressure, and cholesterol levels are normal.    Ordered CBC with platelets - follow up with PCP to rule out any blood disorder.     Follow up with Dr. Clemons in 1 month.     3.   Dry Age Related Macular Degeneration, both eyes.     Recommends ARED's 2 PO BID. Patient mentioned vitamins may trigger migraines and she is not taking ARED's. I encourage her to work with neurology to find a medication of migraine and see if she can resume ARED's vitamins. She is seeing a neurologist via telehealth next week.     Recommends fish and green leafy vegetables.     Recommends UV " protection.    No smoking.    Return immediately if there are changes to amsler grid.    4.   Pituitary Adenoma    Patient has a history of pituitary adenoma, s/p resection in 2007 and again in 2010 due to recurrence. Patient follows neurology.     Baseline visual field was done 2019.     Optic nerve head OCT was done today 05/20/2020. No optic neuropathy seen on exam.    Plan on repeat VF next visit with retina.     Monitor in 1 year with repeat VF and ONH OCT.     5.   Vitreomacular Traction, right eye.     Return immediately if there are distortions seen on amsler grid    6.   Cataract, both eyes.     Not visually significant. Monitor.       Medical History:  Past Medical History:   Diagnosis Date     Anemia 2006     Atrophic vaginitis      Chronic kidney disease October 2018    labs     Drusen (degenerative) of macula, bilateral      Hx of previous reproductive problem 1992     Insomnia      Migraines      Myopia      Pituitary macroadenoma (H)      Restless legs        Medications:  Current Outpatient Medications   Medication Sig Dispense Refill     ALPRAZolam (XANAX) 0.5 MG tablet Take one pill 1/2 hour before MRI imaging (Patient not taking: Reported on 2/27/2020) 1 tablet 0     eletriptan (RELPAX) 20 MG tablet Take 1-2 tablets (20-40 mg) by mouth at onset of headache for migraine (may repeat in 2 hours as needed. Max 80 mg in 24 hours) 18 tablet 6     erenumab-aooe (AIMOVIG) 70 MG/ML injection Inject 1 mL (70 mg) Subcutaneous every 30 days 1 mL 11     ESTRING 2 MG vaginal ring INSERT 1 RING VAGINALLY EVERY 3 MONTHS 1 each 3     Multiple Vitamins-Minerals (PRESERVISION AREDS 2) CAPS Take 1 capsule by mouth 2 times daily (Patient not taking: Reported on 2/27/2020) 120 capsule 11     pramipexole (MIRAPEX) 1 MG tablet Take 1 mg by mouth At Bedtime       venlafaxine (EFFEXOR-XR) 37.5 MG 24 hr capsule Take 1 capsule (37.5 mg) by mouth daily 30 capsule 3     Vitamin D, Cholecalciferol, 1000 units TABS Take 1 tablet  by mouth At Bedtime        ZOLMitriptan (ZOMIG) 5 MG tablet Take 1 tablet (5 mg) by mouth as needed for migraine 9 tablet 3

## 2020-06-22 ENCOUNTER — MYC MEDICAL ADVICE (OUTPATIENT)
Dept: INTERNAL MEDICINE | Facility: CLINIC | Age: 60
End: 2020-06-22

## 2020-06-22 ENCOUNTER — OFFICE VISIT (OUTPATIENT)
Dept: OPHTHALMOLOGY | Facility: CLINIC | Age: 60
End: 2020-06-22
Attending: OPTOMETRIST
Payer: COMMERCIAL

## 2020-06-22 DIAGNOSIS — H43.812 VITREOUS DETACHMENT OF LEFT EYE: ICD-10-CM

## 2020-06-22 DIAGNOSIS — H25.13 NUCLEAR SENILE CATARACT OF BOTH EYES: ICD-10-CM

## 2020-06-22 DIAGNOSIS — H35.3132 INTERMEDIATE STAGE NONEXUDATIVE AGE-RELATED MACULAR DEGENERATION OF BOTH EYES: ICD-10-CM

## 2020-06-22 DIAGNOSIS — H35.62 RETINAL HEMORRHAGE OF LEFT EYE: ICD-10-CM

## 2020-06-22 DIAGNOSIS — H35.62 RETINAL HEMORRHAGE OF LEFT EYE: Primary | ICD-10-CM

## 2020-06-22 LAB
ERYTHROCYTE [DISTWIDTH] IN BLOOD BY AUTOMATED COUNT: 12.9 % (ref 10–15)
HCT VFR BLD AUTO: 41.5 % (ref 35–47)
HGB BLD-MCNC: 13.5 G/DL (ref 11.7–15.7)
MCH RBC QN AUTO: 29.3 PG (ref 26.5–33)
MCHC RBC AUTO-ENTMCNC: 32.5 G/DL (ref 31.5–36.5)
MCV RBC AUTO: 90 FL (ref 78–100)
PLATELET # BLD AUTO: 217 10E9/L (ref 150–450)
RBC # BLD AUTO: 4.61 10E12/L (ref 3.8–5.2)
WBC # BLD AUTO: 5 10E9/L (ref 4–11)

## 2020-06-22 PROCEDURE — 36415 COLL VENOUS BLD VENIPUNCTURE: CPT | Performed by: OPTOMETRIST

## 2020-06-22 PROCEDURE — G0463 HOSPITAL OUTPT CLINIC VISIT: HCPCS | Mod: ZF

## 2020-06-22 PROCEDURE — 85027 COMPLETE CBC AUTOMATED: CPT | Performed by: OPTOMETRIST

## 2020-06-22 ASSESSMENT — CUP TO DISC RATIO
OD_RATIO: 0.25
OS_RATIO: 0.25

## 2020-06-22 ASSESSMENT — VISUAL ACUITY
OS_SC: 20/20
OD_SC+: +1
OD_SC: 20/25
METHOD: SNELLEN - LINEAR

## 2020-06-22 ASSESSMENT — EXTERNAL EXAM - LEFT EYE: OS_EXAM: NORMAL

## 2020-06-22 ASSESSMENT — EXTERNAL EXAM - RIGHT EYE: OD_EXAM: NORMAL

## 2020-06-22 ASSESSMENT — SLIT LAMP EXAM - LIDS
COMMENTS: NORMAL
COMMENTS: NORMAL

## 2020-06-22 ASSESSMENT — CONF VISUAL FIELD
OS_NORMAL: 1
METHOD: COUNTING FINGERS
OD_NORMAL: 1

## 2020-06-22 ASSESSMENT — TONOMETRY
OS_IOP_MMHG: 14
IOP_METHOD: TONOPEN
OD_IOP_MMHG: 14

## 2020-06-22 NOTE — NURSING NOTE
Chief Complaints and History of Present Illnesses   Patient presents with     Posterior Vitreous Detachment Follow Up     5 week follow up Posterior Vitreous Detachment, left eye     Chief Complaint(s) and History of Present Illness(es)     Posterior Vitreous Detachment Follow Up     Laterality: both eyes    Course: stable    Associated symptoms: Negative for floaters, shade, eye pain and blurred vision    Comments: 5 week follow up Posterior Vitreous Detachment, left eye              Comments     Pt denies any significant vision changes in either eye since last visit.  Denies any flashes, floaters, pain, pressure, irritation, discharge, and tearing.  Ocular Meds: None      Laura Diego OT 9:20 AM June 22, 2020

## 2020-06-23 ENCOUNTER — TELEPHONE (OUTPATIENT)
Dept: INTERNAL MEDICINE | Facility: CLINIC | Age: 60
End: 2020-06-23

## 2020-06-23 NOTE — TELEPHONE ENCOUNTER
Muzicall message sent to patient and also routed 99tests message to provider to advise. Will close this encounter as it is a duplicate message. Kristy Rodriguez LPN 6/23/2020 9:10 AM

## 2020-06-23 NOTE — TELEPHONE ENCOUNTER
Health Call Center    Phone Message    May a detailed message be left on voicemail: yes     Reason for Call: Other: Patient was told by Dr. Neely to schedule a follow-up with Dr. Del Castillo for her left eye - possible bleeding in retina. Please review and call patient to let her know if she should be seen in clinic or have a virtual appointment. Thank you.      Action Taken: Message routed to:  [facility]    Travel Screening: Not Applicable

## 2020-06-23 NOTE — TELEPHONE ENCOUNTER
Pt states went to eye appointment for 1 month follow up on retinal bleeding in left eye.has, has worsened. Eye provider, Dr. Neely advised to see PCP to check for possible blood disease, iron related, per Dr. Neely and advised pt to be seen soon.Dr. Neely did CBC lab, results in patients chart.   Scheduled patient for telephone appointment for 6/26/20 at 2:00pm.  Kristy Rodriguez LPN 6/23/2020 9:06 AM

## 2020-06-29 ENCOUNTER — VIRTUAL VISIT (OUTPATIENT)
Dept: NEUROLOGY | Facility: CLINIC | Age: 60
End: 2020-06-29
Payer: COMMERCIAL

## 2020-06-29 ENCOUNTER — VIRTUAL VISIT (OUTPATIENT)
Dept: INTERNAL MEDICINE | Facility: CLINIC | Age: 60
End: 2020-06-29
Payer: COMMERCIAL

## 2020-06-29 DIAGNOSIS — H35.60 RETINAL HEMORRHAGE, UNSPECIFIED LATERALITY: Primary | ICD-10-CM

## 2020-06-29 RX ORDER — PROPRANOLOL HCL 60 MG
60 CAPSULE, EXTENDED RELEASE 24HR ORAL DAILY
COMMUNITY
Start: 2020-05-26 | End: 2020-06-29

## 2020-06-29 RX ORDER — GALCANEZUMAB 120 MG/ML
120 INJECTION, SOLUTION SUBCUTANEOUS PRN
COMMUNITY
Start: 2020-05-29 | End: 2020-06-29

## 2020-06-29 ASSESSMENT — ENCOUNTER SYMPTOMS
EYE WATERING: 0
DOUBLE VISION: 0
EYE IRRITATION: 0
EYE REDNESS: 0
EYE PAIN: 0

## 2020-06-29 NOTE — NURSING NOTE
Chief Complaint   Patient presents with     Eye Problem     Pt reports retinal bleeding       HENRY Leonard at 2:07 PM sign on 6/29/2020

## 2020-06-29 NOTE — PROGRESS NOTES
"Telephone visit    Ms. Casper agrees to a telephone visit    I last saw her in person 4/30/2019. She has h/o macroadenoma resection and was last seen 9/3/2019 by Dr. Hunter, Neurosurgery and he recommended follow up in about 2 years. She still follows with ophthalmology (last visit 6/22/2020). She follows in Neurology with Ms. Nicho (last seen 5/12/2020) and has follow up today 6/29/2020 for Headaches. She still remains on Effexor for stress/anxiety. As noted above seen in ophthalmology for \"retinal bleeding\". She had a normal CBC on 6/22/2020. It was advised for her to follow up in ophthalmology in one month and have an evaluation for \"bleeding\". She denies any bleeding issues. That is no gum bleeding, no GYN bleeding, no GI bleeding. She denies any previous surgical bleeding issues. She overall feels fine and denies any additional HEENT, cardiopulmonary, abdominal, , GYN, neurological, systemic, psychiatric, lymphatic, endocrine, vascular complaints. No historical nor previous laboratory evidence for hematological malignancy (lymphoma, leukemia, CML, polycythemia, multiple myeloma, etc.). Will check PT, and PTT, She is not abusing anti-platelet agents (ASA, NSAIDS).     LEE Del Castillo MD    Total time today 5 minutes   "

## 2020-06-29 NOTE — PROGRESS NOTES
"Video-Visit Details    Type of service:  Video Visit    Video Start Time (time video started): 10:08 AM      Video End Time (time video stopped): 10:21 AM      Originating Location (pt. Location): Home    Distant Location (provider location):  [facility] NEUROLOGY     Mode of Communication:  Video Conference via [employer]    Physician has received verbal consent for a Video Visit from the patient? Yes    Reason for visit:  Headache follow-up    Interval history:  Initial Headache Clinic visit on 10/7/2019, see note for details.   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  Headache Treatments Tried:    Zomig oral and helps in 2 hours but it has been less effective and works some reports that zomig nasal did not work as well as the pills.  Sumatriptan nasal  -was not effective but did not try injectable  Rizatriptan -worked partially  Propranolol 120 mg and it does not appear to be as effective anymore and went down to 80 ER mg now   Topiramate for a long time and did not take any difference and was on 5-6 years  Amitriptyline caused side effects-dizziness, not able to function and did not work, \"major side effect\"  Gabapentin and did not help  Verapamil but she was not on verapamil for two long  Ibuprofen or tylenol never works alone and does not take both frequently for headache  Physical therapy in the past for hedaches about 15 years ago and did biofeedback and newest complimentary treatments   Galcanezumab (Emgality) started December 7 or 8th 2019 and has been for about 6 months and not helping with headaches. Emgality stopped    Erenumab was not started because Effexor has been working in 2 weeks after initiation of treatment.     Patient reports that she has been taking 37.5 mg and no side effects. Patient reports that she need to eletriptan used 4 times since May and took about 2 hours for headaches to alleviate " or be less severe. Reports that no side effects with eletriptan.      Past medical history: Reviewed    Current Outpatient Medications   Medication     ALPRAZolam (XANAX) 0.5 MG tablet     eletriptan (RELPAX) 20 MG tablet     ESTRING 2 MG vaginal ring     Multiple Vitamins-Minerals (PRESERVISION AREDS 2) CAPS     venlafaxine (EFFEXOR-XR) 37.5 MG 24 hr capsule     Vitamin D, Cholecalciferol, 1000 units TABS     No current facility-administered medications for this visit.         Allergies   Allergen Reactions     Ibuprofen Other (See Comments)     Decreased kidney function - asked not to take      Sulfa Drugs Rash     10 point ROS of systems including Constitutional, Eyes, Respiratory, Cardiovascular, Gastroenterology, Genitourinary, Integumentary, Muscularskeletal, Psychiatric were reviewed and see below    Orientation: Normal; Language normal; Attention:  normal, bright mood and affect, not in acute distress  Cranial nerves:   normal palpebral fissure, EOMI, face and smile symmetric       A/P Chronic migraine   Headache treatment plan:  Headache log for frequency and severity  Continue Effexor at 37.5 mg for headaches prevention  Acute migraine headache treatment -continue eletriptan as needed.   Naproxen as needed for mild to moderate headache and limit use to no more than 14 days.   Follow up in 3 months video or sooner if needed.     Headache treatment plan discussed with the patient who is in agreement with the plan.  All of the patient's questions were answered from the best of my knowledge.    I spent a total of 13 minutes for telemedicine consult with Katie Casper during today s video meeting. Over 50% of this time was spent counseling the patient and/or coordinating care    JIM Sutherland [facility]          Answers for HPI/ROS submitted by the patient on 6/29/2020   General Symptoms: No  Skin Symptoms: No  HENT Symptoms: No  EYE SYMPTOMS: Yes  HEART SYMPTOMS: No  LUNG  SYMPTOMS: No  INTESTINAL SYMPTOMS: No  URINARY SYMPTOMS: No  GYNECOLOGIC SYMPTOMS: No  BREAST SYMPTOMS: No  SKELETAL SYMPTOMS: No  BLOOD SYMPTOMS: No  NERVOUS SYSTEM SYMPTOMS: No  MENTAL HEALTH SYMPTOMS: No  Eye pain: No  Vision loss: No  Dry eyes: No  Watery eyes: No  Eye bulging: No  Double vision: No  Flashing of lights: Yes  Spots: Yes  Floaters: Yes  Redness: No  Crossed eyes: No  Tunnel Vision: No  Yellowing of eyes: No  Eye irritation: No

## 2020-06-29 NOTE — PATIENT INSTRUCTIONS
Plan:  Headache log for frequency and severity  Continue Effexor at 37.5 mg for headaches prevention  Acute migraine headache treatment -continue eletriptan as needed.   Naproxen as needed for mild to moderate headache and limit use to no more than 14 days.   Follow up in 3 months video or sooner if needed.

## 2020-06-29 NOTE — Clinical Note
(1) Had telephone visit with pt. Today and ordered future labs to be done within one week    LEE Del Castillo

## 2020-06-29 NOTE — LETTER
"6/29/2020     RE: Katie Casper  [address]  [place] [address]     Dear Colleague,    Thank you for referring your patient, Katie Casper, to the [facility] NEUROLOGY at [facility]. Please see a copy of my visit note below.    Video-Visit Details  Type of service:  Video Visit  Video Start Time (time video started): 10:08 AM  Video End Time (time video stopped): 10:21 AM  Originating Location (pt. Location): Home  Distant Location (provider location):  [facility] NEUROLOGY   Mode of Communication:  Video Conference via [employer]    Reason for visit:  Headache follow-up    Interval history:  Initial Headache Clinic visit on 10/7/2019, see note for details.   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  Headache Treatments Tried:    Zomig oral and helps in 2 hours but it has been less effective and works some reports that zomig nasal did not work as well as the pills.  Sumatriptan nasal  -was not effective but did not try injectable  Rizatriptan -worked partially  Propranolol 120 mg and it does not appear to be as effective anymore and went down to 80 ER mg now   Topiramate for a long time and did not take any difference and was on 5-6 years  Amitriptyline caused side effects-dizziness, not able to function and did not work, \"major side effect\"  Gabapentin and did not help  Verapamil but she was not on verapamil for two long  Ibuprofen or tylenol never works alone and does not take both frequently for headache  Physical therapy in the past for hedaches about 15 years ago and did biofeedback and newest complimentary treatments   Galcanezumab (Emgality) started December 7 or 8th 2019 and has been for about 6 months and not helping with headaches. Emgality stopped    Erenumab was not started because Effexor has been working in 2 weeks after initiation of treatment. "     Patient reports that she has been taking 37.5 mg and no side effects. Patient reports that she need to eletriptan used 4 times since May and took about 2 hours for headaches to alleviate or be less severe. Reports that no side effects with eletriptan.      Past medical history: Reviewed    Current Outpatient Medications   Medication     ALPRAZolam (XANAX) 0.5 MG tablet     eletriptan (RELPAX) 20 MG tablet     ESTRING 2 MG vaginal ring     Multiple Vitamins-Minerals (PRESERVISION AREDS 2) CAPS     venlafaxine (EFFEXOR-XR) 37.5 MG 24 hr capsule     Vitamin D, Cholecalciferol, 1000 units TABS     No current facility-administered medications for this visit.         Allergies   Allergen Reactions     Ibuprofen Other (See Comments)     Decreased kidney function - asked not to take      Sulfa Drugs Rash     10 point ROS of systems including Constitutional, Eyes, Respiratory, Cardiovascular, Gastroenterology, Genitourinary, Integumentary, Muscularskeletal, Psychiatric were reviewed and see below    Orientation: Normal; Language normal; Attention:  normal, bright mood and affect, not in acute distress  Cranial nerves:   normal palpebral fissure, EOMI, face and smile symmetric       A/P Chronic migraine   Headache treatment plan:  Headache log for frequency and severity  Continue Effexor at 37.5 mg for headaches prevention  Acute migraine headache treatment -continue eletriptan as needed.   Naproxen as needed for mild to moderate headache and limit use to no more than 14 days.   Follow up in 3 months video or sooner if needed.     Headache treatment plan discussed with the patient who is in agreement with the plan.  All of the patient's questions were answered from the best of my knowledge.    I spent a total of 13 minutes for telemedicine consult with Katie Casper during today s video meeting. Over 50% of this time was spent counseling the patient and/or coordinating care    JIM Sutherland CNP  [facility]      Answers for HPI/ROS submitted by the patient on 6/29/2020   General Symptoms: No  Skin Symptoms: No  HENT Symptoms: No  EYE SYMPTOMS: Yes  HEART SYMPTOMS: No  LUNG SYMPTOMS: No  INTESTINAL SYMPTOMS: No  URINARY SYMPTOMS: No  GYNECOLOGIC SYMPTOMS: No  BREAST SYMPTOMS: No  SKELETAL SYMPTOMS: No  BLOOD SYMPTOMS: No  NERVOUS SYSTEM SYMPTOMS: No  MENTAL HEALTH SYMPTOMS: No  Eye pain: No  Vision loss: No  Dry eyes: No  Watery eyes: No  Eye bulging: No  Double vision: No  Flashing of lights: Yes  Spots: Yes  Floaters: Yes  Redness: No  Crossed eyes: No  Tunnel Vision: No  Yellowing of eyes: No  Eye irritation: No

## 2020-07-01 NOTE — PATIENT INSTRUCTIONS
[facility] Medication Refill Request Information:  * Please contact your pharmacy regarding ANY request for medication refills.  ** [facility] Prescription Fax = [phone]  * Please allow 3 business days for routine medication refills.  * Please allow 5 business days for controlled substance medication refills.     [facility] Test Result notification information:  *You will be notified with in 7-10 days of your appointment day regarding the results of your test.  If you are on MyChart you will be notified as soon as the provider has reviewed the results and signed off on them.    [facility]: [phone]   Call for a lab appointment.

## 2020-07-02 DIAGNOSIS — H35.60 RETINAL HEMORRHAGE, UNSPECIFIED LATERALITY: ICD-10-CM

## 2020-07-02 DIAGNOSIS — H35.3132 INTERMEDIATE STAGE NONEXUDATIVE AGE-RELATED MACULAR DEGENERATION OF BOTH EYES: Primary | ICD-10-CM

## 2020-07-02 LAB
ALBUMIN SERPL-MCNC: 3.8 G/DL (ref 3.4–5)
ALP SERPL-CCNC: 101 U/L (ref 40–150)
ALT SERPL W P-5'-P-CCNC: 44 U/L (ref 0–50)
ANION GAP SERPL CALCULATED.3IONS-SCNC: 3 MMOL/L (ref 3–14)
APTT PPP: 29 SEC (ref 22–37)
AST SERPL W P-5'-P-CCNC: 36 U/L (ref 0–45)
BILIRUB SERPL-MCNC: 0.4 MG/DL (ref 0.2–1.3)
BUN SERPL-MCNC: 18 MG/DL (ref 7–30)
CALCIUM SERPL-MCNC: 8.7 MG/DL (ref 8.5–10.1)
CHLORIDE SERPL-SCNC: 108 MMOL/L (ref 94–109)
CLOSURE TME COLL+EPINEP BLD: 155 SEC
CO2 SERPL-SCNC: 29 MMOL/L (ref 20–32)
CREAT SERPL-MCNC: 0.87 MG/DL (ref 0.52–1.04)
DEPRECATED CALCIDIOL+CALCIFEROL SERPL-MC: 29 UG/L (ref 20–75)
FACT VIII ACT/NOR PPP: 107 % (ref 55–200)
GFR SERPL CREATININE-BSD FRML MDRD: 72 ML/MIN/{1.73_M2}
GLUCOSE SERPL-MCNC: 94 MG/DL (ref 70–99)
INR PPP: 0.96 (ref 0.86–1.14)
POTASSIUM SERPL-SCNC: 3.9 MMOL/L (ref 3.4–5.3)
PROT SERPL-MCNC: 7.2 G/DL (ref 6.8–8.8)
SODIUM SERPL-SCNC: 140 MMOL/L (ref 133–144)

## 2020-07-07 NOTE — PROGRESS NOTES
CC: retinal blood left eye; floaters    Interval hx: first time with me. Saw Dr. Neely 6/22/20    HPI:  Patient has a history of pituitary adenoma, s/p resection in 2007 and again in 2010 due to recurrence. Patient complains of flashes and floaters in the left eye. Dr. Neely saw retinal heme left eye and referred her.     Strong FH of AMD; siblings with wet AMD    OCT 7/8/20  each eye drusen; no IRF/SRF, left eye peripheral elevated PED with no overlying IRF or SRF    Pertinent Medical History:    Pituitary Adenoma    Migraine    Chronic kidney disease    Anemia    Ocular History:    Age Related Macular Degeneration, both eyes.     Family history of macular degeneration - mother, sister, and brother.     Eye Medications:    None    Assessment and Plan:  1.   Peripheral pigment epithelium detachment,    hemorhagic ?   DDX; peripheral CNV vs pigment epithelium adenoma   Not symptomatic now, but could be a harbinger of PECHR lesion   Will observe closely with Follow-up in 4 w for repeat OCT and FA      1. Posterior Vitreous Detachment, left eye. Retina attached.     Educated on signs and symptoms of a retinal detachment to be seen immediately.     No peripheral traction or tear in depressed exam today      3.   Dry Age Related Macular Degeneration, both eyes.     Strong FH of wet AMD    Recommends ARED's II PO BID. Patient mentioned vitamins may trigger migraines and she is not taking ARED's. I encourage her to work with neurology to find a medication of migraine and see if she can resume ARED's vitamins. She is seeing a neurologist via telehealth next week.     Recommends fish and green leafy vegetables.     Recommends UV protection.    No smoking.    Return immediately if there are changes to amsler grid.    4.   Pituitary Adenoma    Patient has a history of pituitary adenoma, s/p resection in 2007 and again in 2010 due to recurrence. Patient follows neurology.     Baseline visual field was done 2019.     Optic nerve  head OCT was done 05/20/2020. No optic neuropathy seen on exam.    Plan on repeat VF next visit and continue monitoring yearly with VF and ONH OCT.     5.   Vitreomacular Traction, right eye.     Return immediately if there are distortions seen on amsler grid    6.   Cataract, both eyes.     Not visually significant. Monitor        RTC 4 weeks for repeat Optos and OCT (Superotemp retina left eye) and optos FA (left eye transit) and HVF    Complete documentation of historical and exam elements from today's encounter can be found in the full encounter summary report (not reduplicated in this progress note). I personally obtained the chief complaint(s) and history of present illness.  I confirmed and edited as necessary the review of systems, past medical/surgical history, family history, social history, and examination findings as documented by others; and I examined the patient myself. I personally reviewed the relevant tests, images, and reports as documented above. I formulated and edited as necessary the assessment and plan and discussed the findings and management plan with the patient and family.    Cameron Clemons MD  [facility]

## 2020-07-08 ENCOUNTER — OFFICE VISIT (OUTPATIENT)
Dept: OPHTHALMOLOGY | Facility: CLINIC | Age: 60
End: 2020-07-08
Attending: OPHTHALMOLOGY
Payer: COMMERCIAL

## 2020-07-08 DIAGNOSIS — H35.3132 INTERMEDIATE STAGE NONEXUDATIVE AGE-RELATED MACULAR DEGENERATION OF BOTH EYES: ICD-10-CM

## 2020-07-08 DIAGNOSIS — D35.2 PITUITARY ADENOMA (H): ICD-10-CM

## 2020-07-08 DIAGNOSIS — H43.812 VITREOUS DEGENERATION OF LEFT EYE: Primary | ICD-10-CM

## 2020-07-08 PROCEDURE — 92250 FUNDUS PHOTOGRAPHY W/I&R: CPT | Mod: ZF | Performed by: OPHTHALMOLOGY

## 2020-07-08 PROCEDURE — 92134 CPTRZ OPH DX IMG PST SGM RTA: CPT | Mod: ZF | Performed by: OPHTHALMOLOGY

## 2020-07-08 PROCEDURE — G0463 HOSPITAL OUTPT CLINIC VISIT: HCPCS | Mod: ZF

## 2020-07-08 ASSESSMENT — VISUAL ACUITY
OS_SC: 20/20
METHOD: SNELLEN - LINEAR
OD_SC: 20/20
OS_SC+: -2

## 2020-07-08 ASSESSMENT — EXTERNAL EXAM - RIGHT EYE: OD_EXAM: NORMAL

## 2020-07-08 ASSESSMENT — CONF VISUAL FIELD
OD_NORMAL: 1
OS_NORMAL: 1

## 2020-07-08 ASSESSMENT — CUP TO DISC RATIO
OD_RATIO: 0.25
OS_RATIO: 0.25

## 2020-07-08 ASSESSMENT — EXTERNAL EXAM - LEFT EYE: OS_EXAM: NORMAL

## 2020-07-08 ASSESSMENT — TONOMETRY
OS_IOP_MMHG: 14
OD_IOP_MMHG: 15
IOP_METHOD: TONOPEN

## 2020-07-08 ASSESSMENT — SLIT LAMP EXAM - LIDS
COMMENTS: NORMAL
COMMENTS: NORMAL

## 2020-07-08 NOTE — NURSING NOTE
Chief Complaints and History of Present Illnesses   Patient presents with     Consult For     Chief Complaint(s) and History of Present Illness(es)     Consult For     Laterality: left eye    Onset: 2 weeks ago    Associated symptoms: flashes (occasionally  noted when looking far left).  Negative for eye pain and floaters    Pain scale: 0/10              Comments     Referred by Dr. Neely for retinal hemorrhage of the left eye  Pt reports no change in vision since last visit  No drops    SATISH Latif 8:45 AM July 8, 2020

## 2020-07-27 ENCOUNTER — VIRTUAL VISIT (OUTPATIENT)
Dept: NEUROLOGY | Facility: CLINIC | Age: 60
End: 2020-07-27
Payer: COMMERCIAL

## 2020-07-27 RX ORDER — ZOLMITRIPTAN 5 MG/1
5 TABLET, FILM COATED ORAL PRN
COMMUNITY
Start: 2020-04-29 | End: 2020-11-11

## 2020-07-27 RX ORDER — VENLAFAXINE HYDROCHLORIDE 75 MG/1
75 CAPSULE, EXTENDED RELEASE ORAL DAILY
Qty: 30 CAPSULE | Refills: 6 | Status: SHIPPED | OUTPATIENT
Start: 2020-07-27 | End: 2021-03-22

## 2020-07-27 NOTE — PATIENT INSTRUCTIONS
Plan:  Increase venlafaxine to 75 mg ER daily if tolerated. Stop venlafaxine with any mood changes, worsening depression, irritability, tremor.Cannot stop this medication abruptly.   Acute migraine treatment either zolmitriptan or eletriptan but not both at the same time.   Follow up in 6-8 weeks or sooner if needed via video

## 2020-07-27 NOTE — LETTER
7/27/2020       RE: Katie Casper  [address]  [place] [address]     Dear Colleague,    Thank you for referring your patient, Katie Casper, to the [facility] NEUROLOGY at [facility]. Please see a copy of my visit note below.    LVM x2 No Response    Katie Casper is a 59 year old female who is being evaluated via a billable video visit.          Video-Visit Details    Type of service:  telephone Visit    telephoneStart Time: 2:39 PM  telephone End Time: 2:49 PM    Originating Location (pt. Location): Home    Distant Location (provider location):   [facility]     Platform used for Video Visit: [employer]    Reason for visit:  Headache follow-up. This visit was conducted via synchronous video visit due to the current COVID-19 crisis to reduce patient risk.  Verbal consent was obtained.     Interval History:  This is a 59-year old female who presents to the [facility] for headache follow up.   Initial Headache Clinic visit on 10/7/2019, see note for details.   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  Migraine headaches improved in overall at least 60% but over last 10 days about 6 days of persistent migraine headache but goes away easier but still there. Patient reports a profound tiredness for 6-7 months but better since stopping Emgality. Patient reports that last COVID19 test negative 3 weeks ago.   Patient has been taking effexor 37.5 mg and no side effects and reports that helps with headaches, helping with the mood and a little bit calmer.   Eletriptan helps with the headache. No side effects.   Patient reports that she would like to try to increase venlafaxine to see if better control of headaches and anxiety.   Patient did not start Aimovig because she felt that venlafaxine helps with headaches.     Plan:  Increase venlafaxine to 75 mg  "ER daily if tolerated. Stop venlafaxine with any mood changes, worsening depression, irritability, tremor.Cannot stop this medication abruptly.   Acute migraine treatment either zolmitriptan or eletriptan but not both at the same time.   Follow up in 6-8 weeks or sooner if needed via video        Headache Treatments Tried:    Zomig oral and helps in 2 hours but it has been less effective and works some reports that zomig nasal did not work as well as the pills.  Sumatriptan nasal  -was not effective but did not try injectable  Rizatriptan -worked partially  Propranolol 120 mg and it does not appear to be as effective anymore and went down to 80 ER mg now   Topiramate for a long time and did not take any difference and was on 5-6 years  Amitriptyline caused side effects-dizziness, not able to function and did not work, \"major side effect\"  Gabapentin and did not help  Verapamil but she was not on verapamil for two long  Ibuprofen or tylenol never works alone and does not take both frequently for headache  Physical therapy in the past for hedaches about 15 years ago and did biofeedback and newest complimentary treatments   Galcanezumab (Emgality) started December 7 or 8th 2019 and has been for about 6 months and not helping with headaches. Emgality stopped     Erenumab was not started because Effexor has been working in 2 weeks after initiation of treatment.     PMH, allergies and current prescription medications reviewed    10 point ROS of systems including Constitutional, Eyes, Respiratory, Cardiovascular, Gastroenterology, Genitourinary, Integumentary, Muscularskeletal, Psychiatric were reviewed and no concerns reported today unless as mentioned in the interval history    Patient is  alert and no in apparent acute distress,  mentation appears normal, judgement and insight intact, normal speech, intact facial expressions and no observed weakness    I spent a total of 10 minutes for telemedicine consult with the " patient during today s virtual meeting. Over 50% of this time was spent counseling the patient and/or coordinating care    JIM Sutherland [facility]

## 2020-07-27 NOTE — PROGRESS NOTES
"LVM x2 No Response    Katie Casper is a 59 year old female who is being evaluated via a billable video visit.      The patient has been notified of following:     \"This video visit will be conducted via a call between you and your physician/provider. We have found that certain health care needs can be provided without the need for an in-person physical exam.  This service lets us provide the care you need with a video conversation.  If a prescription is necessary we can send it directly to your pharmacy.  If lab work is needed we can place an order for that and you can then stop by our lab to have the test done at a later time.    Video visits are billed at different rates depending on your insurance coverage.  Please reach out to your insurance provider with any questions.    If during the course of the call the physician/provider feels a video visit is not appropriate, you will not be charged for this service.\"    Patient has given verbal consent for telephonevisit? Yes  How would you like to obtain your AVS? MyChart  If you are dropped from the video visit, the video invite should be resent to: cell  Will anyone else be joining your video visit? No        Video-Visit Details    Type of service:  telephone Visit    telephoneStart Time: 2:39 PM  telephone End Time: 2:49 PM    Originating Location (pt. Location): Home    Distant Location (provider location):  [place]     Platform used for Video Visit: [employer]io    Reason for visit:  Headache follow-up. This visit was conducted via synchronous video visit due to the current COVID-19 crisis to reduce patient risk.  Verbal consent was obtained.     Interval History:  This is a 59-year old female who presents to the [facility] for headache follow up.   Initial Headache Clinic visit on 10/7/2019, see note for details.   History of pituitary macroadenoma which was resected through an endoscopic transnasal approach in 2007 and again in 2010 due to " "recurrence.  Migraine headaches improved in overall at least 60% but over last 10 days about 6 days of persistent migraine headache but goes away easier but still there. Patient reports a profound tiredness for 6-7 months but better since stopping Emgality. Patient reports that last COVID19 test negative 3 weeks ago.   Patient has been taking effexor 37.5 mg and no side effects and reports that helps with headaches, helping with the mood and a little bit calmer.   Eletriptan helps with the headache. No side effects.   Patient reports that she would like to try to increase venlafaxine to see if better control of headaches and anxiety.   Patient did not start Aimovig because she felt that venlafaxine helps with headaches.     Plan:  Increase venlafaxine to 75 mg ER daily if tolerated. Stop venlafaxine with any mood changes, worsening depression, irritability, tremor.Cannot stop this medication abruptly.   Acute migraine treatment either zolmitriptan or eletriptan but not both at the same time.   Follow up in 6-8 weeks or sooner if needed via video        Headache Treatments Tried:    Zomig oral and helps in 2 hours but it has been less effective and works some reports that zomig nasal did not work as well as the pills.  Sumatriptan nasal  -was not effective but did not try injectable  Rizatriptan -worked partially  Propranolol 120 mg and it does not appear to be as effective anymore and went down to 80 ER mg now   Topiramate for a long time and did not take any difference and was on 5-6 years  Amitriptyline caused side effects-dizziness, not able to function and did not work, \"major side effect\"  Gabapentin and did not help  Verapamil but she was not on verapamil for two long  Ibuprofen or tylenol never works alone and does not take both frequently for headache  Physical therapy in the past for hedaches about 15 years ago and did biofeedback and newest complimentary treatments   Galcanezumab " (Emgality) started December 7 or 8th 2019 and has been for about 6 months and not helping with headaches. Emgality stopped     Erenumab was not started because Effexor has been working in 2 weeks after initiation of treatment.     PMH, allergies and current prescription medications reviewed    10 point ROS of systems including Constitutional, Eyes, Respiratory, Cardiovascular, Gastroenterology, Genitourinary, Integumentary, Muscularskeletal, Psychiatric were reviewed and no concerns reported today unless as mentioned in the interval history    Patient is  alert and no in apparent acute distress,  mentation appears normal, judgement and insight intact, normal speech, intact facial expressions and no observed weakness    I spent a total of 10 minutes for telemedicine consult with the patient during today s virtual meeting. Over 50% of this time was spent counseling the patient and/or coordinating care    JIM Sutherland [facility]

## 2020-08-04 ENCOUNTER — OFFICE VISIT (OUTPATIENT)
Dept: OPHTHALMOLOGY | Facility: CLINIC | Age: 60
End: 2020-08-04
Attending: OPHTHALMOLOGY
Payer: COMMERCIAL

## 2020-08-04 DIAGNOSIS — D35.2 PITUITARY ADENOMA (H): ICD-10-CM

## 2020-08-04 DIAGNOSIS — H25.13 NUCLEAR SENILE CATARACT OF BOTH EYES: ICD-10-CM

## 2020-08-04 DIAGNOSIS — H43.812 VITREOUS DEGENERATION OF LEFT EYE: ICD-10-CM

## 2020-08-04 DIAGNOSIS — H43.821 VITREOMACULAR TRACTION SYNDROME OF RIGHT EYE: ICD-10-CM

## 2020-08-04 DIAGNOSIS — H35.3132 INTERMEDIATE STAGE NONEXUDATIVE AGE-RELATED MACULAR DEGENERATION OF BOTH EYES: Primary | ICD-10-CM

## 2020-08-04 PROCEDURE — 92134 CPTRZ OPH DX IMG PST SGM RTA: CPT | Mod: ZF | Performed by: OPHTHALMOLOGY

## 2020-08-04 PROCEDURE — 92250 FUNDUS PHOTOGRAPHY W/I&R: CPT | Mod: ZF | Performed by: OPHTHALMOLOGY

## 2020-08-04 PROCEDURE — 92083 EXTENDED VISUAL FIELD XM: CPT | Mod: ZF | Performed by: OPHTHALMOLOGY

## 2020-08-04 PROCEDURE — G0463 HOSPITAL OUTPT CLINIC VISIT: HCPCS | Mod: ZF

## 2020-08-04 PROCEDURE — 92235 FLUORESCEIN ANGRPH MLTIFRAME: CPT | Mod: ZF | Performed by: OPHTHALMOLOGY

## 2020-08-04 ASSESSMENT — VISUAL ACUITY
OD_SC: 20/25
OS_SC+: +1
OD_SC+: +1
METHOD: SNELLEN - LINEAR
OS_SC: 20/25

## 2020-08-04 ASSESSMENT — TONOMETRY
OS_IOP_MMHG: 16
OD_IOP_MMHG: 19
IOP_METHOD: TONOPEN

## 2020-08-04 ASSESSMENT — SLIT LAMP EXAM - LIDS
COMMENTS: NORMAL
COMMENTS: NORMAL

## 2020-08-04 ASSESSMENT — REFRACTION_WEARINGRX
OD_CYLINDER: +0.25
OS_SPHERE: -0.75
OD_AXIS: 155
OS_AXIS: 036
OD_SPHERE: -0.75
SPECS_TYPE: SVL
OS_CYLINDER: +0.25

## 2020-08-04 ASSESSMENT — EXTERNAL EXAM - RIGHT EYE: OD_EXAM: NORMAL

## 2020-08-04 ASSESSMENT — CUP TO DISC RATIO
OD_RATIO: 0.25
OS_RATIO: 0.25

## 2020-08-04 ASSESSMENT — CONF VISUAL FIELD
OD_NORMAL: 1
OS_NORMAL: 1
METHOD: COUNTING FINGERS

## 2020-08-04 ASSESSMENT — EXTERNAL EXAM - LEFT EYE: OS_EXAM: NORMAL

## 2020-08-04 NOTE — PROGRESS NOTES
CC: retinal lesion left eye; floaters    Interval hx: no change in vision; here for repeat OCT for peripheral retinal lesion and FA    HPI:  Patient has a history of pituitary adenoma, s/p resection in 2007 and again in 2010 due to recurrence. Patient complains of flashes and floaters in the left eye. Dr. Neely saw retinal heme left eye and referred her. Saw Dr. Neely 6/22/20    Strong FH of AMD; siblings with wet AMD    OCT 8-4-20  each eye drusen; no IRF/SRF, left eye peripheral elevated PED with no overlying IRF or SRF; no change compared to previous    PHOTOS 8-4-20  Drusen each eye; left eye peripheral RPE/subretinal lesion; stable    FA 8/4/2020  each eye; drusen posterior pole and peripheral; blocking from peripheral retinal left eye lesion; no leaking lesion    Pertinent Medical History:    Pituitary Adenoma    Migraine    Chronic kidney disease    Anemia    Ocular History:    Age Related Macular Degeneration, both eyes.     Family history of macular degeneration - mother, sister, and brother.     Eye Medications:    None    Assessment and Plan:  1.   Peripheral pigment epithelium detachment,    RPE detachment in OCT; stable compared to previous; hemorhagic ? But no leaking lesion in FA today   DDX; peripheral CNV (not likely given FA finding) vs pigment epithelium adenoma vs peripheral PED   Not symptomatic now, but could be a harbinger of PECHR lesion   Will observe closely with Follow-up in 3-4 months for repeat OCT and optos    2. Posterior Vitreous Detachment, left eye. Retina attached.     Educated on signs and symptoms of a retinal detachment to be seen immediately.     No peripheral traction or tear in depressed exam today      3.   Dry Age Related Macular Degeneration, both eyes.     Strong FH of wet AMD    Recommends ARED's II PO BID. Patient mentioned vitamins may trigger migraines and she is not taking ARED's. I encourage her to work with neurology to find a medication of migraine and see if she  can resume ARED's vitamins. She is seeing a neurologist via telehealth next week.     Recommends fish and green leafy vegetables.     Recommends UV protection.    No smoking.    Return immediately if there are changes to amsler grid.    4.   Pituitary Adenoma    Patient has a history of pituitary adenoma, s/p resection in 2007 and again in 2010 due to recurrence. Patient follows neurology.     Baseline visual field was done 2019. Today, VF within normal limits     Optic nerve head OCT was done 05/20/2020. No optic neuropathy seen on exam.    Continue monitoring yearly with VF and ONH OCT.     5.   Vitreomacular Traction, right eye.     Return immediately if there are distortions seen on amsler grid    6.   Cataract, both eyes.     Not visually significant. Monitor        RTC  3 months for repeat Optos and OCT (Superotemp retina left eye)    Complete documentation of historical and exam elements from today's encounter can be found in the full encounter summary report (not reduplicated in this progress note). I personally obtained the chief complaint(s) and history of present illness.  I confirmed and edited as necessary the review of systems, past medical/surgical history, family history, social history, and examination findings as documented by others; and I examined the patient myself. I personally reviewed the relevant tests, images, and reports as documented above. I formulated and edited as necessary the assessment and plan and discussed the findings and management plan with the patient and family.    Cameron Clemons MD  [facility]

## 2020-08-04 NOTE — NURSING NOTE
Chief Complaints and History of Present Illnesses   Patient presents with     Follow Up     4 week follow up Peripheral pigment epithelium detachment, Pituitary Adenoma     Chief Complaint(s) and History of Present Illness(es)     Follow Up     Laterality: left eye    Comments: 4 week follow up Peripheral pigment epithelium detachment, Pituitary Adenoma              Comments     Pt states vision is about the same as last visit. Pt still seeing stringy floater in LE that comes and goes.  No eye pain today. No redness or dryness.    CAROLE Powell August 4, 2020 7:48 AM

## 2020-10-24 ENCOUNTER — MYC MEDICAL ADVICE (OUTPATIENT)
Dept: INTERNAL MEDICINE | Facility: CLINIC | Age: 60
End: 2020-10-24

## 2020-11-04 ENCOUNTER — VIRTUAL VISIT (OUTPATIENT)
Dept: NEUROLOGY | Facility: CLINIC | Age: 60
End: 2020-11-04
Payer: MEDICAID

## 2020-11-04 DIAGNOSIS — D35.2 PITUITARY ADENOMA (H): ICD-10-CM

## 2020-11-04 DIAGNOSIS — F32.A ANXIETY AND DEPRESSION: Primary | ICD-10-CM

## 2020-11-04 DIAGNOSIS — F41.9 ANXIETY AND DEPRESSION: Primary | ICD-10-CM

## 2020-11-04 PROCEDURE — 99213 OFFICE O/P EST LOW 20 MIN: CPT | Mod: 95 | Performed by: NURSE PRACTITIONER

## 2020-11-04 RX ORDER — VENLAFAXINE HYDROCHLORIDE 150 MG/1
150 TABLET, EXTENDED RELEASE ORAL DAILY
Qty: 30 TABLET | Refills: 6 | Status: SHIPPED | OUTPATIENT
Start: 2020-11-04 | End: 2021-01-14

## 2020-11-04 NOTE — LETTER
11/4/2020       RE: Katie Casper  [address]  [place] [address]     Dear Colleague,    Thank you for referring your patient, Katie Casper, to the [facility] NEUROLOG[facility]. Please see a copy of my visit note below.    Video-Visit Details    Type of service:  Video Visit    Video Start Time (time video started): 3:51 PM      Video End Time (time video stopped): 4:10 PM      Originating Location (pt. Location): Home    Distant Location (provider location):  [facility]     Mode of Communication:  Video Conference via [facility]    Physician has received verbal consent for a Video Visit from the patient? Yes    Headache follow-up. This visit was conducted via synchronous video visit due to the current COVID-19 crisis to reduce patient risk.      Interval history:  This is a 60-year-old female who presents to [facility] headache clinic for headache follow-up.  Initial [facility] headache clinic visit on 10/7/2019, see note for details  PMH of pituitary macroadenoma which was resected through an endoscopic transnasal approach in 2007 and again in 2010 due to recurrence.  Headache history-headaches since 19 years old+ a strong family history of headaches  Headache treatment tried:  Sumatriptan, rizatriptan, propranolol, amitriptyline, OTC analgesics, physical therapy.  Patient was started on Emgality on December 7 or eighth 2019 and headaches were increasing in frequency and it was decided to stop Emgality and change it to Aimovig at follow-up visit in May 2020  Patient was started on Effexor and reported relief in 2 weeks after initiation of treatment with the Effexor.  Follow-up on 7/27/2020-increased Effexor to 75 mg extended release daily and hold off from starting Aimovig.  Today patient reports that migraine headaches coming back in the last month -possibly due to stressful circumstances  "-not working regular job and has been taking care of her mother 24/7, Increased stress  Headaches frequency   in October -11 headache days per month and shorter than was on Emgality and triptans are working faster and not as severe. Patient reports that in the past 4 days -one headache lasting up to two hours.   Patient reports that Effexor has been helpful.   Headache Treatments Tried:    Zomig oral and helps in 2 hours but it has been less effective and works some reports that zomig nasal did not work as well as the pills.  Sumatriptan nasal  -was not effective but did not try injectable  Rizatriptan -worked partially  Propranolol 120 mg and it does not appear to be as effective anymore and went down to 80 ER mg now   Topiramate for a long time and did not take any difference and was on 5-6 years  Amitriptyline caused side effects-dizziness, not able to function and did not work, \"major side effect\"  Gabapentin and did not help  Verapamil but she was not on verapamil for two long  Ibuprofen or tylenol never works alone and does not take both frequently for headache  Physical therapy in the past for hedaches about 15 years ago and did biofeedback and newest complimentary treatments   Galcanezumab (Emgality) started December 7 or 8th 2019 and has been for about 6 months and not helping with headaches. Emgality stopped     Erenumab was not started because Effexor has been working in 2 weeks after initiation of treatment.   Patient denies any side effects while being on Effexor so far. Side effects reviewed and withdrawal risk if forgets   Off label use of Effexor for headache prevention.   Eletriptan works good for acute migraine headache treatment so will not make any changes in acute headache treatment plan for now.  No other new symptoms or new concerns reported today    Headache treatment plan:  May try to increase Venlafaxine to 150 mg daily if tolerated and if headaches were increasing then may consider a " trial of one of the CGRPs - Aimovig.   Eletriptan as needed for acute migraine headache treatment  Follow up in 4 weeks after increasing Effexor dose     PMH, allergies and current prescription medications reviewed    10 point ROS of systems including Constitutional, Eyes, Respiratory, Cardiovascular, Gastroenterology, Genitourinary, Integumentary, Muscularskeletal, Psychiatric were reviewed and no concerns reported today unless as mentioned in the HPI    Patient appears alert and no in apparent acute distress,  mentation appears normal, judgement and insight intact, normal speech, symmetrical facial expressions, no apparent weakness, no ptosis.     A/P:  As discussed above    I discussed all my recommendations with Katie Casper who verbalizes understanding and comfortable with the plan.  All of patient's questions were answered from the best of my knowledge.  Reviewed Serotonin syndrome risk with use of effexor and eletriptan    Patient is in agreement with the plan.     I spent a total of 19 minutes for telemedicine consult with the patient during today s virtual meeting. Over 50% of this time was spent counseling the patient and/or coordinating care    Dragon speech recognition software was used for this note dictation, unintentional errors may occur.       JIM Sutherland [facility]

## 2020-11-04 NOTE — PROGRESS NOTES
Video-Visit Details    Type of service:  Video Visit    Video Start Time (time video started): 3:51 PM      Video End Time (time video stopped): 4:10 PM      Originating Location (pt. Location): Home    Distant Location (provider location):  [facility] NEUROLOGY [facility]     Mode of Communication:  Video Conference via GI-View    Physician has received verbal consent for a Video Visit from the patient? Yes    Headache follow-up. This visit was conducted via synchronous video visit due to the current COVID-19 crisis to reduce patient risk.      Interval history:  This is a 60-year-old female who presents to [facility] headache clinic for headache follow-up.  Initial [facility] headache clinic visit on 10/7/2019, see note for details  PMH of pituitary macroadenoma which was resected through an endoscopic transnasal approach in 2007 and again in 2010 due to recurrence.  Headache history-headaches since 19 years old+ a strong family history of headaches  Headache treatment tried:  Sumatriptan, rizatriptan, propranolol, amitriptyline, OTC analgesics, physical therapy.  Patient was started on Emgality on December 7 or eighth 2019 and headaches were increasing in frequency and it was decided to stop Emgality and change it to Aimovig at follow-up visit in May 2020  Patient was started on Effexor and reported relief in 2 weeks after initiation of treatment with the Effexor.  Follow-up on 7/27/2020-increased Effexor to 75 mg extended release daily and hold off from starting Aimovig.  Today patient reports that migraine headaches coming back in the last month -possibly due to stressful circumstances -not working regular job and has been taking care of her mother 24/7, Increased stress  Headaches frequency   in October -11 headache days per month and shorter than was on Emgality and triptans are working faster and not as severe. Patient reports that in the past 4 days -one headache lasting up to two hours.  "  Patient reports that Effexor has been helpful.   Headache Treatments Tried:    Zomig oral and helps in 2 hours but it has been less effective and works some reports that zomig nasal did not work as well as the pills.  Sumatriptan nasal  -was not effective but did not try injectable  Rizatriptan -worked partially  Propranolol 120 mg and it does not appear to be as effective anymore and went down to 80 ER mg now   Topiramate for a long time and did not take any difference and was on 5-6 years  Amitriptyline caused side effects-dizziness, not able to function and did not work, \"major side effect\"  Gabapentin and did not help  Verapamil but she was not on verapamil for two long  Ibuprofen or tylenol never works alone and does not take both frequently for headache  Physical therapy in the past for hedaches about 15 years ago and did biofeedback and newest complimentary treatments   Galcanezumab (Emgality) started December 7 or 8th 2019 and has been for about 6 months and not helping with headaches. Emgality stopped     Erenumab was not started because Effexor has been working in 2 weeks after initiation of treatment.   Patient denies any side effects while being on Effexor so far. Side effects reviewed and withdrawal risk if forgets   Off label use of Effexor for headache prevention.   Eletriptan works good for acute migraine headache treatment so will not make any changes in acute headache treatment plan for now.  No other new symptoms or new concerns reported today    Headache treatment plan:  May try to increase Venlafaxine to 150 mg daily if tolerated and if headaches were increasing then may consider a trial of one of the CGRPs - Aimovig.   Eletriptan as needed for acute migraine headache treatment  Follow up in 4 weeks after increasing Effexor dose     PMH, allergies and current prescription medications reviewed    10 point ROS of systems including Constitutional, Eyes, Respiratory, Cardiovascular, " Gastroenterology, Genitourinary, Integumentary, Muscularskeletal, Psychiatric were reviewed and no concerns reported today unless as mentioned in the HPI    Patient appears alert and no in apparent acute distress,  mentation appears normal, judgement and insight intact, normal speech, symmetrical facial expressions, no apparent weakness, no ptosis.     A/P:  As discussed above    I discussed all my recommendations with Katie Casper who verbalizes understanding and comfortable with the plan.  All of patient's questions were answered from the best of my knowledge.  Reviewed Serotonin syndrome risk with use of effexor and eletriptan    Patient is in agreement with the plan.     I spent a total of 19 minutes for telemedicine consult with the patient during today s virtual meeting. Over 50% of this time was spent counseling the patient and/or coordinating care    Dragon speech recognition software was used for this note dictation, unintentional errors may occur.       JIM Sutherland [facility]

## 2020-11-04 NOTE — PATIENT INSTRUCTIONS
Plan:  May try to increase Venlafaxine to 150 mg daily if tolerated and if headaches were increasing then may consider a trial of one of the CGRPs - Aimovig.   Eletriptan as needed for acute migraine headache treatment  Follow up in 4 weeks after increasing Effexor dose     Patient Education     Patient Education    Venlafaxine Hydrochloride Oral capsule, extended-release    Venlafaxine Hydrochloride Oral tablet    Venlafaxine Hydrochloride Oral tablet, extended-release  Venlafaxine Hydrochloride Oral capsule, extended-release  What is this medicine?  VENLAFAXINE(MEL la fax een) is used to treat depression, anxiety and panic disorder.  This medicine may be used for other purposes; ask your health care provider or pharmacist if you have questions.  What should I tell my health care provider before I take this medicine?  They need to know if you have any of these conditions:    bleeding disorders    glaucoma    heart disease    high blood pressure    high cholesterol    kidney disease    liver disease    low levels of sodium in the blood    kai or bipolar disorder    seizures    suicidal thoughts, plans, or attempt; a previous suicide attempt by you or a family    take medicines that treat or prevent blood clots    thyroid disease    an unusual or allergic reaction to venlafaxine, desvenlafaxine, other medicines, foods, dyes, or preservatives    pregnant or trying to get pregnant    breast-feeding  How should I use this medicine?  Take this medicine by mouth with a full glass of water. Follow the directions on the prescription label. Do not cut, crush, or chew this medicine. Take it with food. If needed, the capsule may be carefully opened and the entire contents sprinkled on a spoonful of cool applesauce. Swallow the applesauce/pellet mixture right away without chewing and follow with a glass of water to ensure complete swallowing of the pellets. Try to take your medicine at about the same time each day. Do not  take your medicine more often than directed. Do not stop taking this medicine suddenly except upon the advice of your doctor. Stopping this medicine too quickly may cause serious side effects or your condition may worsen.  A special MedGuide will be given to you by the pharmacist with each prescription and refill. Be sure to read this information carefully each time.  Talk to your pediatrician regarding the use of this medicine in children. Special care may be needed.  Overdosage: If you think you have taken too much of this medicine contact a poison control center or emergency room at once.  NOTE: This medicine is only for you. Do not share this medicine with others.  What if I miss a dose?  If you miss a dose, take it as soon as you can. If it is almost time for your next dose, take only that dose. Do not take double or extra doses.  What may interact with this medicine?  Do not take this medicine with any of the following medications:    certain medicines for fungal infections like fluconazole, itraconazole, ketoconazole, posaconazole, voriconazole    cisapride    desvenlafaxine    dofetilide    dronedarone    duloxetine    levomilnacipran    linezolid    MAOIs like Carbex, Eldepryl, Marplan, Nardil, and Parnate    methylene blue (injected into a vein)    milnacipran    pimozide    thioridazine    ziprasidone  This medicine may also interact with the following medications:    aspirin and aspirin-like medicines    certain medicines for depression, anxiety, or psychotic disturbances    certain medicines for migraine headaches like almotriptan, eletriptan, frovatriptan, naratriptan, rizatriptan, sumatriptan, zolmitriptan    certain medicines for sleep    certain medicines that treat or prevent blood clots like dalteparin, enoxaparin, warfarin    cimetidine    clozapine    diuretics    fentanyl    furazolidone    indinavir    isoniazid    lithium    metoprolol    NSAIDS, medicines for pain and inflammation, like  ibuprofen or naproxen    other medicines that prolong the QT interval (cause an abnormal heart rhythm)    procarbazine    rasagiline    supplements like [place]'s wort, kava kava, valerian    tramadol    tryptophan  This list may not describe all possible interactions. Give your health care provider a list of all the medicines, herbs, non-prescription drugs, or dietary supplements you use. Also tell them if you smoke, drink alcohol, or use illegal drugs. Some items may interact with your medicine.  What should I watch for while using this medicine?  Tell your doctor if your symptoms do not get better or if they get worse. Visit your doctor or health care professional for regular checks on your progress. Because it may take several weeks to see the full effects of this medicine, it is important to continue your treatment as prescribed by your doctor.  Patients and their families should watch out for new or worsening thoughts of suicide or depression. Also watch out for sudden changes in feelings such as feeling anxious, agitated, panicky, irritable, hostile, aggressive, impulsive, severely restless, overly excited and hyperactive, or not being able to sleep. If this happens, especially at the beginning of treatment or after a change in dose, call your health care professional.  This medicine can cause an increase in blood pressure. Check with your doctor for instructions on monitoring your blood pressure while taking this medicine.  You may get drowsy or dizzy. Do not drive, use machinery, or do anything that needs mental alertness until you know how this medicine affects you. Do not stand or sit up quickly, especially if you are an older patient. This reduces the risk of dizzy or fainting spells. Alcohol may interfere with the effect of this medicine. Avoid alcoholic drinks.  Your mouth may get dry. Chewing sugarless gum, sucking hard candy and drinking plenty of water will help. Contact your doctor if the problem  does not go away or is severe.  What side effects may I notice from receiving this medicine?  Side effects that you should report to your doctor or health care professional as soon as possible:    allergic reactions like skin rash, itching or hives, swelling of the face, lips, or tongue    breathing problems    changes in vision    hallucination, loss of contact with reality    seizures    suicidal thoughts or other mood changes    trouble passing urine or change in the amount of urine    unusual bleeding or bruising  Side effects that usually do not require medical attention (report to your doctor or health care professional if they continue or are bothersome):    change in sex drive or performance    constipation    increased sweating    loss of appetite    nausea    tremors    weight loss  This list may not describe all possible side effects. Call your doctor for medical advice about side effects. You may report side effects to FDA at 5-798-FDA-1629.  Where should I keep my medicine?  Keep out of the reach of children.  Store at a controlled temperature between 20 and 25 degrees C (68 degrees and 77 degrees F), in a dry place. Throw away any unused medicine after the expiration date.  NOTE:This sheet is a summary. It may not cover all possible information. If you have questions about this medicine, talk to your doctor, pharmacist, or health care provider. Copyright  2016 [employer] Standard

## 2020-11-09 ENCOUNTER — MYC MEDICAL ADVICE (OUTPATIENT)
Dept: INTERNAL MEDICINE | Facility: CLINIC | Age: 60
End: 2020-11-09

## 2020-11-11 DIAGNOSIS — G43.719 INTRACTABLE CHRONIC MIGRAINE WITHOUT AURA AND WITHOUT STATUS MIGRAINOSUS: Primary | ICD-10-CM

## 2020-11-11 RX ORDER — ZOLMITRIPTAN 2.5 MG/1
2.5 TABLET, FILM COATED ORAL
Qty: 12 TABLET | Refills: 5 | Status: SHIPPED | OUTPATIENT
Start: 2020-11-11 | End: 2021-10-27

## 2020-12-15 DIAGNOSIS — H35.3132 INTERMEDIATE STAGE NONEXUDATIVE AGE-RELATED MACULAR DEGENERATION OF BOTH EYES: Primary | ICD-10-CM

## 2020-12-21 ENCOUNTER — TELEPHONE (OUTPATIENT)
Dept: NEUROLOGY | Facility: CLINIC | Age: 60
End: 2020-12-21

## 2020-12-21 NOTE — TELEPHONE ENCOUNTER
"Prior Authorization Retail Medication Request    Medication/Dose: ZOLMitriptan (ZOMIG) 2.5 MG tablet  ICD code (if different than what is on RX):  G43.719  Previously Tried and Failed:  Zomig oral and helps in 2 hours but it has been less effective and works some reports that zomig nasal did not work as well as the pills.  Sumatriptan nasal  -was not effective but did not try injectable  Rizatriptan -worked partially  Propranolol 120 mg and it does not appear to be as effective anymore and went down to 80 ER mg now   Topiramate for a long time and did not take any difference and was on 5-6 years  Amitriptyline caused side effects-dizziness, not able to function and did not work, \"major side effect\"  Gabapentin and did not help  Verapamil but she was not on verapamil for two long  Ibuprofen or tylenol never works alone and does not take both frequently for headache  Physical therapy in the past for hedaches about 15 years ago and did biofeedback and newest complimentary treatments   Galcanezumab (Emgality) started December 7 or 8th 2019 and has been for about 6 months and not helping with headaches. Emgality stopped     Erenumab was not started because Effexor has been working in 2 weeks after initiation of treatment.   Patient denies any side effects while being on Effexor so far. Side effects reviewed and withdrawal risk if forgets   Off label use of Effexor for headache prevention.   Eletriptan works good for acute migraine headache treatment so will not make any changes in acute headache treatment plan for now.  No other new symptoms or new concerns reported today     Rationale:  zomig for acute migraine management    Insurance Name:  [employer]  Insurance ID:  [id number]       Pharmacy Information (if different than what is on RX)  Name:    Phone:      "

## 2020-12-22 ENCOUNTER — OFFICE VISIT (OUTPATIENT)
Dept: OPHTHALMOLOGY | Facility: CLINIC | Age: 60
End: 2020-12-22
Attending: OPHTHALMOLOGY
Payer: COMMERCIAL

## 2020-12-22 DIAGNOSIS — H25.13 NUCLEAR SENILE CATARACT OF BOTH EYES: ICD-10-CM

## 2020-12-22 DIAGNOSIS — D35.2 PITUITARY ADENOMA (H): ICD-10-CM

## 2020-12-22 DIAGNOSIS — H35.62 RETINAL HEMORRHAGE OF LEFT EYE: ICD-10-CM

## 2020-12-22 DIAGNOSIS — H35.3132 INTERMEDIATE STAGE NONEXUDATIVE AGE-RELATED MACULAR DEGENERATION OF BOTH EYES: ICD-10-CM

## 2020-12-22 DIAGNOSIS — H43.812 VITREOUS DEGENERATION OF LEFT EYE: Primary | ICD-10-CM

## 2020-12-22 PROCEDURE — G0463 HOSPITAL OUTPT CLINIC VISIT: HCPCS

## 2020-12-22 PROCEDURE — 99207 FUNDUS PHOTOS OU (BOTH EYES): CPT | Performed by: OPHTHALMOLOGY

## 2020-12-22 PROCEDURE — 92250 FUNDUS PHOTOGRAPHY W/I&R: CPT | Performed by: OPHTHALMOLOGY

## 2020-12-22 PROCEDURE — 92014 COMPRE OPH EXAM EST PT 1/>: CPT | Performed by: OPHTHALMOLOGY

## 2020-12-22 PROCEDURE — 92134 CPTRZ OPH DX IMG PST SGM RTA: CPT | Performed by: OPHTHALMOLOGY

## 2020-12-22 ASSESSMENT — VISUAL ACUITY
OS_SC: 20/20
OD_SC: 20/25
METHOD: SNELLEN - LINEAR
OS_SC+: -2

## 2020-12-22 ASSESSMENT — SLIT LAMP EXAM - LIDS
COMMENTS: NORMAL
COMMENTS: NORMAL

## 2020-12-22 ASSESSMENT — CUP TO DISC RATIO
OD_RATIO: 0.25
OS_RATIO: 0.25

## 2020-12-22 ASSESSMENT — REFRACTION_WEARINGRX
OS_CYLINDER: +0.25
SPECS_TYPE: SVL
OS_SPHERE: -0.75
OD_CYLINDER: +0.25
OD_SPHERE: -0.75
OD_AXIS: 155
OS_AXIS: 036

## 2020-12-22 ASSESSMENT — TONOMETRY
OS_IOP_MMHG: 16
OD_IOP_MMHG: 17
IOP_METHOD: TONOPEN

## 2020-12-22 ASSESSMENT — EXTERNAL EXAM - LEFT EYE: OS_EXAM: NORMAL

## 2020-12-22 ASSESSMENT — CONF VISUAL FIELD
OS_NORMAL: 1
OD_NORMAL: 1
METHOD: COUNTING FINGERS

## 2020-12-22 ASSESSMENT — EXTERNAL EXAM - RIGHT EYE: OD_EXAM: NORMAL

## 2020-12-22 NOTE — TELEPHONE ENCOUNTER
Central Prior Authorization Team   Phone: [phone]      PA Initiation    Medication: ZOLMitriptan (ZOMIG) 2.5 MG tablet  Insurance Company: [employer] - Phone [phone] Fax [phone]  Pharmacy Filling the Rx: [id number] - [place], [place] - [address] AT [facility] [address]  Filling Pharmacy Phone: [phone]  Filling Pharmacy Fax:    Start Date: 12/22/2020

## 2020-12-22 NOTE — PROGRESS NOTES
CC: retinal lesion left eye; floaters    Interval hx: no change in vision; here for repeat OCT for peripheral retinal lesion and FA    HPI:  Patient has a history of pituitary adenoma, s/p resection in 2007 and again in 2010 due to recurrence. Patient complains of flashes and floaters in the left eye. Dr. Neely saw retinal heme left eye and referred her. Saw Dr. Neely 6/22/20    Strong FH of AMD; siblings with wet AMD    OCT 12-22-20  each eye drusen; no IRF/SRF, smaller left eye peripheral elevated PED with no overlying IRF or SRF    PHOTOS 12-22-20  Drusen each eye; much smaller left eye peripheral RPE/subretinal lesion    FA 8/4/2020  each eye; drusen posterior pole and peripheral; blocking from peripheral retinal left eye lesion; no leaking lesion    Pertinent Medical History:    Pituitary Adenoma    Migraine    Chronic kidney disease    Anemia    Ocular History:    Age Related Macular Degeneration, both eyes.     Family history of macular degeneration - mother, sister, and brother.     Eye Medications:    None    Assessment and Plan:  1.   Peripheral pigment epithelium detachment,    RPE detachment in OCT; stable compared to previous; hemorhagic ? But no leaking lesion in FA today   DDX; peripheral CNV (not likely given FA finding) vs pigment epithelium adenoma vs peripheral PED   Not symptomatic now, but could be a harbinger of PECHR lesion   Lesion smaller or almost resolved 12/22/20:likely a peripheral CNV, self limited; s/s of CNV activation and bleeding discussed    Will observe with Follow-up in 6 months for repeat OCT    2. Posterior Vitreous Detachment, both eyes. Retina attached.     Educated on signs and symptoms of a retinal detachment to be seen immediately.     No peripheral traction or tear in depressed exam today      3.   Dry Age Related Macular Degeneration, both eyes.     Strong FH of wet AMD    Recommends ARED's II PO BID. Patient mentioned vitamins may trigger migraines and she is not taking  ARED's. I encourage her to work with neurology to find a medication of migraine and see if she can resume ARED's vitamins. Is eating vegetables and healthy food; recommended to take supplemental zinc    Recommends fish and green leafy vegetables.     Recommends UV protection.    No smoking.    Return immediately if there are changes to amsler grid.    4.   Pituitary Adenoma    Patient has a history of pituitary adenoma, s/p resection in 2007 and again in 2010 due to recurrence. Patient follows neurology.     Baseline visual field was done 2019. Today, VF within normal limits     Optic nerve head OCT was done 05/20/2020. No optic neuropathy seen on exam.    Continue monitoring yearly with VF and ONH OCT.     5.   Cataract, both eyes.     Not visually significant. Monitor        RTC  6 months for repeat OCT (Superotemp retina left eye)    Complete documentation of historical and exam elements from today's encounter can be found in the full encounter summary report (not reduplicated in this progress note). I personally obtained the chief complaint(s) and history of present illness.  I confirmed and edited as necessary the review of systems, past medical/surgical history, family history, social history, and examination findings as documented by others; and I examined the patient myself. I personally reviewed the relevant tests, images, and reports as documented above. I formulated and edited as necessary the assessment and plan and discussed the findings and management plan with the patient and family.    Cameron Clemons MD  [facility]

## 2020-12-22 NOTE — NURSING NOTE
Chief Complaints and History of Present Illnesses   Patient presents with     Follow Up     4 month follow up  Peripheral pigment epithelium detachment     Chief Complaint(s) and History of Present Illness(es)     Follow Up     Comments: 4 month follow up  Peripheral pigment epithelium detachment              Comments     Pt states vision is about the same as last visit. Still seeing floaters in BE that come and go randomly.  Some flashes in RE that come and go, but nothing new.  No eye pain today. No redness or dryness.    CAROLE Powell December 22, 2020 7:42 AM

## 2020-12-23 NOTE — TELEPHONE ENCOUNTER
Prior Authorization Approval    Authorization Effective Date: 12/1/2020  Authorization Expiration Date: 12/23/2021  Medication: ZOLMitriptan (ZOMIG) 2.5 MG tablet- APPROVED  Approved Dose/Quantity:   Reference #:     Insurance Company: [employer] - Phone [phone] Fax [phone]  Expected CoPay:       CoPay Card Available:      Foundation Assistance Needed:    Which Pharmacy is filling the prescription (Not needed for infusion/clinic administered): [id number] - [place], [place] - [address] AT [facility] [address]  Pharmacy Notified: Yes-Pharmacy will notify patient when ready.  Patient Notified: No

## 2021-01-08 ASSESSMENT — ENCOUNTER SYMPTOMS: INSOMNIA: 1

## 2021-01-08 ASSESSMENT — ANXIETY QUESTIONNAIRES
5. BEING SO RESTLESS THAT IT IS HARD TO SIT STILL: NOT AT ALL
6. BECOMING EASILY ANNOYED OR IRRITABLE: NOT AT ALL
7. FEELING AFRAID AS IF SOMETHING AWFUL MIGHT HAPPEN: NOT AT ALL
4. TROUBLE RELAXING: NOT AT ALL
GAD7 TOTAL SCORE: 1
2. NOT BEING ABLE TO STOP OR CONTROL WORRYING: NOT AT ALL
7. FEELING AFRAID AS IF SOMETHING AWFUL MIGHT HAPPEN: NOT AT ALL
3. WORRYING TOO MUCH ABOUT DIFFERENT THINGS: SEVERAL DAYS
1. FEELING NERVOUS, ANXIOUS, OR ON EDGE: NOT AT ALL
GAD7 TOTAL SCORE: 1

## 2021-01-09 ASSESSMENT — ANXIETY QUESTIONNAIRES: GAD7 TOTAL SCORE: 1

## 2021-01-10 ENCOUNTER — HEALTH MAINTENANCE LETTER (OUTPATIENT)
Age: 61
End: 2021-01-10

## 2021-01-11 ENCOUNTER — OFFICE VISIT (OUTPATIENT)
Dept: OBGYN | Facility: CLINIC | Age: 61
End: 2021-01-11
Attending: MIDWIFE
Payer: COMMERCIAL

## 2021-01-11 VITALS
WEIGHT: 153.2 LBS | SYSTOLIC BLOOD PRESSURE: 113 MMHG | BODY MASS INDEX: 24.04 KG/M2 | DIASTOLIC BLOOD PRESSURE: 75 MMHG | HEIGHT: 67 IN | HEART RATE: 82 BPM

## 2021-01-11 DIAGNOSIS — Z12.4 SCREENING FOR MALIGNANT NEOPLASM OF CERVIX: ICD-10-CM

## 2021-01-11 DIAGNOSIS — Z12.31 ENCOUNTER FOR SCREENING MAMMOGRAM FOR MALIGNANT NEOPLASM OF BREAST: ICD-10-CM

## 2021-01-11 DIAGNOSIS — M25.442 FINGER JOINT SWELLING, LEFT: ICD-10-CM

## 2021-01-11 DIAGNOSIS — Z00.00 VISIT FOR PREVENTIVE HEALTH EXAMINATION: Primary | ICD-10-CM

## 2021-01-11 DIAGNOSIS — N95.2 ATROPHIC VAGINITIS: ICD-10-CM

## 2021-01-11 DIAGNOSIS — Z01.419 ENCOUNTER FOR GYNECOLOGICAL EXAMINATION WITHOUT ABNORMAL FINDING: ICD-10-CM

## 2021-01-11 PROCEDURE — G0463 HOSPITAL OUTPT CLINIC VISIT: HCPCS

## 2021-01-11 PROCEDURE — 87624 HPV HI-RISK TYP POOLED RSLT: CPT | Performed by: MIDWIFE

## 2021-01-11 PROCEDURE — 99386 PREV VISIT NEW AGE 40-64: CPT | Performed by: MIDWIFE

## 2021-01-11 PROCEDURE — G0145 SCR C/V CYTO,THINLAYER,RESCR: HCPCS | Performed by: MIDWIFE

## 2021-01-11 RX ORDER — ESTRADIOL 2 MG/1
RING VAGINAL
Qty: 1 EACH | Refills: 3 | Status: SHIPPED | OUTPATIENT
Start: 2021-01-11 | End: 2021-01-27

## 2021-01-11 ASSESSMENT — ENCOUNTER SYMPTOMS
HEARTBURN: 0
FLANK PAIN: 0
SORE THROAT: 0
DIARRHEA: 0
STIFFNESS: 0
SPEECH CHANGE: 0
RESPIRATORY PAIN: 0
DIZZINESS: 0
POLYPHAGIA: 0
FEVER: 0
FATIGUE: 0
NECK PAIN: 0
TACHYCARDIA: 0
JOINT SWELLING: 0
CONSTIPATION: 0
ORTHOPNEA: 0
ABDOMINAL PAIN: 0
PALPITATIONS: 0
EXERCISE INTOLERANCE: 0
HYPERTENSION: 0
MEMORY LOSS: 0
EXTREMITY NUMBNESS: 0
BLOOD IN STOOL: 0
NAUSEA: 0
LEG PAIN: 0
WEIGHT LOSS: 0
BRUISES/BLEEDS EASILY: 0
TINGLING: 0
LIGHT-HEADEDNESS: 0
SINUS PAIN: 0
BLOATING: 0
MYALGIAS: 0
NIGHT SWEATS: 0
POOR WOUND HEALING: 0
INCREASED ENERGY: 0
MUSCLE CRAMPS: 0
NECK MASS: 0
SYNCOPE: 0
PARALYSIS: 0
SMELL DISTURBANCE: 0
NAIL CHANGES: 0
BREAST PAIN: 0
JAUNDICE: 0
WEIGHT GAIN: 0
POLYDIPSIA: 0
SWOLLEN GLANDS: 0
DECREASED LIBIDO: 0
DIFFICULTY URINATING: 0
RECTAL PAIN: 0
TASTE DISTURBANCE: 0
INSOMNIA: 1
DISTURBANCES IN COORDINATION: 0
HALLUCINATIONS: 0
HOT FLASHES: 0
LEG SWELLING: 0
COUGH DISTURBING SLEEP: 0
SHORTNESS OF BREATH: 0
HOARSE VOICE: 0
SLEEP DISTURBANCES DUE TO BREATHING: 0
RECTAL BLEEDING: 0
DYSURIA: 0
POSTURAL DYSPNEA: 0
SINUS CONGESTION: 0
CLAUDICATION: 0
SNORES LOUDLY: 0
CHILLS: 0
NUMBNESS: 0
WHEEZING: 0
VOMITING: 0
SKIN CHANGES: 0
WEAKNESS: 0
COUGH: 0
ARTHRALGIAS: 0
HEMOPTYSIS: 0
MUSCLE WEAKNESS: 0
DYSPNEA ON EXERTION: 0
LOSS OF CONSCIOUSNESS: 0
BOWEL INCONTINENCE: 0
HEADACHES: 0
SEIZURES: 0
HEMATURIA: 0
BREAST MASS: 0
DECREASED APPETITE: 0
ALTERED TEMPERATURE REGULATION: 0
TREMORS: 0
TROUBLE SWALLOWING: 0
SPUTUM PRODUCTION: 0
BACK PAIN: 0
HYPOTENSION: 0

## 2021-01-11 ASSESSMENT — PATIENT HEALTH QUESTIONNAIRE - PHQ9: SUM OF ALL RESPONSES TO PHQ QUESTIONS 1-9: 0

## 2021-01-11 ASSESSMENT — PAIN SCALES - GENERAL: PAINLEVEL: NO PAIN (0)

## 2021-01-11 ASSESSMENT — MIFFLIN-ST. JEOR: SCORE: 1292.54

## 2021-01-11 NOTE — PATIENT INSTRUCTIONS

## 2021-01-11 NOTE — PROGRESS NOTES
Progress Note    SUBJECTIVE:  Katie Casper is an 60 year old, , who requests an Annual Preventive Exam.     Concerns today include:   1. Estring- rx ran out in December.   2. Finger injury- jammed left hand, 5th digit about 1 month prior. Distal knuckle has continued to be red and swollen since. Minor pain, but feels concerned that it is taking so long to move.   3. Toe nail fell off- right foot, 1st digit, 3-4 weeks agao. Pt felt it looked like it had a fungal infection. Reports toe nail regrowing looks normal   4. Repeat pap/HPV due after LSIL/HPV+, colpo MARSHAL I (2019)  5. Belching episode in mid-December lasted several days and belched throughout day. Took simethicone for several days in a row and it resolved for a couple weeks, then restart a couple days ago. No GI upset, bloating, pain, diarrhea or constipation.     Answers for HPI/ROS submitted by the patient on 2021   BONITA 7 TOTAL SCORE: 1    Menstrual History:     Last    Lab Results   Component Value Date    PAP LSIL 01/10/2019     History of abnormal Pap smear: YES - updated in Problem List and Health Maintenance accordingly    Last   Lab Results   Component Value Date    HPV16 Negative 01/10/2019     Last   Lab Results   Component Value Date    HPV18 Negative 01/10/2019     Last   Lab Results   Component Value Date    HRHPV Positive 01/10/2019       Mammogram current: due 2020  Last Mammogram:   Ma Screening Digital Bilat - Future  (s+30)    Result Date: 2019  Narrative: Examination: MA SCREENING DIGITAL BILATERAL, COMPUTER AIDED DETECTION 2019 4:39 PM Comparison: 2018, 2017, 2016 History/Family History: No current or new breast symptoms, screening. Family history for breast cancer in close relative, at age 78. Family history of ovarian cancer in sister, age 65. Breast Density: Heterogeneously dense. Technique: Standard mammographic views were performed . Findings: There has been no significant interval change  or suspicious findings in either breast.      Last Colonoscopy: due   No results found for this or any previous visit.      HISTORY:       eletriptan (RELPAX) 20 MG tablet, Take 1-2 tablets (20-40 mg) by mouth at onset of headache for migraine (may repeat in 2 hours as needed. Max 80 mg in 24 hours)       venlafaxine (EFFEXOR-XR) 75 MG 24 hr capsule, Take 1 capsule (75 mg) by mouth daily       Vitamin D, Cholecalciferol, 1000 units TABS, Take 1 tablet by mouth At Bedtime        ZOLMitriptan (ZOMIG) 2.5 MG tablet, Take 1 tablet (2.5 mg) by mouth at onset of headache for migraine (may repeat in 2 hours as needed. Max 4 tabs in 24 hours)       ALPRAZolam (XANAX) 0.5 MG tablet, Take one pill 1/2 hour before MRI imaging (Patient not taking: Reported on 2021)    No current facility-administered medications on file prior to visit.     Allergies   Allergen Reactions     Sulfa Drugs Rash     Immunization History   Administered Date(s) Administered     Influenza Vaccine IM > 6 months Valent IIV4 10/13/2014     TDAP Vaccine (Boostrix) 2008, 2018       OB History    Para Term  AB Living   2 2 2 0 0 0   SAB TAB Ectopic Multiple Live Births   0 0 0 0 0     Past Medical History:   Diagnosis Date     Anemia 2006     Atrophic vaginitis      Chronic kidney disease 2018    labs     Drusen (degenerative) of macula, bilateral      Hx of previous reproductive problem      Insomnia      Migraines      Myopia      Pituitary macroadenoma (H)      Restless legs      Past Surgical History:   Procedure Laterality Date      SECTION        SECTION       colonscopy       ENT SURGERY  pituitary tumor removal through nose    ,      LAPAROSCOPIC CHOLECYSTECTOMY N/A 2019    Procedure: Laparoscopic Cholecystectomy, umbilical hernia repair;  Surgeon: Sampson Snyder MD;  Location: [facility] OR     LAPAROSCOPY DIAGNOSTIC (GENERAL)       Family History   Problem Relation Age  of Onset     Anesthesia Reaction Maternal Half-Brother      Other Cancer Sister         Ovarian cancer - negative BRACCA     Macular Degeneration Sister      Substance Abuse Brother         no longer living     Macular Degeneration Brother      Breast Cancer Maternal Grandmother         in 70s     Macular Degeneration Mother      Glaucoma Maternal Grandfather         not completely sure?     Social History     Socioeconomic History     Marital status:      Spouse name: Not on file     Number of children: Not on file     Years of education: Not on file     Highest education level: Not on file   Occupational History     Not on file   Social Needs     Financial resource strain: Not on file     Food insecurity     Worry: Not on file     Inability: Not on file     Transportation needs     Medical: Not on file     Non-medical: Not on file   Tobacco Use     Smoking status: Never Smoker     Smokeless tobacco: Never Used   Substance and Sexual Activity     Alcohol use: No     Drug use: No     Sexual activity: Yes     Partners: Male     Birth control/protection: Post-menopausal   Lifestyle     Physical activity     Days per week: Not on file     Minutes per session: Not on file     Stress: Not on file   Relationships     Social connections     Talks on phone: Not on file     Gets together: Not on file     Attends [employer] service: Not on file     Active member of club or organization: Not on file     Attends meetings of clubs or organizations: Not on file     Relationship status: Not on file     Intimate partner violence     Fear of current or ex partner: Not on file     Emotionally abused: Not on file     Physically abused: Not on file     Forced sexual activity: Not on file   Other Topics Concern     Not on file   Social History Narrative     Not on file       Review of Systems     Constitutional:  Negative for fever, chills, weight loss, weight gain, fatigue, decreased appetite, night sweats, recent stressors,  height gain, height loss, post-operative complications, incisional pain, hallucinations, increased energy, hyperactivity and confused.   HENT:  Negative for ear pain, hearing loss, tinnitus, nosebleeds, trouble swallowing, hoarse voice, mouth sores, sore throat, ear discharge, tooth pain, gum tenderness, taste disturbance, smell disturbance, hearing aid, bleeding gums, dry mouth, sinus pain, sinus congestion and neck mass.    Eyes:  Positive for flashing lights and floaters.   Respiratory:   Negative for cough, hemoptysis, sputum production, shortness of breath, wheezing, sleep disturbances due to breathing, snores loudly, respiratory pain, dyspnea on exertion, cough disturbing sleep and postural dyspnea.    Cardiovascular:  Negative for chest pain, dyspnea on exertion, palpitations, orthopnea, claudication, leg swelling, fingers/toes turn blue, hypertension, hypotension, syncope, history of heart murmur, chest pain on exertion, chest pain at rest, pacemaker, few scattered varicosities, leg pain, sleep disturbances due to breathing, tachycardia, light-headedness, exercise intolerance and edema.   Gastrointestinal:  Negative for heartburn, nausea, vomiting, abdominal pain, diarrhea, constipation, blood in stool, melena, rectal pain, bloating, hemorrhoids, bowel incontinence, jaundice, rectal bleeding, coffee ground emesis and change in stool.   Genitourinary:  Negative for bladder incontinence, dysuria, urgency, hematuria, flank pain, vaginal discharge, difficulty urinating, genital sores, dyspareunia, decreased libido, nocturia, voiding less frequently, arousal difficulty, abnormal vaginal bleeding, excessive menstruation, menstrual changes, hot flashes, vaginal dryness and postmenopausal bleeding.   Musculoskeletal:  Negative for myalgias, back pain, joint swelling, arthralgias, stiffness, muscle cramps, neck pain, bone pain, muscle weakness and fracture.   Skin:  Negative for nail changes, itching, poor wound  "healing, rash, hair changes, skin changes, acne, warts, poor wound healing, scarring, flaky skin, Raynaud's phenomenon, sensitivity to sunlight and skin thickening.   Neurological:  Negative for dizziness, tingling, tremors, speech change, seizures, loss of consciousness, weakness, light-headedness, numbness, headaches, disturbances in coordination, extremity numbness, memory loss, difficulty walking and paralysis.   Endo/Heme:  Negative for anemia, swollen glands and bruises/bleeds easily.   Psychiatric/Behavioral:  Negative for hallucinations and memory loss.    Breast:  Negative for breast discharge, breast mass, breast pain and nipple retraction.   Endocrine:  Negative for altered temperature regulation, polyphagia, polydipsia, unwanted hair growth and change in facial hair.    [unfilled]  PHQ-9 SCORE 5/1/2019 2/27/2020 1/11/2021   PHQ-9 Total Score MyChart 4 (Minimal depression) - -   PHQ-9 Total Score - 4 0   Some encounter information is confidential and restricted. Go to Review Flowsheets activity to see all data.     BONITA-7 SCORE 4/11/2019 5/1/2019 1/8/2021   Total Score - 2 (minimal anxiety) 1 (minimal anxiety)   Total Score 2 - 1   Some encounter information is confidential and restricted. Go to Review Flowsheets activity to see all data.       EXAM:  Blood pressure 113/75, pulse 82, height 1.702 m (5' 7\"), weight 69.5 kg (153 lb 3.2 oz), not currently breastfeeding. Body mass index is 23.99 kg/m .  General - pleasant female in no acute distress.  Skin - no suspicious lesions or rashes  EENT-  PERRLA, euthyroid with out palpable nodules  Neck - supple without lymphadenopathy.  Lungs - clear to auscultation bilaterally.  Heart - regular rate and rhythm without murmur.  Abdomen - soft, nontender, nondistended, no masses or organomegaly noted.  Musculoskeletal - no gross deformities. Left hand, 5th digit swelling and redness at distal joint. Able to move, mildly tender. Right foot, 1st digit has 5mm toe nail " regrowth, no sign of onychomycosis.     Neurological - normal strength, sensation, and mental status.    Breast Exam:  Breast: Without visible skin changes. No dimpling or lesions seen.   Breasts supple, non-tender with palpation, no dominant mass, nodularity, or nipple discharge noted bilaterally. Axillary nodes negative.      Pelvic Exam:  EG/BUS: Normal genital architecture without lesions, erythema or abnormal secretions Bartholin's, Urethra, [place]'s normal  Urethral meatus: normal   Urethra: no masses, tenderness, or scarring   Bladder: no masses or tenderness   Vagina: atrophic, thin, pale pink with scant creamy, white and odorless  secretions  Cervix: pale, dry, stenotic  Uterus: small, smooth, firm, mobile w/o pain  Adnexa: Within normal limits and No masses, nodularity, tenderness  Rectum:anus normal     ASSESSMENT:  61yo female  Encounter Diagnoses   Name Primary?     Visit for preventive health examination Yes     Encounter for screening mammogram for malignant neoplasm of breast      Screening for malignant neoplasm of cervix      Encounter for gynecological examination without abnormal finding      Atrophic vaginitis      Finger joint swelling, left         PLAN:   Orders Placed This Encounter   Procedures     Pelvic and Breast Exam Procedure []     Pap Smear Exam [] Do Not Remove     Mammogram Screening Bilateral Digital [[id number]]     Pap imaged thin layer screen with HPV - recommended age 30 - 65 years (select HPV order below)     HPV High Risk Types DNA Cervical     Lipid Profile     Hemoglobin A1c     GASTROENTEROLOGY ADULT REF PROCEDURE ONLY     Orders Placed This Encounter   Medications     ESTRING 2 MG vaginal ring     Sig: INSERT 1 RING VAGINALLY EVERY 3 MONTHS     Dispense:  1 each     Refill:  3     Referral for colonoscopy sent.   Recommended pt follow-up with sports medicine for finger injury which is slow to heal. Mychart sent to patient with information.    Discussed repeat  simethicone use for belching symptoms. Reviewed diet/food journaling to identify possible contributing foods.   Continue Estring at current dose.     Additional teaching done at this visit regarding calcium (1200 mg per day), self breast awareness and postmenopause symptoms.     Return to clinic in one year.  Follow-up as needed.    JIM White, FRED

## 2021-01-11 NOTE — LETTER
2021       RE: Katie Casper  [address]  [facility] [address]     Dear Colleague,    Thank you for referring your patient, Katie Casper, to the [facility] [place] at [facility]. Please see a copy of my visit note below.    Progress Note    SUBJECTIVE:  Katie Casper is an 60 year old, , who requests an Annual Preventive Exam.     Concerns today include:   1. Estring- rx ran out in December.   2. Finger injury- jammed left hand, 5th digit about 1 month prior. Distal knuckle has continued to be red and swollen since. Minor pain, but feels concerned that it is taking so long to move.   3. Toe nail fell off- right foot, 1st digit, 3-4 weeks agao. Pt felt it looked like it had a fungal infection. Reports toe nail regrowing looks normal   4. Repeat pap/HPV due after LSIL/HPV+, colpo MARSHAL I (2019)  5. Belching episode in mid-December lasted several days and belched throughout day. Took simethicone for several days in a row and it resolved for a couple weeks, then restart a couple days ago. No GI upset, bloating, pain, diarrhea or constipation.     Answers for HPI/ROS submitted by the patient on 2021   BONITA 7 TOTAL SCORE: 1    Menstrual History:     Last    Lab Results   Component Value Date    PAP LSIL 01/10/2019     History of abnormal Pap smear: YES - updated in Problem List and Health Maintenance accordingly    Last   Lab Results   Component Value Date    HPV16 Negative 01/10/2019     Last   Lab Results   Component Value Date    HPV18 Negative 01/10/2019     Last   Lab Results   Component Value Date    HRHPV Positive 01/10/2019       Mammogram current: due 2020  Last Mammogram:   Ma Screening Digital Bilat - Future  (s+30)    Result Date: 2019  Narrative: Examination: MA SCREENING DIGITAL BILATERAL, COMPUTER AIDED DETECTION 2019 4:39 PM Comparison: 2018, 2017, 2016 History/Family History:  No current or new breast symptoms, screening. Family history for breast cancer in close relative, at age 78. Family history of ovarian cancer in sister, age 65. Breast Density: Heterogeneously dense. Technique: Standard mammographic views were performed . Findings: There has been no significant interval change or suspicious findings in either breast.      Last Colonoscopy: due   No results found for this or any previous visit.      HISTORY:       eletriptan (RELPAX) 20 MG tablet, Take 1-2 tablets (20-40 mg) by mouth at onset of headache for migraine (may repeat in 2 hours as needed. Max 80 mg in 24 hours)       venlafaxine (EFFEXOR-XR) 75 MG 24 hr capsule, Take 1 capsule (75 mg) by mouth daily       Vitamin D, Cholecalciferol, 1000 units TABS, Take 1 tablet by mouth At Bedtime        ZOLMitriptan (ZOMIG) 2.5 MG tablet, Take 1 tablet (2.5 mg) by mouth at onset of headache for migraine (may repeat in 2 hours as needed. Max 4 tabs in 24 hours)       ALPRAZolam (XANAX) 0.5 MG tablet, Take one pill 1/2 hour before MRI imaging (Patient not taking: Reported on 2021)    No current facility-administered medications on file prior to visit.     Allergies   Allergen Reactions     Sulfa Drugs Rash     Immunization History   Administered Date(s) Administered     Influenza Vaccine IM > 6 months Valent IIV4 10/13/2014     TDAP Vaccine (Boostrix) 2008, 2018       OB History    Para Term  AB Living   2 2 2 0 0 0   SAB TAB Ectopic Multiple Live Births   0 0 0 0 0     Past Medical History:   Diagnosis Date     Anemia      Atrophic vaginitis      Chronic kidney disease 2018    labs     Drusen (degenerative) of macula, bilateral      Hx of previous reproductive problem      Insomnia      Migraines      Myopia      Pituitary macroadenoma (H)      Restless legs      Past Surgical History:   Procedure Laterality Date      SECTION        SECTION       colonscopy       ENT  SURGERY  pituitary tumor removal through nose    2007, 2010     LAPAROSCOPIC CHOLECYSTECTOMY N/A 2/18/2019    Procedure: Laparoscopic Cholecystectomy, umbilical hernia repair;  Surgeon: Sampson Snyder MD;  Location: [facility] OR     LAPAROSCOPY DIAGNOSTIC (GENERAL)       Family History   Problem Relation Age of Onset     Anesthesia Reaction Maternal Half-Brother      Other Cancer Sister         Ovarian cancer - negative BRACCA     Macular Degeneration Sister      Substance Abuse Brother         no longer living     Macular Degeneration Brother      Breast Cancer Maternal Grandmother         in 70s     Macular Degeneration Mother      Glaucoma Maternal Grandfather         not completely sure?     Social History     Socioeconomic History     Marital status:      Spouse name: Not on file     Number of children: Not on file     Years of education: Not on file     Highest education level: Not on file   Occupational History     Not on file   Social Needs     Financial resource strain: Not on file     Food insecurity     Worry: Not on file     Inability: Not on file     Transportation needs     Medical: Not on file     Non-medical: Not on file   Tobacco Use     Smoking status: Never Smoker     Smokeless tobacco: Never Used   Substance and Sexual Activity     Alcohol use: No     Drug use: No     Sexual activity: Yes     Partners: Male     Birth control/protection: Post-menopausal   Lifestyle     Physical activity     Days per week: Not on file     Minutes per session: Not on file     Stress: Not on file   Relationships     Social connections     Talks on phone: Not on file     Gets together: Not on file     Attends Denominational service: Not on file     Active member of club or organization: Not on file     Attends meetings of clubs or organizations: Not on file     Relationship status: Not on file     Intimate partner violence     Fear of current or ex partner: Not on file     Emotionally abused: Not on file      Physically abused: Not on file     Forced sexual activity: Not on file   Other Topics Concern     Not on file   Social History Narrative     Not on file       Review of Systems     Constitutional:  Negative for fever, chills, weight loss, weight gain, fatigue, decreased appetite, night sweats, recent stressors, height gain, height loss, post-operative complications, incisional pain, hallucinations, increased energy, hyperactivity and confused.   HENT:  Negative for ear pain, hearing loss, tinnitus, nosebleeds, trouble swallowing, hoarse voice, mouth sores, sore throat, ear discharge, tooth pain, gum tenderness, taste disturbance, smell disturbance, hearing aid, bleeding gums, dry mouth, sinus pain, sinus congestion and neck mass.    Eyes:  Positive for flashing lights and floaters.   Respiratory:   Negative for cough, hemoptysis, sputum production, shortness of breath, wheezing, sleep disturbances due to breathing, snores loudly, respiratory pain, dyspnea on exertion, cough disturbing sleep and postural dyspnea.    Cardiovascular:  Negative for chest pain, dyspnea on exertion, palpitations, orthopnea, claudication, leg swelling, fingers/toes turn blue, hypertension, hypotension, syncope, history of heart murmur, chest pain on exertion, chest pain at rest, pacemaker, few scattered varicosities, leg pain, sleep disturbances due to breathing, tachycardia, light-headedness, exercise intolerance and edema.   Gastrointestinal:  Negative for heartburn, nausea, vomiting, abdominal pain, diarrhea, constipation, blood in stool, melena, rectal pain, bloating, hemorrhoids, bowel incontinence, jaundice, rectal bleeding, coffee ground emesis and change in stool.   Genitourinary:  Negative for bladder incontinence, dysuria, urgency, hematuria, flank pain, vaginal discharge, difficulty urinating, genital sores, dyspareunia, decreased libido, nocturia, voiding less frequently, arousal difficulty, abnormal vaginal bleeding, excessive  "menstruation, menstrual changes, hot flashes, vaginal dryness and postmenopausal bleeding.   Musculoskeletal:  Negative for myalgias, back pain, joint swelling, arthralgias, stiffness, muscle cramps, neck pain, bone pain, muscle weakness and fracture.   Skin:  Negative for nail changes, itching, poor wound healing, rash, hair changes, skin changes, acne, warts, poor wound healing, scarring, flaky skin, Raynaud's phenomenon, sensitivity to sunlight and skin thickening.   Neurological:  Negative for dizziness, tingling, tremors, speech change, seizures, loss of consciousness, weakness, light-headedness, numbness, headaches, disturbances in coordination, extremity numbness, memory loss, difficulty walking and paralysis.   Endo/Heme:  Negative for anemia, swollen glands and bruises/bleeds easily.   Psychiatric/Behavioral:  Negative for hallucinations and memory loss.    Breast:  Negative for breast discharge, breast mass, breast pain and nipple retraction.   Endocrine:  Negative for altered temperature regulation, polyphagia, polydipsia, unwanted hair growth and change in facial hair.    [unfilled]  PHQ-9 SCORE 5/1/2019 2/27/2020 1/11/2021   PHQ-9 Total Score MyChart 4 (Minimal depression) - -   PHQ-9 Total Score - 4 0   Some encounter information is confidential and restricted. Go to Review Flowsheets activity to see all data.     BONITA-7 SCORE 4/11/2019 5/1/2019 1/8/2021   Total Score - 2 (minimal anxiety) 1 (minimal anxiety)   Total Score 2 - 1   Some encounter information is confidential and restricted. Go to Review Flowsheets activity to see all data.       EXAM:  Blood pressure 113/75, pulse 82, height 1.702 m (5' 7\"), weight 69.5 kg (153 lb 3.2 oz), not currently breastfeeding. Body mass index is 23.99 kg/m .  General - pleasant female in no acute distress.  Skin - no suspicious lesions or rashes  EENT-  PERRLA, euthyroid with out palpable nodules  Neck - supple without lymphadenopathy.  Lungs - clear to auscultation " bilaterally.  Heart - regular rate and rhythm without murmur.  Abdomen - soft, nontender, nondistended, no masses or organomegaly noted.  Musculoskeletal - no gross deformities. Left hand, 5th digit swelling and redness at distal joint. Able to move, mildly tender. Right foot, 1st digit has 5mm toe nail regrowth, no sign of onychomycosis.     Neurological - normal strength, sensation, and mental status.    Breast Exam:  Breast: Without visible skin changes. No dimpling or lesions seen.   Breasts supple, non-tender with palpation, no dominant mass, nodularity, or nipple discharge noted bilaterally. Axillary nodes negative.      Pelvic Exam:  EG/BUS: Normal genital architecture without lesions, erythema or abnormal secretions Bartholin's, Urethra, Beach's normal  Urethral meatus: normal   Urethra: no masses, tenderness, or scarring   Bladder: no masses or tenderness   Vagina: atrophic, thin, pale pink with scant creamy, white and odorless  secretions  Cervix: pale, dry, stenotic  Uterus: small, smooth, firm, mobile w/o pain  Adnexa: Within normal limits and No masses, nodularity, tenderness  Rectum:anus normal     ASSESSMENT:  61yo female  Encounter Diagnoses   Name Primary?     Visit for preventive health examination Yes     Encounter for screening mammogram for malignant neoplasm of breast      Screening for malignant neoplasm of cervix      Encounter for gynecological examination without abnormal finding      Atrophic vaginitis      Finger joint swelling, left         PLAN:   Orders Placed This Encounter   Procedures     Pelvic and Breast Exam Procedure []     Pap Smear Exam [] Do Not Remove     Mammogram Screening Bilateral Digital [[id number]]     Pap imaged thin layer screen with HPV - recommended age 30 - 65 years (select HPV order below)     HPV High Risk Types DNA Cervical     Lipid Profile     Hemoglobin A1c     GASTROENTEROLOGY ADULT REF PROCEDURE ONLY     Orders Placed This Encounter   Medications      ESTRING 2 MG vaginal ring     Sig: INSERT 1 RING VAGINALLY EVERY 3 MONTHS     Dispense:  1 each     Refill:  3     Referral for colonoscopy sent.   Recommended pt follow-up with sports medicine for finger injury which is slow to heal. Mychart sent to patient with information.    Discussed repeat simethicone use for belching symptoms. Reviewed diet/food journaling to identify possible contributing foods.   Continue Estring at current dose.     Additional teaching done at this visit regarding calcium (1200 mg per day), self breast awareness and postmenopause symptoms.     Return to clinic in one year.  Follow-up as needed.    JIM White, CNM

## 2021-01-12 ENCOUNTER — TELEPHONE (OUTPATIENT)
Dept: GASTROENTEROLOGY | Facility: CLINIC | Age: 61
End: 2021-01-12

## 2021-01-12 NOTE — TELEPHONE ENCOUNTER
Patient is scheduled for COLONOSCOPY with Dr. BLANCHARD    Spoke with: CURTIS    Date of Procedure: 02/11/2021    Location: [facility]    Sedation Type CS    Pre-op for Unit J MAC NOT NEEDED    (if yes advise patient they will need a pre-op prior to procedure)      Is patient on blood thinners? -NO (If yes- inform patient to follow up with PCP or provider for follow up instructions)     Informed patient they will need an adult  YES    Informed Patient of COVID Test Requirement YES    Preferred Pharmacy for Pre Prescription NA    Confirmed Nurse will call to complete assessment YES    Additional comments: NA

## 2021-01-13 DIAGNOSIS — Z00.00 VISIT FOR PREVENTIVE HEALTH EXAMINATION: ICD-10-CM

## 2021-01-13 LAB
CHOLEST SERPL-MCNC: 201 MG/DL
HBA1C MFR BLD: 5.2 % (ref 0–5.6)
HDLC SERPL-MCNC: 56 MG/DL
LDLC SERPL CALC-MCNC: 126 MG/DL
NONHDLC SERPL-MCNC: 146 MG/DL
TRIGL SERPL-MCNC: 100 MG/DL

## 2021-01-13 PROCEDURE — 36415 COLL VENOUS BLD VENIPUNCTURE: CPT | Performed by: PATHOLOGY

## 2021-01-13 PROCEDURE — 83036 HEMOGLOBIN GLYCOSYLATED A1C: CPT | Mod: 90 | Performed by: PATHOLOGY

## 2021-01-13 PROCEDURE — 80061 LIPID PANEL: CPT | Performed by: PATHOLOGY

## 2021-01-13 NOTE — TELEPHONE ENCOUNTER
Rx Authorization:    Requested Medication/ Dose venlafaxine (EFFEXOR-ER) 150 MG 24 hr tablet    Date last refill ordered: 11/4/20    Quantity ordered: 30    # refills: 6    Date of last clinic visit with ordering provider: 11/4/20    Date of next clinic visit with ordering provider:     All pertinent protocol data (lab date/result):     Include pertinent information from patients message:

## 2021-01-14 ENCOUNTER — TELEPHONE (OUTPATIENT)
Dept: NEUROLOGY | Facility: CLINIC | Age: 61
End: 2021-01-14

## 2021-01-14 DIAGNOSIS — G43.719 INTRACTABLE CHRONIC MIGRAINE WITHOUT AURA AND WITHOUT STATUS MIGRAINOSUS: ICD-10-CM

## 2021-01-14 RX ORDER — VENLAFAXINE HYDROCHLORIDE 37.5 MG/1
CAPSULE, EXTENDED RELEASE ORAL
Qty: 30 CAPSULE | Refills: 3 | OUTPATIENT
Start: 2021-01-14

## 2021-01-14 RX ORDER — VENLAFAXINE HYDROCHLORIDE 150 MG/1
TABLET, EXTENDED RELEASE ORAL
Qty: 30 TABLET | Refills: 6 | Status: SHIPPED | OUTPATIENT
Start: 2021-01-14 | End: 2021-12-15

## 2021-01-14 NOTE — TELEPHONE ENCOUNTER
"Prior Authorization Retail Medication Request    Medication/Dose: venlafaxine (EFFEXOR-ER) 150 MG 24 hr tablet; TAKE 1 TABLET(150 MG) BY MOUTH DAILY  ICD code (if different than what is on RX):  G43.709  Previously Tried and Failed:  Sumatriptan, rizatriptan, propranolol, amitriptyline, OTC analgesics, physical therapy.  Patient was started on Emgality on December 7 or eighth 2019 and headaches were increasing in frequency and it was decided to stop Emgality and change it to Aimovig at follow-up visit in May 2020  Patient was started on Effexor and reported relief in 2 weeks after initiation of treatment with the Effexor.  Follow-up on 7/27/2020-increased Effexor to 75 mg extended release daily and hold off from starting Aimovig.  Today patient reports that migraine headaches coming back in the last month -possibly due to stressful circumstances -not working regular job and has been taking care of her mother 24/7, Increased stress  Headaches frequency   in October -11 headache days per month and shorter than was on Emgality and triptans are working faster and not as severe. Patient reports that in the past 4 days -one headache lasting up to two hours.   Patient reports that Effexor has been helpful.   Headache Treatments Tried:    Zomig oral and helps in 2 hours but it has been less effective and works some reports that zomig nasal did not work as well as the pills.  Sumatriptan nasal  -was not effective but did not try injectable  Rizatriptan -worked partially  Propranolol 120 mg and it does not appear to be as effective anymore and went down to 80 ER mg now   Topiramate for a long time and did not take any difference and was on 5-6 years  Amitriptyline caused side effects-dizziness, not able to function and did not work, \"major side effect\"  Gabapentin and did not help  Verapamil but she was not on verapamil for two long  Ibuprofen or tylenol never works alone and does not take both frequently for " headache  Physical therapy in the past for hedaches about 15 years ago and did biofeedback and newest complimentary treatments   Galcanezumab (Emgality) started December 7 or 8th 2019 and has been for about 6 months and not helping with headaches. Emgality stopped     Erenumab was not started because Effexor has been working in 2 weeks after initiation of treatment.   Patient denies any side effects while being on Effexor so far. Side effects reviewed and withdrawal risk if forgets   Off label use of Effexor for headache prevention.   Eletriptan works good for acute migraine headache treatment so will not make any changes in acute headache treatment plan for now.  Rationale:  Chronic migraine    Insurance Name:  Blue plus  Insurance ID:  [id number]       Pharmacy Information (if different than what is on RX)  Name:    Phone:

## 2021-01-15 LAB
COPATH REPORT: NORMAL
PAP: NORMAL

## 2021-01-18 ENCOUNTER — TELEPHONE (OUTPATIENT)
Dept: OBGYN | Facility: CLINIC | Age: 61
End: 2021-01-18

## 2021-01-18 LAB
FINAL DIAGNOSIS: NORMAL
HPV HR 12 DNA CVX QL NAA+PROBE: NEGATIVE
HPV16 DNA SPEC QL NAA+PROBE: NEGATIVE
HPV18 DNA SPEC QL NAA+PROBE: NEGATIVE
SPECIMEN DESCRIPTION: NORMAL
SPECIMEN SOURCE CVX/VAG CYTO: NORMAL

## 2021-01-18 NOTE — TELEPHONE ENCOUNTER
Central Prior Authorization Team   [phone]    PA Initiation    Medication: venlafaxine (EFFEXOR-ER) 150 MG 24 hr tablet; TAKE 1 TABLET(150 MG) BY MOUTH DAILY  Insurance Company: [employer] - Phone [phone] Fax [phone]  Pharmacy Filling the Rx: [id number] - [place], [place] - [address] AT [facility] [address]  Filling Pharmacy Phone: [phone]  Filling Pharmacy Fax: [phone]  Start Date: 1/18/2021

## 2021-01-18 NOTE — TELEPHONE ENCOUNTER
Prior Authorization Retail Medication Request    Medication/Dose: estring 2mg vaginal ring    Insert vaginal ring every three months  ICD code (if different than what is on RX):  N95.2 atrophic vaginitis  Previously Tried and Failed:  vagifem  Was on it 1- until 2-14-19  Wasn't helping with vaginal dryness- has been on this medication for many years with success of treating symptosm  Rationale:  Vaginal dryness    Insurance Name:  Blue plus  Insurance ID:  [id number]       Pharmacy Information (if different than what is on RX)  Name:  [facility]  Phone:  [phone]

## 2021-01-19 DIAGNOSIS — Z11.59 ENCOUNTER FOR SCREENING FOR OTHER VIRAL DISEASES: ICD-10-CM

## 2021-01-19 PROBLEM — Z87.42 HISTORY OF ABNORMAL CERVICAL PAP SMEAR: Status: ACTIVE | Noted: 2021-01-19

## 2021-01-19 NOTE — TELEPHONE ENCOUNTER
Prior Authorization Approval    Authorization Effective Date: 1/1/2021  Authorization Expiration Date: 1/19/2022  Medication: venlafaxine (EFFEXOR-ER) 150 MG 24 hr tablet; TAKE 1 TABLET(150 MG) BY MOUTH DAILY-PA APPROVED   Approved Dose/Quantity:   Reference #: CASE # [id number]   Insurance Company: [employer] - Phone [phone] Fax [phone]  Expected CoPay:       CoPay Card Available:      Foundation Assistance Needed:    Which Pharmacy is filling the prescription (Not needed for infusion/clinic administered): [id number] - [place], [place] - [address] AT [facility] [address]  Pharmacy Notified: Yes-Pharmacy stated that they have a paid claim on medication on 1/15/2021 through insurance and patient has picked up medication.   Patient Notified: Yes

## 2021-01-21 NOTE — TELEPHONE ENCOUNTER
PRIOR AUTHORIZATION DENIED    Medication: ESTRING 2 MG vaginal ring    Denial Date: 1/21/2021    Denial Rational:       Appeal Information:

## 2021-01-21 NOTE — TELEPHONE ENCOUNTER
Central Prior Authorization Team   Phone: [phone]      PA Initiation    Medication: ESTRING 2 MG vaginal ring  Insurance Company: [employer] - Phone [phone] Fax [phone]  Pharmacy Filling the Rx: [id number] - [place], [place] - [address] AT [facility] [address]  Filling Pharmacy Phone: [phone]  Filling Pharmacy Fax:    Start Date: 1/21/2021

## 2021-01-27 DIAGNOSIS — Z00.00 VISIT FOR PREVENTIVE HEALTH EXAMINATION: ICD-10-CM

## 2021-01-27 DIAGNOSIS — N95.2 ATROPHIC VAGINITIS: ICD-10-CM

## 2021-01-27 RX ORDER — ESTRADIOL 2 MG/1
RING VAGINAL
Qty: 1 EACH | Refills: 3 | Status: SHIPPED | OUTPATIENT
Start: 2021-01-27 | End: 2022-02-11

## 2021-02-01 ENCOUNTER — TELEPHONE (OUTPATIENT)
Dept: OBGYN | Facility: CLINIC | Age: 61
End: 2021-02-01

## 2021-02-02 NOTE — TELEPHONE ENCOUNTER
MEDICATION APPEAL APPROVED - called pharmacy. Spoke to female staff who was able to get rx processed while on the phone with me. Confirmed patient will get a notification when rx is ready.    Medication: ESTRING 2 MG vaginal ring  Authorization Effective Date:  01/01/2021  Authorization Expiration Date:  02/02/2022  Approved Dose/Quantity:  Reference #: [id number]   Insurance Company:    Expected CoPay:       Which Pharmacy is filling the prescription (Not needed for infusion/clinic administered): [id number], [place] - [address] AT [facility] [address]

## 2021-02-08 DIAGNOSIS — Z11.59 ENCOUNTER FOR SCREENING FOR OTHER VIRAL DISEASES: ICD-10-CM

## 2021-02-08 LAB
LABORATORY COMMENT REPORT: NORMAL
SARS-COV-2 RNA RESP QL NAA+PROBE: NEGATIVE
SARS-COV-2 RNA RESP QL NAA+PROBE: NORMAL
SPECIMEN SOURCE: NORMAL
SPECIMEN SOURCE: NORMAL

## 2021-02-08 PROCEDURE — U0005 INFEC AGEN DETEC AMPLI PROBE: HCPCS | Performed by: STUDENT IN AN ORGANIZED HEALTH CARE EDUCATION/TRAINING PROGRAM

## 2021-02-08 PROCEDURE — U0003 INFECTIOUS AGENT DETECTION BY NUCLEIC ACID (DNA OR RNA); SEVERE ACUTE RESPIRATORY SYNDROME CORONAVIRUS 2 (SARS-COV-2) (CORONAVIRUS DISEASE [COVID-19]), AMPLIFIED PROBE TECHNIQUE, MAKING USE OF HIGH THROUGHPUT TECHNOLOGIES AS DESCRIBED BY CMS-2020-01-R: HCPCS | Performed by: STUDENT IN AN ORGANIZED HEALTH CARE EDUCATION/TRAINING PROGRAM

## 2021-02-11 ENCOUNTER — HOSPITAL ENCOUNTER (OUTPATIENT)
Facility: AMBULATORY SURGERY CENTER | Age: 61
Discharge: HOME | End: 2021-02-11
Attending: STUDENT IN AN ORGANIZED HEALTH CARE EDUCATION/TRAINING PROGRAM | Admitting: STUDENT IN AN ORGANIZED HEALTH CARE EDUCATION/TRAINING PROGRAM
Payer: COMMERCIAL

## 2021-02-11 VITALS
HEART RATE: 83 BPM | OXYGEN SATURATION: 100 % | WEIGHT: 150 LBS | BODY MASS INDEX: 23.54 KG/M2 | HEIGHT: 67 IN | SYSTOLIC BLOOD PRESSURE: 116 MMHG | DIASTOLIC BLOOD PRESSURE: 83 MMHG | TEMPERATURE: 98.5 F | RESPIRATION RATE: 14 BRPM

## 2021-02-11 LAB — COLONOSCOPY: NORMAL

## 2021-02-11 PROCEDURE — 45378 DIAGNOSTIC COLONOSCOPY: CPT

## 2021-02-11 RX ORDER — PROCHLORPERAZINE MALEATE 10 MG
10 TABLET ORAL EVERY 6 HOURS PRN
Status: DISCONTINUED | OUTPATIENT
Start: 2021-02-11 | End: 2021-02-12 | Stop reason: HOSPADM

## 2021-02-11 RX ORDER — NALOXONE HYDROCHLORIDE 0.4 MG/ML
0.4 INJECTION, SOLUTION INTRAMUSCULAR; INTRAVENOUS; SUBCUTANEOUS
Status: DISCONTINUED | OUTPATIENT
Start: 2021-02-11 | End: 2021-02-12 | Stop reason: HOSPADM

## 2021-02-11 RX ORDER — LIDOCAINE 40 MG/G
CREAM TOPICAL
Status: DISCONTINUED | OUTPATIENT
Start: 2021-02-11 | End: 2021-02-11 | Stop reason: HOSPADM

## 2021-02-11 RX ORDER — SIMETHICONE
LIQUID (ML) MISCELLANEOUS PRN
Status: DISCONTINUED | OUTPATIENT
Start: 2021-02-11 | End: 2021-02-11 | Stop reason: HOSPADM

## 2021-02-11 RX ORDER — FENTANYL CITRATE 50 UG/ML
INJECTION, SOLUTION INTRAMUSCULAR; INTRAVENOUS PRN
Status: DISCONTINUED | OUTPATIENT
Start: 2021-02-11 | End: 2021-02-11 | Stop reason: HOSPADM

## 2021-02-11 RX ORDER — FLUMAZENIL 0.1 MG/ML
0.2 INJECTION, SOLUTION INTRAVENOUS
Status: DISCONTINUED | OUTPATIENT
Start: 2021-02-11 | End: 2021-02-12 | Stop reason: HOSPADM

## 2021-02-11 RX ORDER — ONDANSETRON 2 MG/ML
4 INJECTION INTRAMUSCULAR; INTRAVENOUS
Status: DISCONTINUED | OUTPATIENT
Start: 2021-02-11 | End: 2021-02-11 | Stop reason: HOSPADM

## 2021-02-11 RX ORDER — ONDANSETRON 2 MG/ML
4 INJECTION INTRAMUSCULAR; INTRAVENOUS EVERY 6 HOURS PRN
Status: DISCONTINUED | OUTPATIENT
Start: 2021-02-11 | End: 2021-02-12 | Stop reason: HOSPADM

## 2021-02-11 RX ORDER — NALOXONE HYDROCHLORIDE 0.4 MG/ML
0.2 INJECTION, SOLUTION INTRAMUSCULAR; INTRAVENOUS; SUBCUTANEOUS
Status: DISCONTINUED | OUTPATIENT
Start: 2021-02-11 | End: 2021-02-12 | Stop reason: HOSPADM

## 2021-02-11 RX ORDER — ONDANSETRON 4 MG/1
4 TABLET, ORALLY DISINTEGRATING ORAL EVERY 6 HOURS PRN
Status: DISCONTINUED | OUTPATIENT
Start: 2021-02-11 | End: 2021-02-12 | Stop reason: HOSPADM

## 2021-02-11 ASSESSMENT — MIFFLIN-ST. JEOR: SCORE: 1278.03

## 2021-02-15 DIAGNOSIS — Z11.1 SCREENING EXAMINATION FOR PULMONARY TUBERCULOSIS: Primary | ICD-10-CM

## 2021-02-16 DIAGNOSIS — Z11.1 SCREENING EXAMINATION FOR PULMONARY TUBERCULOSIS: ICD-10-CM

## 2021-02-16 PROCEDURE — 36415 COLL VENOUS BLD VENIPUNCTURE: CPT | Performed by: MIDWIFE

## 2021-02-16 PROCEDURE — 86481 TB AG RESPONSE T-CELL SUSP: CPT | Performed by: MIDWIFE

## 2021-02-18 ENCOUNTER — ANCILLARY PROCEDURE (OUTPATIENT)
Dept: MAMMOGRAPHY | Facility: CLINIC | Age: 61
End: 2021-02-18
Attending: MIDWIFE
Payer: COMMERCIAL

## 2021-02-18 DIAGNOSIS — Z12.31 ENCOUNTER FOR SCREENING MAMMOGRAM FOR MALIGNANT NEOPLASM OF BREAST: ICD-10-CM

## 2021-02-18 DIAGNOSIS — Z00.00 VISIT FOR PREVENTIVE HEALTH EXAMINATION: ICD-10-CM

## 2021-02-18 LAB
GAMMA INTERFERON BACKGROUND BLD IA-ACNC: 0.19 IU/ML
M TB IFN-G CD4+ BCKGRND COR BLD-ACNC: 9.81 IU/ML
M TB TUBERC IFN-G BLD QL: NEGATIVE
MITOGEN IGNF BCKGRD COR BLD-ACNC: 0 IU/ML
MITOGEN IGNF BCKGRD COR BLD-ACNC: 0.02 IU/ML

## 2021-02-18 PROCEDURE — 77067 SCR MAMMO BI INCL CAD: CPT

## 2021-02-18 PROCEDURE — 77067 SCR MAMMO BI INCL CAD: CPT | Mod: 26 | Performed by: RADIOLOGY

## 2021-03-01 ENCOUNTER — TELEPHONE (OUTPATIENT)
Dept: NEUROLOGY | Facility: CLINIC | Age: 61
End: 2021-03-01

## 2021-03-04 ENCOUNTER — TELEPHONE (OUTPATIENT)
Dept: NEUROLOGY | Facility: CLINIC | Age: 61
End: 2021-03-04

## 2021-03-22 RX ORDER — VENLAFAXINE HYDROCHLORIDE 75 MG/1
75 CAPSULE, EXTENDED RELEASE ORAL DAILY
Qty: 30 CAPSULE | Refills: 6 | Status: SHIPPED | OUTPATIENT
Start: 2021-03-22 | End: 2021-12-15

## 2021-04-08 ENCOUNTER — TELEPHONE (OUTPATIENT)
Dept: NEUROLOGY | Facility: CLINIC | Age: 61
End: 2021-04-08

## 2021-04-08 ENCOUNTER — VIRTUAL VISIT (OUTPATIENT)
Dept: NEUROLOGY | Facility: CLINIC | Age: 61
End: 2021-04-08
Payer: COMMERCIAL

## 2021-04-08 DIAGNOSIS — G43.719 INTRACTABLE CHRONIC MIGRAINE WITHOUT AURA AND WITHOUT STATUS MIGRAINOSUS: ICD-10-CM

## 2021-04-08 DIAGNOSIS — G24.4 OROMANDIBULAR DYSTONIA: Primary | ICD-10-CM

## 2021-04-08 DIAGNOSIS — M26.609 TMJ (TEMPOROMANDIBULAR JOINT SYNDROME): ICD-10-CM

## 2021-04-08 PROCEDURE — 99214 OFFICE O/P EST MOD 30 MIN: CPT | Mod: 95 | Performed by: NURSE PRACTITIONER

## 2021-04-08 RX ORDER — TIZANIDINE 2 MG/1
2-6 TABLET ORAL AT BEDTIME
Qty: 90 TABLET | Refills: 3 | Status: SHIPPED | OUTPATIENT
Start: 2021-04-08 | End: 2022-04-26

## 2021-04-08 RX ORDER — VENLAFAXINE HYDROCHLORIDE 37.5 MG/1
37.5 CAPSULE, EXTENDED RELEASE ORAL DAILY
Qty: 30 CAPSULE | Refills: 6 | Status: SHIPPED | OUTPATIENT
Start: 2021-04-08 | End: 2021-12-15

## 2021-04-08 NOTE — TELEPHONE ENCOUNTER
Prior Authorization Retail Medication Request    Medication/Dose: Rimegepant Sulfate 75 MG TBDP; Take 75 mg by mouth See Admin Instructions Take 75 mg orally per 24 hours placed on or under the tongue; MAX 75 mg/24 hours; - Oral  ICD code (if different than what is on RX):  G43.719    Previously Tried and Failed:  zomig, relpax, sumatriptan, maxalt  Rationale:  Chronic migraine. Plan for nurtec for acute migraine management    Insurance Name:  Blue plus  Insurance ID:  [id number]       Pharmacy Information (if different than what is on RX)  Name:    Phone:

## 2021-04-08 NOTE — PROGRESS NOTES
Horace is a 60 year old who is being evaluated via a billable video visit.      How would you like to obtain your AVS? MyChart  If the video visit is dropped, the invitation should be resent by: Text to cell phone: [phone]  Will anyone else be joining your video visit? No      Video Start Time: 1:51 PM  Video-Visit Details    Type of service:  Video Visit    Video End Time:2:14 PM    Originating Location (pt. Location): Home    Distant Location (provider location):  [facility] [place]     Platform used for Video Visit: Favian    Chief Complaint   Patient presents with     RECHECK     VIDEO VISIT RETURN      Kenney Hernandez    CC: Headache follow up     Interval History:  Initial [facility] headache clinic visit on 10/7/2019, see note for details  PMH of pituitary macroadenoma which was resected through an endoscopic transnasal approach in 2007 and again in 2010 due to recurrence.  Headache history-headaches since 19 years old+ a strong family history of headaches  Headache treatment tried:  Sumatriptan, rizatriptan, propranolol, amitriptyline, OTC analgesics, physical therapy.  Patient was started on Emgality on December 7 or eighth 2019 and headaches were increasing in frequency and it was decided to stop Emgality and change it to Aimovig at follow-up visit in May 2020  Patient was started on Effexor and reported relief in 2 weeks after initiation of treatment with the Effexor. Effexor     Today reportsthat she has been having facial and neck tension and goes to migraine headaches.   Patient reports that she has been seeing 3 different dentist that TMJ contributing to the migraine headaches.  Reports that a dentist referred to [facility] but the wait is up to July.  Reports tightness in the face and back of the head. Migraine headache chronic and is on effexor.    Has been decreasing effexor because possibly may cause teeth grinding per dentist and seems like less since going down on  Effexor      Plan:  Referral to Dr Lagunas PMR for possible oromandibular dystonia, TMJ and chronic migraine headaches  Tizanidine 2-6 mg at bedtime for muscle   A prescription for Nurtec provided. Side effects -nausea, hives  Zomig as needed  Do not use eletriptan  Give prescription for effexor for 37.5 mg daily   Follow up after seeing Dr Lagunas     PMH, allergies and current prescription medications reviewed    10 point ROS of systems including Constitutional, Eyes, Respiratory, Cardiovascular, Gastroenterology, Genitourinary, Integumentary, MSK, Psychiatric were reviewed and no new concerns reported today unless as mentioned in the interval history    Patient appears alert and no in apparent acute distress,  mentation appears normal, judgement and insight intact, normal speech.    A/P: As discussed above    I discussed all my recommendations with Katie Casper who verbalizes understanding and comfortable with the plan.  All of patient's questions were answered from the best of my knowledge.  Patient is in agreement with the plan.      30 minutes spent on the date of the encounter -documentation and further activities as noted above    JIM Rosales, CNP [facility]  Headache certified  [facility]

## 2021-04-08 NOTE — LETTER
4/8/2021       RE: Katie Casper  [address]  [facility] [address]     Dear Colleague,    Thank you for referring your patient, Katie Casper, to the [facility] [place] at [facility]. Please see a copy of my visit note below.    Horace is a 60 year old who is being evaluated via a billable video visit.      How would you like to obtain your AVS? MyChart  If the video visit is dropped, the invitation should be resent by: Text to cell phone: [phone]  Will anyone else be joining your video visit? No    Video-Visit Details    Type of service:  Video Visit    Video Start Time: 1:51 PM  Video End Time:2:14 PM    Originating Location (pt. Location): Home    Distant Location (provider location):  [facility] [place]     Platform used for Video Visit: [employer]    Chief Complaint   Patient presents with     RECHECK     VIDEO VISIT RETURN      Kenney Hernandez    CC: Headache follow up     Interval History:  Initial [facility] headache clinic visit on 10/7/2019, see note for details  PMH of pituitary macroadenoma which was resected through an endoscopic transnasal approach in 2007 and again in 2010 due to recurrence.  Headache history-headaches since 19 years old+ a strong family history of headaches  Headache treatment tried:  Sumatriptan, rizatriptan, propranolol, amitriptyline, OTC analgesics, physical therapy.  Patient was started on Emgality on December 7 or eighth 2019 and headaches were increasing in frequency and it was decided to stop Emgality and change it to Aimovig at follow-up visit in May 2020  Patient was started on Effexor and reported relief in 2 weeks after initiation of treatment with the Effexor. Effexor     Today reportsthat she has been having facial and neck tension and goes to migraine headaches.   Patient reports that she has been seeing 3 different dentist that TMJ contributing to the  migraine headaches.  Reports that a dentist referred to [facility] but the wait is up to July.  Reports tightness in the face and back of the head. Migraine headache chronic and is on effexor.    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 Side effects -nausea, hives  Zomig as needed  Do not use eletriptan  Give prescription for effexor for 37.5 mg daily   Follow up after seeing Dr Lagunas     [facility], allergies and current prescription medications reviewed    10 point ROS of systems including Constitutional, Eyes, Respiratory, Cardiovascular, Gastroenterology, Genitourinary, Integumentary, MSK, Psychiatric were reviewed and no new concerns reported today unless as mentioned in the interval history    Patient appears alert and no in apparent acute distress,  mentation appears normal, judgement and insight intact, normal speech.    A/P: As discussed above    I discussed all my recommendations with Katie Casper who verbalizes understanding and comfortable with the plan.  All of patient's questions were answered from the best of my knowledge.  Patient is in agreement with the plan.      30 minutes spent on the date of the encounter -documentation and further activities as noted above    JIM Rosales, CNP [facility]  Headache certified  [facility]

## 2021-04-08 NOTE — TELEPHONE ENCOUNTER
Central Prior Authorization Team   [phone]    PA Initiation    Medication: Rimegepant Sulfate 75 MG TBDP; Take 75 mg by mouth See Admin Instructions Take 75 mg orally per 24 hours placed on or under the tongue; MAX 75 mg/24 hours; - Oral  Insurance Company: [employer] - Phone [phone] Fax [phone]  Pharmacy Filling the Rx: [id number] - [place], [place] - [address] AT [facility] [address]  Filling Pharmacy Phone: [phone]  Filling Pharmacy Fax: [phone]  Start Date: 4/8/2021

## 2021-04-12 NOTE — TELEPHONE ENCOUNTER
Prior Authorization Approval    Authorization Effective Date: 1/9/2021  Authorization Expiration Date: 4/9/2022  Medication: Rimegepant Sulfate 75 MG TBDP; Take 75 mg by mouth See Admin Instructions Take 75 mg orally per 24 hours placed on or under the tongue; MAX 75 mg/24 hours; - Oral-PA APPROVED   Approved Dose/Quantity: UP TO 45 TABLETS PER 90 DAYS   Reference #:     Insurance Company: [employer] - Phone [phone] Fax [phone]  Expected CoPay:       CoPay Card Available:      Foundation Assistance Needed:    Which Pharmacy is filling the prescription (Not needed for infusion/clinic administered): [id number] - [place], [place] - [address] AT [facility] [address]  Pharmacy Notified: Yes- **Instructed pharmacy to notify patient when script is ready to /ship.**  Patient Notified: Yes

## 2021-04-16 ENCOUNTER — VIRTUAL VISIT (OUTPATIENT)
Dept: PHYSICAL MEDICINE AND REHAB | Facility: CLINIC | Age: 61
End: 2021-04-16
Payer: COMMERCIAL

## 2021-04-16 DIAGNOSIS — G24.4 IDIOPATHIC OROFACIAL DYSTONIA: ICD-10-CM

## 2021-04-16 DIAGNOSIS — G43.719 CHRONIC MIGRAINE WITHOUT AURA, INTRACTABLE, WITHOUT STATUS MIGRAINOSUS: Primary | ICD-10-CM

## 2021-04-16 PROCEDURE — 99204 OFFICE O/P NEW MOD 45 MIN: CPT | Mod: 95 | Performed by: PHYSICAL MEDICINE & REHABILITATION

## 2021-04-16 NOTE — PROGRESS NOTES
Horace is a 60 year old who is being evaluated via a billable video visit.      How would you like to obtain your AVS? gwen  If the video visit is dropped, the invitation should be resent by: [phone]  Will anyone else be joining your video visit? no      Video Start Time: 7:28 AM  Video-Visit Details    Type of service:  Video Visit    Video End Time:8:04 AM    Originating Location (pt. Location): Home    Distant Location (provider location):  [facility] PHYSICAL MEDICINE AND REHABILITATION CLIN[facility] [place]     Platform used for Video Visit: GWEN      [facility]  Physical Medicine & Rehabilitation Consultation    Katie Casper   YOB: 1960  MRN: [id number]   Date of Service: 04/22/21    Requesting Provider: JIM Rosales CNP       History of Present Illness:  Katie Casper is a 60 year old female with a history of chronic migraine headaches who is referred to clinic for consideration of botulinum toxin injections for management of temporomandibular joint dysfunction and bruxism, as they are felt to be contributors to her headaches.    The patient has a history of headaches since she was approximately 19 years old.  She reports 15-20 headache days per month, with associated facial/jaw pain and tension that is present at all times.  Her headaches are primarily right-sided, over the right temporalis, right masseter, and top of head, but she also has pain in her neck that may radiate to either side.  She has associated photophobia and occasional nausea with her headaches.    From Wandy Torre's note on 11/4/2020, headache treatment tried:  Sumatriptan, rizatriptan, propranolol, amitriptyline, OTC analgesics, physical therapy.  Patient was started on Emgality on December 7 or eighth 2019 and headaches were increasing in frequency and it was decided to stop Emgality and change it to Aimovig at follow-up visit in May  "2020  Patient was started on Effexor and reported relief in 2 weeks after initiation of treatment with the Effexor.  Follow-up on 7/27/2020-increased Effexor to 75 mg extended release daily and hold off from starting Aimovig.  Today patient reports that migraine headaches coming back in the last month -possibly due to stressful circumstances -not working regular job and has been taking care of her mother 24/7, Increased stress  Headaches frequency   in October -11 headache days per month and shorter than was on Emgality and triptans are working faster and not as severe. Patient reports that in the past 4 days -one headache lasting up to two hours.   Patient reports that Effexor has been helpful.   Headache Treatments Tried:    Zomig oral and helps in 2 hours but it has been less effective and works some reports that zomig nasal did not work as well as the pills.  Sumatriptan nasal  -was not effective but did not try injectable  Rizatriptan -worked partially  Propranolol 120 mg and it does not appear to be as effective anymore and went down to 80 ER mg now   Topiramate for a long time and did not take any difference and was on 5-6 years  Amitriptyline caused side effects-dizziness, not able to function and did not work, \"major side effect\"  Gabapentin and did not help  Verapamil but she was not on verapamil for two long  Ibuprofen or tylenol never works alone and does not take both frequently for headache  Physical therapy in the past for hedaches about 15 years ago and did biofeedback and newest complimentary treatments   Galcanezumab (Emgality) started December 7 or 8th 2019 and has been for about 6 months and not helping with headaches. Emgality stopped    The patient is referred to clinic for consideration of botulinum toxin injections for management of her temporomandibular joint dysfunction/bruxism, which is felt to be a contributor to her chronic migraine headaches.  Specifically with regards to her jaw " symptoms, she states that she experiences constant facial/jaw tension, which turns into migraine headaches.  She also has tightness in her posterior neck, most prominently at the mastoid process as well as anteriorly in bilateral sternocleidomastoids.  She has seen 3 different dentists in recent months who have told her that her chronic bruxism and temporomandibular joint dysfunction is contributing to her migraine headaches.  She has been advised to wear mouth guards at night. She was recently been prescribed tizanidine for muscle spasms.     Past Medical History:   Diagnosis Date     Anemia 2006     Atrophic vaginitis      Chronic kidney disease October 2018    labs     Drusen (degenerative) of macula, bilateral      Hx of previous reproductive problem 1992     Insomnia      Migraines      Myopia      Pituitary macroadenoma (H)      Restless legs         Family History   Problem Relation Age of Onset     Anesthesia Reaction Maternal Half-Brother      Other Cancer Sister         Ovarian cancer - negative BRACCA     Macular Degeneration Sister      Substance Abuse Brother         no longer living     Macular Degeneration Brother      Breast Cancer Maternal Grandmother         in 70s     Macular Degeneration Mother      Glaucoma Maternal Grandfather         not completely sure?       Social History     Socioeconomic History     Marital status:      Spouse name: Not on file     Number of children: Not on file     Years of education: Not on file     Highest education level: Not on file   Occupational History     Not on file   Social Needs     Financial resource strain: Not on file     Food insecurity     Worry: Not on file     Inability: Not on file     Transportation needs     Medical: Not on file     Non-medical: Not on file   Tobacco Use     Smoking status: Never Smoker     Smokeless tobacco: Never Used   Substance and Sexual Activity     Alcohol use: No     Drug use: No     Sexual activity: Not Currently      Partners: Male     Birth control/protection: Post-menopausal   Lifestyle     Physical activity     Days per week: Not on file     Minutes per session: Not on file     Stress: Not on file   Relationships     Social connections     Talks on phone: Not on file     Gets together: Not on file     Attends Religion service: Not on file     Active member of club or organization: Not on file     Attends meetings of clubs or organizations: Not on file     Relationship status: Not on file     Intimate partner violence     Fear of current or ex partner: Not on file     Emotionally abused: Not on file     Physically abused: Not on file     Forced sexual activity: Not on file   Other Topics Concern     Not on file   Social History Narrative     Not on file       Current Outpatient Medications   Medication Sig Dispense Refill     ALPRAZolam (XANAX) 0.5 MG tablet Take one pill 1/2 hour before MRI imaging 1 tablet 0     eletriptan (RELPAX) 20 MG tablet Take 1-2 tablets (20-40 mg) by mouth at onset of headache for migraine (may repeat in 2 hours as needed. Max 80 mg in 24 hours) 18 tablet 6     ESTRING 2 MG vaginal ring INSERT 1 RING VAGINALLY EVERY 3 MONTHS 1 each 3     Rimegepant Sulfate 75 MG TBDP Take 75 mg by mouth See Admin Instructions Take 75 mg orally per 24 hours placed on or under the tongue; MAX 75 mg/24 hours; 8 tablet 6     tiZANidine (ZANAFLEX) 2 MG tablet Take 1-3 tablets (2-6 mg) by mouth At Bedtime 90 tablet 3     venlafaxine (EFFEXOR-XR) 37.5 MG 24 hr capsule Take 1 capsule (37.5 mg) by mouth daily 30 capsule 6     venlafaxine (EFFEXOR-XR) 75 MG 24 hr capsule Take 1 capsule (75 mg) by mouth daily 30 capsule 6     Vitamin D, Cholecalciferol, 1000 units TABS Take 1 tablet by mouth At Bedtime        ZOLMitriptan (ZOMIG) 2.5 MG tablet Take 1 tablet (2.5 mg) by mouth at onset of headache for migraine (may repeat in 2 hours as needed. Max 4 tabs in 24 hours) 12 tablet 5     venlafaxine (EFFEXOR-ER) 150 MG 24 hr tablet  TAKE 1 TABLET(150 MG) BY MOUTH DAILY (Patient not taking: Reported on 4/16/2021) 30 tablet 6         Allergies   Allergen Reactions     Sulfa Drugs Rash       Physical Examination:  VS: There were no vitals taken for this visit.     Pleasant and cooperative, in no acute distress  No lesions or abrasions on exposed skin  No facial asymmetry, no evidence of any significant wear and tear on her teeth, however this was significantly limited by virtual visit.      Assessment & Recommendations:  Katie Casper is a 60 year old female who presents virtually to rehabilitation clinic for consideration of botulinum toxin injections for management of chronic bruxism, which is felt to be a contributor to her chronic migraine headaches.      With regards to the temporomandibular joint dysfunction and chronic bruxism, I believe she would be a good candidate for intramuscular injections of Botox.  Additionally, she meets criteria for Botox for management of her chronic migraine headaches.  I would like to request up to 300 units of Botox, 100 units for oromandibular dystonia and another 200 units of Botox for her chronic migraines. Will call to schedule once approval is in place.     Thank you for this consultation. Please do not hesitate to contact me with further questions.   Maria Alejandra Lagunas MD  Physical Medicine and Rehabilitation  [phone]      I spent a total of 45 minutes face-to-face via virtual visit and managing the care of Katie Casper. Over 50% of my time was spent counseling the patient and coordinating care. Please see note for details.

## 2021-04-16 NOTE — LETTER
4/16/2021       RE: Katie Casper  [address]  [facility] [address]     Dear Colleague,    Thank you for referring your patient, Katie Casper, to the [facility] PHYSICAL MEDICINE AND REHABILITATION CLINIC [place] at [facility]. Please see a copy of my visit note below.    Horace is a 60 year old who is being evaluated via a billable video visit.      How would you like to obtain your AVS? [facility]  If the video visit is dropped, the invitation should be resent by: [phone]  Will anyone else be joining your video visit? no    Video-Visit Details    Type of service:  Video Visit    Video Start Time: 7:28 AM  Video End Time:8:04 AM    Originating Location (pt. Location): Home    Distant Location (provider location):  [facility] PHYSICAL MEDICINE AND REHABILITATION [facility]     Platform used for Video Visit: clickTRUE      [facility]  Clinics & Surgery [place]  Physical Medicine & Rehabilitation Consultation    Katie Casper   YOB: 1960  MRN: [id number]   Date of Service: 04/22/21    Requesting Provider: JIM Rosales CNP       History of Present Illness:  Katie Casper is a 60 year old female with a history of chronic migraine headaches who is referred to clinic for consideration of botulinum toxin injections for management of temporomandibular joint dysfunction and bruxism, as they are felt to be contributors to her headaches.    The patient has a history of headaches since she was approximately 19 years old.  She reports 15-20 headache days per month, with associated facial/jaw pain and tension that is present at all times.  Her headaches are primarily right-sided, over the right temporalis, right masseter, and top of head, but she also has pain in her neck that may radiate to either side.  She has associated photophobia and occasional nausea with her  "headaches.    From Wandy Torre's note on 11/4/2020, headache treatment tried:  Sumatriptan, rizatriptan, propranolol, amitriptyline, OTC analgesics, physical therapy.  Patient was started on Emgality on December 7 or eighth 2019 and headaches were increasing in frequency and it was decided to stop Emgality and change it to Aimovig at follow-up visit in May 2020  Patient was started on Effexor and reported relief in 2 weeks after initiation of treatment with the Effexor.  Follow-up on 7/27/2020-increased Effexor to 75 mg extended release daily and hold off from starting Aimovig.  Today patient reports that migraine headaches coming back in the last month -possibly due to stressful circumstances -not working regular job and has been taking care of her mother 24/7, Increased stress  Headaches frequency   in October -11 headache days per month and shorter than was on Emgality and triptans are working faster and not as severe. Patient reports that in the past 4 days -one headache lasting up to two hours.   Patient reports that Effexor has been helpful.   Headache Treatments Tried:    Zomig oral and helps in 2 hours but it has been less effective and works some reports that zomig nasal did not work as well as the pills.  Sumatriptan nasal  -was not effective but did not try injectable  Rizatriptan -worked partially  Propranolol 120 mg and it does not appear to be as effective anymore and went down to 80 ER mg now   Topiramate for a long time and did not take any difference and was on 5-6 years  Amitriptyline caused side effects-dizziness, not able to function and did not work, \"major side effect\"  Gabapentin and did not help  Verapamil but she was not on verapamil for two long  Ibuprofen or tylenol never works alone and does not take both frequently for headache  Physical therapy in the past for hedaches about 15 years ago and did biofeedback and newest complimentary treatments   Galcanezumab " (Emgality) started December 7 or 8th 2019 and has been for about 6 months and not helping with headaches. Emgality stopped    The patient is referred to clinic for consideration of botulinum toxin injections for management of her temporomandibular joint dysfunction/bruxism, which is felt to be a contributor to her chronic migraine headaches.  Specifically with regards to her jaw symptoms, she states that she experiences constant facial/jaw tension, which turns into migraine headaches.  She also has tightness in her posterior neck, most prominently at the mastoid process as well as anteriorly in bilateral sternocleidomastoids.  She has seen 3 different dentists in recent months who have told her that her chronic bruxism and temporomandibular joint dysfunction is contributing to her migraine headaches.  She has been advised to wear mouth guards at night. She was recently been prescribed tizanidine for muscle spasms.     Past Medical History:   Diagnosis Date     Anemia 2006     Atrophic vaginitis      Chronic kidney disease October 2018    labs     Drusen (degenerative) of macula, bilateral      Hx of previous reproductive problem 1992     Insomnia      Migraines      Myopia      Pituitary macroadenoma (H)      Restless legs         Family History   Problem Relation Age of Onset     Anesthesia Reaction Maternal Half-Brother      Other Cancer Sister         Ovarian cancer - negative BRACCA     Macular Degeneration Sister      Substance Abuse Brother         no longer living     Macular Degeneration Brother      Breast Cancer Maternal Grandmother         in 70s     Macular Degeneration Mother      Glaucoma Maternal Grandfather         not completely sure?       Social History     Socioeconomic History     Marital status:      Spouse name: Not on file     Number of children: Not on file     Years of education: Not on file     Highest education level: Not on file   Occupational History     Not on file   Social  Needs     Financial resource strain: Not on file     Food insecurity     Worry: Not on file     Inability: Not on file     Transportation needs     Medical: Not on file     Non-medical: Not on file   Tobacco Use     Smoking status: Never Smoker     Smokeless tobacco: Never Used   Substance and Sexual Activity     Alcohol use: No     Drug use: No     Sexual activity: Not Currently     Partners: Male     Birth control/protection: Post-menopausal   Lifestyle     Physical activity     Days per week: Not on file     Minutes per session: Not on file     Stress: Not on file   Relationships     Social connections     Talks on phone: Not on file     Gets together: Not on file     Attends [employer] service: Not on file     Active member of club or organization: Not on file     Attends meetings of clubs or organizations: Not on file     Relationship status: Not on file     Intimate partner violence     Fear of current or ex partner: Not on file     Emotionally abused: Not on file     Physically abused: Not on file     Forced sexual activity: Not on file   Other Topics Concern     Not on file   Social History Narrative     Not on file       Current Outpatient Medications   Medication Sig Dispense Refill     ALPRAZolam (XANAX) 0.5 MG tablet Take one pill 1/2 hour before MRI imaging 1 tablet 0     eletriptan (RELPAX) 20 MG tablet Take 1-2 tablets (20-40 mg) by mouth at onset of headache for migraine (may repeat in 2 hours as needed. Max 80 mg in 24 hours) 18 tablet 6     ESTRING 2 MG vaginal ring INSERT 1 RING VAGINALLY EVERY 3 MONTHS 1 each 3     Rimegepant Sulfate 75 MG TBDP Take 75 mg by mouth See Admin Instructions Take 75 mg orally per 24 hours placed on or under the tongue; MAX 75 mg/24 hours; 8 tablet 6     tiZANidine (ZANAFLEX) 2 MG tablet Take 1-3 tablets (2-6 mg) by mouth At Bedtime 90 tablet 3     venlafaxine (EFFEXOR-XR) 37.5 MG 24 hr capsule Take 1 capsule (37.5 mg) by mouth daily 30 capsule 6     venlafaxine  (EFFEXOR-XR) 75 MG 24 hr capsule Take 1 capsule (75 mg) by mouth daily 30 capsule 6     Vitamin D, Cholecalciferol, 1000 units TABS Take 1 tablet by mouth At Bedtime        ZOLMitriptan (ZOMIG) 2.5 MG tablet Take 1 tablet (2.5 mg) by mouth at onset of headache for migraine (may repeat in 2 hours as needed. Max 4 tabs in 24 hours) 12 tablet 5     venlafaxine (EFFEXOR-ER) 150 MG 24 hr tablet TAKE 1 TABLET(150 MG) BY MOUTH DAILY (Patient not taking: Reported on 4/16/2021) 30 tablet 6         Allergies   Allergen Reactions     Sulfa Drugs Rash       Physical Examination:  VS: There were no vitals taken for this visit.     Pleasant and cooperative, in no acute distress  No lesions or abrasions on exposed skin  No facial asymmetry, no evidence of any significant wear and tear on her teeth, however this was significantly limited by virtual visit.      Assessment & Recommendations:  Katie Casper is a 60 year old female who presents virtually to rehabilitation clinic for consideration of botulinum toxin injections for management of chronic bruxism, which is felt to be a contributor to her chronic migraine headaches.      With regards to the temporomandibular joint dysfunction and chronic bruxism, I believe she would be a good candidate for intramuscular injections of Botox.  Additionally, she meets criteria for Botox for management of her chronic migraine headaches.  I would like to request up to 300 units of Botox, 100 units for oromandibular dystonia and another 200 units of Botox for her chronic migraines. Will call to schedule once approval is in place.     Thank you for this consultation. Please do not hesitate to contact me with further questions.   Maria Alejandra Lagunas MD  Physical Medicine and Rehabilitation  [phone]    I spent a total of 45 minutes face-to-face via virtual visit and managing the care of Katie Casper. Over 50% of my time was spent counseling the patient and coordinating care. Please see  note for details.

## 2021-04-23 DIAGNOSIS — G24.4 IDIOPATHIC OROFACIAL DYSTONIA: ICD-10-CM

## 2021-04-23 DIAGNOSIS — G43.719 CHRONIC MIGRAINE WITHOUT AURA, INTRACTABLE, WITHOUT STATUS MIGRAINOSUS: Primary | ICD-10-CM

## 2021-04-27 ENCOUNTER — MYC MEDICAL ADVICE (OUTPATIENT)
Dept: INTERNAL MEDICINE | Facility: CLINIC | Age: 61
End: 2021-04-27

## 2021-05-07 ENCOUNTER — OFFICE VISIT (OUTPATIENT)
Dept: PHYSICAL MEDICINE AND REHAB | Facility: CLINIC | Age: 61
End: 2021-05-07
Payer: COMMERCIAL

## 2021-05-07 VITALS
RESPIRATION RATE: 16 BRPM | SYSTOLIC BLOOD PRESSURE: 113 MMHG | HEART RATE: 84 BPM | TEMPERATURE: 97.5 F | OXYGEN SATURATION: 99 % | DIASTOLIC BLOOD PRESSURE: 74 MMHG

## 2021-05-07 DIAGNOSIS — G43.719 CHRONIC MIGRAINE WITHOUT AURA, INTRACTABLE, WITHOUT STATUS MIGRAINOSUS: Primary | ICD-10-CM

## 2021-05-07 DIAGNOSIS — G24.4 IDIOPATHIC OROFACIAL DYSTONIA: ICD-10-CM

## 2021-05-07 PROCEDURE — 64612 DESTROY NERVE FACE MUSCLE: CPT | Mod: 50 | Performed by: PHYSICAL MEDICINE & REHABILITATION

## 2021-05-07 PROCEDURE — 95874 GUIDE NERV DESTR NEEDLE EMG: CPT | Performed by: PHYSICAL MEDICINE & REHABILITATION

## 2021-05-07 ASSESSMENT — PAIN SCALES - GENERAL: PAINLEVEL: NO PAIN (0)

## 2021-05-07 NOTE — PROGRESS NOTES
"BOTULINUM TOXIN PROCEDURE - HEADACHE - NOTE    Chief Complaint   Patient presents with     Botox     [facility] RETURN BOTOX       /74   Pulse 84   Resp 16   SpO2 99%        Current Outpatient Medications:      ALPRAZolam (XANAX) 0.5 MG tablet, Take one pill 1/2 hour before MRI imaging, Disp: 1 tablet, Rfl: 0     eletriptan (RELPAX) 20 MG tablet, Take 1-2 tablets (20-40 mg) by mouth at onset of headache for migraine (may repeat in 2 hours as needed. Max 80 mg in 24 hours), Disp: 18 tablet, Rfl: 6     ESTRING 2 MG vaginal ring, INSERT 1 RING VAGINALLY EVERY 3 MONTHS, Disp: 1 each, Rfl: 3     Rimegepant Sulfate 75 MG TBDP, Take 75 mg by mouth See Admin Instructions Take 75 mg orally per 24 hours placed on or under the tongue; MAX 75 mg/24 hours;, Disp: 8 tablet, Rfl: 6     tiZANidine (ZANAFLEX) 2 MG tablet, Take 1-3 tablets (2-6 mg) by mouth At Bedtime, Disp: 90 tablet, Rfl: 3     venlafaxine (EFFEXOR-ER) 150 MG 24 hr tablet, TAKE 1 TABLET(150 MG) BY MOUTH DAILY (Patient not taking: Reported on 4/16/2021), Disp: 30 tablet, Rfl: 6     venlafaxine (EFFEXOR-XR) 37.5 MG 24 hr capsule, Take 1 capsule (37.5 mg) by mouth daily, Disp: 30 capsule, Rfl: 6     venlafaxine (EFFEXOR-XR) 75 MG 24 hr capsule, Take 1 capsule (75 mg) by mouth daily, Disp: 30 capsule, Rfl: 6     Vitamin D, Cholecalciferol, 1000 units TABS, Take 1 tablet by mouth At Bedtime , Disp: , Rfl:      ZOLMitriptan (ZOMIG) 2.5 MG tablet, Take 1 tablet (2.5 mg) by mouth at onset of headache for migraine (may repeat in 2 hours as needed. Max 4 tabs in 24 hours), Disp: 12 tablet, Rfl: 5    Current Facility-Administered Medications:      botulinum toxin type A (BOTOX) 100 units injection 300 Units, 300 Units, Intramuscular, Q90 Days, Standal, Maria Alejandra Summers MD     Allergies   Allergen Reactions     Sulfa Drugs Rash        PHYSICAL EXAM:    No headache today. She states she has facial \"tiredness\", which gets worse as the day goes on as she is talking and eating, " it worsens. Her migraines are usually on the right side, along with the TMJ.    HPI:    Patient denies new medical diagnoses, illnesses, hospitalizations, emergency room visits, and injuries since the previous injection with botulinum neurotoxin.    We reviewed the recommended safety guidelines for  Botox from any vaccine injection, such as the seasonal flu vaccine, by a minimum of 10-14 days with Katie Casper. She acknowledged understanding.    RESPONSE TO PREVIOUS TREATMENT:  N/A - Initial treatment.     BOTULINUM NEUROTOXIN INJECTION PROCEDURES:      VERIFICATION OF PATIENT IDENTIFICATION AND PROCEDURE     Initials   Patient Name PAG   Patient  PAG   Procedure Verified by: PAG     Prior to the start of the procedure and with procedural staff participation, I verbally confirmed the patient s identity using two indicators, relevant allergies, that the procedure was appropriate and matched the consent or emergent situation, and that the correct equipment/implants were available. Immediately prior to starting the procedure I conducted the Time Out with the procedural staff and re-confirmed the patient s name, procedure, and site/side. (The Joint Commission universal protocol was followed.)  Yes    Sedation (Moderate or Deep): None    Above assessments performed by:  Teresa Jimenez RN Care Coordinator    Maria Alejandra Lagunas MD      INDICATIONS FOR PROCEDURES:  Katie Casper is a 60 year old patient with chronic migraine headaches associated with oromandibular components.     Her baseline symptoms have been recalcitrant to oral medications and conservative therapy.  She is here today for reinjection with Botox.    GOAL OF PROCEDURE:  The goal of this procedure is to increase active range of motion, improve volitional motor control and decrease pain .    TOTAL DOSE ADMINISTERED:  Dose Administered:  *** units  Botox (Botulinum Toxin Type A)       2:1 Dilution     Diluent Used:  Preservative Free  Normal Saline  Total Volume of Diluent Used:  *** ml  Lot # [id number] with Expiration Date:  10/23  NDC #: Botox 100u ([address]-59)    Medication guide was offered to patient and was accepted.    CONSENT:  The risks, benefits, and treatment options were discussed with Katie Casepr and she agreed to proceed.    Written consent was obtained by [facility].     EQUIPMENT USED:  Needle-37mm stimulating/recording    SKIN PREPARATION:  Skin preparation was performed using an alcohol wipe.    GUIDANCE DESCRIPTION:  Electro-myographic guidance was necessary throughout the posterior neck and facial area to accurately identify all areas of dystonic muscles while avoiding injection of non-dystonic muscles, neighboring nerves and nearby vascular structures.     AREA/MUSCLE INJECTED:  ***    RESPONSE TO PROCEDURE:  Katie Casper tolerated the procedure well and there were no immediate complications.   She was allowed to recover for an appropriate period of time and was discharged home in stable condition.    FOLLOW UP:  Katie Casper was asked to follow up by phone in 7-14 days with Teresa Jimenez RN, Care Coordinator, to report her response to this series of injections.  Based on the patient's previous response to this therapy, Katie Casper was rescheduled for the next series of injections in 12 weeks.    PLAN (Medication Changes, Therapy Orders, Work or Disability Issues, etc.): Patient will continue to monitor response to today's injections.

## 2021-05-07 NOTE — LETTER
May 7, 2021    To whom it may concern:    Katie MARCH Tremayne was seen in our clinic today. Due to complications with a procedure, she will be late to work.  Thank you for your flexibility.    Sincerely,        Maria Alejandra Lagunas MD

## 2021-05-07 NOTE — PROGRESS NOTES
BOTULINUM TOXIN PROCEDURE NOTE    Chief Complaint   Patient presents with     Botox     [facility] RETURN BOTOX     /74   Pulse 84   Temp 97.5  F (36.4  C)   Resp 16   SpO2 99%     Current Outpatient Medications:      ALPRAZolam (XANAX) 0.5 MG tablet, Take one pill 1/2 hour before MRI imaging, Disp: 1 tablet, Rfl: 0     eletriptan (RELPAX) 20 MG tablet, Take 1-2 tablets (20-40 mg) by mouth at onset of headache for migraine (may repeat in 2 hours as needed. Max 80 mg in 24 hours), Disp: 18 tablet, Rfl: 6     ESTRING 2 MG vaginal ring, INSERT 1 RING VAGINALLY EVERY 3 MONTHS, Disp: 1 each, Rfl: 3     Rimegepant Sulfate 75 MG TBDP, Take 75 mg by mouth See Admin Instructions Take 75 mg orally per 24 hours placed on or under the tongue; MAX 75 mg/24 hours;, Disp: 8 tablet, Rfl: 6     tiZANidine (ZANAFLEX) 2 MG tablet, Take 1-3 tablets (2-6 mg) by mouth At Bedtime, Disp: 90 tablet, Rfl: 3     venlafaxine (EFFEXOR-ER) 150 MG 24 hr tablet, TAKE 1 TABLET(150 MG) BY MOUTH DAILY (Patient not taking: Reported on 4/16/2021), Disp: 30 tablet, Rfl: 6     venlafaxine (EFFEXOR-XR) 37.5 MG 24 hr capsule, Take 1 capsule (37.5 mg) by mouth daily, Disp: 30 capsule, Rfl: 6     venlafaxine (EFFEXOR-XR) 75 MG 24 hr capsule, Take 1 capsule (75 mg) by mouth daily, Disp: 30 capsule, Rfl: 6     Vitamin D, Cholecalciferol, 1000 units TABS, Take 1 tablet by mouth At Bedtime , Disp: , Rfl:      ZOLMitriptan (ZOMIG) 2.5 MG tablet, Take 1 tablet (2.5 mg) by mouth at onset of headache for migraine (may repeat in 2 hours as needed. Max 4 tabs in 24 hours), Disp: 12 tablet, Rfl: 5    Current Facility-Administered Medications:      botulinum toxin type A (BOTOX) 100 units injection 300 Units, 300 Units, Intramuscular, Q90 Days, Standal, Maria Alejandra Summers MD     Allergies   Allergen Reactions     Sulfa Drugs Rash        PHYSICAL EXAM:    Pleasant and cooperative, in no acute distress  No lesions or abrasions on exposed skin  No facial asymmetry, no  evidence of any significant wear and tear on her teeth  Anterior temporalis hypertrophy bilaterally    HPI:    Patient denies new medical diagnoses, illnesses, hospitalizations, emergency room visits, and injuries since her initial evaluation on 2021.     We reviewed the recommended safety guidelines for  Botox from any vaccine injection, such as the seasonal flu vaccine, by a minimum of 10-14 days with Katie Casper. She acknowledged understanding.    RESPONSE TO PREVIOUS TREATMENT: N/A - Initial injections      BOTULINUM NEUROTOXIN INJECTION PROCEDURES:    VERIFICATION OF PATIENT IDENTIFICATION AND PROCEDURE     Initials   Patient Name PAG   Patient  PAG   Procedure Verified by: PAG     Prior to the start of the procedure and with procedural staff participation, I verbally confirmed the patient s identity using two indicators, relevant allergies, that the procedure was appropriate and matched the consent or emergent situation, and that the correct equipment/implants were available. Immediately prior to starting the procedure I conducted the Time Out with the procedural staff and re-confirmed the patient s name, procedure, and site/side. 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 She also has chronic migraine headaches.     Her baseline symptoms have been recalcitrant to oral medications and conservative therapy.  She is here today for an injection of Botox.      GOAL OF PROCEDURE:  The goal of this procedure is to increase active range of motion, improve volitional motor control and decrease pain  associated with dystonic movements.    TOTAL DOSE: 200  UNITS BOTOX  Dose Administered:  150 units Botox    Diluent Used:  Preservative Free Normal Saline  Total Volume of Diluent Used:  3 ml  Lot # [id number] with Expiration Date:  10/2023  NDC #: Botox 100u ([address]-93)     Was there drug waste? Yes  Amount of drug waste (mL): 50 units Botox.  Reason for waste:  Single use vial  Multi-dose vial: No    Maria Alejandra Lagunas MD  May 7, 2021     Medication guide was offered to patient and was accepted.    CONSENT:  The risks, benefits, and treatment options were discussed with Kaite Casper and she agreed to proceed.      Written consent was obtained by [facility].     EQUIPMENT USED:  Needle-25mm stimulating/recording  Needle-30 gauge  EMG/NCS Machine    SKIN PREPARATION:  Skin preparation was performed using an alcohol wipe.    GUIDANCE DESCRIPTION:  Electro-myographic guidance was necessary throughout the procedure to accurately identify all areas of spastic muscles while avoiding injection of non-spastic muscles, neighboring nerves and nearby vascular structures.     AREA/MUSCLE INJECTED:  150 UNITS BOTOX = TOTAL DOSE    1 & 2. SHOULDER GIRDLE & NECK MUSCLES: 50 UNITS BOTOX = TOTAL DOSE, 2:1 DILUTION  Right Upper Trapezius (upper, mid & low cervical) - 7.5 units of Botox at 3 site/s.   Left Upper Trapezius (upper, mid & low cervical) -  7.5 units of Botox at 3 site/s.     Right Splenius - 5 units of Botox at 1 site/s.   Left Splenius - 10 units of Botox at 1 site/s.     Right Levator Scapulae - 10 units of Botox at 2 site/s (neck & scapular insertion).   Left Levator Scapulae - 5 units of Botox at 2 site/s (neck & scapular insertion).    Right Sternocleidomastoid - 2.5 units of Botox at 1 site/s.   Left Sternocleidomastoid - 2.5 units of Botox at 1 site/s.    3. HEAD & SCALP MUSCLES: 55 UNITS BOTOX = TOTAL DOSE, 2:1 DILUTION  Right Frontalis - 10 units of Botox at 2 site/s.  Left Frontalis - 10 units of Botox at 2 site/s.    Right Temporalis - 10 units of Botox at 3  site/s.  Left Temporalis - 10 units of Botox at 3 site/s.    Right  - 5 units of Botox at 1 site/s.   Left  - 5 units of Botox at 1 site/s.     Procerus - 5 units of Botox at 1 site/s.     4. JAW MUSCLES: 45 UNITS BOTOX = TOTAL DOSE, 2:1 DILUTION    Right Masseter - 10 units of Botox at 1 site/s.   Left Masseter - 15 units of Botox at 1 site/s.     Right Anterior Temporalis - 10 units of Botox at 2 site/s.   Left Anterior Temporalis - 10 units of Botox at 2 site/s.      RESPONSE TO PROCEDURE:  Katie Casper tolerated the procedure well and there were no immediate complications.  She did become vasovagal during the procedure, but was allowed to recover for an appropriate period of time and we were able to finish the procedure. She was discharged home in stable condition.    FOLLOW UP:  Katie Casper was asked to follow up by phone in 7-14 days with PM&R care coordinators, to report her response to this series of injections.  Based on the patient's previous response to this therapy, Katie Casper was rescheduled for the next series of injections in 12 weeks.    PLAN (Medication Changes, Therapy Orders, Work or Disability Issues, etc.): Katie had her first series of Botox injections for chronic migraine headache and oromandibular dystonia. She did become vasovagal during today's procedure, so we discussed putting her feet up, removing her mask, and having her hydrate prior to the procedure next time. She recovered nicely and no further intervention was required. She will monitor her response to today's injections and call clinic with any questions or concerns.

## 2021-05-07 NOTE — LETTER
5/7/2021       RE: Katie Casper  [address]  [facility] [address]     Dear Colleague,    Thank you for referring your patient, Katie Casper, to the [facility] PHYSICAL MEDICINE AND [facility]HABILITATION CLINIC [place] [facility]. Please see a copy of my visit note below.    BOTULINUM TOXIN PROCEDURE NOTE    Chief Complaint   Patient presents with     Botox     UMP RETURN BOTOX     /74   Pulse 84   Temp 97.5  F (36.4  C)   Resp 16   SpO2 99%     Current Outpatient Medications:      ALPRAZolam (XANAX) 0.5 MG tablet, Take one pill 1/2 hour before MRI imaging, Disp: 1 tablet, Rfl: 0     eletriptan (RELPAX) 20 MG tablet, Take 1-2 tablets (20-40 mg) by mouth at onset of headache for migraine (may repeat in 2 hours as needed. Max 80 mg in 24 hours), Disp: 18 tablet, Rfl: 6     ESTRING 2 MG vaginal ring, INSERT 1 RING VAGINALLY EVERY 3 MONTHS, Disp: 1 each, Rfl: 3     Rimegepant Sulfate 75 MG TBDP, Take 75 mg by mouth See Admin Instructions Take 75 mg orally per 24 hours placed on or under the tongue; MAX 75 mg/24 hours;, Disp: 8 tablet, Rfl: 6     tiZANidine (ZANAFLEX) 2 MG tablet, Take 1-3 tablets (2-6 mg) by mouth At Bedtime, Disp: 90 tablet, Rfl: 3     venlafaxine (EFFEXOR-ER) 150 MG 24 hr tablet, TAKE 1 TABLET(150 MG) BY MOUTH DAILY (Patient not taking: Reported on 4/16/2021), Disp: 30 tablet, Rfl: 6     venlafaxine (EFFEXOR-XR) 37.5 MG 24 hr capsule, Take 1 capsule (37.5 mg) by mouth daily, Disp: 30 capsule, Rfl: 6     venlafaxine (EFFEXOR-XR) 75 MG 24 hr capsule, Take 1 capsule (75 mg) by mouth daily, Disp: 30 capsule, Rfl: 6     Vitamin D, Cholecalciferol, 1000 units TABS, Take 1 tablet by mouth At Bedtime , Disp: , Rfl:      ZOLMitriptan (ZOMIG) 2.5 MG tablet, Take 1 tablet (2.5 mg) by mouth at onset of headache for migraine (may repeat in 2 hours as needed. Max 4 tabs in 24 hours), Disp: 12 tablet, Rfl: 5    Current  Facility-Administered Medications:      botulinum toxin type A (BOTOX) 100 units injection 300 Units, 300 Units, Intramuscular, Q90 Days, Maria Alejandra Lagunas MD     Allergies   Allergen Reactions     Sulfa Drugs Rash        PHYSICAL EXAM:    Pleasant and cooperative, in no acute distress  No lesions or abrasions on exposed skin  No facial asymmetry, no evidence of any significant wear and tear on her teeth  Anterior temporalis hypertrophy bilaterally    HPI:    Patient denies new medical diagnoses, illnesses, hospitalizations, emergency room visits, and injuries since her initial evaluation on 2021.     We reviewed the recommended safety guidelines for  Botox from any vaccine injection, such as the seasonal flu vaccine, by a minimum of 10-14 days with Katie Casper. She acknowledged understanding.    RESPONSE TO PREVIOUS TREATMENT: N/A - Initial injections      BOTULINUM NEUROTOXIN INJECTION PROCEDURES:    VERIFICATION OF PATIENT IDENTIFICATION AND PROCEDURE     Initials   Patient Name PAG   Patient  PAG   Procedure Verified by: PAG     Prior to the start of the procedure and with procedural staff participation, I verbally confirmed the patient s identity using two indicators, relevant allergies, that the procedure was appropriate and matched the consent or emergent situation, and that the correct equipment/implants were available. Immediately prior to starting the procedure I conducted the Time Out with the procedural staff and re-confirmed the patient s name, procedure, and site/side. 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 She also has chronic  migraine headaches.     Her baseline symptoms have been recalcitrant to oral medications and conservative therapy.  She is here today for an injection of Botox.      GOAL OF PROCEDURE:  The goal of this procedure is to increase active range of motion, improve volitional motor control and decrease pain  associated with dystonic movements.    TOTAL DOSE: 200 UNITS BOTOX  Dose Administered:  150 units Botox    Diluent Used:  Preservative Free Normal Saline  Total Volume of Diluent Used:  3 ml  Lot # [id number] with Expiration Date:  10/2023  NDC #: Botox 100u ([address]-17)     Was there drug waste? Yes  Amount of drug waste (mL): 50 units Botox.  Reason for waste:  Single use vial  Multi-dose vial: No    Maria Alejandra Lagunas MD  May 7, 2021     Medication guide was offered to patient and was accepted.    CONSENT:  The risks, benefits, and treatment options were discussed with Katie MARCH [place] and she agreed to proceed.      Written consent was obtained by pag.     EQUIPMENT USED:  Needle-25mm stimulating/recording  Needle-30 gauge  EMG/NCS Machine    SKIN PREPARATION:  Skin preparation was performed using an alcohol wipe.    GUIDANCE DESCRIPTION:  Electro-myographic guidance was necessary throughout the procedure to accurately identify all areas of spastic muscles while avoiding injection of non-spastic muscles, neighboring nerves and nearby vascular structures.     AREA/MUSCLE INJECTED:  150 UNITS BOTOX = TOTAL DOSE    1 & 2. SHOULDER GIRDLE & NECK MUSCLES: 50 UNITS BOTOX = TOTAL DOSE, 2:1 DILUTION  Right Upper Trapezius (upper, mid & low cervical) - 7.5 units of Botox at 3 site/s.   Left Upper Trapezius (upper, mid & low cervical) -  7.5 units of Botox at 3 site/s.     Right Splenius - 5 units of Botox at 1 site/s.   Left Splenius - 10 units of Botox at 1 site/s.     Right Levator Scapulae - 10 units of Botox at 2 site/s (neck & scapular insertion).   Left Levator Scapulae - 5 units of Botox at 2 site/s (neck &  scapular insertion).    Right Sternocleidomastoid - 2.5 units of Botox at 1 site/s.   Left Sternocleidomastoid - 2.5 units of Botox at 1 site/s.    3. HEAD & SCALP MUSCLES: 55 UNITS BOTOX = TOTAL DOSE, 2:1 DILUTION  Right Frontalis - 10 units of Botox at 2 site/s.  Left Frontalis - 10 units of Botox at 2 site/s.    Right Temporalis - 10 units of Botox at 3 site/s.  Left Temporalis - 10 units of Botox at 3 site/s.    Right  - 5 units of Botox at 1 site/s.   Left  - 5 units of Botox at 1 site/s.     Procerus - 5 units of Botox at 1 site/s.     4. JAW MUSCLES: 45 UNITS BOTOX = TOTAL DOSE, 2:1 DILUTION    Right Masseter - 10 units of Botox at 1 site/s.   Left Masseter - 15 units of Botox at 1 site/s.     Right Anterior Temporalis - 10 units of Botox at 2 site/s.   Left Anterior Temporalis - 10 units of Botox at 2 site/s.      RESPONSE TO PROCEDURE:  Katie Casper tolerated the procedure well and there were no immediate complications.  She did become vasovagal during the procedure, but was allowed to recover for an appropriate period of time and we were able to finish the procedure. She was discharged home in stable condition.    FOLLOW UP:  Katie Casper was asked to follow up by phone in 7-14 days with PM&R care coordinators, to report her response to this series of injections.  Based on the patient's previous response to this therapy, Katie Casper was rescheduled for the next series of injections in 12 weeks.    PLAN (Medication Changes, Therapy Orders, Work or Disability Issues, etc.): Katie had her first series of Botox injections for chronic migraine headache and oromandibular dystonia. She did become vasovagal during today's procedure, so we discussed putting her feet up, removing her mask, and having her hydrate prior to the procedure next time. She recovered nicely and no further intervention was required. She will monitor her response to today's injections and call clinic with  any questions or concerns.     Again, thank you for allowing me to participate in the care of your patient.      Sincerely,    Maria Alejandra Lagunas MD

## 2021-05-11 ENCOUNTER — MYC MEDICAL ADVICE (OUTPATIENT)
Dept: INTERNAL MEDICINE | Facility: CLINIC | Age: 61
End: 2021-05-11

## 2021-05-12 NOTE — TELEPHONE ENCOUNTER
I would have Ms. Casper discuss this formally in an appointment. Specifically it looks the this medication was prescribed in Neurology?    LEE Del Castillo

## 2021-05-25 DIAGNOSIS — G24.4 IDIOPATHIC OROFACIAL DYSTONIA: Primary | ICD-10-CM

## 2021-05-25 DIAGNOSIS — G43.719 CHRONIC MIGRAINE WITHOUT AURA, INTRACTABLE, WITHOUT STATUS MIGRAINOSUS: ICD-10-CM

## 2021-06-09 DIAGNOSIS — H35.3132 INTERMEDIATE STAGE NONEXUDATIVE AGE-RELATED MACULAR DEGENERATION OF BOTH EYES: Primary | ICD-10-CM

## 2021-06-15 ENCOUNTER — OFFICE VISIT (OUTPATIENT)
Dept: OPHTHALMOLOGY | Facility: CLINIC | Age: 61
End: 2021-06-15
Attending: OPHTHALMOLOGY
Payer: COMMERCIAL

## 2021-06-15 DIAGNOSIS — H43.812 VITREOUS DEGENERATION OF LEFT EYE: ICD-10-CM

## 2021-06-15 DIAGNOSIS — H25.13 NUCLEAR SENILE CATARACT OF BOTH EYES: ICD-10-CM

## 2021-06-15 DIAGNOSIS — H35.3132 INTERMEDIATE STAGE NONEXUDATIVE AGE-RELATED MACULAR DEGENERATION OF BOTH EYES: Primary | ICD-10-CM

## 2021-06-15 DIAGNOSIS — H35.62 RETINAL HEMORRHAGE OF LEFT EYE: ICD-10-CM

## 2021-06-15 PROCEDURE — G0463 HOSPITAL OUTPT CLINIC VISIT: HCPCS

## 2021-06-15 PROCEDURE — 99213 OFFICE O/P EST LOW 20 MIN: CPT | Performed by: OPHTHALMOLOGY

## 2021-06-15 PROCEDURE — 92134 CPTRZ OPH DX IMG PST SGM RTA: CPT | Performed by: OPHTHALMOLOGY

## 2021-06-15 ASSESSMENT — TONOMETRY
OS_IOP_MMHG: 16
OD_IOP_MMHG: 15
IOP_METHOD: TONOPEN

## 2021-06-15 ASSESSMENT — VISUAL ACUITY
OD_SC: 20/20
OS_SC: 20/20
METHOD: SNELLEN - LINEAR
OS_SC+: -2

## 2021-06-15 ASSESSMENT — REFRACTION_WEARINGRX
OD_CYLINDER: +0.25
OS_SPHERE: -0.75
OS_CYLINDER: +0.25
OD_AXIS: 155
OS_AXIS: 036
OD_SPHERE: -0.75
SPECS_TYPE: SVL

## 2021-06-15 ASSESSMENT — CONF VISUAL FIELD
METHOD: COUNTING FINGERS
OS_NORMAL: 1
OD_NORMAL: 1

## 2021-06-15 ASSESSMENT — CUP TO DISC RATIO
OD_RATIO: 0.25
OS_RATIO: 0.25

## 2021-06-15 ASSESSMENT — EXTERNAL EXAM - RIGHT EYE: OD_EXAM: NORMAL

## 2021-06-15 ASSESSMENT — SLIT LAMP EXAM - LIDS
COMMENTS: NORMAL
COMMENTS: NORMAL

## 2021-06-15 ASSESSMENT — EXTERNAL EXAM - LEFT EYE: OS_EXAM: NORMAL

## 2021-06-15 NOTE — PROGRESS NOTES
CC: retinal lesion left eye; floaters    Interval hx: no change in vision; here for repeat OCT for peripheral retinal lesion and FA    HPI:  Patient has a history of pituitary adenoma, s/p resection in 2007 and again in 2010 due to recurrence. Patient complains of flashes and floaters in the left eye. Dr. Neely saw retinal heme left eye and referred her. Saw Dr. Neely 6/22/20    Strong FH of AMD; siblings with wet AMD    OCT 6-  each eye drusen; no IRF/SRF, smaller left eye peripheral elevated PED with no overlying IRF or SRF; stable    PHOTOS 12-22-20  Drusen each eye; much smaller left eye peripheral RPE/subretinal lesion    FA 8/4/2020  each eye; drusen posterior pole and peripheral; blocking from peripheral retinal left eye lesion; no leaking lesion    Pertinent Medical History:    Pituitary Adenoma    Migraine    Chronic kidney disease    Anemia    Ocular History:    Age Related Macular Degeneration, both eyes.     Family history of macular degeneration - mother, sister, and brother.     Eye Medications:    None    Assessment and Plan:  1.   Peripheral pigment epithelium detachment,    RPE detachment in OCT; stable compared to previous; hemorhagic ? But no leaking lesion in FA today   DDX; peripheral CNV (not likely given FA finding) vs pigment epithelium adenoma vs peripheral PED   Not symptomatic now, but could be a harbinger of PECHR lesion   Lesion smaller or almost resolved 12/22/20 and 6/15/21:likely a peripheral CNV, self limited; s/s of CNV activation and bleeding discussed    Will observe with Follow-up in 12 months for repeat OCT;     2. Posterior Vitreous Detachment, both eyes. Retina attached.     Educated on signs and symptoms of a retinal detachment to be seen immediately.     No peripheral traction or tear in depressed exam today      3.   Dry Age Related Macular Degeneration, both eyes.     Strong FH of wet AMD    Recommends ARED's II PO BID. Patient mentioned vitamins may trigger  migraines and she is not taking ARED's. I encourage her to work with neurology to find a medication of migraine and see if she can resume ARED's vitamins. Is eating vegetables and healthy food; recommended to take supplemental zinc    Recommends fish and green leafy vegetables.     Recommends UV protection.    No smoking.    Return immediately if there are changes to amsler grid.    4.   Pituitary Adenoma    Patient has a history of pituitary adenoma, s/p resection in 2007 and again in 2010 due to recurrence. Patient follows neurology.     Baseline visual field was done 2019. Today, VF within normal limits     Optic nerve head OCT was done 05/20/2020. No optic neuropathy seen on exam.    Continue monitoring yearly with VF and ONH OCT.     5.   Cataract, both eyes.     Not visually significant. Monitor        RTC  6 months with Dr. Neely for IOP check and 12 months with me for repeat OCT (Superotemp retina left eye) and FAF 30 degrees and VF 24-2    Complete documentation of historical and exam elements from today's encounter can be found in the full encounter summary report (not reduplicated in this progress note). I personally obtained the chief complaint(s) and history of present illness.  I confirmed and edited as necessary the review of systems, past medical/surgical history, family history, social history, and examination findings as documented by others; and I examined the patient myself. I personally reviewed the relevant tests, images, and reports as documented above. I formulated and edited as necessary the assessment and plan and discussed the findings and management plan with the patient and family.    Cameron Clemons MD  [facility]

## 2021-06-15 NOTE — NURSING NOTE
Chief Complaints and History of Present Illnesses   Patient presents with     Follow Up      6 month follow up Peripheral pigment epithelium detachment,      Chief Complaint(s) and History of Present Illness(es)     Follow Up     Comments:  6 month follow up Peripheral pigment epithelium detachment,               Comments     Pt states vision is the same as last visit. No eye pain today.  No new flashes or floaters. No redness. Dryness in both eyes upon waking.    CAROLE Powell Slime 15, 2021 7:57 AM

## 2021-06-22 ENCOUNTER — TRANSFERRED RECORDS (OUTPATIENT)
Dept: HEALTH INFORMATION MANAGEMENT | Facility: CLINIC | Age: 61
End: 2021-06-22

## 2021-06-25 ENCOUNTER — DOCUMENTATION ONLY (OUTPATIENT)
Dept: PHYSICAL MEDICINE AND REHAB | Facility: CLINIC | Age: 61
End: 2021-06-25

## 2021-06-25 NOTE — PROGRESS NOTES
Physical therapy orders signed by Dr Lagunas and faxed to [facility], [place], [phone].

## 2021-09-07 ENCOUNTER — TRANSFERRED RECORDS (OUTPATIENT)
Dept: HEALTH INFORMATION MANAGEMENT | Facility: CLINIC | Age: 61
End: 2021-09-07
Payer: COMMERCIAL

## 2021-10-23 ENCOUNTER — HEALTH MAINTENANCE LETTER (OUTPATIENT)
Age: 61
End: 2021-10-23

## 2021-10-26 DIAGNOSIS — G43.719 INTRACTABLE CHRONIC MIGRAINE WITHOUT AURA AND WITHOUT STATUS MIGRAINOSUS: ICD-10-CM

## 2021-10-27 RX ORDER — ZOLMITRIPTAN 2.5 MG/1
TABLET, FILM COATED ORAL
Qty: 12 TABLET | Refills: 5 | Status: SHIPPED | OUTPATIENT
Start: 2021-10-27 | End: 2022-10-24

## 2021-10-27 NOTE — TELEPHONE ENCOUNTER
Rx Authorization:  Requested Medication/ Dose ZOLMITRIPTAN 2.5MG TABLETS  Date last refill ordered: 11/11/20  Quantity ordered: 12 tabs  # refills: 5  Date of last clinic visit with ordering provider: 4/8/21  Date of next clinic visit with ordering provider:   All pertinent protocol data (lab date/result):   Include pertinent information from patients message:

## 2021-11-29 ENCOUNTER — MYC MEDICAL ADVICE (OUTPATIENT)
Dept: INTERNAL MEDICINE | Facility: CLINIC | Age: 61
End: 2021-11-29
Payer: COMMERCIAL

## 2021-11-30 ENCOUNTER — LAB (OUTPATIENT)
Dept: LAB | Facility: CLINIC | Age: 61
End: 2021-11-30
Payer: COMMERCIAL

## 2021-11-30 ENCOUNTER — VIRTUAL VISIT (OUTPATIENT)
Dept: INTERNAL MEDICINE | Facility: CLINIC | Age: 61
End: 2021-11-30
Payer: COMMERCIAL

## 2021-11-30 DIAGNOSIS — R30.0 DYSURIA: Primary | ICD-10-CM

## 2021-11-30 DIAGNOSIS — R30.0 DYSURIA: ICD-10-CM

## 2021-11-30 LAB
ALBUMIN UR-MCNC: NEGATIVE MG/DL
APPEARANCE UR: CLEAR
BILIRUB UR QL STRIP: NEGATIVE
COLOR UR AUTO: ABNORMAL
GLUCOSE UR STRIP-MCNC: NEGATIVE MG/DL
HGB UR QL STRIP: ABNORMAL
KETONES UR STRIP-MCNC: NEGATIVE MG/DL
LEUKOCYTE ESTERASE UR QL STRIP: NEGATIVE
NITRATE UR QL: NEGATIVE
PH UR STRIP: 5.5 [PH] (ref 5–7)
RBC URINE: <1 /HPF
SP GR UR STRIP: 1 (ref 1–1.03)
SQUAMOUS EPITHELIAL: 1 /HPF
UROBILINOGEN UR STRIP-MCNC: NORMAL MG/DL
WBC URINE: 0 /HPF

## 2021-11-30 PROCEDURE — 81001 URINALYSIS AUTO W/SCOPE: CPT

## 2021-11-30 PROCEDURE — 99213 OFFICE O/P EST LOW 20 MIN: CPT | Mod: 95 | Performed by: NURSE PRACTITIONER

## 2021-11-30 NOTE — PROGRESS NOTES
Horace is a 61 year old who is being evaluated via a billable video visit.      How would you like to obtain your AVS? [email or web address]  If the video visit is dropped, the invitation should be resent by: Send to e-mail at: [email or web address]  Will anyone else be joining your video visit? No      Video Start Time: 2:05 PM    Assessment & Plan     Dysuria  Check UA/UC. This is negative for UTI. Push fluids. Okay to continue with cranberry supplement. Follow-up if symptoms worsen or persist.  - UA with Micro reflex to Culture; Future    Return if symptoms worsen or fail to improve.    JIM Ramon CNP  [facility] INTERNAL MEDICINE [facility]   Horace is a 61 year old who presents for the following health issues     HPI     Horace sent a Bly[employer] message in on 11/29/21 requesting urine testing for concern for UTI, having pain with urination and urinary frequency.  Had UTIs a lot years ago, none in the last 5-8 years.   Not extreme but is mildly painful with urination, and feels like she has to go even after voiding. Every day, at least part of the day, has symptoms. Symptoms started about 5 days ago. Started a cranberry supplement yesterday.  No fevers, abdominal pain, flank pain, no N/V, no C/D, bladder pain. No bleeding or vaginal discharge.   Did have muscle tightness in the back about 7 days ago, resolved, felt more muscular.       Review of Systems   Constitutional, HEENT, cardiovascular, pulmonary, gi and gu systems are negative, except as otherwise noted.      Objective           Vitals:  No vitals were obtained today due to virtual visit.    Physical Exam   GENERAL: Healthy, alert and no distress  EYES: Eyes grossly normal to inspection.  No discharge or erythema, or obvious scleral/conjunctival abnormalities.  RESP: No audible wheeze, cough, or visible cyanosis.  No visible retractions or increased work of breathing.    SKIN: Visible skin clear. No significant rash,  abnormal pigmentation or lesions.  NEURO: Cranial nerves grossly intact.  Mentation and speech appropriate for age.  PSYCH: Mentation appears normal, affect normal/bright, judgement and insight intact, normal speech and appearance well-groomed.                Video-Visit Details    Type of service:  Video Visit    Video End Time:2:12 PM    Originating Location (pt. Location): Home    Distant Location (provider location):  [facility] INTERNAL MEDICINE [facility] [place]     Platform used for Video Visit: SHARKMARX

## 2021-11-30 NOTE — NURSING NOTE
Katie Casper is a 61 year old female patient who is being evaluated via a billable video visit. The patient was called and checked-in for today's virtual visit. After the patient was checked in, Katie Casper's primary concerns for today's visit were discussed. The following are the primary complaints of the patient:     Chief Complaint   Patient presents with     UTI       The patient's allergies and medications were reviewed as noted. The patient does not have any other questions for the provider.    Shiv Araujo, EMT at 1:28 PM on 11/30/2021

## 2021-11-30 NOTE — TELEPHONE ENCOUNTER
OK for same day appt with Kesha.   Marivel Noel, EMT at 9:18 AM on 11/30/2021.  [facility]  [facility]  [place]  [phone]

## 2021-12-08 DIAGNOSIS — H35.3132 INTERMEDIATE STAGE NONEXUDATIVE AGE-RELATED MACULAR DEGENERATION OF BOTH EYES: ICD-10-CM

## 2021-12-08 DIAGNOSIS — D35.2 PITUITARY ADENOMA (H): Primary | ICD-10-CM

## 2021-12-08 NOTE — PROGRESS NOTES
"HPI:  Patient presents for follow up - needs visual field and optic nerve OCT for pituitary adenoma. Patient complains of dryness in both eyes.        has a history of pituitary adenoma, s/p resection in 2007 and again in 2010 due to recurrence. Patient complains of flashes and floaters in the left eye. The floaters looks like \"gnats.\"    Today: Patient presents for follow up of PVD in the left eye. The floaters have no subsided. Vision is stable.       Pertinent Medical History:    Pituitary Adenoma 2013    Migraine    Chronic kidney disease    Anemia    Ocular History:    Age Related Macular Degeneration, both eyes.     Family history of macular degeneration - mother, sister, and brother.     PVD, left eye    Peripheral PED, left eye    VMT, right eye    Cataract both eyes.     Eye Medications:    None    Assessment and Plan:  1.   Pituitary Adenoma    Patient has a history of pituitary adenoma, s/p resection in 2007 and again in 2010 due to recurrence. Patient follows neurology.     Visual field 12/13/2021: Both eyes: no visual field deficit.     Optic nerve OCT 12/13/2021: Both eyes: no edema.     Recommend annual dilated eye exam with visual field and optic nerve OCT.     2.   Posterior Vitreous Detachment, left eye. Retina attached.     Educated on signs and symptoms of a retinal detachment (ie. Hundreds of floaters, flashes of light, and shadow/curtain over the vision) to be seen immediately.     Patient also seen by Dr. Clemons on 05/20/2020    3.   Peripheral Pigment Epithelial Detachment, left eye.     Last saw Dr. Clemons on 06/15/2021. Lesion near resolution 06/15/2021 - likely peripheral CNV that is self limited.      Plan on follow up with Dr. Clemons 06/2022.    4.   Wet Age Related Macular Degeneration, right eye. Dry Age Related Macular Degeneration, left eye.     Recommends AREDS 2 vitamins orally 2 times daily. Patient mentioned vitamins may trigger migraines and she is not taking AREDS. I encourage " her to work with neurology to find a medication of migraine and see if she can resume AREDS vitamins.     Recommends fish and green leafy vegetables.     Recommends UV protection.    No smoking.    Return immediately if there are changes to amsler grid.    Macular OCT: Right eye: there is progression with SRF; left eye: no SRF - stable.     There is new hemorrhage and SRF in the right eye - concern for Wet AMD right eye. Follow up with Dr. Clemons.     5.   Cataract, both eyes.     Not visually significant. Monitor.     6.   Dry Eye Syndrome, both eyes.     Preservative free artificial tears 4 times daily both eyes.     7.   Papilloma, left lower lid.     Not bothersome. Monitor.       Medical History:  Past Medical History:   Diagnosis Date     Anemia 2006     Atrophic vaginitis      Chronic kidney disease October 2018    labs     Drusen (degenerative) of macula, bilateral      Hx of previous reproductive problem 1992     Insomnia      Migraines      Myopia      Pituitary macroadenoma (H)      Restless legs        Medications:  Current Outpatient Medications   Medication Sig Dispense Refill     ALPRAZolam (XANAX) 0.5 MG tablet Take one pill 1/2 hour before MRI imaging (Patient not taking: Reported on 11/30/2021) 1 tablet 0     eletriptan (RELPAX) 20 MG tablet Take 1-2 tablets (20-40 mg) by mouth at onset of headache for migraine (may repeat in 2 hours as needed. Max 80 mg in 24 hours) (Patient not taking: Reported on 11/30/2021) 18 tablet 6     ESTRING 2 MG vaginal ring INSERT 1 RING VAGINALLY EVERY 3 MONTHS (Patient not taking: Reported on 11/30/2021) 1 each 3     Rimegepant Sulfate 75 MG TBDP Take 75 mg by mouth See Admin Instructions Take 75 mg orally per 24 hours placed on or under the tongue; MAX 75 mg/24 hours; (Patient not taking: Reported on 11/30/2021) 8 tablet 6     tiZANidine (ZANAFLEX) 2 MG tablet Take 1-3 tablets (2-6 mg) by mouth At Bedtime (Patient not taking: Reported on 11/30/2021) 90 tablet 3      venlafaxine (EFFEXOR-ER) 150 MG 24 hr tablet TAKE 1 TABLET(150 MG) BY MOUTH DAILY (Patient not taking: Reported on 11/30/2021) 30 tablet 6     venlafaxine (EFFEXOR-XR) 37.5 MG 24 hr capsule Take 1 capsule (37.5 mg) by mouth daily (Patient not taking: Reported on 11/30/2021) 30 capsule 6     venlafaxine (EFFEXOR-XR) 75 MG 24 hr capsule Take 1 capsule (75 mg) by mouth daily (Patient not taking: Reported on 11/30/2021) 30 capsule 6     Vitamin D, Cholecalciferol, 1000 units TABS Take 1 tablet by mouth At Bedtime  (Patient not taking: Reported on 11/30/2021)       ZOLMitriptan (ZOMIG) 2.5 MG tablet TAKE 1 TABLET(2.5 MG) BY MOUTH AT ONSET OF HEADACHE FOR MIGRAINE. MAY REPEAT IN 2 HOURS AS NEEDED. MAX 4 TABLETS IN 24 HOURS 12 tablet 5   Complete documentation of historical and exam elements from today's encounter can be found in the full encounter summary report (not reduplicated in this progress note). I personally obtained the chief complaint(s) and history of present illness.  I confirmed and edited as necessary the review of systems, past medical/surgical history, family history, social history, and examination findings as documented by others; and I examined the patient myself. I personally reviewed the relevant tests, images, and reports as documented above. I formulated and edited as necessary the assessment and plan and discussed the findings and management plan with the patient and family. - Fabian Neely OD

## 2021-12-13 ENCOUNTER — OFFICE VISIT (OUTPATIENT)
Dept: OPHTHALMOLOGY | Facility: CLINIC | Age: 61
End: 2021-12-13
Attending: OPHTHALMOLOGY
Payer: COMMERCIAL

## 2021-12-13 DIAGNOSIS — H35.3122 INTERMEDIATE STAGE NONEXUDATIVE AGE-RELATED MACULAR DEGENERATION OF LEFT EYE: ICD-10-CM

## 2021-12-13 DIAGNOSIS — H35.3211 EXUDATIVE AGE-RELATED MACULAR DEGENERATION OF RIGHT EYE WITH ACTIVE CHOROIDAL NEOVASCULARIZATION (H): ICD-10-CM

## 2021-12-13 DIAGNOSIS — H04.123 DRY EYE SYNDROME OF BOTH EYES: Primary | ICD-10-CM

## 2021-12-13 DIAGNOSIS — D23.122 PAPILLOMA OF LEFT LOWER EYELID: ICD-10-CM

## 2021-12-13 DIAGNOSIS — H43.812 VITREOUS DEGENERATION OF LEFT EYE: ICD-10-CM

## 2021-12-13 DIAGNOSIS — D35.2 PITUITARY ADENOMA (H): ICD-10-CM

## 2021-12-13 PROCEDURE — G0463 HOSPITAL OUTPT CLINIC VISIT: HCPCS

## 2021-12-13 PROCEDURE — 92133 CPTRZD OPH DX IMG PST SGM ON: CPT | Performed by: OPTOMETRIST

## 2021-12-13 PROCEDURE — 99214 OFFICE O/P EST MOD 30 MIN: CPT | Performed by: OPTOMETRIST

## 2021-12-13 PROCEDURE — 92083 EXTENDED VISUAL FIELD XM: CPT | Performed by: OPTOMETRIST

## 2021-12-13 RX ORDER — CARBOXYMETHYLCELLULOSE SODIUM 5 MG/ML
1 SOLUTION/ DROPS OPHTHALMIC 4 TIMES DAILY
Qty: 400 EACH | Refills: 11 | Status: SHIPPED | OUTPATIENT
Start: 2021-12-13 | End: 2022-01-11

## 2021-12-13 ASSESSMENT — VISUAL ACUITY
OS_SC: 20/15
METHOD: SNELLEN - LINEAR
OD_SC+: -2
OD_SC: 20/20

## 2021-12-13 ASSESSMENT — SLIT LAMP EXAM - LIDS
COMMENTS: NORMAL
COMMENTS: PAPILLOMA LL

## 2021-12-13 ASSESSMENT — CUP TO DISC RATIO
OS_RATIO: 0.25
OD_RATIO: 0.25

## 2021-12-13 ASSESSMENT — CONF VISUAL FIELD
OD_NORMAL: 1
OS_NORMAL: 1

## 2021-12-13 ASSESSMENT — TONOMETRY
IOP_METHOD: TONOPEN
OS_IOP_MMHG: 14
OD_IOP_MMHG: 16

## 2021-12-13 ASSESSMENT — EXTERNAL EXAM - LEFT EYE: OS_EXAM: NORMAL

## 2021-12-13 ASSESSMENT — EXTERNAL EXAM - RIGHT EYE: OD_EXAM: NORMAL

## 2021-12-13 NOTE — NURSING NOTE
Chief Complaints and History of Present Illnesses   Patient presents with     Follow Up     Chief Complaint(s) and History of Present Illness(es)     Follow Up     Laterality: both eyes    Associated symptoms: dryness.  Negative for floaters, flashes, pain with eye movement and eye pain    Treatments tried: warm compresses and no treatments    Pain scale: 0/10              Comments     Dry eyes hurt at times  Warm compress 1-2 times per month  Pt reports no vision change since last visit  Sindhu COVARRUBIAS 8:23 AM December 13, 2021

## 2021-12-14 ENCOUNTER — MYC MEDICAL ADVICE (OUTPATIENT)
Dept: OPHTHALMOLOGY | Facility: CLINIC | Age: 61
End: 2021-12-14
Payer: COMMERCIAL

## 2021-12-14 NOTE — TELEPHONE ENCOUNTER
Spoke to pt at 1140    No noted vision changes prior to visit yesterday.    Pt concern of waiting til next week with family history of wet macular degeneration.    offered earlier evaluation tomorrow at 0830 and pt accepts    Pt aware of date/monty/location at [facility]    Jason Salinas RN 11:50 AM 12/14/21

## 2021-12-15 ENCOUNTER — OFFICE VISIT (OUTPATIENT)
Dept: OPHTHALMOLOGY | Facility: CLINIC | Age: 61
End: 2021-12-15
Attending: OPHTHALMOLOGY
Payer: COMMERCIAL

## 2021-12-15 DIAGNOSIS — H35.3211 EXUDATIVE AGE-RELATED MACULAR DEGENERATION OF RIGHT EYE WITH ACTIVE CHOROIDAL NEOVASCULARIZATION (H): Primary | ICD-10-CM

## 2021-12-15 DIAGNOSIS — H25.13 NUCLEAR SENILE CATARACT OF BOTH EYES: ICD-10-CM

## 2021-12-15 DIAGNOSIS — H35.3122 INTERMEDIATE STAGE NONEXUDATIVE AGE-RELATED MACULAR DEGENERATION OF LEFT EYE: ICD-10-CM

## 2021-12-15 PROCEDURE — 92250 FUNDUS PHOTOGRAPHY W/I&R: CPT | Performed by: OPHTHALMOLOGY

## 2021-12-15 PROCEDURE — 250N000011 HC RX IP 250 OP 636: Performed by: OPHTHALMOLOGY

## 2021-12-15 PROCEDURE — 92014 COMPRE OPH EXAM EST PT 1/>: CPT | Mod: 25 | Performed by: OPHTHALMOLOGY

## 2021-12-15 PROCEDURE — G0463 HOSPITAL OUTPT CLINIC VISIT: HCPCS | Mod: 25

## 2021-12-15 PROCEDURE — 67028 INJECTION EYE DRUG: CPT | Mod: RT | Performed by: OPHTHALMOLOGY

## 2021-12-15 RX ADMIN — AFLIBERCEPT 2 MG: 40 INJECTION, SOLUTION INTRAVITREAL at 11:20

## 2021-12-15 ASSESSMENT — VISUAL ACUITY
OD_SC: 20/20
OS_SC: 20/20
OD_SC+: -2
METHOD: SNELLEN - LINEAR
OS_SC+: +1

## 2021-12-15 ASSESSMENT — EXTERNAL EXAM - RIGHT EYE: OD_EXAM: NORMAL

## 2021-12-15 ASSESSMENT — REFRACTION_WEARINGRX
OS_CYLINDER: +0.25
OS_SPHERE: -0.75
OD_AXIS: 155
SPECS_TYPE: SVL
OD_SPHERE: -0.75
OD_CYLINDER: +0.25
OS_AXIS: 036

## 2021-12-15 ASSESSMENT — TONOMETRY
IOP_METHOD: TONOPEN
OD_IOP_MMHG: 15
OS_IOP_MMHG: 16

## 2021-12-15 ASSESSMENT — SLIT LAMP EXAM - LIDS
COMMENTS: NORMAL
COMMENTS: PAPILLOMA LL

## 2021-12-15 ASSESSMENT — CUP TO DISC RATIO
OD_RATIO: 0.25
OS_RATIO: 0.25

## 2021-12-15 ASSESSMENT — CONF VISUAL FIELD
OS_NORMAL: 1
OD_NORMAL: 1
METHOD: COUNTING FINGERS

## 2021-12-15 ASSESSMENT — EXTERNAL EXAM - LEFT EYE: OS_EXAM: NORMAL

## 2021-12-15 NOTE — NURSING NOTE
Chief Complaints and History of Present Illnesses   Patient presents with     Macular Degeneration Evaluation     Chief Complaint(s) and History of Present Illness(es)     Macular Degeneration Evaluation               Comments     Pt states vision is the same as 2 days ago. Dryness in both eyes, some relief with drops.  Occasional floaters in BE that come and go, no changes.  No flashes.    CAROLE Powell December 15, 2021 8:43 AM

## 2021-12-15 NOTE — PROGRESS NOTES
CC: retinal lesion left eye; floaters    Interval hx: no change in vision  Saw Dr. Neely 2 days ago who noticed new retinal heme and OCT changes    HPI:  Patient has a history of pituitary adenoma, s/p resection in 2007 and again in 2010 due to recurrence. Patient complains of flashes and floaters in the left eye. Dr. Neely saw retinal heme left eye and referred her. Saw Dr. Neely 6/22/20    Strong FH of AMD: mother, sister, and brother with wet AMD    OCT/OCTA 12/13/21  Right eye drusen; new SRF, new growth of SHRM with overlying retinal hyperreflectivities  Left eye stable drusen    optos 12-15-21  Drusen each eye; much smaller left eye peripheral RPE/subretinal lesion      Assessment and Plan:  1.   Exudative AMD right eye    Recent conversion to wet form    Fovea is not involved and VA is 20/20   OCTA: SubRPE neovascular membrane nasal to fovea   Eylea injection today 1/3    R/B/A discussed with Horace in length    2. Pigment epithelium detachment, drusen each eye    drusenoid PED; stable compared to previous except for #1   DDX; peripheral CNV (not likely given FA finding) vs pigment epithelium adenoma vs peripheral PED   Not symptomatic now, but could be a harbinger of PECHR lesion   Lesion smaller or almost resolved 12/22/20 and 6/15/21:likely a peripheral CNV, self limited; s/s of CNV activation and bleeding discussed    Will observe for now    3. Posterior Vitreous Detachment, both eyes. Retina attached.   Educated on signs and symptoms of a retinal detachment to be seen immediately.   No peripheral traction or tear in depressed exam today      4. Dry Age Related Macular Degeneration, both eyes.   Strong FH of wet AMD  Recommends ARED's II PO BID. Encouraged green leafy vegetables and healthy food and UV protection/No smoking.  Return immediately if there are changes to amsler grid.    4.   Pituitary Adenoma  History of pituitary adenoma, s/p resection in 2007 and again in 2010 due to recurrence. Patient  follows neurology.   Baseline visual field was done 2019. last VF within normal limits   Optic nerve head OCT was done 05/20/2020. No optic neuropathy seen on exam.  Continue monitoring yearly with VF and ONH OCT.     5.   Cataract, both eyes.   Not visually significant. Monitor        RTC  4 weeks for IV Eylea 2/3 right eye only and then in 4 w for Eylea 3/3 and OCT and optos and DFE    Complete documentation of historical and exam elements from today's encounter can be found in the full encounter summary report (not reduplicated in this progress note). I personally obtained the chief complaint(s) and history of present illness.  I confirmed and edited as necessary the review of systems, past medical/surgical history, family history, social history, and examination findings as documented by others; and I examined the patient myself. I personally reviewed the relevant tests, images, and reports as documented above. I formulated and edited as necessary the assessment and plan and discussed the findings and management plan with the patient and family.    Cameron Clemons MD  [facility]

## 2021-12-22 ENCOUNTER — MYC MEDICAL ADVICE (OUTPATIENT)
Dept: INTERNAL MEDICINE | Facility: CLINIC | Age: 61
End: 2021-12-22
Payer: COMMERCIAL

## 2022-01-11 ENCOUNTER — OFFICE VISIT (OUTPATIENT)
Dept: OPHTHALMOLOGY | Facility: CLINIC | Age: 62
End: 2022-01-11
Attending: OPHTHALMOLOGY
Payer: COMMERCIAL

## 2022-01-11 DIAGNOSIS — H04.123 DRY EYE SYNDROME OF BOTH EYES: ICD-10-CM

## 2022-01-11 DIAGNOSIS — H35.3211 EXUDATIVE AGE-RELATED MACULAR DEGENERATION OF RIGHT EYE WITH ACTIVE CHOROIDAL NEOVASCULARIZATION (H): Primary | ICD-10-CM

## 2022-01-11 PROCEDURE — 250N000011 HC RX IP 250 OP 636: Performed by: OPHTHALMOLOGY

## 2022-01-11 PROCEDURE — 67028 INJECTION EYE DRUG: CPT | Mod: RT | Performed by: OPHTHALMOLOGY

## 2022-01-11 PROCEDURE — G0463 HOSPITAL OUTPT CLINIC VISIT: HCPCS

## 2022-01-11 RX ORDER — CARBOXYMETHYLCELLULOSE SODIUM 5 MG/ML
1 SOLUTION/ DROPS OPHTHALMIC 4 TIMES DAILY
Qty: 400 EACH | Refills: 11 | Status: SHIPPED | OUTPATIENT
Start: 2022-01-11

## 2022-01-11 RX ADMIN — AFLIBERCEPT 2 MG: 40 INJECTION, SOLUTION INTRAVITREAL at 10:10

## 2022-01-11 ASSESSMENT — CONF VISUAL FIELD
METHOD: COUNTING FINGERS
OD_NORMAL: 1
OS_NORMAL: 1

## 2022-01-11 ASSESSMENT — VISUAL ACUITY
OD_SC: 20/25
OS_SC+: -1
METHOD: SNELLEN - LINEAR
OD_SC+: -2
OS_SC: 20/20

## 2022-01-11 ASSESSMENT — CUP TO DISC RATIO
OD_RATIO: 0.25
OS_RATIO: 0.25

## 2022-01-11 ASSESSMENT — SLIT LAMP EXAM - LIDS
COMMENTS: NORMAL
COMMENTS: PAPILLOMA LL

## 2022-01-11 ASSESSMENT — TONOMETRY
IOP_METHOD: ICARE
OS_IOP_MMHG: 17
OD_IOP_MMHG: 16

## 2022-01-11 ASSESSMENT — EXTERNAL EXAM - RIGHT EYE: OD_EXAM: NORMAL

## 2022-01-11 ASSESSMENT — EXTERNAL EXAM - LEFT EYE: OS_EXAM: NORMAL

## 2022-01-11 NOTE — NURSING NOTE
Chief Complaints and History of Present Illnesses   Patient presents with     Follow Up     Exudative AMD right eye      Chief Complaint(s) and History of Present Illness(es)     Follow Up     Laterality: right eye    Course: stable    Associated symptoms: floaters.  Negative for flashes, eye pain and headache    Treatments tried: artificial tears    Pain scale: 0/10    Comments: Exudative AMD right eye               Comments     Injection only right eye today  Floaters same as last visit, no flashes or eye pain    Natacha Ortiz COT 9:50 AM January 11, 2022

## 2022-01-11 NOTE — PROGRESS NOTES
CC: retinal lesion left eye; floaters    Interval hx: no change in vision; was scheduled for Eylea only right eye today but is concerned about the left eye and wants to have left retina examined. No change in VA    HPI:  Patient has a history of pituitary adenoma, s/p resection in 2007 and again in 2010 due to recurrence. Patient complains of flashes and floaters in the left eye. Dr. Neely saw retinal heme left eye and referred her. Saw Dr. Neely 6/22/20    Strong FH of AMD: mother, sister, and brother with wet AMD    OCT/OCTA 12/13/21  Right eye drusen; new SRF, new growth of SHRM with overlying retinal hyperreflectivities  Left eye stable drusen    optos 12-15-21  Drusen each eye; much smaller left eye peripheral RPE/subretinal lesion      Assessment and Plan:  1.   Exudative AMD right eye    Recent conversion to wet form    Fovea is not involved and VA is 20/20   OCTA: SubRPE neovascular membrane nasal to fovea   First Eylea injection 12/15/2021 and today for Eylea injection 2/3    R/B/A discussed with Horace in length    2. Pigment epithelium detachment, drusen each eye    drusenoid PED; stable compared to previous except for #1   DDX; peripheral CNV (not likely given FA finding) vs pigment epithelium adenoma vs peripheral PED   Not symptomatic now, but could be a harbinger of PECHR lesion   Lesion smaller or almost resolved 12/22/20 and 6/15/21:likely a peripheral CNV, self limited; s/s of CNV activation and bleeding discussed    No apparent change in left eye examination 1/11/2022   Will observe for now    3. Posterior Vitreous Detachment, both eyes. Retina attached.   Educated on signs and symptoms of a retinal detachment to be seen immediately.   No peripheral traction or tear in depressed exam today      4. Dry Age Related Macular Degeneration, both eyes.   Strong FH of wet AMD  Recommends ARED's II PO BID. Encouraged green leafy vegetables and healthy food and UV protection/No smoking.  Return immediately  if there are changes to amsler grid.    4.   Pituitary Adenoma  History of pituitary adenoma, s/p resection in 2007 and again in 2010 due to recurrence. Patient follows neurology.   Baseline visual field was done 2019. last VF within normal limits   Optic nerve head OCT was done 05/20/2020. No optic neuropathy seen on exam.  Continue monitoring yearly with VF and ONH OCT.     5.   Cataract, both eyes.   Not visually significant. Monitor        RTC  4 weeks for OCT and optos and DFE and Eylea 3/3    Complete documentation of historical and exam elements from today's encounter can be found in the full encounter summary report (not reduplicated in this progress note). I personally obtained the chief complaint(s) and history of present illness.  I confirmed and edited as necessary the review of systems, past medical/surgical history, family history, social history, and examination findings as documented by others; and I examined the patient myself. I personally reviewed the relevant tests, images, and reports as documented above. I formulated and edited as necessary the assessment and plan and discussed the findings and management plan with the patient and family.    Cameron Clemons MD  [facility]

## 2022-01-13 ENCOUNTER — TRANSFERRED RECORDS (OUTPATIENT)
Dept: HEALTH INFORMATION MANAGEMENT | Facility: CLINIC | Age: 62
End: 2022-01-13
Payer: COMMERCIAL

## 2022-01-15 ASSESSMENT — ENCOUNTER SYMPTOMS
FREQUENCY: 0
DIZZINESS: 0
ARTHRALGIAS: 0
NERVOUS/ANXIOUS: 0
DYSURIA: 0
COUGH: 0
HEARTBURN: 0
NAUSEA: 0
DIARRHEA: 0
HEMATURIA: 0
CHILLS: 0
BREAST MASS: 0
CONSTIPATION: 0
FEVER: 0
HEADACHES: 1
SHORTNESS OF BREATH: 0
MYALGIAS: 0
EYE PAIN: 0
ABDOMINAL PAIN: 0
SORE THROAT: 0
PALPITATIONS: 0
PARESTHESIAS: 0
WEAKNESS: 0
JOINT SWELLING: 0
HEMATOCHEZIA: 0

## 2022-01-17 ENCOUNTER — OFFICE VISIT (OUTPATIENT)
Dept: INTERNAL MEDICINE | Facility: CLINIC | Age: 62
End: 2022-01-17
Payer: COMMERCIAL

## 2022-01-17 VITALS
TEMPERATURE: 97.2 F | BODY MASS INDEX: 23.78 KG/M2 | WEIGHT: 151.5 LBS | DIASTOLIC BLOOD PRESSURE: 68 MMHG | HEART RATE: 67 BPM | SYSTOLIC BLOOD PRESSURE: 100 MMHG | HEIGHT: 67 IN | OXYGEN SATURATION: 100 %

## 2022-01-17 DIAGNOSIS — E78.5 HYPERLIPIDEMIA LDL GOAL <100: ICD-10-CM

## 2022-01-17 DIAGNOSIS — F33.0 MAJOR DEPRESSIVE DISORDER, RECURRENT EPISODE, MILD (H): ICD-10-CM

## 2022-01-17 DIAGNOSIS — M26.609 TMJ (TEMPOROMANDIBULAR JOINT SYNDROME): ICD-10-CM

## 2022-01-17 DIAGNOSIS — R53.83 OTHER FATIGUE: ICD-10-CM

## 2022-01-17 DIAGNOSIS — E55.9 VITAMIN D DEFICIENCY: ICD-10-CM

## 2022-01-17 DIAGNOSIS — Z00.00 ROUTINE GENERAL MEDICAL EXAMINATION AT A HEALTH CARE FACILITY: Primary | ICD-10-CM

## 2022-01-17 DIAGNOSIS — D35.2 PITUITARY ADENOMA (H): ICD-10-CM

## 2022-01-17 LAB
ALBUMIN SERPL-MCNC: 3.8 G/DL (ref 3.4–5)
ALP SERPL-CCNC: 83 U/L (ref 40–150)
ALT SERPL W P-5'-P-CCNC: 23 U/L (ref 0–50)
ANION GAP SERPL CALCULATED.3IONS-SCNC: 4 MMOL/L (ref 3–14)
AST SERPL W P-5'-P-CCNC: 15 U/L (ref 0–45)
BILIRUB SERPL-MCNC: 0.3 MG/DL (ref 0.2–1.3)
BUN SERPL-MCNC: 18 MG/DL (ref 7–30)
CALCIUM SERPL-MCNC: 8.8 MG/DL (ref 8.5–10.1)
CHLORIDE BLD-SCNC: 107 MMOL/L (ref 94–109)
CHOLEST SERPL-MCNC: 235 MG/DL
CO2 SERPL-SCNC: 29 MMOL/L (ref 20–32)
CREAT SERPL-MCNC: 0.9 MG/DL (ref 0.52–1.04)
DEPRECATED CALCIDIOL+CALCIFEROL SERPL-MC: 24 UG/L (ref 20–75)
ERYTHROCYTE [DISTWIDTH] IN BLOOD BY AUTOMATED COUNT: 13.6 % (ref 10–15)
FASTING STATUS PATIENT QL REPORTED: YES
GFR SERPL CREATININE-BSD FRML MDRD: 72 ML/MIN/1.73M2
GLUCOSE BLD-MCNC: 104 MG/DL (ref 70–99)
HCT VFR BLD AUTO: 41.7 % (ref 35–47)
HDLC SERPL-MCNC: 59 MG/DL
HGB BLD-MCNC: 13.4 G/DL (ref 11.7–15.7)
LDLC SERPL CALC-MCNC: 160 MG/DL
MCH RBC QN AUTO: 28.5 PG (ref 26.5–33)
MCHC RBC AUTO-ENTMCNC: 32.1 G/DL (ref 31.5–36.5)
MCV RBC AUTO: 89 FL (ref 78–100)
NONHDLC SERPL-MCNC: 176 MG/DL
PLATELET # BLD AUTO: 229 10E3/UL (ref 150–450)
POTASSIUM BLD-SCNC: 4.3 MMOL/L (ref 3.4–5.3)
PROT SERPL-MCNC: 6.8 G/DL (ref 6.8–8.8)
RBC # BLD AUTO: 4.7 10E6/UL (ref 3.8–5.2)
SODIUM SERPL-SCNC: 140 MMOL/L (ref 133–144)
TRIGL SERPL-MCNC: 81 MG/DL
TSH SERPL DL<=0.005 MIU/L-ACNC: 2.57 MU/L (ref 0.4–4)
WBC # BLD AUTO: 4.8 10E3/UL (ref 4–11)

## 2022-01-17 PROCEDURE — 80053 COMPREHEN METABOLIC PANEL: CPT | Performed by: INTERNAL MEDICINE

## 2022-01-17 PROCEDURE — 84443 ASSAY THYROID STIM HORMONE: CPT | Performed by: INTERNAL MEDICINE

## 2022-01-17 PROCEDURE — 85027 COMPLETE CBC AUTOMATED: CPT | Performed by: INTERNAL MEDICINE

## 2022-01-17 PROCEDURE — 82306 VITAMIN D 25 HYDROXY: CPT | Performed by: INTERNAL MEDICINE

## 2022-01-17 PROCEDURE — 99214 OFFICE O/P EST MOD 30 MIN: CPT | Mod: 25 | Performed by: INTERNAL MEDICINE

## 2022-01-17 PROCEDURE — 99396 PREV VISIT EST AGE 40-64: CPT | Performed by: INTERNAL MEDICINE

## 2022-01-17 PROCEDURE — 80061 LIPID PANEL: CPT | Performed by: INTERNAL MEDICINE

## 2022-01-17 PROCEDURE — 36415 COLL VENOUS BLD VENIPUNCTURE: CPT | Performed by: INTERNAL MEDICINE

## 2022-01-17 ASSESSMENT — ENCOUNTER SYMPTOMS
HEARTBURN: 0
NAUSEA: 0
WEAKNESS: 0
PARESTHESIAS: 0
SHORTNESS OF BREATH: 0
SORE THROAT: 0
EYE PAIN: 0
PALPITATIONS: 0
CHILLS: 0
MYALGIAS: 0
FREQUENCY: 0
ABDOMINAL PAIN: 0
DYSURIA: 0
CONSTIPATION: 0
COUGH: 0
NERVOUS/ANXIOUS: 0
HEMATURIA: 0
JOINT SWELLING: 0
ARTHRALGIAS: 0
HEADACHES: 1
DIZZINESS: 0
HEMATOCHEZIA: 0
DIARRHEA: 0
FEVER: 0
BREAST MASS: 0

## 2022-01-17 ASSESSMENT — MIFFLIN-ST. JEOR: SCORE: 1279.83

## 2022-01-17 NOTE — PROGRESS NOTES
SUBJECTIVE:   CC: Katie Casper is an 61 year old woman who presents for preventive health visit.     Patient has been advised of split billing requirements and indicates understanding: Yes     Healthy Habits:     Getting at least 3 servings of Calcium per day:  Yes    Bi-annual eye exam:  Yes    Dental care twice a year:  Yes    Sleep apnea or symptoms of sleep apnea:  None    Diet:  Regular (no restrictions)    Frequency of exercise:  4-5 days/week    Duration of exercise:  15-30 minutes    Taking medications regularly:  Yes    Medication side effects:  Not applicable    PHQ-2 Total Score: 2    Horace presents today for a physical exam. This is the first time I have met Horace. She tells me she's having ongoing TMJ pain. 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 She was noted by a dentist around that time that she was noted to be grinding her teeth. She was then seeing a craniofacial specialist and a tongue specialist, has a splint that she uses. She continues to have intermittent episodes of severe pain. Wondering if a pain clinic would have anything to offer. She also gets quite fatigued during those pain bouts. Wondering about depression possibility.    Today's PHQ-2 Score:   PHQ-2 ( 1999 Pfizer) 1/15/2022   Q1: Little interest or pleasure in doing things 1   Q2: Feeling down, depressed or hopeless 1   PHQ-2 Score 2   PHQ-2 Total Score (12-17 Years)- Positive if 3 or more points; Administer PHQ-A if positive -   Q1: Little interest or pleasure in doing things Several days   Q2: Feeling down, depressed or hopeless Several days   PHQ-2 Score 2   Some encounter information is confidential and restricted. Go to Review Flowsheets activity to see all data.     Abuse: Current or Past (Physical, Sexual or Emotional) - No  Do you feel safe in your environment? Yes    Social History     Tobacco Use     Smoking status: Never Smoker     Smokeless tobacco: Never Used    Substance Use Topics     Alcohol use: No     If you drink alcohol do you typically have >3 drinks per day or >7 drinks per week? No    Alcohol Use 1/17/2022   Prescreen: >3 drinks/day or >7 drinks/week? -   Prescreen: >3 drinks/day or >7 drinks/week? No     Reviewed orders with patient.  Reviewed health maintenance and updated orders accordingly - Yes    Labs reviewed in EPIC    Breast Cancer Screening:  FHS-7:   Breast CA Risk Assessment (FHS-7) 1/15/2022   Did any of your first-degree relatives have breast or ovarian cancer? Yes   Did any of your relatives have bilateral breast cancer? No   Did any man in your family have breast cancer? No   Did any woman in your family have breast and ovarian cancer? Yes   Did any woman in your family have breast cancer before age 50 y? No   Do you have 2 or more relatives with breast and/or ovarian cancer? Yes   Do you have 2 or more relatives with breast and/or bowel cancer? No     Mammogram Screening: Recommended mammography every 1-2 years with patient discussion and risk factor consideration    Pertinent mammograms are reviewed under the imaging tab.    History of abnormal Pap smear: NO - age 30-65 PAP every 5 years with negative HPV co-testing recommended  PAP / HPV Latest Ref Rng & Units 1/11/2021 1/10/2019   PAP (Historical) - NIL LSIL(A)   HPV16 NEG:Negative Negative Negative   HPV18 NEG:Negative Negative Negative   HRHPV NEG:Negative Negative Positive(A)     Reviewed and updated as needed this visit by clinical staff  Tobacco  Allergies  Meds  Problems  Med Hx  Surg Hx  Fam Hx       Reviewed and updated as needed this visit by Provider  Tobacco  Allergies  Meds  Problems  Med Hx  Surg Hx  Fam Hx        Review of Systems   Constitutional: Negative for chills and fever.   HENT: Negative for congestion, ear pain, hearing loss and sore throat.    Eyes: Negative for pain and visual disturbance.   Respiratory: Negative for cough and shortness of breath.   "  Cardiovascular: Negative for chest pain, palpitations and peripheral edema.   Gastrointestinal: Negative for abdominal pain, constipation, diarrhea, heartburn, hematochezia and nausea.   Breasts:  Negative for tenderness, breast mass and discharge.   Genitourinary: Negative for dysuria, frequency, genital sores, hematuria, pelvic pain, urgency, vaginal bleeding and vaginal discharge.   Musculoskeletal: Negative for arthralgias, joint swelling and myalgias.   Skin: Negative for rash.   Neurological: Positive for headaches. Negative for dizziness, weakness and paresthesias.   Psychiatric/Behavioral: Negative for mood changes. The patient is not nervous/anxious.       OBJECTIVE:   /68   Pulse 67   Temp 97.2  F (36.2  C) (Tympanic)   Ht 1.702 m (5' 7\")   Wt 68.7 kg (151 lb 8 oz)   SpO2 100%   BMI 23.73 kg/m       Physical Exam  GENERAL: In no distress.  EYES: Conjunctivae/corneas clear. EOMs grossly intact. PERRL.  HENT: NC/AT, facies symmetric. Neck supple. No LAD or thyromegaly noted.  RESP: CTAB. No w/r/r.  CV: RRR, no m/r/g.  GI: NT, ND, without rebound or guarding, no CVA tenderness, no hepatomegaly appreciated.  MSK: Moves all four extremities freely.  SKIN: No significant ulcers, lesions or rashes on the visualized portions of the skin  NEURO: Alert. Oriented.  PSYCH: Linear thought process. Speech normal rate and volume. No tangential thoughts, hallucinations, or delusions.    Diagnostic Test Results: Labs reviewed in Epic    ASSESSMENT/PLAN:   Routine general medical examination at a health care facility  Reviewed PMH. Discussed healthcare maintenance issues, including cancer screenings (UTD), relevant immunizations, and cardiac risk factor screenings such as for cholesterol, HTN, and DM.    TMJ (temporomandibular joint syndrome)  Other fatigue  Following with craniofacial clinic. Will check basic labs for fatigue. Pain clinic referral placed for evaluation.  - TSH with free T4 reflex; Future  - " "CBC with platelets; Future  - Comprehensive metabolic panel; Future    Major depressive disorder, recurrent episode, mild (H)  Known issue that I take into account for their medical decisions, no current exacerbations or new concerns. Unclear if these fatigue/pain episodes are depression. She sees counselor every 2 weeks which she finds benefit from. Discussed nortriptyline (had side effects in past) and fluoxetine (she'd like to think about it).    Pituitary adenoma (H)  Known issue that I take into account for their medical decisions, no current exacerbations or new concerns. S/p resection. Follows with neurology. Defer to them.    Hyperlipidemia LDL goal <100  Fasting today.  - Lipid Profile; Future    Vitamin D deficiency  WNL on recent checks.  - Vitamin D Deficiency; Future    Patient has been advised of split billing requirements and indicates understanding: Yes     COUNSELING:  Reviewed preventive health counseling, as reflected in patient instructions    Estimated body mass index is 23.73 kg/m  as calculated from the following:    Height as of this encounter: 1.702 m (5' 7\").    Weight as of this encounter: 68.7 kg (151 lb 8 oz).    She reports that she has never smoked. She has never used smokeless tobacco.    Myke Duenas MD  [facility]  "

## 2022-01-17 NOTE — PATIENT INSTRUCTIONS
- Make an appointment with our pharmacy downstairs or stop by your preferred pharmacy to discuss obtaining the Shingrix (shingles) vaccine series  - I will send you a message on Swoop when I am able to look at the results of your tests from today

## 2022-01-20 ENCOUNTER — TELEPHONE (OUTPATIENT)
Dept: NEUROSURGERY | Facility: CLINIC | Age: 62
End: 2022-01-20
Payer: COMMERCIAL

## 2022-01-20 NOTE — TELEPHONE ENCOUNTER
Spoke to patient and let her know that we have a facial pain clinic that we could get her scheduled with for TMJ pain. Patients appt with Dr. Azevedo canceled and rescheduled with Margarita on 02/04 at 1:30 pm.

## 2022-01-24 ENCOUNTER — PRE VISIT (OUTPATIENT)
Dept: NEUROSURGERY | Facility: CLINIC | Age: 62
End: 2022-01-24
Payer: COMMERCIAL

## 2022-01-24 NOTE — TELEPHONE ENCOUNTER
FUTURE VISIT INFORMATION      FUTURE VISIT INFORMATION:    Date: 2/4/2022    Time: 130pm    Location: [facility]  REFERRAL INFORMATION:    Referring provider:  Dr. Mccabe    Referring providers clinic:  [facility]    Reason for visit/diagnosis  TMJ    RECORDS REQUESTED FROM:       Clinic name Comments Records Status Imaging Status   Internal Dr. Mccabe-1/17/2022    MR Brain-9/3/2019    Dr. Hunter-9/3/2019 [facility] PACS

## 2022-02-04 ENCOUNTER — OFFICE VISIT (OUTPATIENT)
Dept: NEUROSURGERY | Facility: CLINIC | Age: 62
End: 2022-02-04
Payer: COMMERCIAL

## 2022-02-04 VITALS
OXYGEN SATURATION: 97 % | SYSTOLIC BLOOD PRESSURE: 103 MMHG | RESPIRATION RATE: 16 BRPM | DIASTOLIC BLOOD PRESSURE: 68 MMHG | BODY MASS INDEX: 23.96 KG/M2 | WEIGHT: 153 LBS | HEART RATE: 72 BPM

## 2022-02-04 DIAGNOSIS — M26.609 TMJ (TEMPOROMANDIBULAR JOINT SYNDROME): ICD-10-CM

## 2022-02-04 DIAGNOSIS — D35.2 PITUITARY ADENOMA (H): Primary | ICD-10-CM

## 2022-02-04 PROCEDURE — 99215 OFFICE O/P EST HI 40 MIN: CPT | Performed by: NURSE PRACTITIONER

## 2022-02-04 ASSESSMENT — PAIN SCALES - GENERAL: PAINLEVEL: SEVERE PAIN (6)

## 2022-02-04 NOTE — LETTER
"2022       RE: Katie Casper  [address]  [place] [address]     Dear Colleague,    Thank you for referring your patient, Katie Casper, to the [facility] [place] at [facility]. Please see a copy of my visit note below.    [facility]  Department of Neurosurgery    Name: Katie Casper  MRN: [id number]  Age: 61 year old  : 1960  Referring provider: Arnaud Del Castillo  2022      Chief Complaint:   \"Facial Pain\"    Goal of today's appointment:  To get my facial pain under control.    History of Present Illness:  Location of pain:  Which side of your face is painful? Both right and left, right > left    Does the pain ever cross from one side to the other side of your face? Yes    Does the pain ever travel outside of your face (i.e. into your neck, behind your ear, or to the top of your head)? Yes. Pain affect the upper neck anteriorly and behind right ear. Also reports some difficulty with opening her mouth during these episodes.     Autonomic symptoms (Facial flushing, eyelid drooping or swelling, eye redness, tearing, nasal stuffiness, sweating, and ear fullness): No    Description of Pain:  Described as a \"flaming, pressure like tight feeling\".     Triggers for your Pain:  None identified.     Is it constant or intermittent:  Intermittent, but each episodes last for hours, sometimes up to 7 hours    Which of the specialists have you already seen for your facial pain?   She follows up with Wandy Torre CNP in Headache clinic. She was seen by private TMD clinic and was given a mouth guard trial for 6 months. This did not help with the pain. She was seen by Dentist and was told she has bilateral bone growth from her lower jaw.      When did your facial pain first begin?  Initially started 10 years ago when she started to have migraines, but since , facial pain " episodes have become more frequent.     Current medications for Facial Pain:  Flexeril which is helpful at times.     Previously tried medications for Facial pain/ side effects if any:  Gabapentin was used for migraines, but was ineffective    Imaging:  No new imaging    OTHER IMPORTANT MEDICAL INFORMATION:  Have you been diagnosed with multiple sclerosis?   No    Other medical co morbidities:  Migraines  Macular degeneration  h/o Pituitary adenoma, s/p resection in 2007 and 2010    Do you have a pacemaker or a history of heart conditions?   None    Do you take any prescription blood thinners? (examples: Coumadin/Warfarin, Eliquis, Plavix, Lovenox, Pradaxa, Xarelto)   None    Do you take aspirin (ASA, either 81mg or 325mg)?   No    Have you had any previous issues with anesthesia during prior surgeries?   No    Physical Exam:  /68   Pulse 72   Resp 16   Wt 69.4 kg (153 lb)   SpO2 97%   BMI 23.96 kg/m     General: No acute distress.    MSK: Moves all extremities.  No obvious deformity.  Neuro: The patient is fully oriented. Speech is normal. Extraocular movements are intact without nystagmus. Facial sensation is intact in V1, V2, V3 distributions. Facial nerve function is normal.  Shoulders are full strength. There is no pronator drift. Full strength in all extremities. Sensation intact throughout. No dysmetria with finger-nose-finger testing. Gait is normal, with normal heel-toe walking.   Psych: Normal mood and affect. Behavior is normal.      Assessment:  Atypical Facial Pain    Plan:  Will discuss with Dr. Velasco and will call the patient with recommendation   MRI with and without contrast (pituitary protocol) for h/o Pituitary adenoma, s/p resection in 2007 and 2010. This was done by Dr. Hunter. Patient will need antianxiety medication prior to MRI. She will contact the clinic once her imaging is scheduled.   TMJ clinic referral ordered    Addendum: I called the patient on 2/9/2022 to discuss  recommendations from Dr. Velasco. Referral to TMJ clinic was discussed. Per patient, she started seeing a provider at [facility] and they recommended a mouth guard for her. She will schedule an appointment with our TMJ clinic and later on decide whether she wants to continue with her current clinic or establish care at the [place] TMJ clinic.     Margarita Donis CNP  Department of Neurosurgery    I spent 40 minutes on patient care activities related to this encounter on the date of service, including time spent reviewing the chart, obtaining history and examination and in counseling the patient, and in documentation in the electronic medical record.      Again, thank you for allowing me to participate in the care of your patient.      Sincerely,    JIM Horn CNP

## 2022-02-04 NOTE — PROGRESS NOTES
"[facility]  Department of Neurosurgery      Name: Katie Casper  MRN: [id number]  Age: 61 year old  : 1960  Referring provider: Arnaud Del Castillo  2022      Chief Complaint:   \"Facial Pain\"    Goal of today's appointment:  To get my facial pain under control.    History of Present Illness:  Location of pain:  Which side of your face is painful? Both right and left, right > left    Does the pain ever cross from one side to the other side of your face? Yes    Does the pain ever travel outside of your face (i.e. into your neck, behind your ear, or to the top of your head)? Yes. Pain affect the upper neck anteriorly and behind right ear. Also reports some difficulty with opening her mouth during these episodes.     Autonomic symptoms (Facial flushing, eyelid drooping or swelling, eye redness, tearing, nasal stuffiness, sweating, and ear fullness): No    Description of Pain:  Described as a \"flaming, pressure like tight feeling\".     Triggers for your Pain:  None identified.     Is it constant or intermittent:  Intermittent, but each episodes last for hours, sometimes up to 7 hours    Which of the specialists have you already seen for your facial pain?   She follows up with Wandy Torre CNP in Headache clinic. She was seen by private TMD clinic and was given a mouth guard trial for 6 months. This did not help with the pain. She was seen by Dentist and was told she has bilateral bone growth from her lower jaw.      When did your facial pain first begin?  Initially started 10 years ago when she started to have migraines, but since , facial pain episodes have become more frequent.     Current medications for Facial Pain:  Flexeril which is helpful at times.     Previously tried medications for Facial pain/ side effects if any:  Gabapentin was used for migraines, but was ineffective    Imaging:  No new imaging    OTHER IMPORTANT MEDICAL INFORMATION:  Have you been " diagnosed with multiple sclerosis?   No    Other medical co morbidities:  Migraines  Macular degeneration  h/o Pituitary adenoma, s/p resection in 2007 and 2010    Do you have a pacemaker or a history of heart conditions?   None    Do you take any prescription blood thinners? (examples: Coumadin/Warfarin, Eliquis, Plavix, Lovenox, Pradaxa, Xarelto)   None    Do you take aspirin (ASA, either 81mg or 325mg)?   No    Have you had any previous issues with anesthesia during prior surgeries?   No    Physical Exam:  /68   Pulse 72   Resp 16   Wt 69.4 kg (153 lb)   SpO2 97%   BMI 23.96 kg/m     General: No acute distress.    MSK: Moves all extremities.  No obvious deformity.  Neuro: The patient is fully oriented. Speech is normal. Extraocular movements are intact without nystagmus. Facial sensation is intact in V1, V2, V3 distributions. Facial nerve function is normal.  Shoulders are full strength. There is no pronator drift. Full strength in all extremities. Sensation intact throughout. No dysmetria with finger-nose-finger testing. Gait is normal, with normal heel-toe walking.   Psych: Normal mood and affect. Behavior is normal.      Assessment:  Atypical Facial Pain    Plan:  Will discuss with Dr. Velasco and will call the patient with recommendation   MRI with and without contrast (pituitary protocol) for h/o Pituitary adenoma, s/p resection in 2007 and 2010. This was done by Dr. Hunter. Patient will need antianxiety medication prior to MRI. She will contact the clinic once her imaging is scheduled.   TMJ clinic referral ordered    Addendum: I called the patient on 2/9/2022 to discuss recommendations from Dr. Velasco. Referral to TMJ clinic was discussed. Per patient, she started seeing a provider at [facility] and they recommended a mouth guard for her. She will schedule an appointment with our TMJ clinic and later on decide whether she wants to continue with her current clinic or  establish care at the [facility].     Margarita Donis CNP  Department of Neurosurgery    I spent 40 minutes on patient care activities related to this encounter on the date of service, including time spent reviewing the chart, obtaining history and examination and in counseling the patient, and in documentation in the electronic medical record.

## 2022-02-04 NOTE — PATIENT INSTRUCTIONS
1. Will discuss with Dr. Velasco and will send you a message with his recommendation.    Referral to the TMJ Clinic   Taiwo Stover DDS MS  Professor & Director  Division of TMD & Orofacial Pain  [facility] [employer]    [address] [address]  Office: [phone]  Clinic: [phone]  Fax: [phone]  Clinic referrals: [email or web address]/TMDrefer Electronic referral completed.    MRI Brain with and without contrast, Pituitary Protocol Order IN  Horace will call when scheduled for anxiety medication prior to MRI.     Call Fouzia RN  Neurosurgery Care Coordinator with questions/concerns     Thank you for using [facility]

## 2022-02-08 ENCOUNTER — OFFICE VISIT (OUTPATIENT)
Dept: OPHTHALMOLOGY | Facility: CLINIC | Age: 62
End: 2022-02-08
Attending: OPHTHALMOLOGY
Payer: COMMERCIAL

## 2022-02-08 DIAGNOSIS — H43.812 VITREOUS DEGENERATION OF LEFT EYE: ICD-10-CM

## 2022-02-08 DIAGNOSIS — H35.3211 EXUDATIVE AGE-RELATED MACULAR DEGENERATION OF RIGHT EYE WITH ACTIVE CHOROIDAL NEOVASCULARIZATION (H): Primary | ICD-10-CM

## 2022-02-08 DIAGNOSIS — H04.123 DRY EYE SYNDROME OF BOTH EYES: ICD-10-CM

## 2022-02-08 DIAGNOSIS — H25.13 NUCLEAR SENILE CATARACT OF BOTH EYES: ICD-10-CM

## 2022-02-08 PROCEDURE — G0463 HOSPITAL OUTPT CLINIC VISIT: HCPCS | Performed by: TECHNICIAN/TECHNOLOGIST

## 2022-02-08 PROCEDURE — 92014 COMPRE OPH EXAM EST PT 1/>: CPT | Mod: 25 | Performed by: OPHTHALMOLOGY

## 2022-02-08 PROCEDURE — 250N000011 HC RX IP 250 OP 636: Performed by: OPHTHALMOLOGY

## 2022-02-08 PROCEDURE — 92250 FUNDUS PHOTOGRAPHY W/I&R: CPT | Performed by: OPHTHALMOLOGY

## 2022-02-08 PROCEDURE — 67028 INJECTION EYE DRUG: CPT | Mod: RT | Performed by: OPHTHALMOLOGY

## 2022-02-08 PROCEDURE — 92134 CPTRZ OPH DX IMG PST SGM RTA: CPT | Performed by: OPHTHALMOLOGY

## 2022-02-08 RX ADMIN — AFLIBERCEPT 2 MG: 40 INJECTION, SOLUTION INTRAVITREAL at 10:58

## 2022-02-08 ASSESSMENT — TONOMETRY
OS_IOP_MMHG: 18
IOP_METHOD: ICARE
OD_IOP_MMHG: 18

## 2022-02-08 ASSESSMENT — VISUAL ACUITY
OD_SC: 20/30
OD_PH_SC: 20/20
METHOD: SNELLEN - LINEAR
OS_SC: 20/20
OD_SC+: +1

## 2022-02-08 ASSESSMENT — SLIT LAMP EXAM - LIDS
COMMENTS: PAPILLOMA LL
COMMENTS: NORMAL

## 2022-02-08 ASSESSMENT — CUP TO DISC RATIO
OS_RATIO: 0.25
OD_RATIO: 0.25

## 2022-02-08 ASSESSMENT — CONF VISUAL FIELD
METHOD: COUNTING FINGERS
OD_NORMAL: 1
OS_NORMAL: 1

## 2022-02-08 ASSESSMENT — EXTERNAL EXAM - LEFT EYE: OS_EXAM: NORMAL

## 2022-02-08 ASSESSMENT — EXTERNAL EXAM - RIGHT EYE: OD_EXAM: NORMAL

## 2022-02-10 DIAGNOSIS — Z00.00 VISIT FOR PREVENTIVE HEALTH EXAMINATION: ICD-10-CM

## 2022-02-10 DIAGNOSIS — N95.2 ATROPHIC VAGINITIS: ICD-10-CM

## 2022-02-11 RX ORDER — ESTRADIOL 2 MG/1
RING VAGINAL
Qty: 4 EACH | Refills: 1 | Status: SHIPPED | OUTPATIENT
Start: 2022-02-11 | End: 2023-05-02

## 2022-02-16 ENCOUNTER — TELEPHONE (OUTPATIENT)
Dept: OBGYN | Facility: CLINIC | Age: 62
End: 2022-02-16
Payer: COMMERCIAL

## 2022-02-16 NOTE — TELEPHONE ENCOUNTER
Prior Authorization Retail Medication Request    Medication/Dose: estring 2 mg vaginally ring  Insert 1 ring vaginally every three months  ICD code (if different than what is on RX):  n95.2  Previously Tried and Failed:  Long standing use of this medication since 2016  Rationale:  Atrophic vaginitis    Insurance Name:  Blue plus  Insurance ID:  [id number]      Pharmacy Information (if different than what is on RX)  Name:  [facility]  Phone:  [phone]

## 2022-02-16 NOTE — TELEPHONE ENCOUNTER
Central Prior Authorization Team   Phone: [phone]    PA Initiation    Medication: ESTRING 2 MG vaginal ring   Insurance Company: [employer] - Phone [phone] Fax [phone]  Pharmacy Filling the Rx: [id number] - [place], [place] - [address] AT [facility] [address]  Filling Pharmacy Phone: [phone]  Filling Pharmacy Fax: [phone]  Start Date: 2/16/2022

## 2022-02-17 NOTE — TELEPHONE ENCOUNTER
Prior Authorization Approval        Authorization Effective Date: 2/3/2022  Authorization Expiration Date: 2/17/2023  Medication: ESTRING 2 MG vaginal ring-PA APPROVED   Approved Dose/Quantity:   Reference #:     Insurance Company: [employer] - Phone [phone] Fax [phone]  Expected CoPay:       CoPay Card Available:      Foundation Assistance Needed:    Which Pharmacy is filling the prescription (Not needed for infusion/clinic administered): [id number] [address] AT [facility] [address]  Pharmacy Notified: Yes- **Instructed pharmacy to notify patient when script is ready to /ship.**  Patient Notified: Yes

## 2022-03-04 ENCOUNTER — TRANSFERRED RECORDS (OUTPATIENT)
Dept: HEALTH INFORMATION MANAGEMENT | Facility: CLINIC | Age: 62
End: 2022-03-04
Payer: COMMERCIAL

## 2022-03-04 ENCOUNTER — TELEPHONE (OUTPATIENT)
Dept: NEUROSURGERY | Facility: CLINIC | Age: 62
End: 2022-03-04
Payer: COMMERCIAL

## 2022-03-16 DIAGNOSIS — H35.3211 EXUDATIVE AGE-RELATED MACULAR DEGENERATION OF RIGHT EYE WITH ACTIVE CHOROIDAL NEOVASCULARIZATION (H): Primary | ICD-10-CM

## 2022-03-23 ENCOUNTER — OFFICE VISIT (OUTPATIENT)
Dept: OPHTHALMOLOGY | Facility: CLINIC | Age: 62
End: 2022-03-23
Attending: OPHTHALMOLOGY
Payer: COMMERCIAL

## 2022-03-23 DIAGNOSIS — H04.123 DRY EYE SYNDROME OF BOTH EYES: ICD-10-CM

## 2022-03-23 DIAGNOSIS — H43.812 VITREOUS DEGENERATION OF LEFT EYE: ICD-10-CM

## 2022-03-23 DIAGNOSIS — H35.3211 EXUDATIVE AGE-RELATED MACULAR DEGENERATION OF RIGHT EYE WITH ACTIVE CHOROIDAL NEOVASCULARIZATION (H): Primary | ICD-10-CM

## 2022-03-23 DIAGNOSIS — H25.13 NUCLEAR SENILE CATARACT OF BOTH EYES: ICD-10-CM

## 2022-03-23 PROCEDURE — 92134 CPTRZ OPH DX IMG PST SGM RTA: CPT | Performed by: OPHTHALMOLOGY

## 2022-03-23 PROCEDURE — 67028 INJECTION EYE DRUG: CPT | Mod: RT | Performed by: OPHTHALMOLOGY

## 2022-03-23 PROCEDURE — 250N000011 HC RX IP 250 OP 636: Performed by: OPHTHALMOLOGY

## 2022-03-23 PROCEDURE — 92014 COMPRE OPH EXAM EST PT 1/>: CPT | Mod: 25 | Performed by: OPHTHALMOLOGY

## 2022-03-23 PROCEDURE — G0463 HOSPITAL OUTPT CLINIC VISIT: HCPCS | Mod: 25

## 2022-03-23 RX ADMIN — AFLIBERCEPT 2 MG: 40 INJECTION, SOLUTION INTRAVITREAL at 11:12

## 2022-03-23 ASSESSMENT — VISUAL ACUITY
METHOD: SNELLEN - LINEAR
OS_SC: 20/20
OS_SC+: +1
OD_SC: 20/30
OD_SC+: -2

## 2022-03-23 ASSESSMENT — EXTERNAL EXAM - LEFT EYE: OS_EXAM: NORMAL

## 2022-03-23 ASSESSMENT — CUP TO DISC RATIO
OS_RATIO: 0.25
OD_RATIO: 0.25

## 2022-03-23 ASSESSMENT — SLIT LAMP EXAM - LIDS
COMMENTS: NORMAL
COMMENTS: PAPILLOMA LL

## 2022-03-23 ASSESSMENT — TONOMETRY
OD_IOP_MMHG: 17
OS_IOP_MMHG: 15
IOP_METHOD: TONOPEN

## 2022-03-23 ASSESSMENT — CONF VISUAL FIELD
OD_NORMAL: 1
OS_NORMAL: 1
METHOD: COUNTING FINGERS

## 2022-03-23 ASSESSMENT — EXTERNAL EXAM - RIGHT EYE: OD_EXAM: NORMAL

## 2022-03-23 NOTE — PROGRESS NOTES
CC: CNVM right eye     Interval hx: had episodes of movoments of pain in the right eye, these resolved with gel tears. Vision feels similar to prior.     HPI:  Patient has a history of pituitary adenoma, s/p resection in 2007 and again in 2010 due to recurrence. Patient complains of flashes and floaters in the left eye. Dr. Neely saw retinal heme left eye and referred her. Saw Dr. Neely 6/22/20    Strong FH of AMD: mother diagnosed in her 60s, sister, and brother with wet AMD  Father with AMD diagnosed in his 80s    OCT 3/22/2022  Right eye recurrence of SRF,; PEDs slightly more elevated  Left eye stable-  No srf no irf     Assessment and Plan:  1.   Exudative AMD right eye    Recent conversion to wet form; OCTA: SubRPE neovascular membrane nasal to fovea   First fovea was not involved and VA was 20/20; gradually declined to 20/30   Did well with Eyelea at 4wks   Today with recurrence of RIF at 6wks: repeat Eylea today 3/23/2022 right eye    RTC 4-5 weeks for eylea right eye     2. Pigment epithelium detachment, drusen each eye    drusenoid PED; stable compared to previous except for #1   DDX; peripheral CNV (not likely given FA finding) vs pigment epithelium adenoma vs peripheral PED   Not symptomatic now, but could be a harbinger of PECHR lesion   Lesion smaller or almost resolved 12/22/20 and 6/15/21:likely a peripheral CNV, self limited; s/s of CNV activation and bleeding discussed    No apparent change in left eye examination 1/11/2022   Will observe for now    3. Posterior Vitreous Detachment, both eyes. Retina attached.   Educated on signs and symptoms of a retinal detachment to be seen immediately.   No peripheral traction or tear in depressed exam today    4.   Pituitary Adenoma  History of pituitary adenoma, s/p resection in 2007 and again in 2010 due to recurrence. Patient follows neurology.   Baseline visual field was done 2019. last VF within normal limits   Optic nerve head OCT was done 05/20/2020. No  optic neuropathy seen on exam.  Continue monitoring yearly with VF and ONH OCT.     5.   Cataract, both eyes.   Not visually significant. Monitor    RTC  5 weeks for OCT and optos and DFE and Eylea right eye     Lady Collins MD  Ophthalmology Resident PGY3  [facility]     Complete documentation of historical and exam elements from today's encounter can be found in the full encounter summary report (not reduplicated in this progress note). I personally obtained the chief complaint(s) and history of present illness.  I confirmed and edited as necessary the review of systems, past medical/surgical history, family history, social history, and examination findings as documented by others; and I examined the patient myself. I personally reviewed the relevant tests, images, and reports as documented above. I formulated and edited as necessary the assessment and plan and discussed the findings and management plan with the patient and family.    Cameron Clemons MD, PhD

## 2022-03-23 NOTE — NURSING NOTE
"Chief Complaints and History of Present Illnesses   Patient presents with     Macular Degeneration Follow Up     6 week follow up both eyes.     Chief Complaint(s) and History of Present Illness(es)     Macular Degeneration Follow Up     Comments: 6 week follow up both eyes.              Comments     Pt states vision has heriberto good since last visit. Pt notes a few days with \"jolting\" eye pain in the inside of her RE which lasted a few seconds. Pt used Gel drops for relief. No eye pain today.  No flashes of light. Slight increase in floaters in RE, no changes LE.    CAROLE Powell March 23, 2022 10:01 AM                    "

## 2022-03-28 ENCOUNTER — MYC MEDICAL ADVICE (OUTPATIENT)
Dept: INTERNAL MEDICINE | Facility: CLINIC | Age: 62
End: 2022-03-28
Payer: COMMERCIAL

## 2022-03-28 DIAGNOSIS — F41.9 ANXIETY: Primary | ICD-10-CM

## 2022-03-29 RX ORDER — ALPRAZOLAM 0.5 MG
TABLET ORAL
Qty: 2 TABLET | Refills: 0 | Status: SHIPPED | OUTPATIENT
Start: 2022-03-29

## 2022-04-05 ENCOUNTER — HOSPITAL ENCOUNTER (OUTPATIENT)
Dept: MRI IMAGING | Facility: CLINIC | Age: 62
Discharge: HOME | End: 2022-04-05
Attending: NURSE PRACTITIONER | Admitting: NURSE PRACTITIONER
Payer: COMMERCIAL

## 2022-04-05 DIAGNOSIS — D35.2 PITUITARY ADENOMA (H): ICD-10-CM

## 2022-04-05 PROCEDURE — A9585 GADOBUTROL INJECTION: HCPCS | Performed by: NURSE PRACTITIONER

## 2022-04-05 PROCEDURE — 255N000002 HC RX 255 OP 636: Performed by: NURSE PRACTITIONER

## 2022-04-05 PROCEDURE — 70543 MRI ORBT/FAC/NCK W/O &W/DYE: CPT

## 2022-04-05 RX ORDER — GADOBUTROL 604.72 MG/ML
7 INJECTION INTRAVENOUS ONCE
Status: COMPLETED | OUTPATIENT
Start: 2022-04-05 | End: 2022-04-05

## 2022-04-05 RX ADMIN — GADOBUTROL 7 ML: 604.72 INJECTION INTRAVENOUS at 10:53

## 2022-04-19 ENCOUNTER — VIRTUAL VISIT (OUTPATIENT)
Dept: NEUROSURGERY | Facility: CLINIC | Age: 62
End: 2022-04-19
Payer: COMMERCIAL

## 2022-04-19 DIAGNOSIS — D35.2 PITUITARY MACROADENOMA (H): Primary | ICD-10-CM

## 2022-04-19 DIAGNOSIS — H35.3211 EXUDATIVE AGE-RELATED MACULAR DEGENERATION OF RIGHT EYE WITH ACTIVE CHOROIDAL NEOVASCULARIZATION (H): Primary | ICD-10-CM

## 2022-04-19 PROCEDURE — 99207 PR NO DOCUMENTATION ON VISIT: CPT | Performed by: NEUROLOGICAL SURGERY

## 2022-04-19 NOTE — LETTER
2022     RE: Katie Casper  [address]  [place] [facility] [address]     Dear Colleague,    Thank you for referring your patient, Katie Casper, to the [facility] NEUROSURGERY CLINIC [place] at [facility]. Please see a copy of my visit note below.    Horace is a 61 year old who is being evaluated via a billable video visit.      How would you like to obtain your AVS? MyChart  If the video visit is dropped, the invitation should be resent by: Text to cell phone: [phone]  Will anyone else be joining your video visit? No      Video Start Time: 1150  Video-Visit Details    Type of service:  Video Visit    Video End Time:1210    Originating Location (pt. 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 Health has been good. TMJ pain bilaterally radiates into temples. COVID + in 2021. Macular degeneration treatment. Repeat MRI in 3 years.   See dictated note.  ONI Hunter MD    Service Date: 2022    Arnaud Del Csatillo MD  [facility]  [address] [place] [address], [address]th [place] [address]    RE:  Katie Csaper  MRN:  [id number]  :  1960    Dear Dr. Del Castillo:    We saw Ms. Casper as part of a virtual video clinic followup on 2022.  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  The last resection was in .  She has been doing reasonably well.  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  Ms. Casper's health has been good.  She has bilateral TMJ pain that radiates into both temporal regions.  She was COVID positive back in 2021 and has recovered well from this.  She is also treated for macular  degeneration.  Her peripheral vision seems to be normal.    Over the video platform, her general appearance was good.  Her speech and language were normal.  Face was symmetric.  Extraocular movements were normal.  There is no ptosis.    We reviewed her MRI from 2022 and reviewed the images with her and compared it to her previous studies.  The optic apparatus remains completely decompressed.  There is hypoenhancement in the left inferior aspect of the sella, immediately medial to the left parasellar space that measures about 7-8 mm.  This has been unchanged for the past few studies.  The suprasellar space is completely free.  The pituitary stalk is mostly midline and then deviates slightly to the right side of the sella.  Overall her imaging is stable and favorable.    IMPRESSION AND PLAN:  Given her clinical and radiographic stability, we will follow her less frequently.  We will repeat an MRI again in 3 years.    Please do not hesitate to contact us with questions.    Sincerely,    Sergey Hunter MD    D: 2022   T: 2022   MT: mahad  Name:     NICK BOYD ROBSON  MRN:      [id number]        Account:      [id number]   :      1960           Service Date: 2022   Document: [id number]

## 2022-04-19 NOTE — PROGRESS NOTES
Horace is a 61 year old who is being evaluated via a billable video visit.      How would you like to obtain your AVS? MyChart  If the video visit is dropped, the invitation should be resent by: Text to cell phone: [phone]  Will anyone else be joining your video visit? No      Video Start Time: 1150  Video-Visit Details    Type of service:  Video Visit    Video End Time:1210    Originating Location (pt. 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 Health has been good. TMJ pain bilaterally radiates into temples. COVID + in December 2021. Macular degeneration treatment. Repeat MRI in 3 years.   See dictated note.  ONI Hunter MD

## 2022-04-19 NOTE — LETTER
4/19/2022      RE: Katie Casper  [address]  [facility] [address]       Horace is a 61 year old who is being evaluated via a billable video visit.      How would you like to obtain your AVS? MyChart  If the video visit is dropped, the invitation should be resent by: Text to cell phone: [phone]  Will anyone else be joining your video visit? No      Video Start Time: 1150  Video-Visit Details    Type of service:  Video Visit    Video End Time:1210    Originating Location (pt. 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 Health has been good. TMJ pain bilaterally radiates into temples. COVID + in December 2021. Macular degeneration treatment. Repeat MRI in 3 years.   See dictated note.  MD Sergey Pulido MD

## 2022-04-21 NOTE — PROGRESS NOTES
Service Date: 2022    Arnaud Del Castillo MD  [facility]  [address], 4th [place] [address]    RE:  Katie Boyd  MRN:  [id number]  :  1960    Dear Dr. Del Castillo:    We saw Ms. Boyd as part of a virtual video clinic followup on 2022.  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  The last resection was in .  She has been doing reasonably well.  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  Ms. Boyd's health has been good.  She has bilateral TMJ pain that radiates into both temporal regions.  She was COVID positive back in 2021 and has recovered well from this.  She is also treated for macular degeneration.  Her peripheral vision seems to be normal.    Over the video platform, her general appearance was good.  Her speech and language were normal.  Face was symmetric.  Extraocular movements were normal.  There is no ptosis.    We reviewed her MRI from 2022 and reviewed the images with her and compared it to her previous studies.  The optic apparatus remains completely decompressed.  There is hypoenhancement in the left inferior aspect of the sella, immediately medial to the left parasellar space that measures about 7-8 mm.  This has been unchanged for the past few studies.  The suprasellar space is completely free.  The pituitary stalk is mostly midline and then deviates slightly to the right side of the sella.  Overall her imaging is stable and favorable.    IMPRESSION AND PLAN:  Given her clinical and radiographic stability, we will follow her less frequently.  We will repeat an MRI again in 3 years.    Please do not hesitate to contact us with questions.    Sincerely,    Sergey Hunter MD        D: 2022   T: 2022   MT: mahad    Name:     KATIE BOYD  MRN:      [id number]        Account:       [id number]   :      1960           Service Date: 2022       Document: [id number]

## 2022-04-25 DIAGNOSIS — M26.609 TMJ (TEMPOROMANDIBULAR JOINT SYNDROME): ICD-10-CM

## 2022-04-25 DIAGNOSIS — G43.709 CHRONIC MIGRAINE WITHOUT AURA: ICD-10-CM

## 2022-04-25 NOTE — TELEPHONE ENCOUNTER
Rx Authorization:  Requested Medication/ Dose TIZANIDINE 2MG TABLETS  Date last refill ordered: 4/8/21  Quantity ordered: 90 tabs  # refills: 3  Date of last clinic visit with ordering provider: 4/8/21  Date of next clinic visit with ordering provider:   All pertinent protocol data (lab date/result):   Include pertinent information from patients message:

## 2022-04-26 RX ORDER — TIZANIDINE 2 MG/1
TABLET ORAL
Qty: 90 TABLET | Refills: 3 | Status: SHIPPED | OUTPATIENT
Start: 2022-04-26 | End: 2023-03-01

## 2022-04-27 ENCOUNTER — OFFICE VISIT (OUTPATIENT)
Dept: OPHTHALMOLOGY | Facility: CLINIC | Age: 62
End: 2022-04-27
Attending: OPHTHALMOLOGY
Payer: COMMERCIAL

## 2022-04-27 DIAGNOSIS — H43.812 VITREOUS DEGENERATION OF LEFT EYE: ICD-10-CM

## 2022-04-27 DIAGNOSIS — H25.13 NUCLEAR SENILE CATARACT OF BOTH EYES: ICD-10-CM

## 2022-04-27 DIAGNOSIS — D35.2 PITUITARY ADENOMA (H): ICD-10-CM

## 2022-04-27 DIAGNOSIS — H04.123 DRY EYE SYNDROME OF BOTH EYES: ICD-10-CM

## 2022-04-27 DIAGNOSIS — H35.3211 EXUDATIVE AGE-RELATED MACULAR DEGENERATION OF RIGHT EYE WITH ACTIVE CHOROIDAL NEOVASCULARIZATION (H): Primary | ICD-10-CM

## 2022-04-27 PROCEDURE — G0463 HOSPITAL OUTPT CLINIC VISIT: HCPCS | Mod: 25

## 2022-04-27 PROCEDURE — 92014 COMPRE OPH EXAM EST PT 1/>: CPT | Mod: 25 | Performed by: OPHTHALMOLOGY

## 2022-04-27 PROCEDURE — 92134 CPTRZ OPH DX IMG PST SGM RTA: CPT | Performed by: OPHTHALMOLOGY

## 2022-04-27 PROCEDURE — 67028 INJECTION EYE DRUG: CPT | Mod: RT | Performed by: OPHTHALMOLOGY

## 2022-04-27 PROCEDURE — 250N000011 HC RX IP 250 OP 636: Performed by: OPHTHALMOLOGY

## 2022-04-27 RX ADMIN — AFLIBERCEPT 2 MG: 40 INJECTION, SOLUTION INTRAVITREAL at 11:24

## 2022-04-27 ASSESSMENT — VISUAL ACUITY
METHOD: SNELLEN - LINEAR
OS_SC: 20/20
OD_SC: 20/40
OD_SC+: +2

## 2022-04-27 ASSESSMENT — CONF VISUAL FIELD
METHOD: COUNTING FINGERS
OS_NORMAL: 1
OD_NORMAL: 1

## 2022-04-27 ASSESSMENT — TONOMETRY
IOP_METHOD: TONOPEN
OD_IOP_MMHG: 15
OS_IOP_MMHG: 16

## 2022-04-27 ASSESSMENT — SLIT LAMP EXAM - LIDS
COMMENTS: PAPILLOMA LL
COMMENTS: NORMAL

## 2022-04-27 ASSESSMENT — CUP TO DISC RATIO
OD_RATIO: 0.25
OS_RATIO: 0.25

## 2022-04-27 ASSESSMENT — EXTERNAL EXAM - RIGHT EYE: OD_EXAM: NORMAL

## 2022-04-27 ASSESSMENT — EXTERNAL EXAM - LEFT EYE: OS_EXAM: NORMAL

## 2022-04-27 NOTE — NURSING NOTE
"Chief Complaints and History of Present Illnesses   Patient presents with     Macular Degeneration Follow Up     5 week follow up for Exudative AMD right eye, Pigment epithelium detachment, drusen each eye, and PVD each eye with possible injection right eye today.   Patient states vision is stable each eye in the last few weeks. Denies new floaters or flashes. \"I use gel drops for dryness.\"       KARLI Toussaint 10:02 AM 04/27/2022       Chief Complaint(s) and History of Present Illness(es)     Macular Degeneration Follow Up     Laterality: right eye    Onset: weeks ago    Course: stable    Associated symptoms: dryness (\"always\").  Negative for eye pain    Pain scale: 0/10    Comments: 5 week follow up for Exudative AMD right eye, Pigment epithelium detachment, drusen each eye, and PVD each eye with possible injection right eye today.   Patient states vision is stable each eye in the last few weeks. Denies new floaters or flashes. \"I use gel drops for dryness.\"       KARLI Toussaint 10:02 AM 04/27/2022                  "

## 2022-04-27 NOTE — PROGRESS NOTES
CC: CNVM right eye     Interval hx: had episodes of movoments of pain in the right eye, these resolved with gel tears. Vision feels similar to prior.     HPI:  Patient has a history of pituitary adenoma, s/p resection in 2007 and again in 2010 due to recurrence. Patient complains of flashes and floaters in the left eye. Dr. Neely saw retinal heme left eye and referred her. Saw Dr. Neely 6/22/20    Strong FH of AMD: mother diagnosed in her 60s, sister, and brother with wet AMD  Father with AMD diagnosed in his 80s    OCT 3/22/2022  Right eye recurrence of SRF,; PEDs slightly more elevated  Left eye stable-  No srf no irf     Assessment and Plan:  1.   Exudative AMD right eye    Recent conversion to wet form; OCTA: SubRPE neovascular membrane nasal to fovea   First fovea was not involved and VA was 20/20; gradually declined to 20/30   Did well with Eyelea at 4wks; Recurrence of RIF at 6wks: will repeat Eylea q5 w right eye    Eylea today and RTC 5 weeks for eylea right eye     2. Pigment epithelium detachment, drusen each eye    drusenoid PED; stable compared to previous except for #1   DDX; peripheral CNV (not likely given FA finding) vs pigment epithelium adenoma vs peripheral PED   Not symptomatic now, but could be a harbinger of PECHR lesion   Lesion smaller or almost resolved 12/22/20 and 6/15/21:likely a peripheral CNV, self limited; s/s of CNV activation and bleeding discussed    No apparent change in left eye examination 1/11/2022   Will observe for now    3. Posterior Vitreous Detachment, both eyes. Retina attached.   Educated on signs and symptoms of a retinal detachment to be seen immediately.   No peripheral traction or tear in depressed exam today    4.   Pituitary Adenoma  History of pituitary adenoma, s/p resection in 2007 and again in 2010 due to recurrence. Patient follows neurology.   Baseline visual field was done 2019. last VF within normal limits   Optic nerve head OCT was done 05/20/2020. No  optic neuropathy seen on exam.  Continue monitoring yearly with VF and ONH OCT. Recent MRI brain showing stable lesion     5.   Cataract, both eyes.   Not visually significant. Monitor    RTC  5 weeks for OCTand DFE and Eylea right eye     Complete documentation of historical and exam elements from today's encounter can be found in the full encounter summary report (not reduplicated in this progress note). I personally obtained the chief complaint(s) and history of present illness.  I confirmed and edited as necessary the review of systems, past medical/surgical history, family history, social history, and examination findings as documented by others; and I examined the patient myself. I personally reviewed the relevant tests, images, and reports as documented above. I formulated and edited as necessary the assessment and plan and discussed the findings and management plan with the patient and family.    Cameron Clemons MD, PhD

## 2022-05-04 ENCOUNTER — TRANSFERRED RECORDS (OUTPATIENT)
Dept: HEALTH INFORMATION MANAGEMENT | Facility: CLINIC | Age: 62
End: 2022-05-04
Payer: COMMERCIAL

## 2022-05-11 ENCOUNTER — DOCUMENTATION ONLY (OUTPATIENT)
Dept: PHYSICAL MEDICINE AND REHAB | Facility: CLINIC | Age: 62
End: 2022-05-11
Payer: COMMERCIAL

## 2022-05-11 NOTE — PROGRESS NOTES
Please remind patients that providers are given 3-5 business days to complete and return forms.        Form type: PT Discharge      Date form received:   2022     Date form completed by Physician:  5/10/222     How was form returned to patient (mailed, faxed, or at  for patient to ):  FAXED [phone]     Date form mailed/faxed/left at  for patient and sent to HIM for scannin2022     Once form is left for patient, faxed, or mailed PCS will then close the documentation only encounter.    Radha Crook Signature  2022 Date  8:55 Time

## 2022-05-20 DIAGNOSIS — H35.3211 EXUDATIVE AGE-RELATED MACULAR DEGENERATION OF RIGHT EYE WITH ACTIVE CHOROIDAL NEOVASCULARIZATION (H): Primary | ICD-10-CM

## 2022-06-01 ENCOUNTER — OFFICE VISIT (OUTPATIENT)
Dept: OPHTHALMOLOGY | Facility: CLINIC | Age: 62
End: 2022-06-01
Attending: OPHTHALMOLOGY
Payer: COMMERCIAL

## 2022-06-01 DIAGNOSIS — H25.13 NUCLEAR SENILE CATARACT OF BOTH EYES: Primary | ICD-10-CM

## 2022-06-01 DIAGNOSIS — H35.3211 EXUDATIVE AGE-RELATED MACULAR DEGENERATION OF RIGHT EYE WITH ACTIVE CHOROIDAL NEOVASCULARIZATION (H): ICD-10-CM

## 2022-06-01 PROCEDURE — G0463 HOSPITAL OUTPT CLINIC VISIT: HCPCS | Mod: 25

## 2022-06-01 PROCEDURE — 92134 CPTRZ OPH DX IMG PST SGM RTA: CPT | Performed by: OPHTHALMOLOGY

## 2022-06-01 PROCEDURE — 250N000011 HC RX IP 250 OP 636: Performed by: OPHTHALMOLOGY

## 2022-06-01 PROCEDURE — 67028 INJECTION EYE DRUG: CPT | Mod: RT | Performed by: OPHTHALMOLOGY

## 2022-06-01 RX ADMIN — AFLIBERCEPT 2 MG: 40 INJECTION, SOLUTION INTRAVITREAL at 11:10

## 2022-06-01 ASSESSMENT — VISUAL ACUITY
OS_SC: 20/20
OD_SC+: +2
OS_SC+: -1
METHOD: SNELLEN - LINEAR
OD_SC: 20/40

## 2022-06-01 ASSESSMENT — SLIT LAMP EXAM - LIDS
COMMENTS: PAPILLOMA LL
COMMENTS: NORMAL

## 2022-06-01 ASSESSMENT — TONOMETRY
OD_IOP_MMHG: 14
IOP_METHOD: TONOPEN
OS_IOP_MMHG: 10

## 2022-06-01 ASSESSMENT — CONF VISUAL FIELD
OD_NORMAL: 1
OS_NORMAL: 1

## 2022-06-01 ASSESSMENT — EXTERNAL EXAM - LEFT EYE: OS_EXAM: NORMAL

## 2022-06-01 ASSESSMENT — CUP TO DISC RATIO
OD_RATIO: 0.25
OS_RATIO: 0.25

## 2022-06-01 ASSESSMENT — EXTERNAL EXAM - RIGHT EYE: OD_EXAM: NORMAL

## 2022-06-01 NOTE — NURSING NOTE
Chief Complaints and History of Present Illnesses   Patient presents with     Follow Up     Chief Complaint(s) and History of Present Illness(es)     Follow Up               Comments     Pt states that she occasionally still sees some small floaters, but otherwise her vision is doing well. She denies pain, other concerns.    Deion Shaikh OT 9:47 AM June 1, 2022

## 2022-06-01 NOTE — PROGRESS NOTES
CC: CNVM right eye     Interval hx: Vision stable, no new concerns.     HPI:  Patient has a history of pituitary adenoma, s/p resection in 2007 and again in 2010 due to recurrence. Patient complains of flashes and floaters in the left eye. Dr. Neely saw retinal heme left eye and referred her. Saw Dr. Neely 6/22/20    Strong FH of AMD: mother diagnosed in her 60s, sister, and brother with wet AMD  Father with AMD diagnosed in his 80s    OCT 6/1/2022  Right eye stable resolved SRF; PEDs stable  Left eye stable-  No srf no irf, stable drusen     Assessment and Plan:  1.   Exudative AMD right eye    Recent conversion to wet form; OCTA: SubRPE neovascular membrane nasal to fovea   First fovea was not involved and VA was 20/20; gradually declined to 20/30   Did well with Eyelea at 4wks; Recurrence of RIF at 6wks: will repeat Eylea q5 w right eye    Eylea today and RTC 5 weeks for eylea right eye (last Eylea 4/27/22 5 weeks, 5 week interval)   Does not tolerate AREDS due to migraines    2. Pigment epithelium detachment, drusen each eye    drusenoid PED; stable compared to previous except for #1   DDX; peripheral CNV (not likely given FA finding) vs pigment epithelium adenoma vs peripheral PED   Not symptomatic now, but could be a harbinger of PECHR lesion   Lesion smaller or almost resolved 12/22/20 and 6/15/21:likely a peripheral CNV, self limited; s/s of CNV activation and bleeding discussed    No apparent change in left eye examination 1/11/2022   Will observe for now    3. Posterior Vitreous Detachment, both eyes. Retina attached.   Educated on signs and symptoms of a retinal detachment to be seen immediately.   No peripheral traction or tear in depressed exam today    4.   Pituitary Adenoma  History of pituitary adenoma, s/p resection in 2007 and again in 2010 due to recurrence. Patient follows neurology.   Baseline visual field was done 2019. last VF within normal limits   Optic nerve head OCT was done 05/20/2020. No  optic neuropathy seen on exam.  Continue monitoring yearly with VF and ONH OCT. Recent MRI brain showing stable lesion     5.   Cataract, both eyes.   Not visually significant. Monitor    RTC  5 weeks for Natalya DFE and Eylea right eye         Spring Max MD  Ophthalmology Resident, PGY-3  [facility]     Complete documentation of historical and exam elements from today's encounter can be found in the full encounter summary report (not reduplicated in this progress note). I personally obtained the chief complaint(s) and history of present illness.  I confirmed and edited as necessary the review of systems, past medical/surgical history, family history, social history, and examination findings as documented by others; and I examined the patient myself. I personally reviewed the relevant tests, images, and reports as documented above. I formulated and edited as necessary the assessment and plan and discussed the findings and management plan with the patient and family.    Cameron Clemons MD, PhD

## 2022-06-06 ENCOUNTER — TRANSFERRED RECORDS (OUTPATIENT)
Dept: HEALTH INFORMATION MANAGEMENT | Facility: CLINIC | Age: 62
End: 2022-06-06

## 2022-06-07 ENCOUNTER — DOCUMENTATION ONLY (OUTPATIENT)
Dept: PHYSICAL MEDICINE AND REHAB | Facility: CLINIC | Age: 62
End: 2022-06-07

## 2022-06-21 DIAGNOSIS — H35.3211 EXUDATIVE AGE-RELATED MACULAR DEGENERATION OF RIGHT EYE WITH ACTIVE CHOROIDAL NEOVASCULARIZATION (H): Primary | ICD-10-CM

## 2022-07-06 ENCOUNTER — OFFICE VISIT (OUTPATIENT)
Dept: OPHTHALMOLOGY | Facility: CLINIC | Age: 62
End: 2022-07-06
Attending: OPHTHALMOLOGY
Payer: COMMERCIAL

## 2022-07-06 DIAGNOSIS — H35.3211 EXUDATIVE AGE-RELATED MACULAR DEGENERATION OF RIGHT EYE WITH ACTIVE CHOROIDAL NEOVASCULARIZATION (H): ICD-10-CM

## 2022-07-06 PROCEDURE — 99214 OFFICE O/P EST MOD 30 MIN: CPT | Mod: 25 | Performed by: STUDENT IN AN ORGANIZED HEALTH CARE EDUCATION/TRAINING PROGRAM

## 2022-07-06 PROCEDURE — 67028 INJECTION EYE DRUG: CPT | Mod: RT | Performed by: STUDENT IN AN ORGANIZED HEALTH CARE EDUCATION/TRAINING PROGRAM

## 2022-07-06 PROCEDURE — 92134 CPTRZ OPH DX IMG PST SGM RTA: CPT | Mod: 26 | Performed by: STUDENT IN AN ORGANIZED HEALTH CARE EDUCATION/TRAINING PROGRAM

## 2022-07-06 PROCEDURE — 250N000011 HC RX IP 250 OP 636: Performed by: STUDENT IN AN ORGANIZED HEALTH CARE EDUCATION/TRAINING PROGRAM

## 2022-07-06 PROCEDURE — 67028 INJECTION EYE DRUG: CPT | Mod: RT | Performed by: OPHTHALMOLOGY

## 2022-07-06 PROCEDURE — 92134 CPTRZ OPH DX IMG PST SGM RTA: CPT | Performed by: OPHTHALMOLOGY

## 2022-07-06 PROCEDURE — 250N000011 HC RX IP 250 OP 636: Performed by: OPHTHALMOLOGY

## 2022-07-06 PROCEDURE — G0463 HOSPITAL OUTPT CLINIC VISIT: HCPCS | Mod: 25

## 2022-07-06 RX ADMIN — AFLIBERCEPT 2 MG: 40 INJECTION, SOLUTION INTRAVITREAL at 15:02

## 2022-07-06 RX ADMIN — AFLIBERCEPT 2 MG: 40 INJECTION, SOLUTION INTRAVITREAL at 10:52

## 2022-07-06 ASSESSMENT — TONOMETRY
OD_IOP_MMHG: 16
IOP_METHOD: TONOPEN
OS_IOP_MMHG: 16

## 2022-07-06 ASSESSMENT — VISUAL ACUITY
OS_SC+: +2
OS_SC: 20/20
METHOD: SNELLEN - LINEAR
OD_SC+: +1
OD_SC: 20/40
OD_PH_SC: 20/30

## 2022-07-06 ASSESSMENT — EXTERNAL EXAM - RIGHT EYE: OD_EXAM: NORMAL

## 2022-07-06 ASSESSMENT — SLIT LAMP EXAM - LIDS
COMMENTS: NORMAL
COMMENTS: PAPILLOMA LL

## 2022-07-06 ASSESSMENT — CUP TO DISC RATIO
OD_RATIO: 0.25
OS_RATIO: 0.25

## 2022-07-06 ASSESSMENT — CONF VISUAL FIELD
OS_NORMAL: 1
METHOD: COUNTING FINGERS
OD_NORMAL: 1

## 2022-07-06 ASSESSMENT — EXTERNAL EXAM - LEFT EYE: OS_EXAM: NORMAL

## 2022-07-06 NOTE — NURSING NOTE
Chief Complaints and History of Present Illnesses   Patient presents with     Macular Degeneration Follow Up     5 week follow up both eyes.     Chief Complaint(s) and History of Present Illness(es)     Macular Degeneration Follow Up     Comments: 5 week follow up both eyes.              Comments     Pt states vision is the same as last visit. No new flashes or floaters  No eye pain today. No redness. Dryness in BE, better with gel drops at night.    CAROLE Powell July 6, 2022 9:43 AM

## 2022-07-06 NOTE — PROGRESS NOTES
CC: CNVM right eye     Interval hx: Vision stable, no new concerns since last visit early June 2022.     HPI:  Patient has a history of pituitary adenoma, s/p resection in 2007 and again in 2010 due to recurrence. Patient complains of flashes and floaters in the left eye. Dr. Neely saw retinal heme left eye and referred her. Saw Dr. Neely 6/22/20    Strong FH of AMD: mother diagnosed in her 60s, sister, and brother with wet AMD  Father with AMD diagnosed in his 80s    OCT 7/6/2022 vs 6/1/2022  Right eye: recurrence of SRF above PED; PEDs stable  Left eye stable-  No srf no irf, stable drusen     Assessment and Plan:  1.   Exudative AMD right eye    Recent conversion to wet form; OCTA: SubRPE neovascular membrane nasal to fovea   First fovea was not involved and VA was 20/20; gradually declined to 20/30   Did well with Eyelea at 4wks; Recurrence of RIF at 6wks: will repeat Eylea q5 w right eye   Eylea #7 right (07/06/2022, started 12/15/2021)  Plan   Eylea today and RTC 5 weeks for eylea right eye (last Eylea 4/27/22 5 weeks, 5 weeks interval); discussed Vabysmo in future   Does not tolerate AREDS due to migraines    2. Pigment epithelium detachment, drusen each eye    drusenoid PED; stable compared to previous except for #1   DDX; peripheral CNV (not likely given FA finding) vs pigment epithelium adenoma vs peripheral PED   Not symptomatic now, but could be a harbinger of PECHR lesion   Lesion smaller or almost resolved 12/22/20 and 6/15/21:likely a peripheral CNV, self limited; s/s of CNV activation and bleeding discussed    No apparent change in left eye examination 1/11/2022   Will observe for now    3. Posterior Vitreous Detachment, both eyes. Retina attached.   Educated on signs and symptoms of a retinal detachment to be seen immediately.   No peripheral traction or tear in depressed exam today    4.   Pituitary Adenoma  History of pituitary adenoma, s/p resection in 2007 and again in 2010 due to recurrence.  Patient follows neurology.   Baseline visual field was done 2019. last VF within normal limits   Optic nerve head OCT was done 05/20/2020. No optic neuropathy seen on exam.  Continue monitoring yearly with VF and ONH OCT. Recent MRI brain showing stable lesion     5.   Cataract, both eyes.   Not visually significant. Monitor      RTC  5 weeks for OCT, DFE, and Eylea right eye       My privilege to be part of your care,  Andrew Salmeron MD, MSc  Ophthalmology PGY-3 resident physician  Pager: [phone]      Complete documentation of historical and exam elements from today's encounter can be found in the full encounter summary report (not reduplicated in this progress note). I personally obtained the chief complaint(s) and history of present illness.  I confirmed and edited as necessary the review of systems, past medical/surgical history, family history, social history, and examination findings as documented by others; and I examined the patient myself. I personally reviewed the relevant tests, images, and reports as documented above. I formulated and edited as necessary the assessment and plan and discussed the findings and management plan with the patient and family.    Cameron Clemons MD, PhD

## 2022-07-25 ENCOUNTER — TRANSFERRED RECORDS (OUTPATIENT)
Dept: HEALTH INFORMATION MANAGEMENT | Facility: CLINIC | Age: 62
End: 2022-07-25

## 2022-07-26 ENCOUNTER — DOCUMENTATION ONLY (OUTPATIENT)
Dept: PHYSICAL MEDICINE AND REHAB | Facility: CLINIC | Age: 62
End: 2022-07-26

## 2022-07-26 DIAGNOSIS — H35.3211 EXUDATIVE AGE-RELATED MACULAR DEGENERATION OF RIGHT EYE WITH ACTIVE CHOROIDAL NEOVASCULARIZATION (H): Primary | ICD-10-CM

## 2022-07-26 NOTE — PROGRESS NOTES
Please remind patients that providers are given 3-5 business days to complete and return forms.        Form type: PT Re certification note, placed in Dr Lagunas's folder for completion.     Date form received:   22     Date form completed by Physician:  22     How was form returned to patient (mailed, faxed, or at  for patient to ):  FAXED [phone]     Date form mailed/faxed/left at  for patient and sent to HIM for [email or web address]     Once form is left for patient, faxed, or mailed PCS will then close the documentation only encounter.    MisganaSignature  22Date  12:58pmTime

## 2022-08-10 ENCOUNTER — OFFICE VISIT (OUTPATIENT)
Dept: OPHTHALMOLOGY | Facility: CLINIC | Age: 62
End: 2022-08-10
Attending: OPHTHALMOLOGY
Payer: COMMERCIAL

## 2022-08-10 DIAGNOSIS — H25.13 NUCLEAR SENILE CATARACT OF BOTH EYES: ICD-10-CM

## 2022-08-10 DIAGNOSIS — H04.123 DRY EYE SYNDROME OF BOTH EYES: ICD-10-CM

## 2022-08-10 DIAGNOSIS — H43.812 VITREOUS DEGENERATION OF LEFT EYE: ICD-10-CM

## 2022-08-10 DIAGNOSIS — H35.3211 EXUDATIVE AGE-RELATED MACULAR DEGENERATION OF RIGHT EYE WITH ACTIVE CHOROIDAL NEOVASCULARIZATION (H): Primary | ICD-10-CM

## 2022-08-10 PROCEDURE — 250N000011 HC RX IP 250 OP 636: Performed by: OPHTHALMOLOGY

## 2022-08-10 PROCEDURE — G0463 HOSPITAL OUTPT CLINIC VISIT: HCPCS | Mod: 25

## 2022-08-10 PROCEDURE — 92134 CPTRZ OPH DX IMG PST SGM RTA: CPT | Performed by: OPHTHALMOLOGY

## 2022-08-10 PROCEDURE — 92014 COMPRE OPH EXAM EST PT 1/>: CPT | Mod: 25 | Performed by: OPHTHALMOLOGY

## 2022-08-10 PROCEDURE — 67028 INJECTION EYE DRUG: CPT | Mod: RT | Performed by: OPHTHALMOLOGY

## 2022-08-10 RX ADMIN — AFLIBERCEPT 2 MG: 40 INJECTION, SOLUTION INTRAVITREAL at 11:05

## 2022-08-10 ASSESSMENT — EXTERNAL EXAM - LEFT EYE: OS_EXAM: NORMAL

## 2022-08-10 ASSESSMENT — TONOMETRY
IOP_METHOD: TONOPEN
OS_IOP_MMHG: 17
OD_IOP_MMHG: 17

## 2022-08-10 ASSESSMENT — VISUAL ACUITY
OD_SC+: -2
OD_SC: 20/25
OS_SC: 20/20
METHOD: SNELLEN - LINEAR

## 2022-08-10 ASSESSMENT — CONF VISUAL FIELD
OS_NORMAL: 1
OD_NORMAL: 1

## 2022-08-10 ASSESSMENT — SLIT LAMP EXAM - LIDS
COMMENTS: PAPILLOMA LL
COMMENTS: NORMAL

## 2022-08-10 ASSESSMENT — CUP TO DISC RATIO
OS_RATIO: 0.25
OD_RATIO: 0.25

## 2022-08-10 ASSESSMENT — EXTERNAL EXAM - RIGHT EYE: OD_EXAM: NORMAL

## 2022-08-10 NOTE — NURSING NOTE
Chief Complaints and History of Present Illnesses   Patient presents with     Macular Degeneration Follow Up     Chief Complaint(s) and History of Present Illness(es)     Macular Degeneration Follow Up     Laterality: both eyes    Onset: 5 weeks ago              Comments     Pt. States that she is doing well. No change in VA BE. No pain BE. Did notice yesterday that there were letters missing while reading. Has since resolved.   Maru Phelps COT 10:02 AM August 10, 2022

## 2022-08-10 NOTE — PROGRESS NOTES
CC: CNVM right eye     Interval hx: Vision much better right eye, no new concerns since last visit early June 2022.     HPI:  Patient has a history of pituitary adenoma, s/p resection in 2007 and again in 2010 due to recurrence. Patient complains of flashes and floaters in the left eye. Dr. Neely saw retinal heme left eye and referred her. Saw Dr. Neely 6/22/20    Strong FH of AMD: mother diagnosed in her 60s, sister, and brother with wet AMD  Father with AMD diagnosed in his 80s    OCT 7/6/2022 vs 6/1/2022  Right eye: recurrence of SRF above PED; PEDs stable  Left eye stable-  No srf no irf, stable drusen     Assessment and Plan:  1.   Exudative AMD right eye    Recent conversion to wet form; OCTA: SubRPE neovascular membrane nasal to fovea   First fovea was not involved and VA was 20/20; gradually declined to 20/30; today 20/25   Recurrence of RIF at 6wks: will repeat Eylea q5 w right eye   Eylea #8 right (last injection 07/06/2022, started 12/15/2021)  Plan   Eylea today and RTC 5 weeks for eylea right eye   Does not tolerate AREDS due to migraines    2. Pigment epithelium detachment, drusen each eye    drusenoid PED; stable compared to previous except for #1   DDX; peripheral CNV (not likely given FA finding) vs pigment epithelium adenoma vs peripheral PED   Not symptomatic now, but could be a harbinger of PECHR lesion   Lesion smaller or almost resolved 12/22/20 and 6/15/21:likely a peripheral CNV, self limited; s/s of CNV activation and bleeding discussed    No apparent change in left eye examination 1/11/2022   Will observe for now    3. Posterior Vitreous Detachment, both eyes. Retina attached.   Educated on signs and symptoms of a retinal detachment to be seen immediately.   No peripheral traction or tear in depressed exam today    4.   Pituitary Adenoma  History of pituitary adenoma, s/p resection in 2007 and again in 2010 due to recurrence. Patient follows neurology.   Baseline visual field was done 2019.  last VF within normal limits   Optic nerve head OCT was done 05/20/2020. No optic neuropathy seen on exam.  Continue monitoring yearly with VF and ONH OCT. Recent MRI brain showing stable lesion     5.   Cataract, both eyes.   Not visually significant. Monitor      RTC  5 weeks for OCT, DFE, and Eylea right eye       Complete documentation of historical and exam elements from today's encounter can be found in the full encounter summary report (not reduplicated in this progress note). I personally obtained the chief complaint(s) and history of present illness.  I confirmed and edited as necessary the review of systems, past medical/surgical history, family history, social history, and examination findings as documented by others; and I examined the patient myself. I personally reviewed the relevant tests, images, and reports as documented above. I formulated and edited as necessary the assessment and plan and discussed the findings and management plan with the patient and family.    Cameron Clemons MD, PhD

## 2022-09-07 DIAGNOSIS — H35.3211 EXUDATIVE AGE-RELATED MACULAR DEGENERATION OF RIGHT EYE WITH ACTIVE CHOROIDAL NEOVASCULARIZATION (H): Primary | ICD-10-CM

## 2022-09-14 ENCOUNTER — OFFICE VISIT (OUTPATIENT)
Dept: OPHTHALMOLOGY | Facility: CLINIC | Age: 62
End: 2022-09-14
Attending: OPHTHALMOLOGY
Payer: COMMERCIAL

## 2022-09-14 DIAGNOSIS — H35.3211 EXUDATIVE AGE-RELATED MACULAR DEGENERATION OF RIGHT EYE WITH ACTIVE CHOROIDAL NEOVASCULARIZATION (H): Primary | ICD-10-CM

## 2022-09-14 DIAGNOSIS — H43.812 VITREOUS DEGENERATION OF LEFT EYE: ICD-10-CM

## 2022-09-14 DIAGNOSIS — H04.123 DRY EYE SYNDROME OF BOTH EYES: ICD-10-CM

## 2022-09-14 DIAGNOSIS — H25.13 NUCLEAR SENILE CATARACT OF BOTH EYES: ICD-10-CM

## 2022-09-14 PROCEDURE — 67028 INJECTION EYE DRUG: CPT | Mod: RT | Performed by: OPHTHALMOLOGY

## 2022-09-14 PROCEDURE — G0463 HOSPITAL OUTPT CLINIC VISIT: HCPCS | Mod: 25

## 2022-09-14 PROCEDURE — 92014 COMPRE OPH EXAM EST PT 1/>: CPT | Mod: 25 | Performed by: OPHTHALMOLOGY

## 2022-09-14 PROCEDURE — 250N000011 HC RX IP 250 OP 636: Performed by: OPHTHALMOLOGY

## 2022-09-14 PROCEDURE — 92134 CPTRZ OPH DX IMG PST SGM RTA: CPT | Performed by: OPHTHALMOLOGY

## 2022-09-14 RX ADMIN — AFLIBERCEPT 2 MG: 40 INJECTION, SOLUTION INTRAVITREAL at 11:19

## 2022-09-14 ASSESSMENT — TONOMETRY
OD_IOP_MMHG: 20
IOP_METHOD: TONOPEN
OS_IOP_MMHG: 18

## 2022-09-14 ASSESSMENT — CONF VISUAL FIELD
OS_NORMAL: 1
OD_NORMAL: 1

## 2022-09-14 ASSESSMENT — VISUAL ACUITY
OD_SC: 20/25
METHOD: SNELLEN - LINEAR
OS_SC: 20/20

## 2022-09-14 ASSESSMENT — CUP TO DISC RATIO
OD_RATIO: 0.25
OS_RATIO: 0.25

## 2022-09-14 ASSESSMENT — SLIT LAMP EXAM - LIDS
COMMENTS: NORMAL
COMMENTS: PAPILLOMA LL

## 2022-09-14 ASSESSMENT — EXTERNAL EXAM - RIGHT EYE: OD_EXAM: NORMAL

## 2022-09-14 ASSESSMENT — EXTERNAL EXAM - LEFT EYE: OS_EXAM: NORMAL

## 2022-09-14 NOTE — NURSING NOTE
Chief Complaints and History of Present Illnesses   Patient presents with     Follow Up     5 week follow up AMD   Here for injection RE  Pt report vision seems stable  Takes time for vision to adjust from dark to light x3 months  Genteal gel every day BE  Sindhu Gonzales [facility] 10:00 AM September 14, 2022          Chief Complaint(s) and History of Present Illness(es)     Follow Up     Laterality: both eyes    Associated symptoms: dryness.  Negative for eye pain, flashes and floaters    Treatments tried: artificial tears    Pain scale: 0/10    Comments: 5 week follow up AMD   Here for injection RE  Pt report vision seems stable  Takes time for vision to adjust from dark to light x3 months  Genteal gel every day BE  Sindhu Gonzales [facility] 10:00 AM September 14, 2022

## 2022-09-14 NOTE — PROGRESS NOTES
CC: CNVM right eye     Interval hx: Vision much better right eye, no new concerns since last visit early June 2022.     HPI:  Patient has a history of pituitary adenoma, s/p resection in 2007 and again in 2010 due to recurrence. Patient complains of flashes and floaters in the left eye. Dr. Neely saw retinal heme left eye and referred her. Saw Dr. Neely 6/22/20    Strong FH of AMD: mother diagnosed in her 60s, sister, and brother with wet AMD  Father with AMD diagnosed in his 80s    OCT 09/14/22 c/w 8/2022  Right eye: PEDs stable, no SRF  Left eye stable-  No srf no irf, stable drusen     Assessment and Plan:  1.   Exudative AMD right eye    Recent conversion to wet form; OCTA: SubRPE neovascular membrane nasal to fovea   First fovea was not involved and VA was 20/20; gradually declined to 20/30; today 20/25   Recurrence of RIF at 6wks: will repeat Eylea q5 w right eye   S/P Eylea right (last injection 08/10/2022, started 12/15/2021)  Plan   Eylea today and RTC 5 weeks for eylea right eye; in future, we try to extend intervals again, if we fail extending intervals, we may switch to Vabysmo   Does not tolerate AREDS due to migraines    2. Pigment epithelium detachment, drusen each eye    drusenoid PED; stable compared to previous except for #1   DDX; peripheral CNV (not likely given FA finding) vs pigment epithelium adenoma vs peripheral PED   Not symptomatic now, but could be a harbinger of PECHR lesion   Lesion smaller or almost resolved 12/22/20 and 6/15/21:likely a peripheral CNV, self limited; s/s of CNV activation and bleeding discussed    No apparent change in left eye examination 1/11/2022   Will observe for now    3. Posterior Vitreous Detachment, both eyes. Retina attached.   Educated on signs and symptoms of a retinal detachment to be seen immediately.   No peripheral traction or tear in depressed exam today    4.   Pituitary Adenoma  History of pituitary adenoma, s/p resection in 2007 and again in 2010  due to recurrence. Patient follows neurology.   Baseline visual field was done 2019. last VF within normal limits   Optic nerve head OCT was done 05/20/2020. No optic neuropathy seen on exam.  Continue monitoring yearly with VF and ONH OCT. Recent MRI brain showing stable lesion     5.   Cataract, both eyes.   Not visually significant. Monitor      RTC  5 weeks for OCT, DFE, and Eylea right eye     Timo Sterling MD  Resident Physician, PGY-3  Department of Ophthalmology  09/14/22 10:19 AM        Complete documentation of historical and exam elements from today's encounter can be found in the full encounter summary report (not reduplicated in this progress note). I personally obtained the chief complaint(s) and history of present illness.  I confirmed and edited as necessary the review of systems, past medical/surgical history, family history, social history, and examination findings as documented by others; and I examined the patient myself. I personally reviewed the relevant tests, images, and reports as documented above. I formulated and edited as necessary the assessment and plan and discussed the findings and management plan with the patient and family.    Cameron Clemons MD, PhD

## 2022-09-24 ENCOUNTER — MYC MEDICAL ADVICE (OUTPATIENT)
Dept: INTERNAL MEDICINE | Facility: CLINIC | Age: 62
End: 2022-09-24

## 2022-10-05 DIAGNOSIS — H35.3211 EXUDATIVE AGE-RELATED MACULAR DEGENERATION OF RIGHT EYE WITH ACTIVE CHOROIDAL NEOVASCULARIZATION (H): Primary | ICD-10-CM

## 2022-10-10 ENCOUNTER — HEALTH MAINTENANCE LETTER (OUTPATIENT)
Age: 62
End: 2022-10-10

## 2022-10-19 ENCOUNTER — OFFICE VISIT (OUTPATIENT)
Dept: OPHTHALMOLOGY | Facility: CLINIC | Age: 62
End: 2022-10-19
Attending: OPHTHALMOLOGY
Payer: COMMERCIAL

## 2022-10-19 DIAGNOSIS — H43.812 VITREOUS DEGENERATION OF LEFT EYE: ICD-10-CM

## 2022-10-19 DIAGNOSIS — H35.3122 INTERMEDIATE STAGE NONEXUDATIVE AGE-RELATED MACULAR DEGENERATION OF LEFT EYE: ICD-10-CM

## 2022-10-19 DIAGNOSIS — H25.13 NUCLEAR SENILE CATARACT OF BOTH EYES: ICD-10-CM

## 2022-10-19 DIAGNOSIS — H35.3211 EXUDATIVE AGE-RELATED MACULAR DEGENERATION OF RIGHT EYE WITH ACTIVE CHOROIDAL NEOVASCULARIZATION (H): Primary | ICD-10-CM

## 2022-10-19 DIAGNOSIS — H04.123 DRY EYE SYNDROME OF BOTH EYES: ICD-10-CM

## 2022-10-19 PROCEDURE — 67028 INJECTION EYE DRUG: CPT | Mod: RT | Performed by: OPHTHALMOLOGY

## 2022-10-19 PROCEDURE — 99214 OFFICE O/P EST MOD 30 MIN: CPT | Mod: 25 | Performed by: OPHTHALMOLOGY

## 2022-10-19 PROCEDURE — 250N000011 HC RX IP 250 OP 636: Performed by: OPHTHALMOLOGY

## 2022-10-19 PROCEDURE — G0463 HOSPITAL OUTPT CLINIC VISIT: HCPCS | Mod: 25

## 2022-10-19 PROCEDURE — 92134 CPTRZ OPH DX IMG PST SGM RTA: CPT | Performed by: OPHTHALMOLOGY

## 2022-10-19 RX ADMIN — AFLIBERCEPT 2 MG: 40 INJECTION, SOLUTION INTRAVITREAL at 11:10

## 2022-10-19 ASSESSMENT — CONF VISUAL FIELD
OD_SUPERIOR_TEMPORAL_RESTRICTION: 0
OS_INFERIOR_TEMPORAL_RESTRICTION: 0
OD_INFERIOR_TEMPORAL_RESTRICTION: 0
OD_INFERIOR_NASAL_RESTRICTION: 0
OD_NORMAL: 1
OS_SUPERIOR_NASAL_RESTRICTION: 0
OS_NORMAL: 1
OD_SUPERIOR_NASAL_RESTRICTION: 0
OS_INFERIOR_NASAL_RESTRICTION: 0
METHOD: COUNTING FINGERS
OS_SUPERIOR_TEMPORAL_RESTRICTION: 0

## 2022-10-19 ASSESSMENT — SLIT LAMP EXAM - LIDS
COMMENTS: NORMAL
COMMENTS: PAPILLOMA LL

## 2022-10-19 ASSESSMENT — TONOMETRY
OD_IOP_MMHG: 14
OS_IOP_MMHG: 14
IOP_METHOD: TONOPEN

## 2022-10-19 ASSESSMENT — EXTERNAL EXAM - RIGHT EYE: OD_EXAM: NORMAL

## 2022-10-19 ASSESSMENT — VISUAL ACUITY
OS_SC: 20/20
OD_PH_SC+: +2
OD_SC: 20/30
METHOD: SNELLEN - LINEAR
OD_PH_SC: 20/25

## 2022-10-19 ASSESSMENT — EXTERNAL EXAM - LEFT EYE: OS_EXAM: NORMAL

## 2022-10-19 ASSESSMENT — CUP TO DISC RATIO
OS_RATIO: 0.25
OD_RATIO: 0.25

## 2022-10-19 NOTE — NURSING NOTE
Chief Complaints and History of Present Illnesses   Patient presents with     Macular Degeneration Follow Up     Chief Complaint(s) and History of Present Illness(es)     Macular Degeneration Follow Up            Laterality: right eye    Frequency: constantly    Timing: throughout the day    Course: stable    Associated symptoms: Negative for eye pain, flashes and floaters    Pain scale: 0/10          Comments    Pt states no new VA changes noted / denies flashes, floaters or eye pain.  Sandra Banks, COT COT 9:52 AM 10/19/2022

## 2022-10-19 NOTE — PROGRESS NOTES
CC: CNVM right eye     Interval hx: Vision much better right eye, no new concerns since last visit early June 2022.     HPI:  Patient has a history of pituitary adenoma, s/p resection in 2007 and again in 2010 due to recurrence. Patient complains of flashes and floaters in the left eye. Dr. Neely saw retinal heme left eye and referred her. Saw Dr. Neely 6/22/20    Strong FH of AMD: mother diagnosed in her 60s, sister, and brother with wet AMD  Father with AMD diagnosed in his 80s    OCT 09/14/22 c/w 8/2022  Right eye: PEDs stable, no SRF  Left eye stable-  No srf no irf, stable drusen     Assessment and Plan:  1.   Exudative AMD right eye    Recent conversion to wet form; OCTA: SubRPE neovascular membrane nasal to fovea   First fovea was not involved and VA was 20/20; gradually declined to 20/30; today 20/25   Recurrence of RIF at 6wks: will repeat Eylea q5 w right eye   S/P Eylea right (last injection 9/14/2022, started 12/15/2021)  Plan   Eylea today and RTC 5 weeks for eylea right eye; in future, we try to extend intervals again, if we fail extending intervals, we may switch to Vabysmo   Does not tolerate AREDS due to migraines    2. Pigment epithelium detachment, drusen each eye    drusenoid PED; stable compared to previous except for #1   DDX; peripheral CNV (not likely given FA finding) vs pigment epithelium adenoma vs peripheral PED   Not symptomatic now, but could be a harbinger of PECHR lesion   Lesion smaller or almost resolved 12/22/20 and 6/15/21:likely a peripheral CNV, self limited; s/s of CNV activation and bleeding discussed    No apparent change in left eye examination 1/11/2022   Will observe for now    3. Posterior Vitreous Detachment, both eyes. Retina attached.   Educated on signs and symptoms of a retinal detachment to be seen immediately.   No peripheral traction or tear in depressed exam today    4.   Pituitary Adenoma  History of pituitary adenoma, s/p resection in 2007 and again in 2010 due  to recurrence. Patient follows neurology.   Baseline visual field was done 2019. last VF within normal limits   Optic nerve head OCT was done 05/20/2020. No optic neuropathy seen on exam.  Continue monitoring yearly with VF and ONH OCT. Recent MRI brain showing stable lesion     5.   Cataract, both eyes.   Not visually significant. Monitor      RTC  5 weeks for OCT, DFE, and Eylea right eye     Complete documentation of historical and exam elements from today's encounter can be found in the full encounter summary report (not reduplicated in this progress note). I personally obtained the chief complaint(s) and history of present illness.  I confirmed and edited as necessary the review of systems, past medical/surgical history, family history, social history, and examination findings as documented by others; and I examined the patient myself. I personally reviewed the relevant tests, images, and reports as documented above. I formulated and edited as necessary the assessment and plan and discussed the findings and management plan with the patient and family.    Cameron Clemons MD, PhD

## 2022-10-24 DIAGNOSIS — G43.719 INTRACTABLE CHRONIC MIGRAINE WITHOUT AURA AND WITHOUT STATUS MIGRAINOSUS: ICD-10-CM

## 2022-10-24 NOTE — TELEPHONE ENCOUNTER
Rx Authorization:    Requested Medication/ DoseZOLMitriptan (ZOMIG) 2.5 MG tablet    Date last refill ordered: 10/27/21    Quantity ordered: 12    # refills: 5    Date of last clinic visit with ordering provider: 4/8/21    Date of next clinic visit with ordering provider:     All pertinent protocol data (lab date/result):     Include pertinent information from patients message:

## 2022-10-25 RX ORDER — ZOLMITRIPTAN 2.5 MG/1
TABLET, FILM COATED ORAL
Qty: 12 TABLET | Refills: 3 | Status: SHIPPED | OUTPATIENT
Start: 2022-10-25

## 2022-11-07 ENCOUNTER — MYC MEDICAL ADVICE (OUTPATIENT)
Dept: OPHTHALMOLOGY | Facility: CLINIC | Age: 62
End: 2022-11-07

## 2022-11-08 DIAGNOSIS — H35.3211 EXUDATIVE AGE-RELATED MACULAR DEGENERATION OF RIGHT EYE WITH ACTIVE CHOROIDAL NEOVASCULARIZATION (H): Primary | ICD-10-CM

## 2022-11-10 NOTE — TELEPHONE ENCOUNTER
Spoke to pt at 1030    Lower lid stye/inside lid times 2 weeks    Red bump inside lid.    No swelling around eye.    Reviewed most effective treatment for stye/hordeleum are warm compresses    Reviewed 4/day for 10 minutes and pt has been doing.    Reviewed ok to continue for couple more weeks vs exam tomorrow with dr. Santos.    Pt at this time will continue to use warm compresses for couple more weeks    Reviewed should not interfer with plan for another intravitreal injection.    Pt seemed comfortable with plan/information and aware to call for any worsening symptoms/Swelling around eye.    Jason Salinas RN 10:37 AM 11/10/22

## 2022-11-23 ENCOUNTER — OFFICE VISIT (OUTPATIENT)
Dept: OPHTHALMOLOGY | Facility: CLINIC | Age: 62
End: 2022-11-23
Attending: OPHTHALMOLOGY
Payer: COMMERCIAL

## 2022-11-23 DIAGNOSIS — H25.13 NUCLEAR SENILE CATARACT OF BOTH EYES: ICD-10-CM

## 2022-11-23 DIAGNOSIS — H04.123 DRY EYE SYNDROME OF BOTH EYES: ICD-10-CM

## 2022-11-23 DIAGNOSIS — H35.3211 EXUDATIVE AGE-RELATED MACULAR DEGENERATION OF RIGHT EYE WITH ACTIVE CHOROIDAL NEOVASCULARIZATION (H): Primary | ICD-10-CM

## 2022-11-23 DIAGNOSIS — H43.812 VITREOUS DEGENERATION OF LEFT EYE: ICD-10-CM

## 2022-11-23 PROCEDURE — 250N000011 HC RX IP 250 OP 636: Performed by: OPHTHALMOLOGY

## 2022-11-23 PROCEDURE — 92134 CPTRZ OPH DX IMG PST SGM RTA: CPT | Performed by: OPHTHALMOLOGY

## 2022-11-23 PROCEDURE — G0463 HOSPITAL OUTPT CLINIC VISIT: HCPCS

## 2022-11-23 PROCEDURE — 99214 OFFICE O/P EST MOD 30 MIN: CPT | Mod: 25 | Performed by: OPHTHALMOLOGY

## 2022-11-23 PROCEDURE — 67028 INJECTION EYE DRUG: CPT | Mod: RT | Performed by: OPHTHALMOLOGY

## 2022-11-23 RX ADMIN — AFLIBERCEPT 2 MG: 40 INJECTION, SOLUTION INTRAVITREAL at 11:26

## 2022-11-23 ASSESSMENT — CONF VISUAL FIELD
OD_INFERIOR_TEMPORAL_RESTRICTION: 0
OS_SUPERIOR_NASAL_RESTRICTION: 0
OD_SUPERIOR_NASAL_RESTRICTION: 0
OS_INFERIOR_NASAL_RESTRICTION: 0
OD_INFERIOR_NASAL_RESTRICTION: 0
METHOD: COUNTING FINGERS
OD_NORMAL: 1
OS_SUPERIOR_TEMPORAL_RESTRICTION: 0
OD_SUPERIOR_TEMPORAL_RESTRICTION: 0
OS_INFERIOR_TEMPORAL_RESTRICTION: 0
OS_NORMAL: 1

## 2022-11-23 ASSESSMENT — VISUAL ACUITY
OD_PH_SC+: -1
METHOD: SNELLEN - LINEAR
OD_SC+: -0
OS_SC: 20/20
OD_PH_SC: 20/30
OD_SC: 20/40 SEARCHING

## 2022-11-23 ASSESSMENT — EXTERNAL EXAM - LEFT EYE: OS_EXAM: NORMAL

## 2022-11-23 ASSESSMENT — CUP TO DISC RATIO
OD_RATIO: 0.25
OS_RATIO: 0.25

## 2022-11-23 ASSESSMENT — TONOMETRY
IOP_METHOD: TONOPEN
OS_IOP_MMHG: 15
OD_IOP_MMHG: 19

## 2022-11-23 ASSESSMENT — SLIT LAMP EXAM - LIDS
COMMENTS: PAPILLOMA LL
COMMENTS: NORMAL

## 2022-11-23 ASSESSMENT — EXTERNAL EXAM - RIGHT EYE: OD_EXAM: NORMAL

## 2022-11-23 NOTE — PROGRESS NOTES
CC: CNVM right eye     Interval hx: Vision much better right eye, no new concerns since last visit early June 2022.     HPI:  Patient has a history of pituitary adenoma, s/p resection in 2007 and again in 2010 due to recurrence. Patient complains of flashes and floaters in the left eye. Dr. Neely saw retinal heme left eye and referred her. Saw Dr. Neely 6/22/20    Strong FH of AMD: mother diagnosed in her 60s, sister, and brother with wet AMD  Father with AMD diagnosed in his 80s    OCT 11/23/22  Right eye: PEDs stable, new SRF  Left eye stable-  No srf no irf, stable drusen     Assessment and Plan:  1.   Exudative AMD right eye    Recent conversion to wet form; OCTA: SubRPE neovascular membrane nasal to fovea   First fovea was not involved and VA was 20/20; gradually declined to 20/30; today 20/25   Recurrence of RIF at 6wks: will repeat Eylea q5 w right eye   S/P Eylea right (last injection 10/19/2022 - 5 weeks, started 12/15/2021)  Plan   Eylea today and RTC4-5 weeks for eylea right eye; in future, we try to extend intervals again, if we fail extending intervals, we may switch to Vabysmo   Does not tolerate AREDS due to migraines    2. Pigment epithelium detachment, drusen each eye    drusenoid PED; stable compared to previous except for #1   DDX; peripheral CNV (not likely given FA finding) vs pigment epithelium adenoma vs peripheral PED   Not symptomatic now, but could be a harbinger of PECHR lesion   Lesion smaller or almost resolved 12/22/20 and 6/15/21:likely a peripheral CNV, self limited; s/s of CNV activation and bleeding discussed    No apparent change in left eye examination 1/11/2022   Will observe for now    3. Posterior Vitreous Detachment, both eyes. Retina attached.   Educated on signs and symptoms of a retinal detachment to be seen immediately.   No peripheral traction or tear in depressed exam today    4.   Pituitary Adenoma  History of pituitary adenoma, s/p resection in 2007 and again in 2010  due to recurrence. Patient follows neurology.   Baseline visual field was done 2019. last VF within normal limits   Optic nerve head OCT was done 05/20/2020. No optic neuropathy seen on exam.  Continue monitoring yearly with VF and ONH OCT. Recent MRI brain showing stable lesion     5.   Cataract, both eyes.   Not visually significant. Monitor      RTC  4 weeks for OCT, DFE, and Eylea right eye     Complete documentation of historical and exam elements from today's encounter can be found in the full encounter summary report (not reduplicated in this progress note). I personally obtained the chief complaint(s) and history of present illness.  I confirmed and edited as necessary the review of systems, past medical/surgical history, family history, social history, and examination findings as documented by others; and I examined the patient myself. I personally reviewed the relevant tests, images, and reports as documented above. I formulated and edited as necessary the assessment and plan and discussed the findings and management plan with the patient and family.    Cameron Clemons MD, PhD

## 2022-11-23 NOTE — NURSING NOTE
Chief Complaints and History of Present Illnesses   Patient presents with     Exudative Macular Degeneration Follow Up     Chief Complaint(s) and History of Present Illness(es)     Exudative Macular Degeneration Follow Up            Associated symptoms: dryness.  Negative for eye pain, tearing, flashes, floaters, itching and burning    Pain scale: 0/10          Comments    Exudative age-related macular degeneration of right eye with active choroidal neovascularization. Patient states she has had a stye INSIDE LE,  LLL which she has used warm compresses, but still there.   Vision seems stable    Tyler Stout COT 10:14 AM November 23, 2022

## 2022-12-08 DIAGNOSIS — H35.3211 EXUDATIVE AGE-RELATED MACULAR DEGENERATION OF RIGHT EYE WITH ACTIVE CHOROIDAL NEOVASCULARIZATION (H): Primary | ICD-10-CM

## 2022-12-20 DIAGNOSIS — D35.2 PITUITARY ADENOMA (H): Primary | ICD-10-CM

## 2022-12-20 NOTE — PROGRESS NOTES
"HPI:  Patient presents for follow up - needs visual field and optic nerve OCT for pituitary adenoma. Patient complains of dryness in both eyes.        has a history of pituitary adenoma, s/p resection in 2007 and again in 2010 due to recurrence. Patient complains of flashes and floaters in the left eye. The floaters looks like \"gnats.\"    Today: Patient presents for follow up of PVD in the left eye. The floaters have no subsided. Vision is stable.       Pertinent Medical History:    Pituitary Adenoma 2013    Migraine    Chronic kidney disease    Anemia    Ocular History:    Age Related Macular Degeneration, both eyes.     Family history of macular degeneration - mother, sister, and brother.     PVD, left eye    Peripheral PED, left eye    VMT, right eye    Cataract both eyes.     PED, both eyes.     Eye Medications:    None    Assessment and Plan:  1.   Pituitary Adenoma    Patient has a history of pituitary adenoma, s/p resection in 2007 and again in 2010 due to recurrence. Patient follows neurology sees Dr. Hunter. NSG note on 04/19/2022 says to repeat brain MRI in 3 years.     Visual field 12/28/2022: Both eyes: no visual field deficit.     Optic nerve OCT 12/28/2022: Both eyes: no edema.     Recommend annual dilated eye exam with visual field and optic nerve OCT.     2.   Posterior Vitreous Detachment, both eyes. Retina attached.     Educated on signs and symptoms of a retinal detachment (ie. Hundreds of floaters, flashes of light, and shadow/curtain over the vision) to be seen immediately.     Patient also seen by Dr. Clemons on 05/20/2020    3.   Peripheral Pigment Epithelial Detachment, left eye.     Last saw Dr. Clemons on 06/15/2021. Lesion near resolution 06/15/2021 - likely peripheral CNV that is self limited.      Plan on follow up with Dr. Clemons 06/2022.    4.   Wet Age Related Macular Degeneration, right eye. Dry Age Related Macular Degeneration, left eye.     Recommends AREDS 2 vitamins orally 2 times " daily. Patient mentioned vitamins may trigger migraines and she is not taking AREDS. I encourage her to work with neurology to find a medication of migraine and see if she can resume AREDS vitamins.     Recommends fish and green leafy vegetables.     Recommends UV protection.    No smoking.    Return immediately if there are changes to amsler grid.    Last saw Dr. Clemons in 11/23/2022 - receives Eyelea injection in the right eye.     5.   Cataract, both eyes.     Not visually significant. Monitor.     6.   Dry Eye Syndrome, both eyes.     Preservative free artificial tears 4 times daily both eyes.     Artificial tear ointment at bedtime both eyes.     7.   Papilloma, left lower lid.     Papilloma vs pyogenic granuloma. Patient feels it is growing in the last couple of months - follow up with oculoplastics.     8.   Allergic Conjunctivitis, both eyes.     Start pataday once daily both eyes. Start 12/28/2022.     9.   Presbyopia, both eyes.     Currently uses +1.50D. Can try +2.00D as well.     Medical History:  Past Medical History:   Diagnosis Date     Anemia 2006     Atrophic vaginitis      Chronic kidney disease October 2018    labs     Drusen (degenerative) of macula, bilateral      Hx of previous reproductive problem 1992     Insomnia      Migraines      Myopia      Pituitary macroadenoma (H)      Restless legs        Medications:  Current Outpatient Medications   Medication Sig Dispense Refill     ALPRAZolam (XANAX) 0.5 MG tablet Take one pill 1/2 hour before MRI scan 2 tablet 0     carboxymethylcellulose PF (CARBOXYMETHYLCELLULOSE SODIUM) 0.5 % ophthalmic solution Place 1 drop into both eyes 4 times daily 400 each 11     ESTRING 2 MG vaginal ring INSERT 1 RING VAGINALLY EVERY 3 MONTHS 4 each 1     tiZANidine (ZANAFLEX) 2 MG tablet TAKE 1 TO 3 TABLETS(2 TO 6 MG) BY MOUTH AT BEDTIME 90 tablet 3     Vitamin D, Cholecalciferol, 1000 units TABS Take 1 tablet by mouth At Bedtime        ZOLMitriptan (ZOMIG) 2.5 MG  tablet TAKE 1 TABLET(2.5 MG) BY MOUTH AT ONSET OF HEADACHE FOR MIGRAINE. MAY REPEAT IN 2 HOURS AS NEEDED. MAX 4 TABLETS IN 24 HOURS 12 tablet 3   Complete documentation of historical and exam elements from today's encounter can be found in the full encounter summary report (not reduplicated in this progress note). I personally obtained the chief complaint(s) and history of present illness.  I confirmed and edited as necessary the review of systems, past medical/surgical history, family history, social history, and examination findings as documented by others; and I examined the patient myself. I personally reviewed the relevant tests, images, and reports as documented above. I formulated and edited as necessary the assessment and plan and discussed the findings and management plan with the patient and family. - Fabian Neely OD

## 2022-12-21 ENCOUNTER — OFFICE VISIT (OUTPATIENT)
Dept: OPHTHALMOLOGY | Facility: CLINIC | Age: 62
End: 2022-12-21
Attending: OPHTHALMOLOGY
Payer: COMMERCIAL

## 2022-12-21 DIAGNOSIS — H25.13 NUCLEAR SENILE CATARACT OF BOTH EYES: ICD-10-CM

## 2022-12-21 DIAGNOSIS — H35.3211 EXUDATIVE AGE-RELATED MACULAR DEGENERATION OF RIGHT EYE WITH ACTIVE CHOROIDAL NEOVASCULARIZATION (H): Primary | ICD-10-CM

## 2022-12-21 DIAGNOSIS — H04.123 DRY EYE SYNDROME OF BOTH EYES: ICD-10-CM

## 2022-12-21 DIAGNOSIS — H43.812 VITREOUS DEGENERATION OF LEFT EYE: ICD-10-CM

## 2022-12-21 PROCEDURE — 92134 CPTRZ OPH DX IMG PST SGM RTA: CPT | Performed by: OPHTHALMOLOGY

## 2022-12-21 PROCEDURE — 99214 OFFICE O/P EST MOD 30 MIN: CPT | Mod: 25 | Performed by: OPHTHALMOLOGY

## 2022-12-21 PROCEDURE — 250N000011 HC RX IP 250 OP 636: Performed by: OPHTHALMOLOGY

## 2022-12-21 PROCEDURE — G0463 HOSPITAL OUTPT CLINIC VISIT: HCPCS | Mod: 25

## 2022-12-21 PROCEDURE — 67028 INJECTION EYE DRUG: CPT | Mod: RT | Performed by: OPHTHALMOLOGY

## 2022-12-21 RX ADMIN — AFLIBERCEPT 2 MG: 40 INJECTION, SOLUTION INTRAVITREAL at 10:51

## 2022-12-21 ASSESSMENT — TONOMETRY
IOP_METHOD: TONOPEN
OD_IOP_MMHG: 18
OS_IOP_MMHG: 18

## 2022-12-21 ASSESSMENT — VISUAL ACUITY
METHOD: SNELLEN - LINEAR
OS_SC: 20/20
OS_SC+: -1
OD_SC+: -2
OD_SC: 20/25

## 2022-12-21 ASSESSMENT — CONF VISUAL FIELD
OD_SUPERIOR_TEMPORAL_RESTRICTION: 0
OS_INFERIOR_NASAL_RESTRICTION: 0
OS_SUPERIOR_TEMPORAL_RESTRICTION: 0
OS_INFERIOR_TEMPORAL_RESTRICTION: 0
OD_INFERIOR_NASAL_RESTRICTION: 0
OD_SUPERIOR_NASAL_RESTRICTION: 0
OS_NORMAL: 1
OD_INFERIOR_TEMPORAL_RESTRICTION: 0
OS_SUPERIOR_NASAL_RESTRICTION: 0
OD_NORMAL: 1
METHOD: COUNTING FINGERS

## 2022-12-21 ASSESSMENT — SLIT LAMP EXAM - LIDS
COMMENTS: NORMAL
COMMENTS: PAPILLOMA LL

## 2022-12-21 ASSESSMENT — CUP TO DISC RATIO
OD_RATIO: 0.25
OS_RATIO: 0.25

## 2022-12-21 ASSESSMENT — EXTERNAL EXAM - LEFT EYE: OS_EXAM: NORMAL

## 2022-12-21 ASSESSMENT — EXTERNAL EXAM - RIGHT EYE: OD_EXAM: NORMAL

## 2022-12-21 NOTE — NURSING NOTE
Chief Complaints and History of Present Illnesses   Patient presents with     Macular Degeneration Follow Up     Chief Complaint(s) and History of Present Illness(es)     Macular Degeneration Follow Up            Laterality: both eyes    Associated symptoms: Negative for flashes and floaters    Treatments tried: artificial tears    Pain scale: 0/10          Comments    Macular degeneration follow up.    The patient uses the artificial gel which works well.  SATISH Fernández, COA 10:03 AM 12/21/2022

## 2022-12-21 NOTE — PROGRESS NOTES
CC: CNVM right eye     Interval hx: Patient has noticed vision is better. No new concerns. This month marks 1 year since she started getting injections. She is happy she is still seeing well!    HPI:  Katie Casper is a 62 year old patient here for follow up of exudative AMD.    She has a history of pituitary adenoma, s/p resection in 2007 and again in 2010 due to recurrence. Patient complains of flashes and floaters in the left eye. Dr. Neely saw retinal heme left eye and referred her. Saw Dr. Neely 6/22/20    Strong FH of early AMD: mother diagnosed in her 60s, sister, and brother with wet AMD  Father with AMD diagnosed in his 80s    OCT 12/21/22   Right eye: improved central SRF, central PED, choroid thick, hyalooid   Left eye stable-  No srf no irf, stable drusen, choroid thick, hyaloid     Assessment and Plan:    #  Exudative AMD right eye    Conversion to wet form 12/2021   OCTA: SubRPE neovascular membrane nasal to fovea   First fovea was not involved and VA was 20/20; gradually declined to 20/30; today 20/25   Recurrence of RIF at 5wks at 11/25/22 visit   S/p Eylea right - last injection 11/25/22 - (4 weeks, 4 week interval)    Plan   Eylea today and RTC4 weeks for Eylea right eye - injection only appt x 2 ; in future, we try to extend intervals again, if we fail extending intervals, we may switch to Vabysmo   Does not tolerate AREDS due to migraines    # Pigment epithelium detachment, drusen each eye    drusenoid PED; stable compared to previous except for #1   DDX; peripheral CNV (not likely given FA finding) vs pigment epithelium adenoma vs peripheral PED   Not symptomatic now, but could be a harbinger of PECHR lesion   Lesion smaller or almost resolved 12/22/20 and 6/15/21: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  Retina attached.   Educated on signs and symptoms of a retinal detachment to be seen immediately.   No peripheral traction or tear in depressed exam today    #  Pituitary Adenoma  History of pituitary adenoma, s/p resection in 2007 and again in 2010 due to recurrence. Patient follows neurology.   Baseline visual field was done 2019. last VF within normal limits   Optic nerve head OCT was done 05/20/2020. No optic neuropathy seen on exam.  Continue monitoring yearly with VF and ONH OCT.   MRI brain summer 2022 showing stable lesion   Follows with [facility], doesn't have an endocrinologist, but is working on an appt.  Has appt with Jackie on 12/28/22 for repeat field testing      #  Cataract, both eyes.   Not visually significant. Monitor      RTC  4 weeks for injection only Eylea right eye     Complete documentation of historical and exam elements from today's encounter can be found in the full encounter summary report (not reduplicated in this progress note). I personally obtained the chief complaint(s) and history of present illness.  I confirmed and edited as necessary the review of systems, past medical/surgical history, family history, social history, and examination findings as documented by others; and I examined the patient myself. I personally reviewed the relevant tests, images, and reports as documented above. I formulated and edited as necessary the assessment and plan and discussed the findings and management plan with the patient and family.    Cameron Clemons MD, PhD

## 2022-12-28 ENCOUNTER — OFFICE VISIT (OUTPATIENT)
Dept: OPHTHALMOLOGY | Facility: CLINIC | Age: 62
End: 2022-12-28
Attending: OPTOMETRIST
Payer: COMMERCIAL

## 2022-12-28 DIAGNOSIS — H04.123 DRY EYE SYNDROME OF BOTH EYES: ICD-10-CM

## 2022-12-28 DIAGNOSIS — H35.3211 EXUDATIVE AGE-RELATED MACULAR DEGENERATION OF RIGHT EYE WITH ACTIVE CHOROIDAL NEOVASCULARIZATION (H): ICD-10-CM

## 2022-12-28 DIAGNOSIS — D35.2 PITUITARY ADENOMA (H): ICD-10-CM

## 2022-12-28 DIAGNOSIS — H25.13 NUCLEAR SENILE CATARACT OF BOTH EYES: ICD-10-CM

## 2022-12-28 DIAGNOSIS — H10.13 ALLERGIC CONJUNCTIVITIS OF BOTH EYES: Primary | ICD-10-CM

## 2022-12-28 DIAGNOSIS — H43.812 VITREOUS DEGENERATION OF LEFT EYE: ICD-10-CM

## 2022-12-28 PROCEDURE — G0463 HOSPITAL OUTPT CLINIC VISIT: HCPCS | Mod: 25

## 2022-12-28 PROCEDURE — 92133 CPTRZD OPH DX IMG PST SGM ON: CPT | Performed by: OPTOMETRIST

## 2022-12-28 PROCEDURE — 92014 COMPRE OPH EXAM EST PT 1/>: CPT | Performed by: OPTOMETRIST

## 2022-12-28 PROCEDURE — 92083 EXTENDED VISUAL FIELD XM: CPT | Performed by: OPTOMETRIST

## 2022-12-28 RX ORDER — OLOPATADINE HYDROCHLORIDE 2 MG/ML
1 SOLUTION/ DROPS OPHTHALMIC DAILY
Qty: 2.5 ML | Refills: 11 | Status: SHIPPED | OUTPATIENT
Start: 2022-12-28 | End: 2024-01-23

## 2022-12-28 ASSESSMENT — VISUAL ACUITY
OD_SC+: -2
METHOD: SNELLEN - LINEAR
OD_SC: 20/40
OD_PH_SC: 20/20
OD_PH_SC+: -2
OS_SC+: -1
OS_SC: 20/20

## 2022-12-28 ASSESSMENT — SLIT LAMP EXAM - LIDS: COMMENTS: NORMAL

## 2022-12-28 ASSESSMENT — REFRACTION_MANIFEST
OS_CYLINDER: SPHERE
OD_CYLINDER: SPHERE
OD_SPHERE: +0.75
OS_SPHERE: PLANO

## 2022-12-28 ASSESSMENT — REFRACTION_WEARINGRX
OS_CYLINDER: SPHERE
OD_SPHERE: -0.25
OS_SPHERE: -0.75
OS_ADD: +2.00
OD_ADD: +2.00
OD_CYLINDER: SPHERE

## 2022-12-28 ASSESSMENT — CUP TO DISC RATIO
OD_RATIO: 0.25
OS_RATIO: 0.25

## 2022-12-28 ASSESSMENT — CONF VISUAL FIELD
OS_SUPERIOR_TEMPORAL_RESTRICTION: 0
OD_SUPERIOR_NASAL_RESTRICTION: 0
OS_INFERIOR_NASAL_RESTRICTION: 0
OD_INFERIOR_NASAL_RESTRICTION: 0
METHOD: COUNTING FINGERS
OD_SUPERIOR_TEMPORAL_RESTRICTION: 0
OD_INFERIOR_TEMPORAL_RESTRICTION: 0
OD_NORMAL: 1
OS_SUPERIOR_NASAL_RESTRICTION: 0
OS_INFERIOR_TEMPORAL_RESTRICTION: 0
OS_NORMAL: 1

## 2022-12-28 ASSESSMENT — EXTERNAL EXAM - RIGHT EYE: OD_EXAM: NORMAL

## 2022-12-28 ASSESSMENT — TONOMETRY
OD_IOP_MMHG: 11
IOP_METHOD: TONOPEN
OS_IOP_MMHG: 13

## 2022-12-28 ASSESSMENT — EXTERNAL EXAM - LEFT EYE: OS_EXAM: NORMAL

## 2022-12-28 NOTE — NURSING NOTE
Chief Complaints and History of Present Illnesses   Patient presents with     Annual Eye Exam     Chief Complaint(s) and History of Present Illness(es)     Annual Eye Exam            Laterality: both eyes    Onset: years ago    Location: central vision    Associated symptoms: floaters.  Negative for glare, eye pain, flashes and itching    Pain scale: 0/10          Comments    Pt states her eyes have been dry.  Pt had a stye in her LE for about 2 months.  Pt has been using otc Genteal drops.    DELLA COVARRUBIAS December 28, 2022 10:30 AM

## 2022-12-29 NOTE — TELEPHONE ENCOUNTER
FUTURE VISIT INFORMATION      FUTURE VISIT INFORMATION:    Date: 2/13/23    Time: 9:30am    Location: [facility]  REFERRAL INFORMATION:    Referring provider:  Fabian Mejia Chi, CHERYL    Referring providers clinic:  [facility]    Reason for visit/diagnosis  Papilloma    RECORDS REQUESTED FROM:       Clinic name Comments Records Status Imaging Status   [facility] OV/referral 12/28/22  Ov/notes 12/28/22-4/16/19 Epic    Imaging MR Brain 9/3/19  MR Brain done 6/18/18 [facility]

## 2023-01-16 ENCOUNTER — MYC MEDICAL ADVICE (OUTPATIENT)
Dept: OPHTHALMOLOGY | Facility: CLINIC | Age: 63
End: 2023-01-16
Payer: COMMERCIAL

## 2023-01-25 ENCOUNTER — OFFICE VISIT (OUTPATIENT)
Dept: OPHTHALMOLOGY | Facility: CLINIC | Age: 63
End: 2023-01-25
Attending: OPHTHALMOLOGY
Payer: COMMERCIAL

## 2023-01-25 DIAGNOSIS — H35.3211 EXUDATIVE AGE-RELATED MACULAR DEGENERATION OF RIGHT EYE WITH ACTIVE CHOROIDAL NEOVASCULARIZATION (H): Primary | ICD-10-CM

## 2023-01-25 PROCEDURE — 250N000011 HC RX IP 250 OP 636: Performed by: OPHTHALMOLOGY

## 2023-01-25 PROCEDURE — 999N000103 HC STATISTIC NO CHARGE FACILITY FEE

## 2023-01-25 PROCEDURE — 67028 INJECTION EYE DRUG: CPT | Mod: RT | Performed by: OPHTHALMOLOGY

## 2023-01-25 RX ADMIN — AFLIBERCEPT 2 MG: 40 INJECTION, SOLUTION INTRAVITREAL at 11:14

## 2023-01-25 ASSESSMENT — VISUAL ACUITY
OS_SC: 20/20
OD_PH_SC: 20/20
OD_SC+: -2
CORRECTION_TYPE: GLASSES
OD_SC: 20/30
METHOD: SNELLEN - LINEAR

## 2023-01-25 ASSESSMENT — TONOMETRY
OS_IOP_MMHG: 15
IOP_METHOD: ICARE
OD_IOP_MMHG: 13

## 2023-01-25 NOTE — NURSING NOTE
Chief Complaints and History of Present Illnesses   Patient presents with     Macular Degeneration Follow Up     Chief Complaint(s) and History of Present Illness(es)     Macular Degeneration Follow Up            Laterality: both eyes    Onset: 5 weeks ago          Comments    Pt. States that she has been seeing intermittent distortion lately. Unsure of which eye is causing the distortion. Will be looking at a cupboard and there will be a curve in it. Only last a few seconds and then goes away. No pain BE. No flashes or floaters BE.  Maru Phelps COT 11:03 AM January 25, 2023

## 2023-01-25 NOTE — PROGRESS NOTES
Here for injection only visit: Eylea right eye     CC: CNVM right eye     Interval hx: Patient has noticed vision is better. No new concerns. This month marks 1 year since she started getting injections. She is happy she is still seeing well!    HPI:  Katie Casper is a 62 year old patient here for follow up of exudative AMD.    She has a history of pituitary adenoma, s/p resection in 2007 and again in 2010 due to recurrence. Patient complains of flashes and floaters in the left eye. Dr. Neely saw retinal heme left eye and referred her. Saw Dr. Neely 6/22/20    Strong FH of early AMD: mother diagnosed in her 60s, sister, and brother with wet AMD  Father with AMD diagnosed in his 80s    OCT 12/21/22   Right eye: improved central SRF, central PED, choroid thick, hyalooid   Left eye stable-  No srf no irf, stable drusen, choroid thick, hyaloid     Assessment and Plan:    #  Exudative AMD right eye    Conversion to wet form 12/2021   OCTA: SubRPE neovascular membrane nasal to fovea   First fovea was not involved and VA was 20/20; gradually declined to 20/30; today 20/25   Recurrence of RIF at 5wks at 11/25/22 visit   S/p Eylea right - last injection 11/25/22 - (4 weeks, 4 week interval)    Plan   Eylea today and RTC4 weeks for Eylea right eye - injection only appt x 2 ; in future, we try to extend intervals again, if we fail extending intervals, we may switch to Vabysmo   Does not tolerate AREDS due to migraines    # Pigment epithelium detachment, drusen each eye    drusenoid PED; stable compared to previous except for #1   DDX; peripheral CNV (not likely given FA finding) vs pigment epithelium adenoma vs peripheral PED   Not symptomatic now, but could be a harbinger of PECHR lesion   Lesion smaller or almost resolved 12/22/20 and 6/15/21: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 Retina attached.   Educated on signs and symptoms of a retinal detachment to be seen immediately.   No peripheral traction or tear in depressed exam today    #  Pituitary Adenoma  History of pituitary adenoma, s/p resection in 2007 and again in 2010 due to recurrence. Patient follows neurology.   Baseline visual field was done 2019. last VF within normal limits   Optic nerve head OCT was done 05/20/2020. No optic neuropathy seen on exam.  Continue monitoring yearly with VF and ONH OCT.   MRI brain summer 2022 showing stable lesion   Follows with [facility], doesn't have an endocrinologist, but is working on an appt.  Has appt with Jackie on 12/28/22 for repeat field testing      #  Cataract, both eyes.   Not visually significant. Monitor      RTC  4 weeks for injection only Eylea right eye     Complete documentation of historical and exam elements from today's encounter can be found in the full encounter summary report (not reduplicated in this progress note). I personally obtained the chief complaint(s) and history of present illness.  I confirmed and edited as necessary the review of systems, past medical/surgical history, family history, social history, and examination findings as documented by others; and I examined the patient myself. I personally reviewed the relevant tests, images, and reports as documented above. I formulated and edited as necessary the assessment and plan and discussed the findings and management plan with the patient and family.    Cameron Clemons MD, PhD

## 2023-01-26 ASSESSMENT — EXTERNAL EXAM - LEFT EYE: OS_EXAM: NORMAL

## 2023-01-26 ASSESSMENT — SLIT LAMP EXAM - LIDS: COMMENTS: NORMAL

## 2023-01-26 ASSESSMENT — EXTERNAL EXAM - RIGHT EYE: OD_EXAM: NORMAL

## 2023-02-05 ENCOUNTER — MYC MEDICAL ADVICE (OUTPATIENT)
Dept: INTERNAL MEDICINE | Facility: CLINIC | Age: 63
End: 2023-02-05
Payer: COMMERCIAL

## 2023-02-07 ENCOUNTER — MYC MEDICAL ADVICE (OUTPATIENT)
Dept: OPHTHALMOLOGY | Facility: CLINIC | Age: 63
End: 2023-02-07
Payer: COMMERCIAL

## 2023-02-08 ENCOUNTER — TELEPHONE (OUTPATIENT)
Dept: OPHTHALMOLOGY | Facility: CLINIC | Age: 63
End: 2023-02-08
Payer: COMMERCIAL

## 2023-02-08 NOTE — TELEPHONE ENCOUNTER
LVM for patient regarding scheduling  appointment on 2/13/23. Provided direct number for rescheduling.

## 2023-02-08 NOTE — TELEPHONE ENCOUNTER
LVM for patient regarding rescheduling  appointment on 2/13/23. Provided direct number for rescheduling. Also sent patient a [employer] message.

## 2023-02-08 NOTE — TELEPHONE ENCOUNTER
Spoke to pt about scheduling February 15th (10 days after positive covid test) reviewed eye exam within about 2 weeks of amsler changes ok to schedule.    Pt states 2 days ago really noticed the amsler changes, but feels vision in left eye started changing around 1- (after last retina visit for right eye inj)    Will ask [facility] to review earlier workup request/exam under covid protocols vs February 15th    Pt has appt with Dr. Murguia next Monday and will also forward to [facility] scheduling to reschedule non-urgent consult (pt comfortable with rescheduling)    Jason Salinas, RN 2:34 PM 02/08/23

## 2023-02-08 NOTE — TELEPHONE ENCOUNTER
Spoke to pt late afternoon    Pt noticing new squiggles/distortion on amsler grid in past week in left eye     Left eye been better seeing eye and left eye has been receiving intravitreal injections for macular degeneration    Pt also states has tested positive for COVID on Sunday, February 5th with symptoms.    Pt scheduled next Monday with Dr. Abernathy for lid eval.    Reviewed if asymptomatic clinic able to have exams with normal workup after 10 days    Typically ok to have exam for new amsler changes within about 2 weeks.    Will review/confirm scheduling with retina team and call back today    Jason Salinas RN 8:10 AM 02/08/23

## 2023-02-09 ENCOUNTER — TELEPHONE (OUTPATIENT)
Dept: OPHTHALMOLOGY | Facility: CLINIC | Age: 63
End: 2023-02-09
Payer: COMMERCIAL

## 2023-02-09 NOTE — TELEPHONE ENCOUNTER
Spoke with patient regarding rescheduling  appointment on 2/13/23 due to illness. Rescheduled paitent accordingly and sent reminder letter to patient.-Per Patient

## 2023-02-10 ENCOUNTER — OFFICE VISIT (OUTPATIENT)
Dept: OPHTHALMOLOGY | Facility: CLINIC | Age: 63
End: 2023-02-10
Attending: STUDENT IN AN ORGANIZED HEALTH CARE EDUCATION/TRAINING PROGRAM
Payer: COMMERCIAL

## 2023-02-10 DIAGNOSIS — H35.3211 EXUDATIVE AGE-RELATED MACULAR DEGENERATION OF RIGHT EYE WITH ACTIVE CHOROIDAL NEOVASCULARIZATION (H): ICD-10-CM

## 2023-02-10 DIAGNOSIS — H35.3231 EXUDATIVE AGE-RELATED MACULAR DEGENERATION OF BOTH EYES WITH ACTIVE CHOROIDAL NEOVASCULARIZATION (H): Primary | ICD-10-CM

## 2023-02-10 PROCEDURE — 92134 CPTRZ OPH DX IMG PST SGM RTA: CPT | Mod: 26 | Performed by: OPHTHALMOLOGY

## 2023-02-10 PROCEDURE — 67028 INJECTION EYE DRUG: CPT | Mod: LT | Performed by: OPHTHALMOLOGY

## 2023-02-10 PROCEDURE — 67028 INJECTION EYE DRUG: CPT | Mod: LT | Performed by: STUDENT IN AN ORGANIZED HEALTH CARE EDUCATION/TRAINING PROGRAM

## 2023-02-10 PROCEDURE — 250N000011 HC RX IP 250 OP 636: Performed by: STUDENT IN AN ORGANIZED HEALTH CARE EDUCATION/TRAINING PROGRAM

## 2023-02-10 PROCEDURE — 92134 CPTRZ OPH DX IMG PST SGM RTA: CPT | Performed by: STUDENT IN AN ORGANIZED HEALTH CARE EDUCATION/TRAINING PROGRAM

## 2023-02-10 PROCEDURE — G0463 HOSPITAL OUTPT CLINIC VISIT: HCPCS | Mod: 25 | Performed by: STUDENT IN AN ORGANIZED HEALTH CARE EDUCATION/TRAINING PROGRAM

## 2023-02-10 RX ADMIN — AFLIBERCEPT 2 MG: 40 INJECTION, SOLUTION INTRAVITREAL at 10:00

## 2023-02-10 ASSESSMENT — CONF VISUAL FIELD
OS_INFERIOR_NASAL_RESTRICTION: 0
OD_SUPERIOR_NASAL_RESTRICTION: 0
OD_SUPERIOR_TEMPORAL_RESTRICTION: 0
OS_NORMAL: 1
OS_INFERIOR_TEMPORAL_RESTRICTION: 0
OS_SUPERIOR_NASAL_RESTRICTION: 0
OD_INFERIOR_NASAL_RESTRICTION: 0
OD_INFERIOR_TEMPORAL_RESTRICTION: 0
OD_NORMAL: 1
OS_SUPERIOR_TEMPORAL_RESTRICTION: 0

## 2023-02-10 ASSESSMENT — VISUAL ACUITY
OS_SC: 20/20
OD_SC+: +1
OS_SC+: +1
OD_SC: 20/30
METHOD: SNELLEN - LINEAR

## 2023-02-10 ASSESSMENT — EXTERNAL EXAM - RIGHT EYE: OD_EXAM: NORMAL

## 2023-02-10 ASSESSMENT — CUP TO DISC RATIO
OS_RATIO: 0.25
OD_RATIO: 0.25

## 2023-02-10 ASSESSMENT — EXTERNAL EXAM - LEFT EYE: OS_EXAM: NORMAL

## 2023-02-10 ASSESSMENT — TONOMETRY
IOP_METHOD: TONOPEN
OD_IOP_MMHG: 13
OS_IOP_MMHG: 13

## 2023-02-10 ASSESSMENT — SLIT LAMP EXAM - LIDS: COMMENTS: NORMAL

## 2023-02-10 NOTE — TELEPHONE ENCOUNTER
FUTURE VISIT INFORMATION      FUTURE VISIT INFORMATION:    Date: 3/20/23    Time: 7:30am    Location: [facility]  REFERRAL INFORMATION:    Referring provider:  Fabian Mejia Chi, CHERYL    Referring providers clinic:  [facility]    Reason for visit/diagnosis  Papilloma     RECORDS REQUESTED FROM:         Clinic name Comments Records Status Imaging Status   [facility] OV/referral 12/28/22  Ov/notes 12/28/22-4/16/19 Epic     Imaging MR Brain 9/3/19  MR Brain done 6/18/18 [facility]

## 2023-02-10 NOTE — PROGRESS NOTES
CC: metamorphopsia, left eye    Interval hx: Noticing metamorphopsia - like bends in straight lines. This was present in the left eye at the time of her last visit, but she did not discuss it with the doctor. It started getting more pronounced about a week ago. Now it is getting a little more noticeable with each passing day. Last night it interfered with her reading for the first time.     HPI:  Katie Casper is a 62 year old patient here for follow up of exudative AMD.    She has a history of pituitary adenoma, s/p resection in 2007 and again in 2010 due to recurrence. Patient complains of flashes and floaters in the left eye. Dr. Neely saw retinal heme left eye and referred her. Saw Dr. Neely 6/22/20    Strong FH of early AMD: mother diagnosed in her 60s, sister, and brother with wet AMD  Father with AMD diagnosed in his 80s    OCT 02/10/23   Right eye: no SRF, central PED, choroid normal thickness, hyaloid   Left eye stable-  Normal foveal contour, interval development of new sub-foveal fluid with changes within the sub-foveal PED, choroid normal thickness, hyaloid     Assessment and Plan:    #  Exudative AMD right eye    Conversion to wet form 12/2021   OCTA: SubRPE neovascular membrane nasal to fovea   First fovea was not involved and VA was 20/20; gradually declined to 20/30; today 20/25   Recurrence of RIF at 5wks at 11/25/22 visit   S/p Eylea right - last injection 1/25/23 - (4 weeks, 4 week interval)    Plan   Eylea today and RTC4 weeks for Eylea right eye - injection only appt x 2 ; in future, we try to extend intervals again, if we fail extending intervals, we may switch to Vabysmo   Does not tolerate AREDS due to migraines    # Exudative AMD left eye   Conversion to wet form 2/2023   Initiated anti-VEGF with Eylea to keep dosing interval uniform   S/p Eylea 2/10/23 (plan for 4 week interval x 2 then T&E)   Discussed sx of endophthalmitis 02/10/23    # Pigment epithelium detachment,  drusen each eye    drusenoid PED; stable compared to previous except for #1   DDX; peripheral CNV (not likely given FA finding) vs pigment epithelium adenoma vs peripheral PED   Not symptomatic now, but could be a harbinger of PECHR lesion   Lesion smaller or almost resolved 12/22/20 and 6/15/21: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 Retina attached.   Educated on signs and symptoms of a retinal detachment to be seen immediately.   No peripheral traction or tear in depressed exam today    #  Pituitary Adenoma  History of pituitary adenoma, s/p resection in 2007 and again in 2010 due to recurrence. Patient follows neurology.   Baseline visual field was done 2019. last VF within normal limits   Optic nerve head OCT was done 05/20/2020. No optic neuropathy seen on exam.  Continue monitoring yearly with VF and ONH OCT.   MRI brain summer 2022 showing stable lesion   Follows with [facility], doesn't have an endocrinologist, but is working on an appt.  Has appt with Jackie on 12/28/22 for repeat field testing      #  Cataract, both eyes.   Not visually significant. Monitor    RTC  As scheduled 2/22/23 for injection only Eylea right eye   3/14/23 for injection only, Eylea left eye     Abdirashid Crook MD  Vitreoret[employer] Surgical Fellow, PGY6  [facility]      ATTESTATION     Attending Physician Attestation:      Complete documentation of historical and exam elements from today's encounter can be found in the full encounter summary report (not reduplicated in this progress note).  I personally obtained the chief complaint(s) and history of present illness.  I confirmed and edited as necessary the review of systems, past medical/surgical history, family history, social history, and examination findings as documented by others; and I examined the patient  myself.  I personally reviewed the relevant tests, images, and reports as documented above.  I formulated and edited as necessary the assessment and plan and discussed the findings and management plan with the patient and family    Cameron Clemons MD PhD

## 2023-02-13 ENCOUNTER — PRE VISIT (OUTPATIENT)
Dept: OPHTHALMOLOGY | Facility: CLINIC | Age: 63
End: 2023-02-13

## 2023-02-18 ENCOUNTER — HEALTH MAINTENANCE LETTER (OUTPATIENT)
Age: 63
End: 2023-02-18

## 2023-03-01 ENCOUNTER — OFFICE VISIT (OUTPATIENT)
Dept: OPHTHALMOLOGY | Facility: CLINIC | Age: 63
End: 2023-03-01
Attending: OPHTHALMOLOGY
Payer: COMMERCIAL

## 2023-03-01 DIAGNOSIS — H35.3231 EXUDATIVE AGE-RELATED MACULAR DEGENERATION OF BOTH EYES WITH ACTIVE CHOROIDAL NEOVASCULARIZATION (H): ICD-10-CM

## 2023-03-01 DIAGNOSIS — H35.3211 EXUDATIVE AGE-RELATED MACULAR DEGENERATION OF RIGHT EYE WITH ACTIVE CHOROIDAL NEOVASCULARIZATION (H): Primary | ICD-10-CM

## 2023-03-01 PROCEDURE — 250N000011 HC RX IP 250 OP 636: Performed by: OPHTHALMOLOGY

## 2023-03-01 PROCEDURE — 67028 INJECTION EYE DRUG: CPT | Mod: RT | Performed by: OPHTHALMOLOGY

## 2023-03-01 PROCEDURE — 999N000103 HC STATISTIC NO CHARGE FACILITY FEE

## 2023-03-01 RX ADMIN — AFLIBERCEPT 2 MG: 40 INJECTION, SOLUTION INTRAVITREAL at 13:35

## 2023-03-01 ASSESSMENT — CONF VISUAL FIELD
OD_NORMAL: 1
OS_INFERIOR_NASAL_RESTRICTION: 0
OS_SUPERIOR_TEMPORAL_RESTRICTION: 0
METHOD: COUNTING FINGERS
OD_SUPERIOR_TEMPORAL_RESTRICTION: 0
OS_INFERIOR_TEMPORAL_RESTRICTION: 0
OS_NORMAL: 1
OD_SUPERIOR_NASAL_RESTRICTION: 0
OS_SUPERIOR_NASAL_RESTRICTION: 0
OD_INFERIOR_NASAL_RESTRICTION: 0
OD_INFERIOR_TEMPORAL_RESTRICTION: 0

## 2023-03-01 ASSESSMENT — TONOMETRY
OD_IOP_MMHG: 16
IOP_METHOD: TONOPEN
OS_IOP_MMHG: 15

## 2023-03-01 ASSESSMENT — VISUAL ACUITY
METHOD: SNELLEN - LINEAR
OD_SC: 20/40
OD_PH_SC: 20/20
OD_SC+: -1
OS_SC: 20/25
OS_SC+: -2

## 2023-03-01 NOTE — NURSING NOTE
Chief Complaints and History of Present Illnesses   Patient presents with     Exudative Macular Degeneration Follow Up     Chief Complaint(s) and History of Present Illness(es)     Exudative Macular Degeneration Follow Up            Laterality: both eyes    Associated symptoms: dryness and floaters.  Negative for eye pain, flashes, itching and burning    Pain scale: 0/10          Comments    Horace is here to continue care for Exudative age-related macular degeneration of both eyes with active choroidal neovascularization. She is having and eye injection today.  She says she still sees squiggly lines but not as bad as before injections.    Tyler Stout COT 1:08 PM March 1, 2023

## 2023-03-01 NOTE — PROGRESS NOTES
For right eye injection only today    CC: metamorphopsia, left eye    Interval hx: Noticing metamorphopsia - like bends in straight lines. This was present in the left eye at the time of her last visit, but she did not discuss it with the doctor. It started getting more pronounced about a week ago. Now it is getting a little more noticeable with each passing day. Last night it interfered with her reading for the first time.     HPI:  Katie Casper is a 62 year old patient here for follow up of exudative AMD.    She has a history of pituitary adenoma, s/p resection in 2007 and again in 2010 due to recurrence. Patient complains of flashes and floaters in the left eye. Dr. Neely saw retinal heme left eye and referred her. Saw Dr. Neely 6/22/20    Strong FH of early AMD: mother diagnosed in her 60s, sister, and brother with wet AMD  Father with AMD diagnosed in his 80s    OCT 02/10/23   Right eye: no SRF, central PED, choroid normal thickness, hyaloid   Left eye stable-  Normal foveal contour, interval development of new sub-foveal fluid with changes within the sub-foveal PED, choroid normal thickness, hyaloid     Assessment and Plan:    #  Exudative AMD right eye    Conversion to wet form 12/2021   OCTA: SubRPE neovascular membrane nasal to fovea   First fovea was not involved and VA was 20/20; gradually declined to 20/30; today 20/25   Recurrence of RIF at 5wks at 11/25/22 visit   S/p Eylea right - last injection 1/25/23 - (4 weeks, 4 week interval)    Plan   Eylea today and RTC4 weeks for Eylea right eye - injection only appt x 2 ; in future, we try to extend intervals again, if we fail extending intervals, we may switch to Vabysmo   Does not tolerate AREDS due to migraines    # Exudative AMD left eye   Conversion to wet form 2/2023   Initiated anti-VEGF with Eylea to keep dosing interval uniform   S/p Eylea 2/10/23 (plan for 4 week interval x 2 then T&E)   Discussed sx of endophthalmitis  02/10/23    # Pigment epithelium detachment, drusen each eye    drusenoid PED; stable compared to previous except for #1   DDX; peripheral CNV (not likely given FA finding) vs pigment epithelium adenoma vs peripheral PED   Not symptomatic now, but could be a harbinger of PECHR lesion   Lesion smaller or almost resolved 12/22/20 and 6/15/21: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 Retina attached.   Educated on signs and symptoms of a retinal detachment to be seen immediately.   No peripheral traction or tear in depressed exam today    #  Pituitary Adenoma  History of pituitary adenoma, s/p resection in 2007 and again in 2010 due to recurrence. Patient follows neurology.   Baseline visual field was done 2019. last VF within normal limits   Optic nerve head OCT was done 05/20/2020. No optic neuropathy seen on exam.  Continue monitoring yearly with VF and ONH OCT.   MRI brain summer 2022 showing stable lesion   Follows with [facility], doesn't have an endocrinologist, but is working on an appt.  Has appt with Jackie on 12/28/22 for repeat field testing      #  Cataract, both eyes.   Not visually significant. Monitor    RTC  As scheduled 3/22/23 for injection only, Eylea left eye     Attending Physician Attestation:      Complete documentation of historical and exam elements from today's encounter can be found in the full encounter summary report (not reduplicated in this progress note).  I personally obtained the chief complaint(s) and history of present illness.  I confirmed and edited as necessary the review of systems, past medical/surgical history, family history, social history, and examination findings as documented by others; and I examined the patient myself.  I personally reviewed the relevant tests, images, and reports as documented above.  I  formulated and edited as necessary the assessment and plan and discussed the findings and management plan with the patient and family    Cameron Clemons MD PhD

## 2023-03-20 ENCOUNTER — OFFICE VISIT (OUTPATIENT)
Dept: OPHTHALMOLOGY | Facility: CLINIC | Age: 63
End: 2023-03-20
Payer: COMMERCIAL

## 2023-03-20 ENCOUNTER — PRE VISIT (OUTPATIENT)
Dept: OPHTHALMOLOGY | Facility: CLINIC | Age: 63
End: 2023-03-20

## 2023-03-20 DIAGNOSIS — H00.15 CHALAZION OF LEFT LOWER EYELID: ICD-10-CM

## 2023-03-20 DIAGNOSIS — D22.10 NEVUS OF EYELID, LEFT: Primary | ICD-10-CM

## 2023-03-20 PROCEDURE — 92285 EXTERNAL OCULAR PHOTOGRAPHY: CPT | Mod: GC | Performed by: OPHTHALMOLOGY

## 2023-03-20 PROCEDURE — 99213 OFFICE O/P EST LOW 20 MIN: CPT | Mod: GC | Performed by: OPHTHALMOLOGY

## 2023-03-20 ASSESSMENT — VISUAL ACUITY
OD_SC: 20/30
OS_SC+: -2
OS_SC: 20/25
METHOD: SNELLEN - LINEAR
OD_SC+: -2

## 2023-03-20 ASSESSMENT — TONOMETRY
OD_IOP_MMHG: 14
OS_IOP_MMHG: 13
IOP_METHOD: ICARE

## 2023-03-20 ASSESSMENT — SLIT LAMP EXAM - LIDS: COMMENTS: NORMAL

## 2023-03-20 ASSESSMENT — EXTERNAL EXAM - RIGHT EYE: OD_EXAM: NORMAL

## 2023-03-20 ASSESSMENT — EXTERNAL EXAM - LEFT EYE: OS_EXAM: NORMAL

## 2023-03-20 NOTE — LETTER
3/20/2023         RE:  :  MRN: Katie Casper  1960  [id number]     Dear Dr. Mejia,    Thank you for asking me to see your patient, Katie Casper, for an oculoplastic   consultation.  My assessment and plan are below.  For further details, please see my attached clinic note.      Assessment & Plan     Katie Casper is a 62 year old female with the following diagnoses:   1. Nevus of eyelid, left    2. Chalazion of left lower eyelid         Chalazion cares  Return to clinic as needed if no improvement        Again, thank you for allowing me to participate in the care of your patient.      Sincerely,    Aj Murguia MD  Department of Ophthalmology and Visual Neurosciences  [facility]    CC: Arnaud Del Castillo MD  [address] [address]  Via In Basket     Fabian Mejia, CHERYL  [address] [facility] [address]  Via In Basket

## 2023-03-20 NOTE — PROGRESS NOTES
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      Chief Complaint(s) and History of Present Illness(es)     Consult For    In left eye.  Associated symptoms include Negative for eye pain.   Additional comments: Pt here for consult on LLL papilloma vs pyogenic   granuloma, referred by Dr Mejia.            Comments    Pt notes lesion was first noticed by Dr Mejia. She states she started   having problems with it in October 2022. She also mentions she never   noticed it before as it is under the eye lid. Denies pain currently- only   hurt when it was inflamed.They thought it was a stye at first. Warm   compresses were used.   SATISH Mancilla on 3/20/2023 at 7:54 AM           Patient noticed bump inside LLL in Oct 2022. During that time it was slightly red and irritated but since then has been stable. She tried warm compresses which helped a little bit but not much. It wasn't bothering her much so she stopped the compresses after about 6 weeks. Was seen in Dec for CEE and referred here. It has not grown, changed or caused any irritation since then. No bleeding or itching.          Assessment & Plan     Katie Casper is a 62 year old female with the following diagnoses:   1. Nevus of eyelid, left    2. Chalazion of left lower eyelid         Chalazion cares  Return to clinic as needed if no improvement          Sameera Box MD  Resident Physician, PGY-2  Department of Ophthalmology    Attending Physician Attestation:  I have seen and examined this patient with the resident .  I have confirmed and edited as necessary the chief complaint(s), history of present illness, review of systems, relevant history, and examination findings as documented by others.  I have personally reviewed the relevant tests, images, and reports as documented above.  I have confirmed and edited as  necessary the assessment and plan and agree with this note.    - Aj Murguia MD 8:19 AM 3/20/2023

## 2023-03-20 NOTE — PATIENT INSTRUCTIONS
"Instructions for your chalazion / chalazia:  Most chalazia will resolve with treatment at home using warm compresses and massage to soften and drain them.  Follow these steps twice a day:     1.  Soak the eyelids for ten minutes with a hot wet cloth -- as hot as you can stand but not so hot that you burn yourself.  An easy way to make a long-lasting warm compress is to wrap a boiled egg or potato in a wet washcloth.  If you use the microwave to heat anything, be VERY CAREFUL that it is not too hot as microwaved foods and cloths can have very uneven hot spots that pose a burn hazard.       2.  After the eyelids are soft and refreshed from the hot compress, clean the debris from the glands at the bases of the eyelashes.  With a warm wet washcloth wrapped around your index finger, use the tip of your finger to vigorously scrub the bases of the eyelashes.  The principle is similar to brushing your teeth but here you can use a side-to-side motion.  Perform ten strokes per eyelid across the entire length of the eyelid. You can use plain water for this brushing but many patients claim better results if they use a dilute solution of one capful of Nicho's Baby Shampoo in a glass of water.  This cleaning dislodges and removes the caked-in secretions in the gland and debris on the eyelids.  Do NOT wash the EYEBALL.     3.  If you have been prescribed an ointment, rub it on the eyelashes now.  Do NOT use Visine, Clear Eyes, or any \"anti-redness\" eye drops.  These can worsen your eye redness and irritation over time.     4.  Remember:  chalazia may take many WEEKS TO MONTHS to go away...so, hang in there and keep up with the compresses and scrubs!     5.  Diet & Supplements:  Modifying your diet helps reduce the chance of developing chalazia and possibly acne in some individuals.  This includes:  Avoid or decrease your intake of coffee, chocolate, refined sugars, and fried or processed foods. (Reduce gluten, breads, pastas, " and processed foods.)  Increase consumption of vegetables and fruits, fresh or lightly cooked. There is evidence  That Omega 3 supplementation will help as well. These are available at the local pharmacy or health food store. Dietary supplements with omega-3 fatty acids thin and decrease the inflammatory potential of the eyelid duct secretions decreasing your chance for recurrent chalazia in the future.  Omega-3 supplements are available from flax seeds, flax seed oil, or purified fish oil.  Supplement 500 - 1,500 mg of fish and/or flax seed oil daily for pre-adolescent children and 1,000 - 2,000 mg daily for adolescents and adults.  If you have any bleeding or cardiovascular problems or take prescription blood thinners, consult with your primary care physician before starting omega-3 supplements.      6.  Finally, if the chalazia persist despite following all the measures above consistently for at least 2 months, we can consider surgical removal.  In adults, this is performed as a 15 minute office procedure under local anesthesia. This necessitates general anesthesia in children, which we would much prefer to avoid if possible; so, again, please be diligent and patient with the above regimen.     7. If you have any questions please do not hesitate to contact us at [phone].

## 2023-03-22 ENCOUNTER — OFFICE VISIT (OUTPATIENT)
Dept: OPHTHALMOLOGY | Facility: CLINIC | Age: 63
End: 2023-03-22
Attending: OPHTHALMOLOGY
Payer: COMMERCIAL

## 2023-03-22 DIAGNOSIS — H35.3231 EXUDATIVE AGE-RELATED MACULAR DEGENERATION OF BOTH EYES WITH ACTIVE CHOROIDAL NEOVASCULARIZATION (H): Primary | ICD-10-CM

## 2023-03-22 PROCEDURE — 250N000011 HC RX IP 250 OP 636: Performed by: OPHTHALMOLOGY

## 2023-03-22 PROCEDURE — 67028 INJECTION EYE DRUG: CPT | Mod: LT | Performed by: OPHTHALMOLOGY

## 2023-03-22 RX ADMIN — AFLIBERCEPT 2 MG: 40 INJECTION, SOLUTION INTRAVITREAL at 13:25

## 2023-03-22 ASSESSMENT — VISUAL ACUITY
OS_SC+: +2
OS_SC: 20/30
OD_SC: 20/25
METHOD: SNELLEN - LINEAR

## 2023-03-22 ASSESSMENT — REFRACTION_WEARINGRX
OS_SPHERE: -0.75
OD_CYLINDER: SPHERE
OD_SPHERE: -0.25
OS_ADD: +2.00
OS_CYLINDER: SPHERE
OD_ADD: +2.00

## 2023-03-22 ASSESSMENT — TONOMETRY
OD_IOP_MMHG: 14
OS_IOP_MMHG: 12
IOP_METHOD: ICARE

## 2023-03-22 NOTE — PROGRESS NOTES
For left eye injection only today    CC: exudative AMD ou     Interval hx: notices metamorphopsia ~3 days ago    HPI:  Katie Casper is a 62 year old patient here for follow up of exudative AMD.    She has a history of pituitary adenoma, s/p resection in 2007 and again in 2010 due to recurrence. Patient complains of flashes and floaters in the left eye. Dr. Neely saw retinal heme left eye and referred her. Saw Dr. Neely 6/22/20    Strong FH of early AMD: mother diagnosed in her 60s, sister, and brother with wet AMD  Father with AMD diagnosed in his 80s    OCT 02/10/23   Right eye: no SRF, central PED, choroid normal thickness, hyaloid   Left eye stable-  Normal foveal contour, interval development of new sub-foveal fluid with changes within the sub-foveal PED, choroid normal thickness, hyaloid     Assessment and Plan:    #  Exudative AMD right eye    Conversion to wet form 12/2021   OCTA: SubRPE neovascular membrane nasal to fovea   First fovea was not involved and VA was 20/20; gradually declined to 20/30; today 20/25   Recurrence of RIF at 5wks at 11/25/22 visit   S/p Eylea right - last injection 3/1/23 - (4 week interval)    Plan   Eylea next week; after that I will try to overlap right eye and left eye injections on the same day     Does not tolerate AREDS due to migraines    # Exudative AMD left eye   Conversion to wet form 2/2023   Initiated anti-VEGF with Eylea to keep dosing interval uniform   S/p Eylea 2/10/23 (plan for 4 week interval but came at 7 w because I was out; notices new metamorphopsia x~3 days   Plan for Eylea #2 today and RTC 5 w   Discussed sx of endophthalmitis 02/10/23    # Pigment epithelium detachment, drusen each eye    drusenoid PED; stable compared to previous except for #1   DDX; peripheral CNV (not likely given FA finding) vs pigment epithelium adenoma vs peripheral PED   Not symptomatic now, but could be a harbinger of PECHR lesion   Lesion smaller or almost  resolved 12/22/20 and 6/15/21: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 Retina attached.   Educated on signs and symptoms of a retinal detachment to be seen immediately.   No peripheral traction or tear in depressed exam today    #  Pituitary Adenoma  History of pituitary adenoma, s/p resection in 2007 and again in 2010 due to recurrence. Patient follows neurology.   Baseline visual field was done 2019. last VF within normal limits   Optic nerve head OCT was done 05/20/2020. No optic neuropathy seen on exam.  Continue monitoring yearly with VF and ONH OCT.   MRI brain summer 2022 showing stable lesion   Follows with [facility], doesn't have an endocrinologist, but is working on an appt.  Has appt with Jackie on 12/28/22 for repeat field testing      #  Cataract, both eyes.   Not visually significant. Monitor    RTC  Next week for Eylea right eye and then on 4/25/23 or 4/26/23 for VTD and OCT and injection ou      Attending Physician Attestation:      Complete documentation of historical and exam elements from today's encounter can be found in the full encounter summary report (not reduplicated in this progress note).  I personally obtained the chief complaint(s) and history of present illness.  I confirmed and edited as necessary the review of systems, past medical/surgical history, family history, social history, and examination findings as documented by others; and I examined the patient myself.  I personally reviewed the relevant tests, images, and reports as documented above.  I formulated and edited as necessary the assessment and plan and discussed the findings and management plan with the patient and family    Cameron Clemons MD PhD

## 2023-03-22 NOTE — NURSING NOTE
Chief Complaints and History of Present Illnesses   Patient presents with     Macular Degeneration Follow Up     Chief Complaint(s) and History of Present Illness(es)     Macular Degeneration Follow Up            Laterality: both eyes          Comments    Macular degeneration follow up.  Injection today.  The patient reports squiggly lines in the left eye in last four days.  Radha Lau, COA, COA 1:08 PM 03/22/2023

## 2023-03-29 ENCOUNTER — OFFICE VISIT (OUTPATIENT)
Dept: OPHTHALMOLOGY | Facility: CLINIC | Age: 63
End: 2023-03-29
Attending: OPHTHALMOLOGY
Payer: COMMERCIAL

## 2023-03-29 DIAGNOSIS — H35.3231 EXUDATIVE AGE-RELATED MACULAR DEGENERATION OF BOTH EYES WITH ACTIVE CHOROIDAL NEOVASCULARIZATION (H): Primary | ICD-10-CM

## 2023-03-29 PROCEDURE — 67028 INJECTION EYE DRUG: CPT | Mod: RT | Performed by: OPHTHALMOLOGY

## 2023-03-29 PROCEDURE — 250N000011 HC RX IP 250 OP 636: Performed by: OPHTHALMOLOGY

## 2023-03-29 RX ADMIN — AFLIBERCEPT 2 MG: 40 INJECTION, SOLUTION INTRAVITREAL at 13:46

## 2023-03-29 ASSESSMENT — VISUAL ACUITY
OD_SC+: -3
OD_SC: 20/25
OS_SC: 20/25
OS_SC+: +2
METHOD: SNELLEN - LINEAR

## 2023-03-29 ASSESSMENT — CONF VISUAL FIELD
OD_SUPERIOR_TEMPORAL_RESTRICTION: 0
OS_SUPERIOR_NASAL_RESTRICTION: 0
OD_INFERIOR_NASAL_RESTRICTION: 0
OD_NORMAL: 1
OS_NORMAL: 1
OS_SUPERIOR_TEMPORAL_RESTRICTION: 0
METHOD: COUNTING FINGERS
OS_INFERIOR_NASAL_RESTRICTION: 0
OD_SUPERIOR_NASAL_RESTRICTION: 0
OD_INFERIOR_TEMPORAL_RESTRICTION: 0
OS_INFERIOR_TEMPORAL_RESTRICTION: 0

## 2023-03-29 ASSESSMENT — REFRACTION_WEARINGRX
OS_ADD: +2.00
OS_SPHERE: -0.75
OS_CYLINDER: SPHERE
OD_CYLINDER: SPHERE
OD_SPHERE: -0.25
OD_ADD: +2.00

## 2023-03-29 ASSESSMENT — TONOMETRY
OD_IOP_MMHG: 15
OS_IOP_MMHG: 14
IOP_METHOD: TONOPEN

## 2023-03-29 NOTE — NURSING NOTE
Chief Complaints and History of Present Illnesses   Patient presents with     Macular Degeneration Follow Up     1 week follow up injection RE     Chief Complaint(s) and History of Present Illness(es)     Macular Degeneration Follow Up            Comments: 1 week follow up injection RE          Comments    Pt states vision is the same as last visit. No eye pain today. No new flashes or floaters.   Some dryness today, relief with artificial tears.    CAROLE Powell March 29, 2023 1:33 PM

## 2023-04-12 NOTE — PROGRESS NOTES
For right eye injection only today    CC: exudative AMD ou     Interval hx: notices metamorphopsia ~3 days ago    HPI:  Katie Casper is a 62 year old patient here for follow up of exudative AMD.    She has a history of pituitary adenoma, s/p resection in 2007 and again in 2010 due to recurrence. Patient complains of flashes and floaters in the left eye. Dr. Neely saw retinal heme left eye and referred her. Saw Dr. Neely 6/22/20    Strong FH of early AMD: mother diagnosed in her 60s, sister, and brother with wet AMD  Father with AMD diagnosed in his 80s    OCT 02/10/23   Right eye: no SRF, central PED, choroid normal thickness, hyaloid   Left eye stable-  Normal foveal contour, interval development of new sub-foveal fluid with changes within the sub-foveal PED, choroid normal thickness, hyaloid     Assessment and Plan:    #  Exudative AMD right eye    Conversion to wet form 12/2021   OCTA: SubRPE neovascular membrane nasal to fovea   First fovea was not involved and VA was 20/20; gradually declined to 20/30; today 20/25   Recurrence of RIF at 5wks at 11/25/22 visit   S/p Eylea right - last injection 3/1/23 - (4 week interval)    Plan   Eylea today; then we will try to overlap right eye and left eye injections on the same day     Does not tolerate AREDS due to migraines    # Exudative AMD left eye   Conversion to wet form 2/2023   Initiated anti-VEGF with Eylea to keep dosing interval uniform   S/p Eylea 2/10/23 (plan for 4 week interval but came at 7 w because I was out; notices new metamorphopsia x~3 days   Last Eylea #2 3/22/23   Discussed sx of endophthalmitis 02/10/23    # Pigment epithelium detachment, drusen each eye    drusenoid PED; stable compared to previous except for #1   DDX; peripheral CNV (not likely given FA finding) vs pigment epithelium adenoma vs peripheral PED   Not symptomatic now, but could be a harbinger of PECHR lesion   Lesion smaller or almost resolved 12/22/20 and  6/15/21: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 Retina attached.   Educated on signs and symptoms of a retinal detachment to be seen immediately.   No peripheral traction or tear in depressed exam today    #  Pituitary Adenoma  History of pituitary adenoma, s/p resection in 2007 and again in 2010 due to recurrence. Patient follows neurology.   Baseline visual field was done 2019. last VF within normal limits   Optic nerve head OCT was done 05/20/2020. No optic neuropathy seen on exam.  Continue monitoring yearly with VF and ONH OCT.   MRI brain summer 2022 showing stable lesion   [place] [facility]'t have an endocrinologist, but is working on an appt.  Has appt with Jackie on 12/28/22 for repeat field testing      #  Cataract, both eyes.   Not visually significant. Monitor    RTC  4w for VTD and OCT and injection ou      Attending Physician Attestation:      Complete documentation of historical and exam elements from today's encounter can be found in the full encounter summary report (not reduplicated in this progress note).  I personally obtained the chief complaint(s) and history of present illness.  I confirmed and edited as necessary the review of systems, past medical/surgical history, family history, social history, and examination findings as documented by others; and I examined the patient myself.  I personally reviewed the relevant tests, images, and reports as documented above.  I formulated and edited as necessary the assessment and plan and discussed the findings and management plan with the patient and family    Cameron Clemons MD PhD

## 2023-04-13 DIAGNOSIS — H35.3231 EXUDATIVE AGE-RELATED MACULAR DEGENERATION OF BOTH EYES WITH ACTIVE CHOROIDAL NEOVASCULARIZATION (H): Primary | ICD-10-CM

## 2023-04-26 ENCOUNTER — OFFICE VISIT (OUTPATIENT)
Dept: OPHTHALMOLOGY | Facility: CLINIC | Age: 63
End: 2023-04-26
Attending: OPHTHALMOLOGY
Payer: COMMERCIAL

## 2023-04-26 DIAGNOSIS — H04.123 DRY EYE SYNDROME OF BOTH EYES: ICD-10-CM

## 2023-04-26 DIAGNOSIS — H35.3231 EXUDATIVE AGE-RELATED MACULAR DEGENERATION OF BOTH EYES WITH ACTIVE CHOROIDAL NEOVASCULARIZATION (H): Primary | ICD-10-CM

## 2023-04-26 DIAGNOSIS — D22.10 NEVUS OF EYELID, LEFT: ICD-10-CM

## 2023-04-26 DIAGNOSIS — H25.13 NUCLEAR SENILE CATARACT OF BOTH EYES: ICD-10-CM

## 2023-04-26 PROCEDURE — G0463 HOSPITAL OUTPT CLINIC VISIT: HCPCS | Mod: 25 | Performed by: OPHTHALMOLOGY

## 2023-04-26 PROCEDURE — 67028 INJECTION EYE DRUG: CPT | Mod: LT | Performed by: OPHTHALMOLOGY

## 2023-04-26 PROCEDURE — 250N000011 HC RX IP 250 OP 636: Performed by: OPHTHALMOLOGY

## 2023-04-26 PROCEDURE — 67028 INJECTION EYE DRUG: CPT | Mod: RT | Performed by: OPHTHALMOLOGY

## 2023-04-26 PROCEDURE — 92134 CPTRZ OPH DX IMG PST SGM RTA: CPT | Performed by: OPHTHALMOLOGY

## 2023-04-26 RX ADMIN — AFLIBERCEPT 2 MG: 40 INJECTION, SOLUTION INTRAVITREAL at 16:50

## 2023-04-26 RX ADMIN — AFLIBERCEPT 2 MG: 40 INJECTION, SOLUTION INTRAVITREAL at 16:49

## 2023-04-26 ASSESSMENT — VISUAL ACUITY
OS_PH_SC+: -2
OD_PH_SC+: -1
OD_PH_SC: 20/25
OS_SC: 20/30
METHOD: SNELLEN - LINEAR
OS_SC+: -1
OS_PH_SC: 20/20
OD_SC: 20/30

## 2023-04-26 ASSESSMENT — TONOMETRY
IOP_METHOD: TONOPEN
OD_IOP_MMHG: 14
OS_IOP_MMHG: 14

## 2023-04-26 ASSESSMENT — CONF VISUAL FIELD
OS_SUPERIOR_NASAL_RESTRICTION: 0
OD_INFERIOR_TEMPORAL_RESTRICTION: 0
OD_INFERIOR_NASAL_RESTRICTION: 0
OD_NORMAL: 1
OS_SUPERIOR_TEMPORAL_RESTRICTION: 0
OS_INFERIOR_TEMPORAL_RESTRICTION: 0
OS_NORMAL: 1
OD_SUPERIOR_NASAL_RESTRICTION: 0
METHOD: COUNTING FINGERS
OD_SUPERIOR_TEMPORAL_RESTRICTION: 0
OS_INFERIOR_NASAL_RESTRICTION: 0

## 2023-04-26 ASSESSMENT — CUP TO DISC RATIO
OD_RATIO: 0.25
OS_RATIO: 0.25

## 2023-04-26 ASSESSMENT — EXTERNAL EXAM - LEFT EYE: OS_EXAM: NORMAL

## 2023-04-26 ASSESSMENT — SLIT LAMP EXAM - LIDS: COMMENTS: NORMAL

## 2023-04-26 ASSESSMENT — EXTERNAL EXAM - RIGHT EYE: OD_EXAM: NORMAL

## 2023-04-26 NOTE — NURSING NOTE
Chief Complaints and History of Present Illnesses   Patient presents with     Follow Up     exudative AMD ou      Chief Complaint(s) and History of Present Illness(es)     Follow Up            Comments: exudative AMD ou           Comments    Natacha Ortiz COT 3:08 PM April 26, 2023   Pt states no flashes, floaters eye p[ain or redness    Natacha Ortiz COT 3:08 PM April 26, 2023

## 2023-05-02 DIAGNOSIS — N95.2 ATROPHIC VAGINITIS: ICD-10-CM

## 2023-05-02 DIAGNOSIS — Z00.00 VISIT FOR PREVENTIVE HEALTH EXAMINATION: ICD-10-CM

## 2023-05-02 RX ORDER — ESTRADIOL 2 MG/1
RING VAGINAL
Qty: 4 EACH | Refills: 1 | Status: SHIPPED | OUTPATIENT
Start: 2023-05-02

## 2023-05-04 ENCOUNTER — TELEPHONE (OUTPATIENT)
Dept: OBGYN | Facility: CLINIC | Age: 63
End: 2023-05-04
Payer: COMMERCIAL

## 2023-05-04 ENCOUNTER — NURSE TRIAGE (OUTPATIENT)
Dept: OBGYN | Facility: CLINIC | Age: 63
End: 2023-05-04
Payer: COMMERCIAL

## 2023-05-04 NOTE — TELEPHONE ENCOUNTER
Prior Authorization Retail Medication Request    Medication/Dose: estring  ICD code (if different than what is on RX):    Previously Tried and Failed:    Rationale:      Insurance Name:    Insurance ID:        Pharmacy Information (if different than what is on RX)  Name:    Phone:

## 2023-05-11 ENCOUNTER — TELEPHONE (OUTPATIENT)
Dept: OPHTHALMOLOGY | Facility: CLINIC | Age: 63
End: 2023-05-11
Payer: COMMERCIAL

## 2023-05-11 NOTE — TELEPHONE ENCOUNTER
Spoke with patient regarding Provider out of clinic and appointment will need rescheduling. Rescheduled patient accordingly and [facility]

## 2023-05-11 NOTE — TELEPHONE ENCOUNTER
PA Initiation    Medication: ESTRING 2 MG vaginal ring   Insurance Company: [employer] - Phone [phone] Fax [phone]  Pharmacy Filling the Rx: [id number] - [place] - [address] AT [facility] [address]  Filling Pharmacy Phone: [phone]  Filling Pharmacy Fax: [phone]  Start Date: 5/10/2023

## 2023-05-12 NOTE — TELEPHONE ENCOUNTER
Prior Authorization Approval    Authorization Effective Date: 2/10/2023  Authorization Expiration Date: 5/11/2024  Medication: ESTRING 2 MG vaginal ring--APPROVED  Approved Dose/Quantity:   Reference #:     Insurance Company: [employer] - Phone [phone] Fax [phone]  Expected CoPay:       CoPay Card Available:      Foundation Assistance Needed:    Which Pharmacy is filling the prescription (Not needed for infusion/clinic administered): [id number] [address] AT [facility] [address]  Pharmacy Notified: Yes  Patient Notified: Yes **Instructed pharmacy to notify patient when script is ready to /ship.**         [facility] [address], [place] [address]

Test Date:    2021-10-16               Test Time:    21:26:10

Pat Name:     PETRA AYALA            Department:   

Patient ID:   [id number]           Room:          

Gender:       M                        Technician:   WILD

:          1964               Requested By: MAHNAZ MOON

Order Number: [id number]            Reading MD:   Prakash Augustin MD

                                 Measurements

Intervals                              Axis          

Rate:         66                       P:            24

HI:           160                      QRS:          -8

QRSD:         84                       T:            83

QT:           386                                    

QTc:          406                                    

                           Interpretive Statements

SINUS RHYTHM

Electronically Signed On 10- 10:27:41 CDT by Prakash Augustin MD

## 2023-05-24 ENCOUNTER — OFFICE VISIT (OUTPATIENT)
Dept: OPHTHALMOLOGY | Facility: CLINIC | Age: 63
End: 2023-05-24
Attending: OPHTHALMOLOGY
Payer: COMMERCIAL

## 2023-05-24 DIAGNOSIS — H43.812 VITREOUS DEGENERATION OF LEFT EYE: ICD-10-CM

## 2023-05-24 DIAGNOSIS — H25.13 NUCLEAR SENILE CATARACT OF BOTH EYES: ICD-10-CM

## 2023-05-24 DIAGNOSIS — H04.123 DRY EYE SYNDROME OF BOTH EYES: ICD-10-CM

## 2023-05-24 DIAGNOSIS — D22.10 NEVUS OF EYELID, LEFT: ICD-10-CM

## 2023-05-24 DIAGNOSIS — H35.3231 EXUDATIVE AGE-RELATED MACULAR DEGENERATION OF BOTH EYES WITH ACTIVE CHOROIDAL NEOVASCULARIZATION (H): Primary | ICD-10-CM

## 2023-05-24 PROCEDURE — 99214 OFFICE O/P EST MOD 30 MIN: CPT | Mod: 25 | Performed by: OPHTHALMOLOGY

## 2023-05-24 PROCEDURE — 67028 INJECTION EYE DRUG: CPT | Mod: LT | Performed by: OPHTHALMOLOGY

## 2023-05-24 PROCEDURE — G0463 HOSPITAL OUTPT CLINIC VISIT: HCPCS | Mod: 25 | Performed by: OPHTHALMOLOGY

## 2023-05-24 PROCEDURE — 67028 INJECTION EYE DRUG: CPT | Mod: RT | Performed by: OPHTHALMOLOGY

## 2023-05-24 PROCEDURE — 250N000011 HC RX IP 250 OP 636: Performed by: OPHTHALMOLOGY

## 2023-05-24 RX ADMIN — AFLIBERCEPT 2 MG: 40 INJECTION, SOLUTION INTRAVITREAL at 13:17

## 2023-05-24 ASSESSMENT — CONF VISUAL FIELD
METHOD: COUNTING FINGERS
OS_INFERIOR_TEMPORAL_RESTRICTION: 0
OS_SUPERIOR_TEMPORAL_RESTRICTION: 0
OS_NORMAL: 1
OS_INFERIOR_NASAL_RESTRICTION: 0
OD_INFERIOR_TEMPORAL_RESTRICTION: 0
OD_NORMAL: 1
OS_SUPERIOR_NASAL_RESTRICTION: 0
OD_SUPERIOR_NASAL_RESTRICTION: 0
OD_SUPERIOR_TEMPORAL_RESTRICTION: 0
OD_INFERIOR_NASAL_RESTRICTION: 0

## 2023-05-24 ASSESSMENT — CUP TO DISC RATIO
OS_RATIO: 0.25
OD_RATIO: 0.25

## 2023-05-24 ASSESSMENT — SLIT LAMP EXAM - LIDS: COMMENTS: NORMAL

## 2023-05-24 ASSESSMENT — VISUAL ACUITY
OD_SC: 20/30
METHOD: SNELLEN - LINEAR
OS_SC: 20/30
OD_SC+: -1

## 2023-05-24 ASSESSMENT — TONOMETRY
IOP_METHOD: TONOPEN
OD_IOP_MMHG: 16
OS_IOP_MMHG: 15

## 2023-05-24 ASSESSMENT — EXTERNAL EXAM - RIGHT EYE: OD_EXAM: NORMAL

## 2023-05-24 ASSESSMENT — EXTERNAL EXAM - LEFT EYE: OS_EXAM: NORMAL

## 2023-05-24 NOTE — PROGRESS NOTES
CC: exudative AMD ou     Interval hx: Here for  Eyela injection each eye Exudative AMD each eye   States va is the same since last visit  AT    [facility]:  Katie Casper is a 62 year old patient here for follow up of exudative AMD.    She has a history of pituitary adenoma, s/p resection in 2007 and again in 2010 due to recurrence. Patient complains of flashes and floaters in the left eye. Dr. Neely saw retinal heme left eye and referred her. Saw Dr. Neely 6/22/20    Strong FH of early AMD: mother diagnosed in her 60s, sister, and brother with wet AMD  Father with AMD diagnosed in his 80s    OCT 04/26/23   Right eye: no SRF, central PED, choroid normal thickness, hyaloid   Left eye stable-  New SRF    Assessment and Plan:    #  Exudative AMD right eye    Conversion to wet form 12/2021   OCTA: SubRPE neovascular membrane nasal to fovea   First fovea was not involved and VA was 20/20; gradually declined to 20/30; today 20/25   Recurrence of RIF at 5wks at 11/25/22 visit   S/p Eylea right - last injection 4/26/23 - (4 week interval)    Plan   Eylea today; RTC 4 w for Eylea OU     Does not tolerate AREDS due to migraines    # Exudative AMD left eye   Conversion to wet form 2/2023   Initiated anti-VEGF with Eylea to keep dosing interval uniform   S/p Eylea 2/10/23 (plan for 4 week interval but came at 7 w because I was out; notices new metamorphopsia x~3 days   Last Eylea #2 4/26/23 (4 w)   4/26/23: new SRF: this means that we are not able to extend intervals beyond 4 w at this time: Eylea today and RTC 4 w for Eylea ou    # Pigment epithelium detachment, drusen each eye    drusenoid PED; stable compared to previous except for #1   DDX; peripheral CNV (not likely given FA finding) vs pigment epithelium adenoma vs peripheral PED   Not symptomatic now, but could be a harbinger of PECHR lesion   Lesion smaller or almost resolved 12/22/20 and 6/15/21:likely a peripheral CNV, self limited; s/s of CNV activation and  bleeding discussed    No apparent change in left eye examination 1/11/2022   Will observe for now    # Posterior Vitreous Detachment, both eyes. Retina attached.   Educated on signs and symptoms of a retinal detachment to be seen immediately.   No peripheral traction or tear in depressed exam today    #  Pituitary Adenoma  History of pituitary adenoma, s/p resection in 2007 and again in 2010 due to recurrence. Patient follows neurology.   Baseline visual field was done 2019. last VF within normal limits   Optic nerve head OCT was done 05/20/2020. No optic neuropathy seen on exam.  Continue monitoring yearly with VF and ONH OCT.   MRI brain summer 2022 showing stable lesion   Follows with NS, doesn't have an endocrinologist, but is working on an appt.  Has appt with Jackie on 12/28/22 for repeat field testing      #  Cataract, both eyes.   Not visually significant. Monitor    RTC  4w for Eyela injection ou , OCT mac, DFE    Ciro Sanders MD MPH  Vitreoretinal Fellow PGY-5  [facility]     Attending Physician Attestation:      Complete documentation of historical and exam elements from today's encounter can be found in the full encounter summary report (not reduplicated in this progress note).  I personally obtained the chief complaint(s) and history of present illness.  I confirmed and edited as necessary the review of systems, past medical/surgical history, family history, social history, and examination findings as documented by others; and I examined the patient myself.  I personally reviewed the relevant tests, images, and reports as documented above.  I formulated and edited as necessary the assessment and plan and discussed the findings and management plan with the patient and family    Cameron Clemons MD PhD

## 2023-05-24 NOTE — NURSING NOTE
Chief Complaints and History of Present Illnesses   Patient presents with     Procedure     Chief Complaint(s) and History of Present Illness(es)     Procedure           Comments    Here for  Eyela injection each eye Exudative AMD each eye   States va is the same since last visit  AT  Fay Shipley COT 1:04 PM May 24, 2023

## 2023-05-27 ENCOUNTER — HEALTH MAINTENANCE LETTER (OUTPATIENT)
Age: 63
End: 2023-05-27

## 2023-06-13 DIAGNOSIS — H35.3231 EXUDATIVE AGE-RELATED MACULAR DEGENERATION OF BOTH EYES WITH ACTIVE CHOROIDAL NEOVASCULARIZATION (H): Primary | ICD-10-CM

## 2023-06-21 ENCOUNTER — OFFICE VISIT (OUTPATIENT)
Dept: OPHTHALMOLOGY | Facility: CLINIC | Age: 63
End: 2023-06-21
Attending: OPHTHALMOLOGY
Payer: COMMERCIAL

## 2023-06-21 DIAGNOSIS — H43.812 VITREOUS DEGENERATION OF LEFT EYE: ICD-10-CM

## 2023-06-21 DIAGNOSIS — H25.13 NUCLEAR SENILE CATARACT OF BOTH EYES: ICD-10-CM

## 2023-06-21 DIAGNOSIS — H35.3231 EXUDATIVE AGE-RELATED MACULAR DEGENERATION OF BOTH EYES WITH ACTIVE CHOROIDAL NEOVASCULARIZATION (H): Primary | ICD-10-CM

## 2023-06-21 DIAGNOSIS — H04.123 DRY EYE SYNDROME OF BOTH EYES: ICD-10-CM

## 2023-06-21 PROCEDURE — 67028 INJECTION EYE DRUG: CPT | Mod: RT | Performed by: OPHTHALMOLOGY

## 2023-06-21 PROCEDURE — 67028 INJECTION EYE DRUG: CPT | Mod: LT | Performed by: OPHTHALMOLOGY

## 2023-06-21 PROCEDURE — G0463 HOSPITAL OUTPT CLINIC VISIT: HCPCS | Mod: 25 | Performed by: OPHTHALMOLOGY

## 2023-06-21 PROCEDURE — 92134 CPTRZ OPH DX IMG PST SGM RTA: CPT | Performed by: OPHTHALMOLOGY

## 2023-06-21 PROCEDURE — 99214 OFFICE O/P EST MOD 30 MIN: CPT | Mod: 25 | Performed by: OPHTHALMOLOGY

## 2023-06-21 PROCEDURE — 250N000011 HC RX IP 250 OP 636: Mod: JZ | Performed by: OPHTHALMOLOGY

## 2023-06-21 RX ADMIN — AFLIBERCEPT 2 MG: 40 INJECTION, SOLUTION INTRAVITREAL at 14:23

## 2023-06-21 ASSESSMENT — VISUAL ACUITY
OS_SC: 20/30
METHOD: SNELLEN - LINEAR
OD_SC+: +2
OS_SC+: -1
OD_SC: 20/40

## 2023-06-21 ASSESSMENT — TONOMETRY
OS_IOP_MMHG: 16
IOP_METHOD: TONOPEN
OD_IOP_MMHG: 18

## 2023-06-21 ASSESSMENT — CUP TO DISC RATIO
OD_RATIO: 0.25
OS_RATIO: 0.25

## 2023-06-21 ASSESSMENT — SLIT LAMP EXAM - LIDS: COMMENTS: NORMAL

## 2023-06-21 ASSESSMENT — PATIENT HEALTH QUESTIONNAIRE - PHQ9: SUM OF ALL RESPONSES TO PHQ QUESTIONS 1-9: 0

## 2023-06-21 ASSESSMENT — EXTERNAL EXAM - LEFT EYE: OS_EXAM: NORMAL

## 2023-06-21 ASSESSMENT — EXTERNAL EXAM - RIGHT EYE: OD_EXAM: NORMAL

## 2023-06-21 NOTE — NURSING NOTE
"Chief Complaints and History of Present Illnesses   Patient presents with     Exudative Macular Degeneration Follow Up     Exudative AMD w/ active choroidal neovascularization. Both eyes     Chief Complaint(s) and History of Present Illness(es)     Exudative Macular Degeneration Follow Up            Laterality: both eyes    Onset: 1 month ago    Treatments tried: artificial tears    Pain scale: 0/10    Comments: Exudative AMD w/ active choroidal neovascularization. Both eyes          Comments    Patient reports no new changes in vision, \"squiggly\" wavy lines in central vision of left eye has decreased ; which pt is pleased about.   Denies double vision/light sensitivity/pain. BE  Uses PF wetting drops up to 2-3 times per day and pataday every other day per pt.       MARAH Mcclelland OA, June 21, 2023                 "

## 2023-06-21 NOTE — PROGRESS NOTES
CC: exudative AMD ou     Interval hx: Here for  Eyela injection each eye Exudative AMD each eye   States va is same and waviness is better OS.     HPI:  Katie Casper is a 62 year old patient here for follow up of exudative AMD.    She has a history of pituitary adenoma, s/p resection in 2007 and again in 2010 due to recurrence. Patient complains of flashes and floaters in the left eye. Dr. Neely saw retinal heme left eye and referred her. Saw Dr. Neely 6/22/20    Strong FH of early AMD: mother diagnosed in her 60s, sister, and brother with wet AMD  Father with AMD diagnosed in his 80s    OCT 06/21/23  Right eye: no SRF, central PED, choroid normal thickness, hyaloid  - largely stable  Left eye stable-  Significant worsening to SRF OS    Assessment and Plan:    #  Exudative AMD right eye    Conversion to wet form 12/2021   OCTA: SubRPE neovascular membrane nasal to fovea   First fovea was not involved and VA was 20/20; gradually declined to 20/30; today 20/25   Recurrence of RIF at 5wks at 11/25/22 visit   S/p Eylea right - last injection 5/24/23 - (4 week interval)    Plan   Eylea today; RTC 4 w for Eylea OD     Does not tolerate AREDS due to migraines    # Exudative AMD left eye   Conversion to wet form 2/2023   Initiated anti-VEGF with Eylea to keep dosing interval uniform   S/p Eylea 2/10/23 (plan for 4 week interval but came at 7 w because I was out; notices new metamorphopsia x~3 days   Last Eylea 5/24/23 (4 w)   06/21/23: worsening SRF: this means that we are not able to extend intervals beyond 4 w at this time:  Will consider switch to Vabysmo at future visit pending exam and OCT findings   Eylea today and RTC 4 w for Eylea/Vabsymo OS    # Pigment epithelium detachment, drusen each eye    drusenoid PED; stable compared to previous except for #1   DDX; peripheral CNV (not likely given FA finding) vs pigment epithelium adenoma vs peripheral PED   Not symptomatic now, but could be a harbinger of  PECHR lesion   Lesion smaller or almost resolved 12/22/20 and 6/15/21: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 Retina attached.   Educated on signs and symptoms of a retinal detachment to be seen immediately.   No peripheral traction or tear in depressed exam today    #  Pituitary Adenoma  History of pituitary adenoma, s/p resection in 2007 and again in 2010 due to recurrence. Patient follows neurology.   Baseline visual field was done 2019. last VF within normal limits   Optic nerve head OCT was done 05/20/2020. No optic neuropathy seen on exam.  Continue monitoring yearly with VF and ONH OCT.   MRI brain summer 2022 showing stable lesion   Follows with [facility], doesn't have an endocrinologist, but is working on an appt.  Has appt with Jackie on 12/28/22 for repeat field testing      #  Cataract, both eyes.   Not visually significant. Monitor    RTC  4w for Eyela injection OD; Eylea vs Vabysmo OS , OCT mac - do not dilate    Ciro Sanders MD MPH  Vitreoretinal Fellow PGY-5  [facility]     Attending Physician Attestation:      Complete documentation of historical and exam elements from today's encounter can be found in the full encounter summary report (not reduplicated in this progress note).  I personally obtained the chief complaint(s) and history of present illness.  I confirmed and edited as necessary the review of systems, past medical/surgical history, family history, social history, and examination findings as documented by others; and I examined the patient myself.  I personally reviewed the relevant tests, images, and reports as documented above.  I formulated and edited as necessary the assessment and plan and discussed the findings and management plan with the patient and family    Cameron Clemons MD PhD

## 2023-07-11 DIAGNOSIS — H35.3231 EXUDATIVE AGE-RELATED MACULAR DEGENERATION OF BOTH EYES WITH ACTIVE CHOROIDAL NEOVASCULARIZATION (H): Primary | ICD-10-CM

## 2023-07-19 ENCOUNTER — OFFICE VISIT (OUTPATIENT)
Dept: OPHTHALMOLOGY | Facility: CLINIC | Age: 63
End: 2023-07-19
Attending: OPHTHALMOLOGY
Payer: COMMERCIAL

## 2023-07-19 DIAGNOSIS — H43.812 VITREOUS DEGENERATION OF LEFT EYE: ICD-10-CM

## 2023-07-19 DIAGNOSIS — H25.13 NUCLEAR SENILE CATARACT OF BOTH EYES: ICD-10-CM

## 2023-07-19 DIAGNOSIS — H04.123 DRY EYE SYNDROME OF BOTH EYES: ICD-10-CM

## 2023-07-19 DIAGNOSIS — H35.3231 EXUDATIVE AGE-RELATED MACULAR DEGENERATION OF BOTH EYES WITH ACTIVE CHOROIDAL NEOVASCULARIZATION (H): Primary | ICD-10-CM

## 2023-07-19 DIAGNOSIS — H10.13 ALLERGIC CONJUNCTIVITIS OF BOTH EYES: ICD-10-CM

## 2023-07-19 PROCEDURE — G0463 HOSPITAL OUTPT CLINIC VISIT: HCPCS | Mod: 25 | Performed by: OPHTHALMOLOGY

## 2023-07-19 PROCEDURE — 67028 INJECTION EYE DRUG: CPT | Mod: 50 | Performed by: OPHTHALMOLOGY

## 2023-07-19 PROCEDURE — 250N000011 HC RX IP 250 OP 636: Mod: JZ | Performed by: OPHTHALMOLOGY

## 2023-07-19 PROCEDURE — 92134 CPTRZ OPH DX IMG PST SGM RTA: CPT | Performed by: OPHTHALMOLOGY

## 2023-07-19 PROCEDURE — 99214 OFFICE O/P EST MOD 30 MIN: CPT | Mod: 25 | Performed by: OPHTHALMOLOGY

## 2023-07-19 PROCEDURE — 67028 INJECTION EYE DRUG: CPT | Mod: LT | Performed by: OPHTHALMOLOGY

## 2023-07-19 RX ADMIN — AFLIBERCEPT 2 MG: 40 INJECTION, SOLUTION INTRAVITREAL at 12:10

## 2023-07-19 RX ADMIN — FARICIMAB 6 MG: 6 INJECTION, SOLUTION INTRAVITREAL at 12:10

## 2023-07-19 ASSESSMENT — VISUAL ACUITY
OD_PH_SC: 20/20
OD_PH_SC+: -1
OS_SC: 20/50
METHOD: SNELLEN - LINEAR
OS_PH_SC: 20/25
OD_SC: 20/40

## 2023-07-19 ASSESSMENT — CONF VISUAL FIELD
METHOD: COUNTING FINGERS
OD_SUPERIOR_NASAL_RESTRICTION: 0
OS_SUPERIOR_TEMPORAL_RESTRICTION: 0
OS_INFERIOR_TEMPORAL_RESTRICTION: 0
OD_NORMAL: 1
OS_NORMAL: 1
OD_INFERIOR_NASAL_RESTRICTION: 0
OS_INFERIOR_NASAL_RESTRICTION: 0
OS_SUPERIOR_NASAL_RESTRICTION: 0
OD_SUPERIOR_TEMPORAL_RESTRICTION: 0
OD_INFERIOR_TEMPORAL_RESTRICTION: 0

## 2023-07-19 ASSESSMENT — CUP TO DISC RATIO
OS_RATIO: 0.25
OD_RATIO: 0.25

## 2023-07-19 ASSESSMENT — TONOMETRY
OS_IOP_MMHG: 16
IOP_METHOD: ICARE
OD_IOP_MMHG: 16

## 2023-07-19 ASSESSMENT — SLIT LAMP EXAM - LIDS: COMMENTS: NORMAL

## 2023-07-19 ASSESSMENT — EXTERNAL EXAM - RIGHT EYE: OD_EXAM: NORMAL

## 2023-07-19 ASSESSMENT — EXTERNAL EXAM - LEFT EYE: OS_EXAM: NORMAL

## 2023-07-19 NOTE — NURSING NOTE
No chief complaint on file.    Chief Complaint(s) and History of Present Illness(es)    Eylea injections each eye today. Patient states when washed out well her eyes don't hurt as much.  SATISH Copeland July 19, 2023 11:16 AM

## 2023-07-19 NOTE — PROGRESS NOTES
CC: exudative AMD ou     Interval hx: left eye is more blurry.     HPI:  Katie Casper is a 62 year old patient here for follow up of exudative AMD.    She has a history of pituitary adenoma, s/p resection in 2007 and again in 2010 due to recurrence.  Strong FH of early AMD: mother diagnosed in her 60s, sister, and brother with wet AMD  Father with AMD diagnosed in his 80s    OCT 07/19/23  Right eye: no SRF, central PED, choroid normal thickness, hyaloid  - largely stable  Left eye stable-  Significant worsening to SRF OS    Assessment and Plan:    #  Exudative AMD right eye    Conversion to wet form 12/2021   OCTA: SubRPE neovascular membrane nasal to fovea   First fovea was not involved and VA was 20/20; gradually declined to 20/30; today 20/25   Recurrence of RIF at 5wks at 11/25/22 visit   S/p Eylea right - last injection 6/12/23 - (4 week interval)    Plan   Eylea today; RTC 4 w for Eylea OD   Does not tolerate AREDS due to migraines    # Exudative AMD left eye   Conversion to wet form 2/2023   Initiated anti-VEGF with Eylea to keep dosing interval uniform   S/p Eylea 2/10/23 (plan for 4 week interval but came at 7 w because I was out; notices new metamorphopsia x~3 days   Last Eylea 5/24/23 (4 w)   07/19/23: worsening SRF: this means that we are not able to extend intervals beyond 4 w at this time: will switch to Vabysmo today    # Pigment epithelium detachment, drusen each eye    drusenoid PED; stable compared to previous except for #1   DDX; peripheral CNV (not likely given FA finding) vs pigment epithelium adenoma vs peripheral PED   Not symptomatic now, but could be a harbinger of PECHR lesion   Lesion smaller or almost resolved 12/22/20 and 6/15/21: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 Retina attached.   Educated on signs  and symptoms of a retinal detachment to be seen immediately.   No peripheral traction or tear in depressed exam today    #  Pituitary Adenoma  History of pituitary adenoma, s/p resection in 2007 and again in 2010 due to recurrence. Patient follows neurology.   Baseline visual field was done 2019. last VF within normal limits   Optic nerve head OCT was done 05/20/2020. No optic neuropathy seen on exam.  Continue monitoring yearly with VF and ONH OCT.   MRI brain summer 2022 showing stable lesion   Follows with NS, doesn't have an endocrinologist, but is working on an appt.  Has appt with Jackie on 12/28/22 for repeat field testing      #  Cataract, both eyes.   Not visually significant. Monitor    RTC  4w for DFE and OCT ou  Eyela injection OD; Vabysmo OS , OCT mac - do not dilate      Attending Physician Attestation:      Complete documentation of historical and exam elements from today's encounter can be found in the full encounter summary report (not reduplicated in this progress note).  I personally obtained the chief complaint(s) and history of present illness.  I confirmed and edited as necessary the review of systems, past medical/surgical history, family history, social history, and examination findings as documented by others; and I examined the patient myself.  I personally reviewed the relevant tests, images, and reports as documented above.  I formulated and edited as necessary the assessment and plan and discussed the findings and management plan with the patient and family    Cameron Clemons MD PhD

## 2023-08-02 DIAGNOSIS — H35.3231 EXUDATIVE AGE-RELATED MACULAR DEGENERATION OF BOTH EYES WITH ACTIVE CHOROIDAL NEOVASCULARIZATION (H): Primary | ICD-10-CM

## 2023-08-16 ENCOUNTER — OFFICE VISIT (OUTPATIENT)
Dept: OPHTHALMOLOGY | Facility: CLINIC | Age: 63
End: 2023-08-16
Attending: OPHTHALMOLOGY
Payer: COMMERCIAL

## 2023-08-16 DIAGNOSIS — H43.812 VITREOUS DEGENERATION OF LEFT EYE: ICD-10-CM

## 2023-08-16 DIAGNOSIS — H04.123 DRY EYE SYNDROME OF BOTH EYES: ICD-10-CM

## 2023-08-16 DIAGNOSIS — H10.13 ALLERGIC CONJUNCTIVITIS OF BOTH EYES: ICD-10-CM

## 2023-08-16 DIAGNOSIS — H35.3231 EXUDATIVE AGE-RELATED MACULAR DEGENERATION OF BOTH EYES WITH ACTIVE CHOROIDAL NEOVASCULARIZATION (H): Primary | ICD-10-CM

## 2023-08-16 DIAGNOSIS — H25.13 NUCLEAR SENILE CATARACT OF BOTH EYES: ICD-10-CM

## 2023-08-16 PROCEDURE — 67028 INJECTION EYE DRUG: CPT | Mod: RT | Performed by: OPHTHALMOLOGY

## 2023-08-16 PROCEDURE — 99214 OFFICE O/P EST MOD 30 MIN: CPT | Mod: 25 | Performed by: OPHTHALMOLOGY

## 2023-08-16 PROCEDURE — 67028 INJECTION EYE DRUG: CPT | Mod: LT | Performed by: OPHTHALMOLOGY

## 2023-08-16 PROCEDURE — 250N000011 HC RX IP 250 OP 636: Mod: JZ | Performed by: OPHTHALMOLOGY

## 2023-08-16 PROCEDURE — 92134 CPTRZ OPH DX IMG PST SGM RTA: CPT | Performed by: OPHTHALMOLOGY

## 2023-08-16 PROCEDURE — G0463 HOSPITAL OUTPT CLINIC VISIT: HCPCS | Mod: 25 | Performed by: OPHTHALMOLOGY

## 2023-08-16 RX ADMIN — AFLIBERCEPT 2 MG: 40 INJECTION, SOLUTION INTRAVITREAL at 09:34

## 2023-08-16 RX ADMIN — FARICIMAB 6 MG: 6 INJECTION, SOLUTION INTRAVITREAL at 09:35

## 2023-08-16 ASSESSMENT — VISUAL ACUITY
OD_SC+: +2
OS_SC: 20/40
OD_PH_SC: 20/20
OS_PH_SC: 20/30
OS_PH_SC+: +2
OD_SC: 20/40
OD_PH_SC+: -2
OS_SC+: -2
METHOD: SNELLEN - LINEAR

## 2023-08-16 ASSESSMENT — SLIT LAMP EXAM - LIDS: COMMENTS: NORMAL

## 2023-08-16 ASSESSMENT — EXTERNAL EXAM - RIGHT EYE: OD_EXAM: NORMAL

## 2023-08-16 ASSESSMENT — TONOMETRY
OS_IOP_MMHG: 13
OD_IOP_MMHG: 14
IOP_METHOD: TONOPEN

## 2023-08-16 ASSESSMENT — CUP TO DISC RATIO
OD_RATIO: 0.25
OS_RATIO: 0.25

## 2023-08-16 ASSESSMENT — EXTERNAL EXAM - LEFT EYE: OS_EXAM: NORMAL

## 2023-08-16 NOTE — PROGRESS NOTES
CC: exudative AMD ou     Interval hx: left eye vision is improved but doesn't feel that it has returned to baseline    HPI:  Katie Casper is a 62 year old patient here for follow up of exudative AMD.    She has a history of pituitary adenoma, s/p resection in 2007 and again in 2010 due to recurrence.  Strong FH of early AMD: mother diagnosed in her 60s, sister, and brother with wet AMD  Father with AMD diagnosed in his 80s    OCT 07/19/23  Right eye: no SRF, central PED, several small to large drusen, choroid normal thickness, hyaloid  - largely stable  Left eye stable-  Slightly improved SRF near neovascularization, several small to large drusen and few PED parafoveal    Assessment and Plan:    #  Exudative AMD right eye    Conversion to wet form 12/2021   OCTA: SubRPE neovascular membrane nasal to fovea   First fovea was not involved and VA was 20/20; gradually declined to 20/30; today 20/25   Recurrence of RIF at 5wks at 11/25/22 visit   S/p Eylea right - last injection 7/19//23 - (4 week interval)    Plan   Eylea today; RTC 4 w for Eylea OD   Does not tolerate AREDS due to migraines    # Exudative AMD left eye   Conversion to wet form 2/2023   Initiated anti-VEGF with Eylea to keep dosing interval uniform   S/p Eylea 2/10/23 (plan for 4 week interval but came at 7 w because I was out; notices new metamorphopsia x~3 days   Last Eylea 6/21/23 (4 w) but demonstrated worsening at 1 month and was subsequently switched to Vabysmo (last inj 7/19/23 - 4 w)   8/16/23: SRF slightly decreased; Vabysmo 8/16/23    # Pigment epithelium detachment, drusen each eye    drusenoid PED; stable compared to previous except for #1   DDX; peripheral CNV (not likely given FA finding) vs pigment epithelium adenoma vs peripheral PED   Not symptomatic now, but could be a harbinger of PECHR lesion   Lesion smaller or almost resolved 12/22/20 and 6/15/21:likely a peripheral CNV, self limited; s/s of CNV activation and  bleeding discussed    No apparent change in left eye examination 1/11/2022   Will observe for now    # Posterior Vitreous Detachment, both eyes. Retina attached.   Educated on signs and symptoms of a retinal detachment to be seen immediately.   No peripheral traction or tear in depressed exam today    #  Pituitary Adenoma  History of pituitary adenoma, s/p resection in 2007 and again in 2010 due to recurrence. Patient follows neurology.   Baseline visual field was done 2019. last VF within normal limits   Optic nerve head OCT was done 05/20/2020. No optic neuropathy seen on exam.  Continue monitoring yearly with VF and ONH OCT.   MRI brain summer 2022 showing stable lesion   Follows with [facility], doesn't have an endocrinologist, but is working on an appt.  Has appt with Jackie on 12/28/22 for repeat field testing      #  Cataract, both eyes.   Not visually significant. Monitor    RTC  4w for OCT ou  Eyela injection OD; Vabysmo OS - do not dilate, needs multiple washes after betadine  Last dilation 6/21/23    Thank you for entrusting us with your care  Angela Narvaez MD, PGY3  Ophthalmology Resident  [facility]      Attending Physician Attestation:      Complete documentation of historical and exam elements from today's encounter can be found in the full encounter summary report (not reduplicated in this progress note).  I personally obtained the chief complaint(s) and history of present illness.  I confirmed and edited as necessary the review of systems, past medical/surgical history, family history, social history, and examination findings as documented by others; and I examined the patient myself.  I personally reviewed the relevant tests, images, and reports as documented above.  I formulated and edited as necessary the assessment and plan and discussed the findings and management plan with the patient and family    Cameron Clemons MD PhD

## 2023-08-16 NOTE — NURSING NOTE
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

## 2023-09-01 DIAGNOSIS — H35.3231 EXUDATIVE AGE-RELATED MACULAR DEGENERATION OF BOTH EYES WITH ACTIVE CHOROIDAL NEOVASCULARIZATION (H): Primary | ICD-10-CM

## 2023-09-05 ENCOUNTER — TELEPHONE (OUTPATIENT)
Dept: OPHTHALMOLOGY | Facility: CLINIC | Age: 63
End: 2023-09-05

## 2023-09-05 NOTE — TELEPHONE ENCOUNTER
[facility] Call Center    Phone Message    May a detailed message be left on voicemail: yes     Reason for Call: Appointment Intake    Referring Provider Name: n/a  Diagnosis and/or Symptoms: Pt states she cannot make her 9/13 appt and would like to know if  can get her in sometime this month. States he needs her injections every 4 weeks but next available w/  is 10/25. Please reach out to pt of it's possible to get her in within this month.    Action Taken: Message routed to:  [facility] eye    Travel Screening: Not Applicable

## 2023-09-12 ENCOUNTER — OFFICE VISIT (OUTPATIENT)
Dept: OPHTHALMOLOGY | Facility: CLINIC | Age: 63
End: 2023-09-12
Attending: OPHTHALMOLOGY
Payer: COMMERCIAL

## 2023-09-12 DIAGNOSIS — H35.3231 EXUDATIVE AGE-RELATED MACULAR DEGENERATION OF BOTH EYES WITH ACTIVE CHOROIDAL NEOVASCULARIZATION (H): ICD-10-CM

## 2023-09-12 PROCEDURE — 67028 INJECTION EYE DRUG: CPT | Mod: RT | Performed by: OPHTHALMOLOGY

## 2023-09-12 PROCEDURE — 250N000011 HC RX IP 250 OP 636: Mod: JZ | Performed by: OPHTHALMOLOGY

## 2023-09-12 PROCEDURE — 92134 CPTRZ OPH DX IMG PST SGM RTA: CPT | Performed by: OPHTHALMOLOGY

## 2023-09-12 PROCEDURE — 67028 INJECTION EYE DRUG: CPT | Mod: LT | Performed by: OPHTHALMOLOGY

## 2023-09-12 PROCEDURE — G0463 HOSPITAL OUTPT CLINIC VISIT: HCPCS | Mod: 25 | Performed by: OPHTHALMOLOGY

## 2023-09-12 RX ADMIN — AFLIBERCEPT 2 MG: 40 INJECTION, SOLUTION INTRAVITREAL at 11:54

## 2023-09-12 RX ADMIN — FARICIMAB 6 MG: 6 INJECTION, SOLUTION INTRAVITREAL at 11:54

## 2023-09-12 ASSESSMENT — CUP TO DISC RATIO
OS_RATIO: 0.25
OD_RATIO: 0.25

## 2023-09-12 ASSESSMENT — VISUAL ACUITY
OD_PH_SC: 20/25
OS_PH_SC+: -2
METHOD: SNELLEN - LINEAR
OS_SC: 20/40
OD_SC+: -1
OD_SC: 20/30
OS_PH_SC: 20/25
OS_SC+: +1

## 2023-09-12 ASSESSMENT — TONOMETRY
OS_IOP_MMHG: 14
IOP_METHOD: TONOPEN
OD_IOP_MMHG: 15

## 2023-09-12 ASSESSMENT — EXTERNAL EXAM - LEFT EYE: OS_EXAM: NORMAL

## 2023-09-12 ASSESSMENT — SLIT LAMP EXAM - LIDS: COMMENTS: NORMAL

## 2023-09-12 ASSESSMENT — EXTERNAL EXAM - RIGHT EYE: OD_EXAM: NORMAL

## 2023-09-12 NOTE — NURSING NOTE
Chief Complaints and History of Present Illnesses   Patient presents with    Macular Degeneration Follow Up     Follow up Exudative age-related macular degeneration of both eyes w/active choroidal neovascularization.  Last seen August 16, 2023.      Chief Complaint(s) and History of Present Illness(es)       Macular Degeneration Follow Up              Laterality: both eyes    Associated symptoms: dryness (worse during allergy season).  Negative for redness, tearing, flashes and floaters    Treatments tried: artificial tears    Pain scale: 0/10    Comments: Follow up Exudative age-related macular degeneration of both eyes w/active choroidal neovascularization.  Last seen August 16, 2023.               Comments    Patient presents to clinic today for Vabysmo injection(s).   Patient reports no noticeable changes in vision since last seen, appears stable. BE  Denies new floaters/flashes/pain. BE  Uses Pataday PRN VITO Mcclelland OA, September 12, 2023

## 2023-09-12 NOTE — PROGRESS NOTES
CC: exudative AMD ou     Interval hx: left eye vision is improved but doesn't feel that it has returned to baseline    HPI:  Katie Casper is a 62 year old patient here for follow up of exudative AMD.    She has a history of pituitary adenoma, s/p resection in 2007 and again in 2010 due to recurrence.  Strong FH of early AMD: mother diagnosed in her 60s, sister, and brother with wet AMD  Father with AMD diagnosed in his 80s    OCT 09/12/23  Right eye: no SRF, central PED, several small to large drusen, choroid normal thickness, hyaloid  - largely stable  Left eye stable-  improved SRF near neovascularization, several small to large drusen and few PED parafoveal    Assessment and Plan:    #  Exudative AMD right eye    Conversion to wet form 12/2021   OCTA: SubRPE neovascular membrane nasal to fovea   First fovea was not involved and VA was 20/20; gradually declined to 20/30; today 20/25   Recurrence of RIF at 5wks at 11/25/22 visit   S/p Eylea right - last injection 8/16//23 - (4 week interval)    Plan   Eylea today; RTC 4 w for Eylea OD   Does not tolerate AREDS due to migraines    # Exudative AMD left eye   Conversion to wet form 2/2023   Initiated anti-VEGF with Eylea to keep dosing interval uniform   S/p Eylea 2/10/23 (plan for 4 week interval but came at 7 w because I was out; notices new metamorphopsia x~3 days/   Last Eylea 6/21/23 (4 w) but demonstrated worsening at 1 month and was subsequently switched to Vabysmo (last inj 8/16/23 - 4 w)   9/12/23: SRF decreased; Vabysmo 9//23    # Pigment epithelium detachment, drusen each eye    drusenoid PED; stable compared to previous except for #1   DDX; peripheral CNV (not likely given FA finding) vs pigment epithelium adenoma vs peripheral PED   Not symptomatic now, but could be a harbinger of PECHR lesion   Lesion smaller or almost resolved 12/22/20 and 6/15/21:likely a peripheral CNV, self limited; s/s of CNV activation and bleeding discussed    No  apparent change in left eye examination 6/21/23   Will observe for now    # Posterior Vitreous Detachment, both eyes. Retina attached.   Educated on signs and symptoms of a retinal detachment to be seen immediately.   No peripheral traction or tear in depressed exam today    #  Pituitary Adenoma  History of pituitary adenoma, s/p resection in 2007 and again in 2010 due to recurrence. Patient follows neurology.   Baseline visual field was done 2019. last VF within normal limits   Optic nerve head OCT was done 05/20/2020. No optic neuropathy seen on exam.  Continue monitoring yearly with VF and ONH OCT.   MRI brain summer 2022 showing stable lesion   Follows with [facility], doesn't have an endocrinologist, but is working on an appt.  Has appt with Jackie on 12/28/22 for repeat field testing      #  Cataract, both eyes.   Not visually significant. Monitor    RTC  4w for OCT ou  Eyela injection OD; Vabysmo OS - do not dilate, needs multiple washes after betadine  Last dilation 6/21/23    Thank you for entrusting us with your care  Angela Narvaez MD, PGY3  Ophthalmology Resident  [facility]      Attending Physician Attestation:      Complete documentation of historical and exam elements from today's encounter can be found in the full encounter summary report (not reduplicated in this progress note).  I personally obtained the chief complaint(s) and history of present illness.  I confirmed and edited as necessary the review of systems, past medical/surgical history, family history, social history, and examination findings as documented by others; and I examined the patient myself.  I personally reviewed the relevant tests, images, and reports as documented above.  I formulated and edited as necessary the assessment and plan and discussed the findings and management plan with the patient and family    Cameron Clemons MD PhD

## 2023-10-11 ENCOUNTER — OFFICE VISIT (OUTPATIENT)
Dept: OPHTHALMOLOGY | Facility: CLINIC | Age: 63
End: 2023-10-11
Attending: OPHTHALMOLOGY
Payer: COMMERCIAL

## 2023-10-11 DIAGNOSIS — H25.13 NUCLEAR SENILE CATARACT OF BOTH EYES: ICD-10-CM

## 2023-10-11 DIAGNOSIS — H10.13 ALLERGIC CONJUNCTIVITIS OF BOTH EYES: ICD-10-CM

## 2023-10-11 DIAGNOSIS — H35.3231 EXUDATIVE AGE-RELATED MACULAR DEGENERATION OF BOTH EYES WITH ACTIVE CHOROIDAL NEOVASCULARIZATION (H): Primary | ICD-10-CM

## 2023-10-11 DIAGNOSIS — H04.123 DRY EYE SYNDROME OF BOTH EYES: ICD-10-CM

## 2023-10-11 DIAGNOSIS — H43.812 VITREOUS DEGENERATION OF LEFT EYE: ICD-10-CM

## 2023-10-11 PROCEDURE — 250N000011 HC RX IP 250 OP 636: Mod: JZ | Performed by: OPHTHALMOLOGY

## 2023-10-11 PROCEDURE — 67028 INJECTION EYE DRUG: CPT | Mod: 50 | Performed by: OPHTHALMOLOGY

## 2023-10-11 PROCEDURE — 67028 INJECTION EYE DRUG: CPT | Mod: RT | Performed by: OPHTHALMOLOGY

## 2023-10-11 PROCEDURE — 92134 CPTRZ OPH DX IMG PST SGM RTA: CPT | Performed by: OPHTHALMOLOGY

## 2023-10-11 RX ADMIN — AFLIBERCEPT 2 MG: 40 INJECTION, SOLUTION INTRAVITREAL at 11:12

## 2023-10-11 RX ADMIN — FARICIMAB 6 MG: 6 INJECTION, SOLUTION INTRAVITREAL at 11:12

## 2023-10-11 ASSESSMENT — VISUAL ACUITY
OD_PH_SC: 20/30
OS_SC+: +1
OD_SC+: +1
OD_SC: 20/40
METHOD: SNELLEN - LINEAR
OS_PH_SC: 20/30
OS_SC: 20/40

## 2023-10-11 ASSESSMENT — REFRACTION_WEARINGRX
OS_ADD: +2.00
OD_ADD: +2.00
OD_CYLINDER: SPHERE
OD_SPHERE: -0.25
OS_SPHERE: -0.75
OS_CYLINDER: SPHERE

## 2023-10-11 ASSESSMENT — CONF VISUAL FIELD
OD_SUPERIOR_TEMPORAL_RESTRICTION: 0
OS_SUPERIOR_NASAL_RESTRICTION: 0
METHOD: COUNTING FINGERS
OD_INFERIOR_TEMPORAL_RESTRICTION: 0
OS_INFERIOR_NASAL_RESTRICTION: 0
OS_INFERIOR_TEMPORAL_RESTRICTION: 0
OD_SUPERIOR_NASAL_RESTRICTION: 0
OD_INFERIOR_NASAL_RESTRICTION: 0
OS_SUPERIOR_TEMPORAL_RESTRICTION: 0
OS_NORMAL: 1
OD_NORMAL: 1

## 2023-10-11 ASSESSMENT — TONOMETRY
IOP_METHOD: TONOPEN
OD_IOP_MMHG: 17
OS_IOP_MMHG: 16

## 2023-10-11 NOTE — PROGRESS NOTES
For intravitreal injection ou, Eylea right eye and vabysmo left eye     CC: exudative AMD ou     Interval hx: left eye vision is improved but doesn't feel that it has returned to baseline    HPI:  Katie Casper is a 62 year old patient here for follow up of exudative AMD.    She has a history of pituitary adenoma, s/p resection in 2007 and again in 2010 due to recurrence.  Strong FH of early AMD: mother diagnosed in her 60s, sister, and brother with wet AMD  Father with AMD diagnosed in his 80s    OCT 09/12/23  Right eye: no SRF, central PED, several small to large drusen, choroid normal thickness, hyaloid  - largely stable  Left eye stable-  improved SRF near neovascularization, several small to large drusen and few PED parafoveal    Assessment and Plan:    #  Exudative AMD right eye    Conversion to wet form 12/2021   OCTA: SubRPE neovascular membrane nasal to fovea   First fovea was not involved and VA was 20/20; gradually declined to 20/30; today 20/25   Recurrence of RIF at 5wks at 11/25/22 visit   S/p Eylea right - last injection 8/16//23 - (4 week interval)    Plan   Eylea today; RTC 4 w for Eylea OD   Does not tolerate AREDS due to migraines    # Exudative AMD left eye   Conversion to wet form 2/2023   Initiated anti-VEGF with Eylea to keep dosing interval uniform   S/p Eylea 2/10/23 (plan for 4 week interval but came at 7 w because I was out; notices new metamorphopsia x~3 days/   Last Eylea 6/21/23 (4 w) but demonstrated worsening at 1 month and was subsequently switched to Vabysmo (last inj 8/16/23 - 4 w)   9/12/23: SRF decreased; Vabysmo 9//23    # Pigment epithelium detachment, drusen each eye    drusenoid PED; stable compared to previous except for #1   DDX; peripheral CNV (not likely given FA finding) vs pigment epithelium adenoma vs peripheral PED   Not symptomatic now, but could be a harbinger of PECHR lesion   Lesion smaller or almost resolved 12/22/20 and 6/15/21:likely a peripheral  CNV, self limited; s/s of CNV activation and bleeding discussed    No apparent change in left eye examination 6/21/23   Will observe for now    # Posterior Vitreous Detachment, both eyes. Retina attached.   Educated on signs and symptoms of a retinal detachment to be seen immediately.   No peripheral traction or tear in depressed exam today    #  Pituitary Adenoma  History of pituitary adenoma, s/p resection in 2007 and again in 2010 due to recurrence. Patient follows neurology.   Baseline visual field was done 2019. last VF within normal limits   Optic nerve head OCT was done 05/20/2020. No optic neuropathy seen on exam.  Continue monitoring yearly with VF and ONH OCT.   MRI brain summer 2022 showing stable lesion   Follows with NS, doesn't have an endocrinologist, but is working on an appt.  Has appt with Jackie on 12/28/22 for repeat field testing      #  Cataract, both eyes.   Not visually significant. Monitor    RTC  4w for OCT ou  Eyela injection OD; Vabysmo OS - do not dilate, needs multiple washes after betadine  Last dilation 6/21/23    Attending Physician Attestation:      Complete documentation of historical and exam elements from today's encounter can be found in the full encounter summary report (not reduplicated in this progress note).  I personally obtained the chief complaint(s) and history of present illness.  I confirmed and edited as necessary the review of systems, past medical/surgical history, family history, social history, and examination findings as documented by others; and I examined the patient myself.  I personally reviewed the relevant tests, images, and reports as documented above.  I formulated and edited as necessary the assessment and plan and discussed the findings and management plan with the patient and family    Cameron Clemons MD PhD

## 2023-10-11 NOTE — NURSING NOTE
Chief Complaints and History of Present Illnesses   Patient presents with    Macular Degeneration Follow Up     Chief Complaint(s) and History of Present Illness(es)       Macular Degeneration Follow Up              Laterality: both eyes    Associated symptoms: Negative for flashes and floaters    Pain scale: 0/10              Comments    Follow up for AMD injections today.  The patient reports no vision changes.  Radha Lau COA, COA 10:23 AM 10/11/2023

## 2023-10-27 DIAGNOSIS — H35.3231 EXUDATIVE AGE-RELATED MACULAR DEGENERATION OF BOTH EYES WITH ACTIVE CHOROIDAL NEOVASCULARIZATION (H): Primary | ICD-10-CM

## 2023-11-08 ENCOUNTER — OFFICE VISIT (OUTPATIENT)
Dept: OPHTHALMOLOGY | Facility: CLINIC | Age: 63
End: 2023-11-08
Attending: OPHTHALMOLOGY
Payer: COMMERCIAL

## 2023-11-08 DIAGNOSIS — H35.3231 EXUDATIVE AGE-RELATED MACULAR DEGENERATION OF BOTH EYES WITH ACTIVE CHOROIDAL NEOVASCULARIZATION (H): Primary | ICD-10-CM

## 2023-11-08 PROCEDURE — 67028 INJECTION EYE DRUG: CPT | Mod: RT | Performed by: OPHTHALMOLOGY

## 2023-11-08 PROCEDURE — 67028 INJECTION EYE DRUG: CPT | Mod: 50 | Performed by: OPHTHALMOLOGY

## 2023-11-08 PROCEDURE — 250N000011 HC RX IP 250 OP 636: Mod: JZ | Performed by: OPHTHALMOLOGY

## 2023-11-08 PROCEDURE — 92134 CPTRZ OPH DX IMG PST SGM RTA: CPT | Performed by: OPHTHALMOLOGY

## 2023-11-08 RX ADMIN — FARICIMAB 6 MG: 6 INJECTION, SOLUTION INTRAVITREAL at 11:37

## 2023-11-08 RX ADMIN — AFLIBERCEPT 2 MG: 40 INJECTION, SOLUTION INTRAVITREAL at 11:37

## 2023-11-08 ASSESSMENT — VISUAL ACUITY
OD_PH_SC: 20/20
OS_SC+: -1
OD_SC: 20/40
METHOD: SNELLEN - LINEAR
OS_SC: 20/40
OS_PH_SC+: -2
OS_PH_SC: 20/25

## 2023-11-08 ASSESSMENT — TONOMETRY
OD_IOP_MMHG: 15
IOP_METHOD: ICARE
OS_IOP_MMHG: 13

## 2023-11-08 NOTE — PROGRESS NOTES
For intravitreal injection ou, Eylea right eye and vabysmo left eye     CC: exudative AMD ou     Interval hx: left eye vision is improved but doesn't feel that it has returned to baseline    HPI:  Katie Casper is a 63 year old patient here for follow up of exudative AMD.    She has a history of pituitary adenoma, s/p resection in 2007 and again in 2010 due to recurrence.  Strong FH of early AMD: mother diagnosed in her 60s, sister, and brother with wet AMD  Father with AMD diagnosed in his 80s    OCT 11/8/23  Right eye: no SRF, central PED, several small to large drusen, choroid normal thickness, hyaloid  - largely stable  Left eye stable-  improved SRF near neovascularization, several small to large drusen and few PED parafoveal    Assessment and Plan:    #  Exudative AMD right eye    Conversion to wet form 12/2021   OCTA: SubRPE neovascular membrane nasal to fovea   First fovea was not involved and VA was 20/20; gradually declined to 20/30; today 20/25   Recurrence of RIF at 5wks at 11/25/22 visit   S/p Eylea right - last injection 8/16//23 - (4 week interval)    Plan   Eylea today; RTC 4 w for Eylea OD   Does not tolerate AREDS due to migraines    # Exudative AMD left eye   Conversion to wet form 2/2023   Initiated anti-VEGF with Eylea to keep dosing interval uniform   S/p Eylea 2/10/23 (plan for 4 week interval but came at 7 w because I was out; notices new metamorphopsia x~3 days/   Last Eylea 6/21/23 (4 w) but demonstrated worsening at 1 month and was subsequently switched to Vabysmo (last inj 8/16/23 - 4 w)   9/12/23: SRF decreased; Vabysmo 9//23    # Pigment epithelium detachment, drusen each eye    drusenoid PED; stable compared to previous except for #1   DDX; peripheral CNV (not likely given FA finding) vs pigment epithelium adenoma vs peripheral PED   Not symptomatic now, but could be a harbinger of PECHR lesion   Lesion smaller or almost resolved 12/22/20 and 6/15/21:likely a peripheral  CNV, self limited; s/s of CNV activation and bleeding discussed    No apparent change in left eye examination 6/21/23   Will observe for now    # Posterior Vitreous Detachment, both eyes. Retina attached.   Educated on signs and symptoms of a retinal detachment to be seen immediately.   No peripheral traction or tear in depressed exam today    #  Pituitary Adenoma  History of pituitary adenoma, s/p resection in 2007 and again in 2010 due to recurrence. Patient follows neurology.   Baseline visual field was done 2019. last VF within normal limits   Optic nerve head OCT was done 05/20/2020. No optic neuropathy seen on exam.  Continue monitoring yearly with VF and ONH OCT.   MRI brain summer 2022 showing stable lesion   Follows with NS, doesn't have an endocrinologist, but is working on an appt.  Has appt with Jackie on 12/28/22 for repeat field testing      #  Cataract, both eyes.   Not visually significant. Monitor    RTC  4w for OCT ou  Eyela injection OD; Vabysmo OS - do not dilate, will dilate next time   needs multiple washes after betadine  Last dilation 6/21/23    Attending Physician Attestation:      Complete documentation of historical and exam elements from today's encounter can be found in the full encounter summary report (not reduplicated in this progress note).  I personally obtained the chief complaint(s) and history of present illness.  I confirmed and edited as necessary the review of systems, past medical/surgical history, family history, social history, and examination findings as documented by others; and I examined the patient myself.  I personally reviewed the relevant tests, images, and reports as documented above.  I formulated and edited as necessary the assessment and plan and discussed the findings and management plan with the patient and family    Cameron Clemons MD PhD

## 2023-11-08 NOTE — NURSING NOTE
Chief Complaints and History of Present Illnesses   Patient presents with    Follow Up     4 week follow up, injection      Chief Complaint(s) and History of Present Illness(es)       Follow Up              Comments: 4 week follow up, injection               Comments    Pt states eyes fell good. Pt states no pain, flashes, floaters, vision changes, or concerns.   Per pt only artifical tears and pataday used    Maryan HAIRSTON 10:49 AM November 8, 2023

## 2023-11-28 DIAGNOSIS — H35.3231 EXUDATIVE AGE-RELATED MACULAR DEGENERATION OF BOTH EYES WITH ACTIVE CHOROIDAL NEOVASCULARIZATION (H): Primary | ICD-10-CM

## 2023-12-06 ENCOUNTER — OFFICE VISIT (OUTPATIENT)
Dept: OPHTHALMOLOGY | Facility: CLINIC | Age: 63
End: 2023-12-06
Attending: OPHTHALMOLOGY
Payer: COMMERCIAL

## 2023-12-06 DIAGNOSIS — H35.3231 EXUDATIVE AGE-RELATED MACULAR DEGENERATION OF BOTH EYES WITH ACTIVE CHOROIDAL NEOVASCULARIZATION (H): ICD-10-CM

## 2023-12-06 PROCEDURE — 67028 INJECTION EYE DRUG: CPT | Mod: RT | Performed by: OPHTHALMOLOGY

## 2023-12-06 PROCEDURE — 92134 CPTRZ OPH DX IMG PST SGM RTA: CPT | Performed by: OPHTHALMOLOGY

## 2023-12-06 PROCEDURE — 250N000011 HC RX IP 250 OP 636: Mod: JZ | Performed by: OPHTHALMOLOGY

## 2023-12-06 PROCEDURE — G0463 HOSPITAL OUTPT CLINIC VISIT: HCPCS | Mod: 25 | Performed by: OPHTHALMOLOGY

## 2023-12-06 PROCEDURE — 67028 INJECTION EYE DRUG: CPT | Mod: LT | Performed by: OPHTHALMOLOGY

## 2023-12-06 RX ADMIN — FARICIMAB 6 MG: 6 INJECTION, SOLUTION INTRAVITREAL at 12:08

## 2023-12-06 RX ADMIN — AFLIBERCEPT 2 MG: 40 INJECTION, SOLUTION INTRAVITREAL at 12:08

## 2023-12-06 ASSESSMENT — TONOMETRY
OS_IOP_MMHG: 10
OD_IOP_MMHG: 13
IOP_METHOD: ICARE

## 2023-12-06 ASSESSMENT — VISUAL ACUITY
METHOD: SNELLEN - LINEAR
OD_SC: 20/40
OD_PH_SC+: -2
OS_SC+: +1
OS_SC: 20/30
OD_PH_SC: 20/25

## 2023-12-06 NOTE — PROGRESS NOTES
For intravitreal injection ou, Eylea right eye and vabysmo left eye     CC: exudative AMD ou     Interval hx: left eye vision is improved but doesn't feel that it has returned to baseline    HPI:  Katie Casper is a 63 year old patient here for follow up of exudative AMD.    She has a history of pituitary adenoma, s/p resection in 2007 and again in 2010 due to recurrence.  Strong FH of early AMD: mother diagnosed in her 60s, sister, and brother with wet AMD  Father with AMD diagnosed in his 80s    OCT 12/6/23  Right eye: no SRF, central PED, several small to large drusen, choroid normal thickness, hyaloid  - largely stable  Left eye stable-  improved SRF near neovascularization, several small to large drusen and few PED parafoveal    Assessment and Plan:    #  Exudative AMD right eye    Conversion to wet form 12/2021   OCTA: SubRPE neovascular membrane nasal to fovea   First fovea was not involved and VA was 20/20; gradually declined to 20/30; today 20/25   Recurrence of RIF at 5wks at 11/25/22 visit   S/p Eylea right - last injection 8/16//23 - (4 week interval)    Plan   Eylea today; RTC 4 w for Eylea OD   Does not tolerate AREDS due to migraines    # Exudative AMD left eye   Conversion to wet form 2/2023   Initiated anti-VEGF with Eylea to keep dosing interval uniform   S/p Eylea 2/10/23 (plan for 4 week interval but came at 7 w because I was out; notices new metamorphopsia x~3 days/   Last Eylea 6/21/23 (4 w) but demonstrated worsening at 1 month and was subsequently switched to Vabysmo (last inj 8/16/23 - 4 w)   9/12/23: SRF decreased; Vabysmo 9//23    # Pigment epithelium detachment, drusen each eye    drusenoid PED; stable compared to previous except for #1   DDX; peripheral CNV (not likely given FA finding) vs pigment epithelium adenoma vs peripheral PED   Not symptomatic now, but could be a harbinger of PECHR lesion   Lesion smaller or almost resolved 12/22/20 and 6/15/21:likely a peripheral  CNV, self limited; s/s of CNV activation and bleeding discussed    No apparent change in left eye examination 6/21/23   Will observe for now    # Posterior Vitreous Detachment, both eyes. Retina attached.   Educated on signs and symptoms of a retinal detachment to be seen immediately.   No peripheral traction or tear in depressed exam today    #  Pituitary Adenoma  History of pituitary adenoma, s/p resection in 2007 and again in 2010 due to recurrence. Patient follows neurology.   Baseline visual field was done 2019. last VF within normal limits   Optic nerve head OCT was done 05/20/2020. No optic neuropathy seen on exam.  Continue monitoring yearly with VF and ONH OCT.   MRI brain summer 2022 showing stable lesion   Follows with NS, doesn't have an endocrinologist, but is working on an appt.  Has appt with Jackie on 12/28/22 for repeat field testing      #  Cataract, both eyes.   Not visually significant. Monitor    RTC  4w for OCT ou  Eyela injection OD; Vabysmo OS - VTD each eye  needs multiple washes after betadine  Last dilation 6/21/23    Attending Physician Attestation:      Complete documentation of historical and exam elements from today's encounter can be found in the full encounter summary report (not reduplicated in this progress note).  I personally obtained the chief complaint(s) and history of present illness.  I confirmed and edited as necessary the review of systems, past medical/surgical history, family history, social history, and examination findings as documented by others; and I examined the patient myself.  I personally reviewed the relevant tests, images, and reports as documented above.  I formulated and edited as necessary the assessment and plan and discussed the findings and management plan with the patient and family    Cameron Clemons MD PhD

## 2023-12-06 NOTE — NURSING NOTE
"Chief Complaints and History of Present Illnesses   Patient presents with    Follow Up     4 week follow up Eyela injection OD; Vabysmo OS     Chief Complaint(s) and History of Present Illness(es)       Follow Up              Associated symptoms: Negative for eye pain    Treatments tried: eye drops and artificial tears    Pain scale: 0/10    Comments: 4 week follow up Eyela injection OD; Vabysmo OS              Comments    Pt states vision has been stable both eyes.   Denies eye pain or discomfort.   No flashes or floaters.   She is using \"eye gel\" and Pataday KERI Granda 11:03 AM December 6, 2023                    "

## 2023-12-13 NOTE — ADDENDUM NOTE
Addended by: KATRIN MILLER on: 6/21/2023 03:20 PM     Modules accepted: Orders     Detail Level: Detailed

## 2023-12-27 DIAGNOSIS — D35.2 PITUITARY ADENOMA (H): Primary | ICD-10-CM

## 2023-12-30 DIAGNOSIS — H35.3231 EXUDATIVE AGE-RELATED MACULAR DEGENERATION OF BOTH EYES WITH ACTIVE CHOROIDAL NEOVASCULARIZATION (H): Primary | ICD-10-CM

## 2024-01-02 ENCOUNTER — NURSE TRIAGE (OUTPATIENT)
Dept: NURSING | Facility: CLINIC | Age: 64
End: 2024-01-02

## 2024-01-02 ENCOUNTER — MYC MEDICAL ADVICE (OUTPATIENT)
Dept: OPHTHALMOLOGY | Facility: CLINIC | Age: 64
End: 2024-01-02

## 2024-01-02 ENCOUNTER — OFFICE VISIT (OUTPATIENT)
Dept: OPHTHALMOLOGY | Facility: CLINIC | Age: 64
End: 2024-01-02
Attending: OPHTHALMOLOGY
Payer: COMMERCIAL

## 2024-01-02 DIAGNOSIS — H25.13 NUCLEAR SENILE CATARACT OF BOTH EYES: ICD-10-CM

## 2024-01-02 DIAGNOSIS — D22.10 NEVUS OF EYELID, LEFT: ICD-10-CM

## 2024-01-02 DIAGNOSIS — H35.3231 EXUDATIVE AGE-RELATED MACULAR DEGENERATION OF BOTH EYES WITH ACTIVE CHOROIDAL NEOVASCULARIZATION (H): Primary | ICD-10-CM

## 2024-01-02 DIAGNOSIS — D35.2 PITUITARY ADENOMA (H): ICD-10-CM

## 2024-01-02 DIAGNOSIS — H04.123 DRY EYE SYNDROME OF BOTH EYES: ICD-10-CM

## 2024-01-02 PROCEDURE — G0463 HOSPITAL OUTPT CLINIC VISIT: HCPCS | Mod: 25 | Performed by: OPHTHALMOLOGY

## 2024-01-02 PROCEDURE — 250N000011 HC RX IP 250 OP 636: Mod: JZ | Performed by: OPHTHALMOLOGY

## 2024-01-02 PROCEDURE — 99212 OFFICE O/P EST SF 10 MIN: CPT | Mod: 25 | Performed by: OPHTHALMOLOGY

## 2024-01-02 PROCEDURE — 67028 INJECTION EYE DRUG: CPT | Mod: 50 | Performed by: OPHTHALMOLOGY

## 2024-01-02 PROCEDURE — 99214 OFFICE O/P EST MOD 30 MIN: CPT | Mod: 25 | Performed by: OPHTHALMOLOGY

## 2024-01-02 PROCEDURE — 67028 INJECTION EYE DRUG: CPT | Mod: RT | Performed by: OPHTHALMOLOGY

## 2024-01-02 PROCEDURE — 92134 CPTRZ OPH DX IMG PST SGM RTA: CPT | Performed by: OPHTHALMOLOGY

## 2024-01-02 RX ADMIN — AFLIBERCEPT 2 MG: 40 INJECTION, SOLUTION INTRAVITREAL at 10:18

## 2024-01-02 RX ADMIN — FARICIMAB 6 MG: 6 INJECTION, SOLUTION INTRAVITREAL at 10:17

## 2024-01-02 ASSESSMENT — SLIT LAMP EXAM - LIDS: COMMENTS: NORMAL

## 2024-01-02 ASSESSMENT — VISUAL ACUITY
OD_PH_SC+: -2
OD_SC+: +1
OD_SC: 20/40
OS_PH_SC+: -2
OS_SC: 20/30
OS_PH_SC: 20/20
OS_SC+: -2
METHOD: SNELLEN - LINEAR
OD_PH_SC: 20/25

## 2024-01-02 ASSESSMENT — EXTERNAL EXAM - LEFT EYE: OS_EXAM: NORMAL

## 2024-01-02 ASSESSMENT — CUP TO DISC RATIO
OS_RATIO: 0.25
OD_RATIO: 0.25

## 2024-01-02 ASSESSMENT — TONOMETRY
OD_IOP_MMHG: 12
OS_IOP_MMHG: 12
IOP_METHOD: TONOPEN

## 2024-01-02 ASSESSMENT — EXTERNAL EXAM - RIGHT EYE: OD_EXAM: NORMAL

## 2024-01-02 NOTE — PROGRESS NOTES
For intravitreal injection ou, Eylea right eye and vabysmo left eye     CC: exudative AMD ou     Interval hx: left eye vision is improved but doesn't feel that it has returned to baseline    HPI:  Katie Casper is a 63 year old patient here for follow up of exudative AMD.    She has a history of pituitary adenoma, s/p resection in 2007 and again in 2010 due to recurrence.  Strong FH of early AMD: mother diagnosed in her 60s, sister, and brother with wet AMD  Father with AMD diagnosed in his 80s    OCT 12/6/23  Right eye: no SRF, central PED, several small to large drusen, choroid normal thickness, hyaloid  - largely stable  Left eye stable-  improved SRF near neovascularization, several small to large drusen and few PED parafoveal    Assessment and Plan:    #  Exudative AMD right eye    Conversion to wet form 12/2021   OCTA: SubRPE neovascular membrane nasal to fovea   First fovea was not involved and VA was 20/20; gradually declined to 20/30; today 20/25   Previously (in 2022) IRF recurrence at 5 weeks; stable with no fluid with q4 injections for long time; will try to extend gradually   S/p Eylea right - last injection 12/6/23 - (4 week )    Plan   Eylea today; RTC 5 w for Eylea OD   Does not tolerate AREDS due to migraines    # Exudative AMD left eye   Conversion to wet form 2/2023   Initiated anti-VEGF with Eylea to keep dosing interval uniform   S/p Eylea multiple injections then switched to Vabysmo (last inj 12/6/23 - 4 w)   1/2/2024: resolved SRF; Vabysmo today and RTC 5 weeks    # Pigment epithelium detachment, drusen each eye    drusenoid PED; stable compared to previous except for #1   DDX; peripheral CNV (not likely given FA finding) vs pigment epithelium adenoma vs peripheral PED   Not symptomatic now, but could be a harbinger of PECHR lesion   Lesion smaller or almost resolved 12/22/20 and 6/15/21:likely a peripheral CNV, self limited; s/s of CNV activation and bleeding discussed    No  apparent change in left eye examination 1/2/24   Will observe for now    # Posterior Vitreous Detachment, both eyes. Retina attached.   Educated on signs and symptoms of a retinal detachment to be seen immediately.   No peripheral traction or tear in depressed exam today    #  Pituitary Adenoma  History of pituitary adenoma, s/p resection in 2007 and again in 2010 due to recurrence. Patient follows neurology.   Baseline visual field was done 2019. last VF within normal limits   Optic nerve head OCT was done 05/20/2020. No optic neuropathy seen on exam.  Continue monitoring yearly with VF and ONH OCT.   MRI brain summer 2022 showing stable lesion   Follows with NS, doesn't have an endocrinologist, but is working on an appt.  Has appt with Jackie on 12/28/22 for repeat field testing      #  Cataract, both eyes.   Not visually significant. Monitor    RTC  5 w for OCT ou  Eyela injection OD; Vabysmo OS - no dilation ou   needs multiple washes after betadine  Last dilation 1/2/24    Attending Physician Attestation:      Complete documentation of historical and exam elements from today's encounter can be found in the full encounter summary report (not reduplicated in this progress note).  I personally obtained the chief complaint(s) and history of present illness.  I confirmed and edited as necessary the review of systems, past medical/surgical history, family history, social history, and examination findings as documented by others; and I examined the patient myself.  I personally reviewed the relevant tests, images, and reports as documented above.  I formulated and edited as necessary the assessment and plan and discussed the findings and management plan with the patient and family    Cameron Clemons MD PhD

## 2024-01-02 NOTE — TELEPHONE ENCOUNTER
Spoke pt at 1440    Likely left eye abrasion per symptoms--offered to come back for exam and pt would like to come in    Pt will be arriving back to clinic around 1515    --can't keep eye open/tearing    Jason Salinas RN 2:47 PM 01/02/24

## 2024-01-02 NOTE — TELEPHONE ENCOUNTER
"63 year old female s/p intravitreal injection ou, Eylea right eye and vabysmo left eye She is now home and is calling with severe pain in left eye  She states the pain appears to be below injection site   She rates it 8-9/10 if eye is open or she moves her eye ball   if closed and eye ball is still she rates it 2/10   She states it feel like a piece of glass in the eye      Forwarding to eye team to follow-up               Reason for Disposition   MODERATE eye pain or discomfort (e.g., interferes with normal activities or awakens from sleep; more than mild)    Additional Information   Negative: Followed an eye injury   Negative: Eye pain from chemical in the eye   Negative: Eye pain from foreign body in eye   Negative: Has sinus pain or pressure   Negative: SEVERE eye pain   Negative: Complete loss of vision in one or both eyes   Negative: Eyelids are very swollen (shut or almost) and fever   Negative: Eyelid (outer) is very red and fever   Negative: Foreign body sensation ('feels like something is in there') and irrigation didn't help   Negative: Vomiting   Negative: Ulcer or sore seen on the cornea (clear center part of the eye)   Negative: Recent eye surgery and increasing eye pain   Negative: Blurred vision and new or worsening   Negative: Patient sounds very sick or weak to the triager    Answer Assessment - Initial Assessment Questions  1. ONSET: \"When did the pain start?\" (e.g., minutes, hours, days)      Started when she got home today after injetion   2. TIMING: \"Does the pain come and go, or has it been constant since it started?\" (e.g., constant, intermittent, fleeting)      No constant  3. SEVERITY: \"How bad is the pain?\"   (Scale 1-10; mild, moderate or severe)    - MILD (1-3): doesn't interfere with normal activities     - MODERATE (4-7): interferes with normal activities or awakens from sleep     - SEVERE (8-10): excruciating pain and patient unable to do normal activities      severe  4. LOCATION: " "\"Where does it hurt?\"  (e.g., eyelid, eye, cheekbone)      Left corner of left eye  5. CAUSE: \"What do you think is causing the pain?\"      Not sure  pain is below injection site  6. VISION: \"Do you have blurred vision or changes in your vision?\"       Can't open eye too painful  7. EYE DISCHARGE: \"Is there any discharge (pus) from the eye(s)?\"  If Yes, ask: \"What color is it?\"       Watering but normal after injection  8. FEVER: \"Do you have a fever?\" If Yes, ask: \"What is it, how was it measured, and when did it start?\"       no  9. OTHER SYMPTOMS: \"Do you have any other symptoms?\" (e.g., headache, nasal discharge, facial rash)      no  10. PREGNANCY: \"Is there any chance you are pregnant?\" \"When was your last menstrual period?\"        no    Protocols used: Eye Pain and Other Symptoms-A-[place]    "

## 2024-01-04 ENCOUNTER — TELEPHONE (OUTPATIENT)
Dept: OPHTHALMOLOGY | Facility: CLINIC | Age: 64
End: 2024-01-04
Payer: COMMERCIAL

## 2024-01-04 ENCOUNTER — OFFICE VISIT (OUTPATIENT)
Dept: OPHTHALMOLOGY | Facility: CLINIC | Age: 64
End: 2024-01-04
Attending: OPHTHALMOLOGY
Payer: COMMERCIAL

## 2024-01-04 DIAGNOSIS — S05.02XD ABRASION OF LEFT CORNEA, SUBSEQUENT ENCOUNTER: Primary | ICD-10-CM

## 2024-01-04 ASSESSMENT — SLIT LAMP EXAM - LIDS: COMMENTS: NORMAL

## 2024-01-04 ASSESSMENT — VISUAL ACUITY
OS_CC: 20/70
METHOD: SNELLEN - LINEAR
OD_CC: 20/30
OD_CC+: -2
OS_CC+: +2

## 2024-01-04 ASSESSMENT — EXTERNAL EXAM - LEFT EYE: OS_EXAM: NORMAL

## 2024-01-04 ASSESSMENT — EXTERNAL EXAM - RIGHT EYE: OD_EXAM: NORMAL

## 2024-01-04 NOTE — PROGRESS NOTES
[facility]     Chief Complaint(s) and History of Present Illness(es)       Corneal Abrasion Follow Up    Duration of 2 days.  Associated symptoms include Negative for blurred vision and redness.             Comments    Patient returns for recheck of corneal abrasion.  Denies pain in the eye since placing bcl.  Denies change in vision besides maybe some mild blurring since the contact lens was placed, especially with reading.     Emerita Harper on 1/4/2024 at 10:21 AM                     HPI:   Katie Casper is a 63 year old female who presents for corneal abrasion follow up and BCL removal. She has small abrasion OS after intravitreal injection 2 days ago so BCL was placed by Dr. Clemons. Today she is feeling much better and pain is well improved. Vision still a little blurry.      Review of systems for the eyes was negative other than the pertinent positives/negatives listed in the HPI.      Assessment & Plan      Katie Casper is a 63 year old female with the following diagnoses:     # Corneal abrasion, left eye  BCL removed today  Abrasion healed left eye  Return to clinic as scheduled with Dr. Clemons on 2/7/24    Sameera Box MD  Resident Physician, PGY-3  Department of Ophthalmology  01/04/24 10:28 AM

## 2024-01-04 NOTE — NURSING NOTE
Chief Complaints and History of Present Illnesses   Patient presents with    Corneal Abrasion Follow Up     Chief Complaint(s) and History of Present Illness(es)       Corneal Abrasion Follow Up              Duration: 2 days    Associated symptoms: Negative for blurred vision and redness              Comments    Patient returns for recheck of corneal abrasion.  Denies pain in the eye since placing bcl.  Denies change in vision besides maybe some mild blurring since the contact lens was placed, especially with reading.     Emerita Harper on 1/4/2024 at 10:21 AM

## 2024-01-17 DIAGNOSIS — D35.2 PITUITARY ADENOMA (H): Primary | ICD-10-CM

## 2024-01-17 NOTE — PROGRESS NOTES
HPI:  Patient presents for follow up - needs visual field and optic nerve OCT for pituitary adenoma.     Social history: Has a grandson who is 2.5 (noted in 2024). Has a son.       Pertinent Medical History:  Pituitary Adenoma 2013  Migraine  Chronic kidney disease  Anemia    Ocular History:  Age Related Macular Degeneration, both eyes.   Family history of macular degeneration - mother, sister, and brother.   PVD, both eyes.   Peripheral PED, both eyes.   VMT, right eye  Cataract both eyes.   PED, both eyes.   Allergic conjunctivitis, both eyes  Eyelid lesion, LLL. 12/28/2022  Corneal abrasion 01/04/2024  Chalazion, LLL. 03/20/2023.   Eyelid nevus, LLL.     Eye Medications:  Pataday both eyes.     Assessment and Plan:  #   History of Pituitary Adenoma  Visual field 01/31/2024: Both eyes: no visual field deficit.   Optic nerve OCT 12/28/2022: Both eyes: no edema.   S/P resection x 2. Last resection was in 2010.  Residual pituitary tissue/adema - follows neurosurgery.   Recommend annual dilated eye exam with visual field and optic nerve OCT.     #   Posterior Vitreous Detachment, both eyes. Retina attached.   Educated on signs and symptoms of a retinal detachment (ie. Hundreds of floaters, flashes of light, and shadow/curtain over the vision) to be seen immediately.     #   Peripheral Pigment Epithelial Detachment, both eyes.    Differential diagnosis: peripheral CNV vs pigment epithelium adenoma vs peripheral PED.   Follows Dr. Clemons.     #   Wet Age Related Macular Degeneration, both eyes.  Recommends AREDS 2 vitamins orally 2 times daily. Patient mentioned vitamins may trigger migraines and she is not taking AREDS. I encourage her to work with neurology to find a medication of migraine and see if she can resume AREDS vitamins.   Recommends fish and green leafy vegetables.   Recommends UV protection.  No smoking.  Return immediately if there are changes to amsler grid.  Follows Dr. Clemons for eye injections.     #    Cataract, both eyes.   Not visually significant. Monitor.     #   Dry Eye Syndrome, both eyes.   Symptoms of dry eyes include blurry vision, eye pain, grittiness, burning, redness, eye irritation, and tearing. The goal is not to eliminate, but to improve symptoms.   Preservative free artificial tears 4 times daily both eyes. Refresh or Systane.    Artificial tear ointment at bedtime both eyes.     #   Allergic Conjunctivitis, both eyes.   Pataday once daily both eyes. Use as needed for itching.     #   Presbyopia, both eyes.   Will try lined bi-focal - prescription given.         Patient consented to a dilated eye exam:    Yes. Side effects discussed.   Mood/affect: very pleasant.     Medical History:  Past Medical History:   Diagnosis Date    Anemia 2006    Atrophic vaginitis     Chronic kidney disease October 2018    labs    Drusen (degenerative) of macula, bilateral     Hx of previous reproductive problem 1992    Insomnia     Migraines     Myopia     Pituitary macroadenoma (H)     Restless legs        Medications:  Current Outpatient Medications   Medication Sig Dispense Refill    ALPRAZolam (XANAX) 0.5 MG tablet Take one pill 1/2 hour before MRI scan 2 tablet 0    carboxymethylcellulose PF (CARBOXYMETHYLCELLULOSE SODIUM) 0.5 % ophthalmic solution Place 1 drop into both eyes 4 times daily 400 each 11    ESTRING 2 MG vaginal ring Insert 1 ring vaginally every 3 months 4 each 1    olopatadine (PATADAY) 0.2 % ophthalmic solution Place 0.05 mLs (1 drop) into both eyes daily 2.5 mL 11    Vitamin D, Cholecalciferol, 1000 units TABS Take 1 tablet by mouth At Bedtime       ZOLMitriptan (ZOMIG) 2.5 MG tablet TAKE 1 TABLET(2.5 MG) BY MOUTH AT ONSET OF HEADACHE FOR MIGRAINE. MAY REPEAT IN 2 HOURS AS NEEDED. MAX 4 TABLETS IN 24 HOURS 12 tablet 3   Complete documentation of historical and exam elements from today's encounter can be found in the full encounter summary report (not reduplicated in this progress note). I  personally obtained the chief complaint(s) and history of present illness.  I confirmed and edited as necessary the review of systems, past medical/surgical history, family history, social history, and examination findings as documented by others; and I examined the patient myself. I personally reviewed the relevant tests, images, and reports as documented above. I formulated and edited as necessary the assessment and plan and discussed the findings and management plan with the patient and family. - Fabian Neely, CHERYL

## 2024-01-22 DIAGNOSIS — H10.13 ALLERGIC CONJUNCTIVITIS OF BOTH EYES: ICD-10-CM

## 2024-01-22 NOTE — TELEPHONE ENCOUNTER
OLOPATADINE 0.2% OPHTH SOLN 2.5ML   Last Written Prescription Date:  12/28/2022  Last Fill Quantity: 2.5,   # refills: 11  Last Office Visit : 1/4/2024  Future Office visit:  1/31/2024  Nothing mentioned to continue this med for Pt care.  Refer to clinic for review and refills per Providers orders for Pt care.    Suzy Santa RN  Central Triage Red Flags/Med Refills

## 2024-01-23 RX ORDER — OLOPATADINE HYDROCHLORIDE 2 MG/ML
1 SOLUTION/ DROPS OPHTHALMIC DAILY
Qty: 2.5 ML | Refills: 11 | Status: SHIPPED | OUTPATIENT
Start: 2024-01-23

## 2024-01-25 DIAGNOSIS — H35.3231 EXUDATIVE AGE-RELATED MACULAR DEGENERATION OF BOTH EYES WITH ACTIVE CHOROIDAL NEOVASCULARIZATION (H): Primary | ICD-10-CM

## 2024-01-31 ENCOUNTER — OFFICE VISIT (OUTPATIENT)
Dept: OPHTHALMOLOGY | Facility: CLINIC | Age: 64
End: 2024-01-31
Attending: OPTOMETRIST
Payer: COMMERCIAL

## 2024-01-31 DIAGNOSIS — H25.13 NUCLEAR SENILE CATARACT OF BOTH EYES: ICD-10-CM

## 2024-01-31 DIAGNOSIS — H43.813 VITREOUS DEGENERATION OF BOTH EYES: Primary | ICD-10-CM

## 2024-01-31 DIAGNOSIS — H35.3231 EXUDATIVE AGE-RELATED MACULAR DEGENERATION OF BOTH EYES WITH ACTIVE CHOROIDAL NEOVASCULARIZATION (H): ICD-10-CM

## 2024-01-31 DIAGNOSIS — D35.2 PITUITARY ADENOMA (H): ICD-10-CM

## 2024-01-31 DIAGNOSIS — H04.123 DRY EYE SYNDROME OF BOTH EYES: ICD-10-CM

## 2024-01-31 DIAGNOSIS — H10.13 ALLERGIC CONJUNCTIVITIS OF BOTH EYES: ICD-10-CM

## 2024-01-31 PROCEDURE — 92133 CPTRZD OPH DX IMG PST SGM ON: CPT | Performed by: OPTOMETRIST

## 2024-01-31 PROCEDURE — G0463 HOSPITAL OUTPT CLINIC VISIT: HCPCS | Performed by: OPTOMETRIST

## 2024-01-31 PROCEDURE — 92015 DETERMINE REFRACTIVE STATE: CPT

## 2024-01-31 PROCEDURE — 92083 EXTENDED VISUAL FIELD XM: CPT | Performed by: OPTOMETRIST

## 2024-01-31 PROCEDURE — 92014 COMPRE OPH EXAM EST PT 1/>: CPT | Performed by: OPTOMETRIST

## 2024-01-31 RX ORDER — CYCLOBENZAPRINE HCL 5 MG
1-2 TABLET ORAL AT BEDTIME
COMMUNITY
Start: 2023-07-18

## 2024-01-31 ASSESSMENT — SLIT LAMP EXAM - LIDS
COMMENTS: NORMAL
COMMENTS: NORMAL

## 2024-01-31 ASSESSMENT — REFRACTION_MANIFEST
OD_CYLINDER: SPHERE
OS_SPHERE: +0.75
OD_ADD: +2.50
OS_ADD: +2.50
OD_SPHERE: +0.75
OS_CYLINDER: SPHERE

## 2024-01-31 ASSESSMENT — VISUAL ACUITY
OD_SC+: -2
OS_PH_SC+: -2
OD_PH_SC+: -1
METHOD: SNELLEN - LINEAR
OS_SC: 20/30
OS_PH_SC: 20/25
OD_PH_SC: 20/25
OD_SC: 20/40
OS_SC+: -2

## 2024-01-31 ASSESSMENT — CONF VISUAL FIELD
OS_SUPERIOR_NASAL_RESTRICTION: 0
OD_INFERIOR_TEMPORAL_RESTRICTION: 0
OD_NORMAL: 1
OS_SUPERIOR_TEMPORAL_RESTRICTION: 0
OD_SUPERIOR_NASAL_RESTRICTION: 0
OS_INFERIOR_TEMPORAL_RESTRICTION: 0
OS_NORMAL: 1
OD_INFERIOR_NASAL_RESTRICTION: 0
OS_INFERIOR_NASAL_RESTRICTION: 0
OD_SUPERIOR_TEMPORAL_RESTRICTION: 0
METHOD: COUNTING FINGERS

## 2024-01-31 ASSESSMENT — TONOMETRY
OD_IOP_MMHG: 09
IOP_METHOD: TONOPEN
OS_IOP_MMHG: 10

## 2024-01-31 ASSESSMENT — EXTERNAL EXAM - RIGHT EYE: OD_EXAM: NORMAL

## 2024-01-31 ASSESSMENT — CUP TO DISC RATIO
OS_RATIO: 0.25
OD_RATIO: 0.25

## 2024-01-31 ASSESSMENT — EXTERNAL EXAM - LEFT EYE: OS_EXAM: NORMAL

## 2024-01-31 NOTE — NURSING NOTE
Chief Complaints and History of Present Illnesses   Patient presents with    COMPREHENSIVE EYE EXAM     History of pituitary adenoma     Chief Complaint(s) and History of Present Illness(es)       COMPREHENSIVE EYE EXAM              Laterality: both eyes    Course: stable    Associated symptoms: dryness (intermittent), headache and floaters (left eye, stable).  Negative for eye pain and flashes    Treatments tried: artificial tears    Pain scale: 0/10    Comments: History of pituitary adenoma              Comments    She states that her vision has seemed stable in both eyes since her last eye exam.  Both eyes seem intermittently dry.  Using artificial tears does help the discomfort.     KARLI Rose 10:17 AM  January 31, 2024

## 2024-02-07 ENCOUNTER — OFFICE VISIT (OUTPATIENT)
Dept: OPHTHALMOLOGY | Facility: CLINIC | Age: 64
End: 2024-02-07
Attending: OPHTHALMOLOGY
Payer: COMMERCIAL

## 2024-02-07 DIAGNOSIS — H35.3231 EXUDATIVE AGE-RELATED MACULAR DEGENERATION OF BOTH EYES WITH ACTIVE CHOROIDAL NEOVASCULARIZATION (H): ICD-10-CM

## 2024-02-07 PROCEDURE — 67028 INJECTION EYE DRUG: CPT | Mod: RT | Performed by: OPHTHALMOLOGY

## 2024-02-07 PROCEDURE — 92134 CPTRZ OPH DX IMG PST SGM RTA: CPT | Performed by: OPHTHALMOLOGY

## 2024-02-07 PROCEDURE — G0463 HOSPITAL OUTPT CLINIC VISIT: HCPCS | Mod: 25 | Performed by: OPHTHALMOLOGY

## 2024-02-07 PROCEDURE — 250N000011 HC RX IP 250 OP 636: Mod: JZ | Performed by: OPHTHALMOLOGY

## 2024-02-07 PROCEDURE — 67028 INJECTION EYE DRUG: CPT | Mod: LT | Performed by: OPHTHALMOLOGY

## 2024-02-07 RX ADMIN — AFLIBERCEPT 2 MG: 40 INJECTION, SOLUTION INTRAVITREAL at 13:48

## 2024-02-07 RX ADMIN — FARICIMAB 6 MG: 6 INJECTION, SOLUTION INTRAVITREAL at 13:48

## 2024-02-07 ASSESSMENT — VISUAL ACUITY
OS_PH_SC+: -3
OD_SC: 20/50
OD_PH_SC+: -1
METHOD: SNELLEN - LINEAR
OS_SC: 20/30
OS_PH_SC: 20/25
OD_PH_SC: 20/25
OS_SC+: -1
OD_SC+: -1

## 2024-02-07 ASSESSMENT — CONF VISUAL FIELD
OS_INFERIOR_NASAL_RESTRICTION: 0
OS_SUPERIOR_NASAL_RESTRICTION: 0
METHOD: COUNTING FINGERS
OS_NORMAL: 1
OD_INFERIOR_NASAL_RESTRICTION: 0
OD_SUPERIOR_NASAL_RESTRICTION: 0
OD_INFERIOR_TEMPORAL_RESTRICTION: 0
OD_SUPERIOR_TEMPORAL_RESTRICTION: 0
OS_SUPERIOR_TEMPORAL_RESTRICTION: 0
OS_INFERIOR_TEMPORAL_RESTRICTION: 0
OD_NORMAL: 1

## 2024-02-07 ASSESSMENT — TONOMETRY
OS_IOP_MMHG: 13
IOP_METHOD: TONOPEN
OD_IOP_MMHG: 14

## 2024-02-07 ASSESSMENT — PATIENT HEALTH QUESTIONNAIRE - PHQ9: SUM OF ALL RESPONSES TO PHQ QUESTIONS 1-9: 3

## 2024-02-07 NOTE — NURSING NOTE
Chief Complaints and History of Present Illnesses   Patient presents with    Exudative Macular Degeneration Follow Up     Chief Complaint(s) and History of Present Illness(es)       Exudative Macular Degeneration Follow Up              Laterality: both eyes    Associated symptoms: floaters.  Negative for dryness, eye pain, tearing, flashes, itching and burning    Pain scale: 0/10              Comments    Horace is here to continue care for exudative macular degeneration. She feels overall vision is about the same as last visit.     Tyler Stout COT 1:14 PM February 7, 2024                       You can access the FollowMyHealth Patient Portal offered by [facility] by registering at the following website: [email or web address]. By joining [facility]’s FollowMyHealth portal, you will also be able to view your health information using other applications (apps) compatible with our system.

## 2024-02-08 ENCOUNTER — MYC MEDICAL ADVICE (OUTPATIENT)
Dept: OPHTHALMOLOGY | Facility: CLINIC | Age: 64
End: 2024-02-08
Payer: COMMERCIAL

## 2024-02-08 DIAGNOSIS — S05.02XD ABRASION OF LEFT CORNEA, SUBSEQUENT ENCOUNTER: Primary | ICD-10-CM

## 2024-02-08 RX ORDER — ERYTHROMYCIN 5 MG/G
0.5 OINTMENT OPHTHALMIC 3 TIMES DAILY
Qty: 3.5 G | Refills: 0 | Status: SHIPPED | OUTPATIENT
Start: 2024-02-08

## 2024-02-08 NOTE — TELEPHONE ENCOUNTER
Spoke to pt at 0755    Eye pain over night, tearing, pain with blinking following eye injection yesterday.    Pain not as severe as last month.    Reviewed with Retina tear/Dr. Box.    Rx for Erythromycin ointment sent for 3/day use for 3-4 days and pt come in to clinic tomorrow if still having any symptoms.    Reoccurring pain following injections and ask pt to review with team next visit patching eye afterwards for several hours while numbing agents wear off.    Pt verbally demonstrated understanding and seemed comfortable with plan.    Jason Salinas RN 8:02 AM 02/08/24

## 2024-02-13 NOTE — PROGRESS NOTES
For intravitreal injection ou, Eylea right eye and vabysmo left eye     CC: exudative AMD ou     Interval hx: left eye vision is improved but doesn't feel that it has returned to baseline    HPI:  Katie Casper is a 63 year old patient here for follow up of exudative AMD.    She has a history of pituitary adenoma, s/p resection in 2007 and again in 2010 due to recurrence.  Strong FH of early AMD: mother diagnosed in her 60s, sister, and brother with wet AMD  Father with AMD diagnosed in his 80s    OCT 2/7/24  Right eye: no SRF, central PED, several small to large drusen, choroid normal thickness, hyaloid  - largely stable  Left eye stable-  improved SRF near neovascularization, several small to large drusen and few PED parafoveal    Assessment and Plan:    #  Exudative AMD right eye    Conversion to wet form 12/2021   OCTA: SubRPE neovascular membrane nasal to fovea   First fovea was not involved and VA was 20/20; gradually declined to 20/30; today 20/25   Previously (in 2022) IRF recurrence at 5 weeks; stable with no fluid with q4 injections for long time; will try to extend gradually   S/p Eylea right - last injection 1/2/24 - (5 week )    Plan   Eylea today; RTC 5-6 w for Eylea OD   Does not tolerate AREDS due to migraines    # Exudative AMD left eye   Conversion to wet form 2/2023   Initiated anti-VEGF with Eylea to keep dosing interval uniform   S/p Eylea multiple injections then switched to Vabysmo (last inj 1/2/24 - 5 w)   2/7/2024: resolved SRF; Vabysmo today and RTC 5-6 weeks    # Pigment epithelium detachment, drusen each eye    drusenoid PED; stable compared to previous except for #1   DDX; peripheral CNV (not likely given FA finding) vs pigment epithelium adenoma vs peripheral PED   Not symptomatic now, but could be a harbinger of PECHR lesion   Lesion smaller or almost resolved 12/22/20 and 6/15/21:likely a peripheral CNV, self limited; s/s of CNV activation and bleeding discussed    No  apparent change in left eye examination 1/2/24   Will observe for now    # Posterior Vitreous Detachment, both eyes. Retina attached.   Educated on signs and symptoms of a retinal detachment to be seen immediately.   No peripheral traction or tear in depressed exam today    #  Pituitary Adenoma  History of pituitary adenoma, s/p resection in 2007 and again in 2010 due to recurrence. Patient follows neurology.   Baseline visual field was done 2019. last VF within normal limits   Optic nerve head OCT was done 05/20/2020. No optic neuropathy seen on exam.  Continue monitoring yearly with VF and ONH OCT.   MRI brain summer 2022 showing stable lesion   Follows with [facility], doesn't have an endocrinologist, but is working on an appt.  Has appt with Jackie on 12/28/22 for repeat field testing      #  Cataract, both eyes.   Not visually significant. Monitor    RTC  5-6 w for OCT ou  Eyela injection OD; Vabysmo OS - no dilation ou   needs multiple washes after betadine  Last dilation 1/2/24    Attending Physician Attestation:      Complete documentation of historical and exam elements from today's encounter can be found in the full encounter summary report (not reduplicated in this progress note).  I personally obtained the chief complaint(s) and history of present illness.  I confirmed and edited as necessary the review of systems, past medical/surgical history, family history, social history, and examination findings as documented by others; and I examined the patient myself.  I personally reviewed the relevant tests, images, and reports as documented above.  I formulated and edited as necessary the assessment and plan and discussed the findings and management plan with the patient and family    Cameron Clemons MD PhD

## 2024-02-29 NOTE — PROGRESS NOTES
[facility] [place] [employer] [facility]  [address] [place] [address]  [phone] fax    Plan of Care/Statement of Necessity for Occupational Therapy Services    Patient name: Catherine Grewal Start of Care: 2017   Referral source: Courtney Mckinnon MD : 1960    Medical Diagnosis: Pain in left hand [M79.642]   Onset Date:17    Treatment Diagnosis: Pain, decreased ROM l hand   Prior Hospitalization: see medical history Provider#: [id number]   Medications: Verified on Patient summary List    Comorbidities: Prior L thumb surgery, r thumb surgery   Prior Level of Function: I self care home care driving, gardening          The Plan of Care and following information is based on the information from the initial evaluation. Assessment/ key information: 61year old RHD female who underwent revision of L thumb arthroplasty following fall in may 2016. She has previously had multiple surgeries on both thumbs due to arthritic changes. She reports pain 8/12 \"with certain movements\" such as when getting dressed. She currently has a forearm based thumb spica and has orders for a hand based thumb spica which was fabricated today. Her AROM of L  thumb is MP 10, IP 60, radial abduction 35, palmar 40. She can oppose to all digit tips. Her wrist motion is limited to flexion 28, extension 35, ulnar deviation 20, radial 0. She is currently completing all self care and is receiving assistance for lifting items, opening jars and containers and household chores. Her FOTO is 38/100 reflecting very severe impairment in functional status. She will benefit from skilled occupational therapy to improve thumb AROM and function for home care tasks.       Evaluation Complexity: History LOW Complexity : Brief history review  Examination LOW Complexity : 1-3 performance deficits relating to physical, cognitive , or psychosocial skils that result in activity limitations and / or Pt states that he has diminished hearing in both of his ears. States that he feels like what he does hear is warped. States that his ears feel full and that he cannot clear them. Pt complains of a cough and URI symptoms for the last few days. Positive contact with a flu+ person.   participation restrictions  Clinical Decision Making LOW Complexity : No comorbidities that affect functional and no verbal or physical assistance needed to complete eval tasks   Overall Complexity Rating: LOW     Problem List: Pain effecting function, Decreased range of motion, Decreased strength, Decreased coordination/prehension, Decreased ADL/functional abilities  and Decreased flexibility/joint mobility     Treatment Plan may include any combination of the following: Therapeutic exercise, Therapeutic activities, Physical agent/modality, Splinting/orthoses, Patient education and ADLs/IADLs    Patient / Family readiness to learn indicated by: asking questions, trying to perform skills and interest    Persons(s) to be included in education:   patient (P)    Barriers to Learning/Limitations: None    Patient Goal (s): use of L hand    Patient Self Reported Health Status: fair    Rehabilitation Potential: fair    Short Term Goals: To be accomplished in 2 weeks:  1. Patient will be provided with custom hand based thumb spica to position L thumb during recovery. 2.  Patient will be independent in initial home program for thumb ROM. 3.  Patient will be able to  small objects using opposition with all digits    Long Term Goals: To be accomplished in 8 treatments:   1. Patient will improve L wrist AROM by at least 20 in flexion extension for positioning wrist for bathing. 2.  Patient will be able to use l hand as assist in household activities without increase in pain. 3.  Patient will report improved performance in household tasks such as cooking as shown by increase in FOTO of at least 10 points.       Frequency / Duration: Patient to be seen 2 times per week for 8 treatments:    Patient/ Caregiver education and instruction: Diagnosis, prognosis, self care, activity modification, brace/ splint application and exercises   [x]  Plan of care has been reviewed with CLAIR Rivero 7/24/2017 12:36 PM    _____________________________________________________________________    I certify that the above Therapy Services are being furnished while the patient is under my care. I agree with the treatment plan and certify that this therapy is necessary.     Physician's Signature:____________________  Date:____________Time:__________    Please sign and return to [facility] [address]  [place]anoj Jordan  [phone] fax accepted

## 2024-03-04 DIAGNOSIS — H35.3231 EXUDATIVE AGE-RELATED MACULAR DEGENERATION OF BOTH EYES WITH ACTIVE CHOROIDAL NEOVASCULARIZATION (H): Primary | ICD-10-CM

## 2024-03-13 ENCOUNTER — OFFICE VISIT (OUTPATIENT)
Dept: OPHTHALMOLOGY | Facility: CLINIC | Age: 64
End: 2024-03-13
Attending: OPHTHALMOLOGY
Payer: COMMERCIAL

## 2024-03-13 DIAGNOSIS — H35.3231 EXUDATIVE AGE-RELATED MACULAR DEGENERATION OF BOTH EYES WITH ACTIVE CHOROIDAL NEOVASCULARIZATION (H): Primary | ICD-10-CM

## 2024-03-13 PROCEDURE — 67028 INJECTION EYE DRUG: CPT | Mod: 50 | Performed by: OPHTHALMOLOGY

## 2024-03-13 PROCEDURE — 67028 INJECTION EYE DRUG: CPT | Mod: RT | Performed by: OPHTHALMOLOGY

## 2024-03-13 PROCEDURE — 99214 OFFICE O/P EST MOD 30 MIN: CPT | Mod: 25 | Performed by: OPHTHALMOLOGY

## 2024-03-13 PROCEDURE — 92134 CPTRZ OPH DX IMG PST SGM RTA: CPT | Performed by: OPHTHALMOLOGY

## 2024-03-13 PROCEDURE — 250N000011 HC RX IP 250 OP 636: Mod: JZ | Performed by: OPHTHALMOLOGY

## 2024-03-13 PROCEDURE — G0463 HOSPITAL OUTPT CLINIC VISIT: HCPCS | Mod: 25 | Performed by: OPHTHALMOLOGY

## 2024-03-13 RX ADMIN — AFLIBERCEPT 2 MG: 40 INJECTION, SOLUTION INTRAVITREAL at 14:03

## 2024-03-13 RX ADMIN — FARICIMAB 6 MG: 6 INJECTION, SOLUTION INTRAVITREAL at 14:03

## 2024-03-13 RX ADMIN — LIDOCAINE HYDROCHLORIDE 0.5 ML: 20 INJECTION, SOLUTION EPIDURAL; INFILTRATION; INTRACAUDAL; PERINEURAL at 14:09

## 2024-03-13 ASSESSMENT — REFRACTION_WEARINGRX
OD_SPHERE: +3.25
OD_CYLINDER: SPHERE
OS_SPHERE: +3.25
OD_SPHERE: +0.75
OS_CYLINDER: SPHERE
OD_CYLINDER: SPHERE
OD_ADD: +2.50
OS_CYLINDER: SPHERE
OS_SPHERE: +0.75
OS_ADD: +2.50

## 2024-03-13 ASSESSMENT — VISUAL ACUITY
CORRECTION_TYPE: GLASSES
OS_CC: 20/25
OD_CC: 20/20
OS_PH_CC: 20/25
OS_CC+: -2
METHOD: SNELLEN - LINEAR

## 2024-03-13 ASSESSMENT — CONF VISUAL FIELD
OD_SUPERIOR_TEMPORAL_RESTRICTION: 0
OS_NORMAL: 1
OD_SUPERIOR_NASAL_RESTRICTION: 0
OD_NORMAL: 1
OD_INFERIOR_NASAL_RESTRICTION: 0
OS_INFERIOR_NASAL_RESTRICTION: 0
OS_INFERIOR_TEMPORAL_RESTRICTION: 0
OD_INFERIOR_TEMPORAL_RESTRICTION: 0
OS_SUPERIOR_TEMPORAL_RESTRICTION: 0
OS_SUPERIOR_NASAL_RESTRICTION: 0
METHOD: COUNTING FINGERS

## 2024-03-13 ASSESSMENT — EXTERNAL EXAM - LEFT EYE: OS_EXAM: NORMAL

## 2024-03-13 ASSESSMENT — EXTERNAL EXAM - RIGHT EYE: OD_EXAM: NORMAL

## 2024-03-13 ASSESSMENT — TONOMETRY
OS_IOP_MMHG: 14
OD_IOP_MMHG: 15
IOP_METHOD: TONOPEN

## 2024-03-13 ASSESSMENT — SLIT LAMP EXAM - LIDS
COMMENTS: NORMAL
COMMENTS: NORMAL

## 2024-03-13 NOTE — PROGRESS NOTES
For intravitreal injection ou, Eylea right eye and vabysmo left eye     CC: exudative AMD ou     Interval hx: lPt states vision is the same as last visit. No eye pain today. No new flashes or floaters.  No redness. Dryness in each eye, relief with drops.    HPI:  Katie Casper is a 63 year old patient here for follow up of exudative AMD.    She has a history of pituitary adenoma, s/p resection in 2007 and again in 2010 due to recurrence.  Strong FH of early AMD: mother diagnosed in her 60s, sister, and brother with wet AMD  Father with AMD diagnosed in his 80s    OCT 2/7/24  Right eye: no SRF, central PED, several small to large drusen, choroid normal thickness, hyaloid  - largely stable  Left eye stable-  improved SRF near neovascularization, several small to large drusen and few PED parafoveal    Assessment and Plan:    #  Exudative AMD right eye    Conversion to wet form 12/2021   OCTA: SubRPE neovascular membrane nasal to fovea   First fovea was not involved and VA was 20/20; gradually declined to 20/30; today 20/25   Previously (in 2022) IRF recurrence at 5 weeks; stable with no fluid with q4 injections for long time; will try to extend gradually   S/p Eylea right - last injection 2/7/24- (5 week )    Plan   Eylea today; RTC 5-6 w for Eylea OD   Does not tolerate AREDS due to migraines    # Exudative AMD left eye   Conversion to wet form 2/2023   Initiated anti-VEGF with Eylea to keep dosing interval uniform   S/p Eylea multiple injections then switched to Vabysmo (last inj 2/7/24 - 5 w)   2/7/2024: continued resolution of SRF; Vabysmo today and RTC 5-6 weeks    # Pigment epithelium detachment, drusen each eye    drusenoid PED; stable compared to previous except for #1   DDX; peripheral CNV (not likely given FA finding) vs pigment epithelium adenoma vs peripheral PED   Not symptomatic now, but could be a harbinger of PECHR lesion   Lesion smaller or almost resolved 12/22/20 and 6/15/21:likely a  peripheral CNV, self limited; s/s of CNV activation and bleeding discussed    No apparent change in left eye examination 1/2/24   Will observe for now    # Posterior Vitreous Detachment, both eyes. Retina attached.   Educated on signs and symptoms of a retinal detachment to be seen immediately.   No peripheral traction or tear in depressed exam today    #  Pituitary Adenoma  History of pituitary adenoma, s/p resection in 2007 and again in 2010 due to recurrence. Patient follows neurology.   Baseline visual field was done 2019. last VF within normal limits   Optic nerve head OCT was done 05/20/2020. No optic neuropathy seen on exam.  Continue monitoring yearly with VF and ONH OCT.   MRI brain summer 2022 showing stable lesion   [place] [facility], but is working on an appt.  Has appt with Jackie on 12/28/22 for repeat field testing      #  Cataract, both eyes.   Not visually significant. Monitor    RTC  5-6 w for OCT ou  Eyela injection OD; Vabysmo OS - dilation ou   needs multiple washes after betadine  Last dilation 1/2/24    Ciro Sanders MD MPH  Vitreoretinal Fellow PGY-6  [facility]       Attending Physician Attestation:      Complete documentation of historical and exam elements from today's encounter can be found in the full encounter summary report (not reduplicated in this progress note).  I personally obtained the chief complaint(s) and history of present illness.  I confirmed and edited as necessary the review of systems, past medical/surgical history, family history, social history, and examination findings as documented by others; and I examined the patient myself.  I personally reviewed the relevant tests, images, and reports as documented above.  I formulated and edited as necessary the assessment and plan and discussed the findings and management plan with the patient and family    Cameron Clemons MD PhD

## 2024-03-13 NOTE — PROGRESS NOTES
CC: exudative AMD ou     Interval hx: left eye vision is improved but doesn't feel that it has returned to baseline  Had pain and irritation left eye after the injection last time  HPI:  Katie Casper is a 63 year old patient here for follow up of exudative AMD.    She has a history of pituitary adenoma, s/p resection in 2007 and again in 2010 due to recurrence.  Strong FH of early AMD: mother diagnosed in her 60s, sister, and brother with wet AMD  Father with AMD diagnosed in his 80s    OCT 3/13/24  Right eye: no SRF, central PED, several small to large drusen, choroid normal thickness, hyaloid  - largely stable  Left eye stable-  improved SRF near neovascularization, several small to large drusen and few PED parafoveal    Assessment and Plan:    #  Exudative AMD right eye    Conversion to wet form 12/2021   OCTA: SubRPE neovascular membrane nasal to fovea   First fovea was not involved and VA was 20/20; gradually declined to 20/30; today 20/25   Previously (in 2022) IRF recurrence at 5 weeks; stable with no fluid with q4 injections for long time; will try to extend gradually   S/p Eylea right - last injection 1/2/24 - (5 week )    Plan   Eylea today; RTC 5-6 w for Eylea OD   Does not tolerate AREDS due to migraines    # Exudative AMD left eye   Conversion to wet form 2/2023   Initiated anti-VEGF with Eylea to keep dosing interval uniform   S/p Eylea multiple injections then switched to Vabysmo (last inj 1/2/24 - 5 w)   3/13/2024: resolved SRF; Vabysmo today and RTC 5-6 weeks: will do injection with subconj lido injection    # Pigment epithelium detachment, drusen each eye    drusenoid PED; stable compared to previous except for #1   DDX; peripheral CNV (not likely given FA finding) vs pigment epithelium adenoma vs peripheral PED   Not symptomatic now, but could be a harbinger of PECHR lesion   Lesion smaller or almost resolved 12/22/20 and 6/15/21:likely a peripheral CNV, self limited; s/s of CNV  activation and bleeding discussed    No apparent change in left eye examination 1/2/24   Will observe for now    # Posterior Vitreous Detachment, both eyes. Retina attached.   Educated on signs and symptoms of a retinal detachment to be seen immediately.   No peripheral traction or tear in depressed exam today    #  Pituitary Adenoma  History of pituitary adenoma, s/p resection in 2007 and again in 2010 due to recurrence. Patient follows neurology.   Baseline visual field was done 2019. last VF within normal limits   Optic nerve head OCT was done 05/20/2020. No optic neuropathy seen on exam.  Continue monitoring yearly with VF and ONH OCT.   MRI brain summer 2022 showing stable lesion   Follows with [facility], doesn't have an endocrinologist, but is working on an appt.  Has appt with Jackie on 12/28/22 for repeat field testing      #  Cataract, both eyes.   Not visually significant. Monitor    RTC  5-6 w for OCT ou  Eyela injection OD; Vabysmo OS - no dilation ou   needs multiple washes after betadine  Last dilation 1/2/24    Attending Physician Attestation:      Complete documentation of historical and exam elements from today's encounter can be found in the full encounter summary report (not reduplicated in this progress note).  I personally obtained the chief complaint(s) and history of present illness.  I confirmed and edited as necessary the review of systems, past medical/surgical history, family history, social history, and examination findings as documented by others; and I examined the patient myself.  I personally reviewed the relevant tests, images, and reports as documented above.  I formulated and edited as necessary the assessment and plan and discussed the findings and management plan with the patient and family    Cameron Clemons MD PhD

## 2024-03-16 ENCOUNTER — HEALTH MAINTENANCE LETTER (OUTPATIENT)
Age: 64
End: 2024-03-16

## 2024-04-09 DIAGNOSIS — H35.3231 EXUDATIVE AGE-RELATED MACULAR DEGENERATION OF BOTH EYES WITH ACTIVE CHOROIDAL NEOVASCULARIZATION (H): Primary | ICD-10-CM

## 2024-04-24 ENCOUNTER — OFFICE VISIT (OUTPATIENT)
Dept: OPHTHALMOLOGY | Facility: CLINIC | Age: 64
End: 2024-04-24
Attending: OPHTHALMOLOGY
Payer: COMMERCIAL

## 2024-04-24 DIAGNOSIS — H35.3231 EXUDATIVE AGE-RELATED MACULAR DEGENERATION OF BOTH EYES WITH ACTIVE CHOROIDAL NEOVASCULARIZATION (H): Primary | ICD-10-CM

## 2024-04-24 PROCEDURE — G0463 HOSPITAL OUTPT CLINIC VISIT: HCPCS | Mod: 25 | Performed by: OPHTHALMOLOGY

## 2024-04-24 PROCEDURE — 250N000011 HC RX IP 250 OP 636: Performed by: OPHTHALMOLOGY

## 2024-04-24 PROCEDURE — 92134 CPTRZ OPH DX IMG PST SGM RTA: CPT | Performed by: OPHTHALMOLOGY

## 2024-04-24 PROCEDURE — 67028 INJECTION EYE DRUG: CPT | Mod: LT | Performed by: OPHTHALMOLOGY

## 2024-04-24 PROCEDURE — 67028 INJECTION EYE DRUG: CPT | Mod: RT | Performed by: OPHTHALMOLOGY

## 2024-04-24 RX ADMIN — LIDOCAINE HYDROCHLORIDE 1 ML: 20 INJECTION, SOLUTION EPIDURAL; INFILTRATION; INTRACAUDAL; PERINEURAL at 13:47

## 2024-04-24 RX ADMIN — AFLIBERCEPT 2 MG: 40 INJECTION, SOLUTION INTRAVITREAL at 13:48

## 2024-04-24 RX ADMIN — FARICIMAB 6 MG: 6 INJECTION, SOLUTION INTRAVITREAL at 13:48

## 2024-04-24 ASSESSMENT — CONF VISUAL FIELD
OD_INFERIOR_TEMPORAL_RESTRICTION: 0
OS_SUPERIOR_NASAL_RESTRICTION: 0
OD_INFERIOR_NASAL_RESTRICTION: 0
OS_INFERIOR_NASAL_RESTRICTION: 0
OD_SUPERIOR_NASAL_RESTRICTION: 0
OS_NORMAL: 1
OS_INFERIOR_TEMPORAL_RESTRICTION: 0
OS_SUPERIOR_TEMPORAL_RESTRICTION: 0
OD_SUPERIOR_TEMPORAL_RESTRICTION: 0
METHOD: COUNTING FINGERS
OD_NORMAL: 1

## 2024-04-24 ASSESSMENT — REFRACTION_WEARINGRX
OS_CYLINDER: SPHERE
OD_CYLINDER: SPHERE
OS_SPHERE: +3.25
OD_CYLINDER: SPHERE
OD_SPHERE: +3.25
OS_SPHERE: +0.75
OS_CYLINDER: SPHERE
OS_ADD: +2.50
OD_ADD: +2.50
OD_SPHERE: +0.75

## 2024-04-24 ASSESSMENT — SLIT LAMP EXAM - LIDS
COMMENTS: NORMAL
COMMENTS: NORMAL

## 2024-04-24 ASSESSMENT — TONOMETRY
OD_IOP_MMHG: 11
IOP_METHOD: TONOPEN
OS_IOP_MMHG: 13

## 2024-04-24 ASSESSMENT — EXTERNAL EXAM - RIGHT EYE: OD_EXAM: NORMAL

## 2024-04-24 ASSESSMENT — VISUAL ACUITY
OD_CC+: -2
OS_CC+: -2
OS_CC: 20/20
METHOD: SNELLEN - LINEAR
OD_CC: 20/25

## 2024-04-24 ASSESSMENT — EXTERNAL EXAM - LEFT EYE: OS_EXAM: NORMAL

## 2024-04-24 NOTE — NURSING NOTE
Chief Complaints and History of Present Illnesses   Patient presents with    Exudative Macular Degeneration Follow Up     Chief Complaint(s) and History of Present Illness(es)       Exudative Macular Degeneration Follow Up              Laterality: both eyes    Associated symptoms: dryness and floaters.  Negative for eye pain, tearing, flashes, itching and burning    Pain scale: 0/10              Comments    Horace is here to continue care for exudative macular degeneration of both eyes. She states vision is stable.    Tyler Stout COT 12:31 PM April 24, 2024

## 2024-04-24 NOTE — PROGRESS NOTES
For intravitreal injection ou, Eylea right eye and vabysmo left eye     CC: exudative AMD ou     Interval hx: lPt states vision is the same as last visit. No eye pain today. No new flashes or floaters.  No redness. Dryness in each eye, relief with drops.    HPI:  Katie Casper is a 63 year old patient here for follow up of exudative AMD.    She has a history of pituitary adenoma, s/p resection in 2007 and again in 2010 due to recurrence.  Strong FH of early AMD: mother diagnosed in her 60s, sister, and brother with wet AMD  Father with AMD diagnosed in his 80s    OCT 2/7/24 and 4/24/24  Right eye: no SRF, central PED, several small to large drusen, choroid normal thickness, hyaloid  - largely stable  Left eye stable-  improved SRF near neovascularization, several small to large drusen and few PED parafoveal    Assessment and Plan:    #  Exudative AMD right eye    Conversion to wet form 12/2021   OCTA: SubRPE neovascular membrane nasal to fovea   First fovea was not involved and VA was 20/20; gradually declined to 20/30; today 20/25   Previously (in 2022) IRF recurrence at 5 weeks; stable with no fluid with q4 injections for long time; will try to extend gradually   S/p Eylea right - last injection 2/7/24- (5 week )    Plan   Eylea today; RTC 5-6 w for Eylea OD   Does not tolerate AREDS due to migraines    # Exudative AMD left eye   Conversion to wet form 2/2023   Initiated anti-VEGF with Eylea to keep dosing interval uniform   S/p Eylea multiple injections then switched to Vabysmo (last inj 2/7/24 - 5 w)   2/7/2024: continued resolution of SRF; Vabysmo today and RTC 5-6 weeks    # Pigment epithelium detachment, drusen each eye    drusenoid PED; stable compared to previous except for #1   DDX; peripheral CNV (not likely given FA finding) vs pigment epithelium adenoma vs peripheral PED   Not symptomatic now, but could be a harbinger of PECHR lesion   Lesion smaller or almost resolved 12/22/20 and  6/15/21: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 Retina attached.   Educated on signs and symptoms of a retinal detachment to be seen immediately.   No peripheral traction or tear in depressed exam today    #  Pituitary Adenoma  History of pituitary adenoma, s/p resection in 2007 and again in 2010 due to recurrence. Patient follows neurology.   Baseline visual field was done 2019. last VF within normal limits   Optic nerve head OCT was done 05/20/2020. No optic neuropathy seen on exam.  Continue monitoring yearly with VF and ONH OCT.   MRI brain summer 2022 showing stable lesion   [place] with [facility] [place], doesn't have an endocrinologist, but is working on an appt.  Has appt with Jackie on 12/28/22 for repeat field testing      #  Cataract, both eyes.   Not visually significant. Monitor    RTC  6-7 w for OCT ou  Eyela injection OD; Vabysmo OS - dilation ou   needs multiple washes after betadine - we did not use lido gel but used lid injection subconj this time  Last dilation 1/2/24      Attending Physician Attestation:      Complete documentation of historical and exam elements from today's encounter can be found in the full encounter summary report (not reduplicated in this progress note).  I personally obtained the chief complaint(s) and history of present illness.  I confirmed and edited as necessary the review of systems, past medical/surgical history, family history, social history, and examination findings as documented by others; and I examined the patient myself.  I personally reviewed the relevant tests, images, and reports as documented above.  I formulated and edited as necessary the assessment and plan and discussed the findings and management plan with the patient and family    Cameron Clemons MD PhD

## 2024-05-28 DIAGNOSIS — H35.3231 EXUDATIVE AGE-RELATED MACULAR DEGENERATION OF BOTH EYES WITH ACTIVE CHOROIDAL NEOVASCULARIZATION (H): Primary | ICD-10-CM

## 2024-06-12 ENCOUNTER — OFFICE VISIT (OUTPATIENT)
Dept: OPHTHALMOLOGY | Facility: CLINIC | Age: 64
End: 2024-06-12
Attending: OPHTHALMOLOGY
Payer: COMMERCIAL

## 2024-06-12 DIAGNOSIS — H43.813 VITREOUS DEGENERATION OF BOTH EYES: ICD-10-CM

## 2024-06-12 DIAGNOSIS — H04.123 DRY EYE SYNDROME OF BOTH EYES: ICD-10-CM

## 2024-06-12 DIAGNOSIS — H25.13 NUCLEAR SENILE CATARACT OF BOTH EYES: ICD-10-CM

## 2024-06-12 DIAGNOSIS — H35.3231 EXUDATIVE AGE-RELATED MACULAR DEGENERATION OF BOTH EYES WITH ACTIVE CHOROIDAL NEOVASCULARIZATION (H): Primary | ICD-10-CM

## 2024-06-12 PROCEDURE — 99214 OFFICE O/P EST MOD 30 MIN: CPT | Mod: 25 | Performed by: OPHTHALMOLOGY

## 2024-06-12 PROCEDURE — 250N000011 HC RX IP 250 OP 636: Mod: JZ | Performed by: OPHTHALMOLOGY

## 2024-06-12 PROCEDURE — G0463 HOSPITAL OUTPT CLINIC VISIT: HCPCS | Mod: 25 | Performed by: OPHTHALMOLOGY

## 2024-06-12 PROCEDURE — 67028 INJECTION EYE DRUG: CPT | Mod: RT | Performed by: OPHTHALMOLOGY

## 2024-06-12 PROCEDURE — 67028 INJECTION EYE DRUG: CPT | Mod: LT | Performed by: OPHTHALMOLOGY

## 2024-06-12 PROCEDURE — 92134 CPTRZ OPH DX IMG PST SGM RTA: CPT | Performed by: OPHTHALMOLOGY

## 2024-06-12 RX ADMIN — FARICIMAB 6 MG: 6 INJECTION, SOLUTION INTRAVITREAL at 14:06

## 2024-06-12 RX ADMIN — AFLIBERCEPT 2 MG: 40 INJECTION, SOLUTION INTRAVITREAL at 14:06

## 2024-06-12 RX ADMIN — LIDOCAINE HYDROCHLORIDE 1 ML: 20 INJECTION, SOLUTION EPIDURAL; INFILTRATION; INTRACAUDAL; PERINEURAL at 14:07

## 2024-06-12 ASSESSMENT — REFRACTION_WEARINGRX
OS_SPHERE: +3.25
OS_ADD: +2.50
OD_SPHERE: +3.25
OS_SPHERE: +0.75
OD_ADD: +2.50
OS_CYLINDER: SPHERE
OD_SPHERE: +0.75
OD_CYLINDER: SPHERE
OS_CYLINDER: SPHERE
OD_CYLINDER: SPHERE

## 2024-06-12 ASSESSMENT — EXTERNAL EXAM - RIGHT EYE: OD_EXAM: NORMAL

## 2024-06-12 ASSESSMENT — SLIT LAMP EXAM - LIDS
COMMENTS: NORMAL
COMMENTS: NORMAL

## 2024-06-12 ASSESSMENT — CUP TO DISC RATIO
OD_RATIO: 0.25
OS_RATIO: 0.25

## 2024-06-12 ASSESSMENT — EXTERNAL EXAM - LEFT EYE: OS_EXAM: NORMAL

## 2024-06-12 ASSESSMENT — TONOMETRY
IOP_METHOD: TONOPEN
OS_IOP_MMHG: 13
OD_IOP_MMHG: 09

## 2024-06-12 ASSESSMENT — VISUAL ACUITY
OS_CC: 20/25
METHOD: SNELLEN - LINEAR
OD_CC: 20/20
CORRECTION_TYPE: GLASSES
OD_CC+: -2

## 2024-06-12 NOTE — NURSING NOTE
Chief Complaints and History of Present Illnesses   Patient presents with    Follow Up     Exudative age-related macular degeneration of both eyes with active choroidal neovascularization     Chief Complaint(s) and History of Present Illness(es)       Follow Up              Laterality: both eyes    Course: stable    Associated symptoms: dryness.  Negative for eye pain, flashes and floaters    Treatments tried: artificial tears    Pain scale: 0/10    Comments: Exudative age-related macular degeneration of both eyes with active choroidal neovascularization              Comments    She states that her vision has seemed stable in both eyes since her last eye exam.  Both eyes seem intermittently dry.  Using artificial tears does help the discomfort.     KARLI Rose 12:55 PM  June 12, 2024

## 2024-06-12 NOTE — PROGRESS NOTES
For intravitreal injection ou, Eylea right eye and vabysmo left eye     CC: exudative AMD ou     Interval hx: Va stable each eye. No eye pain today. No new flashes or floaters.  No redness. Dryness in each eye, relief with drops.    HPI:  Katie Casper is a 63 year old patient here for follow up of exudative AMD.    She has a history of pituitary adenoma, s/p resection in 2007 and again in 2010 due to recurrence.  Strong FH of early AMD: mother diagnosed in her 60s, sister, and brother with wet AMD  Father with AMD diagnosed in his 80s    OCT 06/12/24   Right eye: no SRF, central PED, several small to large drusen, choroid normal thickness, hyaloid  - largely stable  Left eye: worsening SRF near neovascularization, several small to large drusen and few PED parafoveal    Assessment and Plan:    #  Exudative AMD right eye    Conversion to wet form 12/2021   OCTA: SubRPE neovascular membrane nasal to fovea   First fovea was not involved and VA was 20/20; gradually declined to 20/30; today 20/25   Previously (in 2022) IRF recurrence at 5 weeks; stable with no fluid with q4 injections for long time; will try to extend gradually   S/p Eylea right - last injection 4/24/2024- (7 week )    Plan   Eylea today; RTC 6 w for Eylea OD   Does not tolerate AREDS due to migraines    # Exudative AMD left eye   Conversion to wet form 2/2023   Initiated anti-VEGF with Eylea to keep dosing interval uniform   S/p Eylea multiple injections then switched to Vabysmo (last inj 4/24/24 - 5 w)   2/7/2024: continued resolution of SRF; Vabysmo today and RTC 5-6 weeks   06/12/24: Recurrence of SRF: Vabysmo today and RTC 6 weeks    # Pigment epithelium detachment, drusen each eye    drusenoid PED; stable compared to previous except for #1   DDX; peripheral CNV (not likely given FA finding) vs pigment epithelium adenoma vs peripheral PED   Not symptomatic now, but could be a harbinger of PECHR lesion   Lesion smaller or almost resolved  12/22/20 and 6/15/21: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 Retina attached.   Educated on signs and symptoms of a retinal detachment to be seen immediately.   No peripheral traction or tear in depressed exam today    #  Pituitary Adenoma  History of pituitary adenoma, s/p resection in 2007 and again in 2010 due to recurrence. Patient follows neurology.   Baseline visual field was done 2019. last VF within normal limits   Optic nerve head OCT was done 05/20/2020. No optic neuropathy seen on exam.  Continue monitoring yearly with VF and ONH OCT.   MRI brain summer 2022 showing stable lesion   Follows with [facility] [place], doesn't have an endocrinologist, but is working on an appt.  Has appt with Jackie on 12/28/22 for repeat field testing      #  Cataract, both eyes.   Not visually significant. Monitor    RTC  6 weeks for OCT ou  Eyela injection OD; Vabysmo OS - dilation ou   needs multiple washes after betadine - we did not use lido gel but used lid injection subconj this time  Last dilation 1/2/24    Kamlesh Mayo MD  PGY-5 Vitreoretinal Surgery Fellow  [facility]  Attending Physician Attestation:      Complete documentation of historical and exam elements from today's encounter can be found in the full encounter summary report (not reduplicated in this progress note).  I personally obtained the chief complaint(s) and history of present illness.  I confirmed and edited as necessary the review of systems, past medical/surgical history, family history, social history, and examination findings as documented by others; and I examined the patient myself.  I personally reviewed the relevant tests, images, and reports as documented above.  I formulated and edited as necessary the assessment and plan and discussed the findings and management plan with the  patient and family    Cameron Clemons MD PhD

## 2024-06-16 ENCOUNTER — MYC MEDICAL ADVICE (OUTPATIENT)
Dept: OPHTHALMOLOGY | Facility: CLINIC | Age: 64
End: 2024-06-16
Payer: COMMERCIAL

## 2024-07-17 DIAGNOSIS — H35.3231 EXUDATIVE AGE-RELATED MACULAR DEGENERATION OF BOTH EYES WITH ACTIVE CHOROIDAL NEOVASCULARIZATION (H): Primary | ICD-10-CM

## 2024-07-26 DIAGNOSIS — D35.2 PITUITARY ADENOMA (H): Primary | ICD-10-CM

## 2024-07-26 NOTE — PROGRESS NOTES
2 Yr F/U Elsa  LOV 4/19/22  MRI Brain with and without contrast Pituitary Protocol order entered.

## 2024-07-30 ENCOUNTER — OFFICE VISIT (OUTPATIENT)
Dept: OPHTHALMOLOGY | Facility: CLINIC | Age: 64
End: 2024-07-30
Attending: OPHTHALMOLOGY
Payer: COMMERCIAL

## 2024-07-30 DIAGNOSIS — H04.123 DRY EYE SYNDROME OF BOTH EYES: ICD-10-CM

## 2024-07-30 DIAGNOSIS — H35.3231 EXUDATIVE AGE-RELATED MACULAR DEGENERATION OF BOTH EYES WITH ACTIVE CHOROIDAL NEOVASCULARIZATION (H): Primary | ICD-10-CM

## 2024-07-30 DIAGNOSIS — H25.13 NUCLEAR SENILE CATARACT OF BOTH EYES: ICD-10-CM

## 2024-07-30 DIAGNOSIS — H43.813 VITREOUS DEGENERATION OF BOTH EYES: ICD-10-CM

## 2024-07-30 DIAGNOSIS — D22.10 NEVUS OF EYELID, LEFT: ICD-10-CM

## 2024-07-30 PROCEDURE — 92134 CPTRZ OPH DX IMG PST SGM RTA: CPT | Performed by: OPHTHALMOLOGY

## 2024-07-30 PROCEDURE — G0463 HOSPITAL OUTPT CLINIC VISIT: HCPCS | Mod: 25 | Performed by: OPHTHALMOLOGY

## 2024-07-30 PROCEDURE — 99214 OFFICE O/P EST MOD 30 MIN: CPT | Mod: 25 | Performed by: OPHTHALMOLOGY

## 2024-07-30 PROCEDURE — 67028 INJECTION EYE DRUG: CPT | Mod: RT | Performed by: OPHTHALMOLOGY

## 2024-07-30 PROCEDURE — 67028 INJECTION EYE DRUG: CPT | Mod: LT | Performed by: OPHTHALMOLOGY

## 2024-07-30 PROCEDURE — 250N000011 HC RX IP 250 OP 636: Performed by: OPHTHALMOLOGY

## 2024-07-30 RX ADMIN — FARICIMAB 6 MG: 6 INJECTION, SOLUTION INTRAVITREAL at 10:16

## 2024-07-30 RX ADMIN — LIDOCAINE HYDROCHLORIDE 1 ML: 20 INJECTION, SOLUTION EPIDURAL; INFILTRATION; INTRACAUDAL; PERINEURAL at 10:17

## 2024-07-30 RX ADMIN — AFLIBERCEPT 2 MG: 40 INJECTION, SOLUTION INTRAVITREAL at 10:16

## 2024-07-30 ASSESSMENT — EXTERNAL EXAM - LEFT EYE: OS_EXAM: NORMAL

## 2024-07-30 ASSESSMENT — VISUAL ACUITY
METHOD: SNELLEN - LINEAR
OD_CC: 20/20
CORRECTION_TYPE: GLASSES
OS_CC+: -2
OS_CC: 20/20
OD_CC+: -1

## 2024-07-30 ASSESSMENT — CONF VISUAL FIELD
OD_SUPERIOR_NASAL_RESTRICTION: 0
OD_INFERIOR_NASAL_RESTRICTION: 0
OS_INFERIOR_NASAL_RESTRICTION: 0
OD_INFERIOR_TEMPORAL_RESTRICTION: 0
OS_SUPERIOR_TEMPORAL_RESTRICTION: 0
OD_NORMAL: 1
OD_SUPERIOR_TEMPORAL_RESTRICTION: 0
OS_SUPERIOR_NASAL_RESTRICTION: 0
OS_NORMAL: 1
OS_INFERIOR_TEMPORAL_RESTRICTION: 0

## 2024-07-30 ASSESSMENT — SLIT LAMP EXAM - LIDS
COMMENTS: NORMAL
COMMENTS: NORMAL

## 2024-07-30 ASSESSMENT — CUP TO DISC RATIO
OD_RATIO: 0.25
OS_RATIO: 0.25

## 2024-07-30 ASSESSMENT — TONOMETRY
OS_IOP_MMHG: 15
IOP_METHOD: TONOPEN
OD_IOP_MMHG: 14

## 2024-07-30 ASSESSMENT — EXTERNAL EXAM - RIGHT EYE: OD_EXAM: NORMAL

## 2024-07-30 NOTE — PROGRESS NOTES
For intravitreal injection ou, Eylea right eye and vabysmo left eye     CC: exudative AMD ou     Interval hx: Va stable each eye. No eye pain today. No new flashes or floaters.  No redness. Dryness in each eye, relief with drops.    HPI:  Katie Csaper is a 63 year old patient here for follow up of exudative AMD.    She has a history of pituitary adenoma, s/p resection in 2007 and again in 2010 due to recurrence.  Strong FH of early AMD: mother diagnosed in her 60s, sister, and brother with wet AMD  Father with AMD diagnosed in his 80s    OCT 07/30/24   Right eye: no SRF, central PED, several small to large drusen, choroid normal thickness, hyaloid  - largely stable  Left eye: improving SRF near neovascularization, several small to large drusen and few PED parafoveal    Assessment and Plan:    #  Exudative AMD right eye    Conversion to wet form 12/2021   OCTA: SubRPE neovascular membrane nasal to fovea   First fovea was not involved and VA was 20/20; gradually declined to 20/30; today 20/25   Previously (in 2022) IRF recurrence at 5 weeks; stable with no fluid with q4 injections for long time; will try to extend gradually   S/p Eylea right - last injection 4/24/2024- (7 week )    Plan   Eylea today; RTC 6 w for Eylea OD   Does not tolerate AREDS due to migraines    # Exudative AMD left eye   Conversion to wet form 2/2023   Initiated anti-VEGF with Eylea to keep dosing interval uniform   S/p Eylea multiple injections then switched to Vabysmo (last inj 4/24/24 - 5 w)   2/7/2024: continued resolution of SRF; Vabysmo today and RTC 5-6 weeks   06/12/24: Recurrence of SRF: Vabysmo today and RTC 6 weeks    # Pigment epithelium detachment, drusen each eye    drusenoid PED; stable compared to previous except for #1   DDX; peripheral CNV (not likely given FA finding) vs pigment epithelium adenoma vs peripheral PED   Not symptomatic now, but could be a harbinger of PECHR lesion   Lesion smaller or almost resolved  12/22/20 and 6/15/21: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 Retina attached.   Educated on signs and symptoms of a retinal detachment to be seen immediately.   No peripheral traction or tear in depressed exam today    #  Pituitary Adenoma  History of pituitary adenoma, s/p resection in 2007 and again in 2010 due to recurrence. Patient follows neurology.   Baseline visual field was done 2019. last VF within normal limits   Optic nerve head OCT was done 05/20/2020. No optic neuropathy seen on exam.  Continue monitoring yearly with VF and ONH OCT.   MRI brain summer 2022 showing stable lesion   Follows with [facility] [place], doesn't have an endocrinologist, but is working on an appt.  Has appt with Jackie on 12/28/22 for repeat field testing      #  Cataract, both eyes.   Not visually significant. Monitor    RTC  6 weeks for OCT ou  Eyela injection OD; Vabysmo OS - dilation ou   needs multiple washes after betadine - we did not use lido gel but used lid injection subconj this time  Last dilation 7/30/24     Complete documentation of historical and exam elements from today's encounter can be found in the full encounter summary report (not reduplicated in this progress note).  I personally obtained the chief complaint(s) and history of present illness.  I confirmed and edited as necessary the review of systems, past medical/surgical history, family history, social history, and examination findings as documented by others; and I examined the patient myself.  I personally reviewed the relevant tests, images, and reports as documented above.  I formulated and edited as necessary the assessment and plan and discussed the findings and management plan with the patient and family    Cameron Clemons MD PhD

## 2024-07-30 NOTE — NURSING NOTE
Patient states that her eyes have felt pretty stable, no new changes or complaints. Uses GenTeal tears at night and artificial tears during the day. Will get pain in left eye when light hits it. She states that it gets better, when drops are used or goes back to darkness.    Latricia Collado 9:17 AM

## 2024-08-06 ENCOUNTER — TELEPHONE (OUTPATIENT)
Dept: NEUROSURGERY | Facility: CLINIC | Age: 64
End: 2024-08-06
Payer: COMMERCIAL

## 2024-08-06 NOTE — TELEPHONE ENCOUNTER
Left Voicemail (1st Attempt) and Sent Mychart (1st Attempt) for the patient to call back and schedule the following:    Appointment type: Return Vascular Neurosurg  Provider: Elsa  Return date: On or near 4/15/25  Specialty phone number: [phone]  Additional appointment(s) needed: MRI  Additonal Notes: 3yr follow up; phone call or video visit after MRI updated    Ruth Swift on 8/6/2024 at 4:49 PM

## 2024-09-04 DIAGNOSIS — H35.3231 EXUDATIVE AGE-RELATED MACULAR DEGENERATION OF BOTH EYES WITH ACTIVE CHOROIDAL NEOVASCULARIZATION (H): Primary | ICD-10-CM

## 2024-09-17 ENCOUNTER — OFFICE VISIT (OUTPATIENT)
Dept: OPHTHALMOLOGY | Facility: CLINIC | Age: 64
End: 2024-09-17
Attending: OPHTHALMOLOGY
Payer: COMMERCIAL

## 2024-09-17 DIAGNOSIS — H04.123 DRY EYE SYNDROME OF BOTH EYES: ICD-10-CM

## 2024-09-17 DIAGNOSIS — H35.3231 EXUDATIVE AGE-RELATED MACULAR DEGENERATION OF BOTH EYES WITH ACTIVE CHOROIDAL NEOVASCULARIZATION (H): Primary | ICD-10-CM

## 2024-09-17 PROCEDURE — 250N000011 HC RX IP 250 OP 636: Mod: JZ | Performed by: OPHTHALMOLOGY

## 2024-09-17 PROCEDURE — G0463 HOSPITAL OUTPT CLINIC VISIT: HCPCS | Performed by: OPHTHALMOLOGY

## 2024-09-17 PROCEDURE — 67028 INJECTION EYE DRUG: CPT | Mod: 50 | Performed by: OPHTHALMOLOGY

## 2024-09-17 PROCEDURE — 92134 CPTRZ OPH DX IMG PST SGM RTA: CPT | Performed by: OPHTHALMOLOGY

## 2024-09-17 PROCEDURE — 67028 INJECTION EYE DRUG: CPT | Mod: RT | Performed by: OPHTHALMOLOGY

## 2024-09-17 RX ADMIN — FARICIMAB 6 MG: 6 INJECTION, SOLUTION INTRAVITREAL at 09:45

## 2024-09-17 RX ADMIN — AFLIBERCEPT 2 MG: 40 INJECTION, SOLUTION INTRAVITREAL at 09:45

## 2024-09-17 RX ADMIN — LIDOCAINE HYDROCHLORIDE 5 ML: 20 INJECTION, SOLUTION EPIDURAL; INFILTRATION; INTRACAUDAL; PERINEURAL at 09:46

## 2024-09-17 ASSESSMENT — VISUAL ACUITY
OS_CC+: -3
OD_CC: 20/20
OD_CC+: -1
METHOD: SNELLEN - LINEAR
OS_CC: 20/25

## 2024-09-17 ASSESSMENT — SLIT LAMP EXAM - LIDS
COMMENTS: NORMAL
COMMENTS: NORMAL

## 2024-09-17 ASSESSMENT — EXTERNAL EXAM - LEFT EYE: OS_EXAM: NORMAL

## 2024-09-17 ASSESSMENT — CONF VISUAL FIELD
OD_INFERIOR_NASAL_RESTRICTION: 0
OS_INFERIOR_NASAL_RESTRICTION: 0
OS_SUPERIOR_TEMPORAL_RESTRICTION: 0
OS_SUPERIOR_NASAL_RESTRICTION: 0
OD_INFERIOR_TEMPORAL_RESTRICTION: 0
OD_NORMAL: 1
OD_SUPERIOR_TEMPORAL_RESTRICTION: 0
OS_INFERIOR_TEMPORAL_RESTRICTION: 0
OD_SUPERIOR_NASAL_RESTRICTION: 0
OS_NORMAL: 1
METHOD: COUNTING FINGERS

## 2024-09-17 ASSESSMENT — TONOMETRY
OS_IOP_MMHG: 17
IOP_METHOD: TONOPEN
OD_IOP_MMHG: 16

## 2024-09-17 ASSESSMENT — CUP TO DISC RATIO
OD_RATIO: 0.25
OS_RATIO: 0.25

## 2024-09-17 ASSESSMENT — PATIENT HEALTH QUESTIONNAIRE - PHQ9: SUM OF ALL RESPONSES TO PHQ QUESTIONS 1-9: 2

## 2024-09-17 ASSESSMENT — EXTERNAL EXAM - RIGHT EYE: OD_EXAM: NORMAL

## 2024-09-17 NOTE — NURSING NOTE
Chief Complaints and History of Present Illnesses   Patient presents with    Exudative Macular Degeneration Follow Up     Chief Complaint(s) and History of Present Illness(es)       Exudative Macular Degeneration Follow Up              Laterality: both eyes    Associated symptoms: dryness and floaters.  Negative for eye pain, tearing, photophobia, flashes, itching and burning    Pain scale: 0/10              Comments    Horace is here to continue care for exudative macular degeneration of both eyes. She states vision is stable.    Tyler Stout COT 9:07 AM September 17, 2024

## 2024-09-17 NOTE — PROGRESS NOTES
For intravitreal injection ou, Eylea right eye and vabysmo left eye     CC: exudative AMD ou     Interval hx: Va stable each eye. No eye pain today. No new flashes or floaters.  No redness. Dryness in each eye, relief with drops.    HPI:  Katie Casper is a 63 year old patient here for follow up of exudative AMD.    She has a history of pituitary adenoma, s/p resection in 2007 and again in 2010 due to recurrence.  Strong FH of early AMD: mother diagnosed in her 60s, sister, and brother with wet AMD  Father with AMD diagnosed in his 80s    OCT 07/30/24   Right eye: no SRF, central PED, several small to large drusen, choroid normal thickness, hyaloid  - largely stable  Left eye: improving SRF near neovascularization, several small to large drusen and few PED parafoveal    Assessment and Plan:    #  Exudative AMD right eye    Conversion to wet form 12/2021   OCTA: SubRPE neovascular membrane nasal to fovea   First fovea was not involved and VA was 20/20; gradually declined to 20/30; today 20/25   Previously (in 2022) IRF recurrence at 5 weeks; stable with no fluid with q4 injections for long time; will try to extend gradually   S/p Eylea right - last injection 4/24/2024- (7 week )     9/17/24: Eylea today; RTC 6 w for Eylea OD   Does not tolerate AREDS due to migraines    # Exudative AMD left eye   Conversion to wet form 2/2023   Initiated anti-VEGF with Eylea to keep dosing interval uniform   S/p Eylea multiple injections then switched to Vabysmo (last inj 4/24/24 - 5 w)   2/7/2024: continued resolution of SRF; Vabysmo today and RTC 5-6 weeks   06/12/24: Recurrence of SRF: Vabysmo (7 w)   07/30/24: Vabysmo (7w)   09/17/24: OCT with increased SRF; vabysmo and RTC 6 w not 7 w    # Pigment epithelium detachment, drusen each eye    drusenoid PED; stable compared to previous except for #1   DDX; peripheral CNV (not likely given FA finding) vs pigment epithelium adenoma vs peripheral PED   Not symptomatic now,  but could be a harbinger of PECHR lesion   Lesion smaller or almost resolved 12/22/20 and 6/15/21: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 Retina attached.   Educated on signs and symptoms of a retinal detachment to be seen immediately.   No peripheral traction or tear in depressed exam today    #  Pituitary Adenoma  History of pituitary adenoma, s/p resection in 2007 and again in 2010 due to recurrence. Patient follows neurology.   Baseline visual field was done 2019. last VF within normal limits   Optic nerve head OCT was done 05/20/2020. No optic neuropathy seen on exam.  Continue monitoring yearly with VF and ONH OCT.   MRI brain summer 2022 showing stable lesion   Follows with [facility], doesn't have an endocrinologist, but is working on an appt.  Has appt with Jackie on 12/28/22 for repeat field testing      #  Cataract, both eyes.   Not visually significant. Monitor    RTC  6 weeks for OCT ou  Eyela injection OD; Vabysmo OS - dilation ou   needs multiple washes after betadine - we did not use lido gel but used lid injection subconj this time  Last dilation 7/30/24     Complete documentation of historical and exam elements from today's encounter can be found in the full encounter summary report (not reduplicated in this progress note).  I personally obtained the chief complaint(s) and history of present illness.  I confirmed and edited as necessary the review of systems, past medical/surgical history, family history, social history, and examination findings as documented by others; and I examined the patient myself.  I personally reviewed the relevant tests, images, and reports as documented above.  I formulated and edited as necessary the assessment and plan and discussed the findings and management plan with the patient and family    Cameron Clemons,  MD PhD

## 2024-09-18 ENCOUNTER — MYC MEDICAL ADVICE (OUTPATIENT)
Dept: OPHTHALMOLOGY | Facility: CLINIC | Age: 64
End: 2024-09-18
Payer: COMMERCIAL

## 2024-10-16 DIAGNOSIS — H35.3231 EXUDATIVE AGE-RELATED MACULAR DEGENERATION OF BOTH EYES WITH ACTIVE CHOROIDAL NEOVASCULARIZATION (H): Primary | ICD-10-CM

## 2024-10-29 ENCOUNTER — OFFICE VISIT (OUTPATIENT)
Dept: OPHTHALMOLOGY | Facility: CLINIC | Age: 64
End: 2024-10-29
Attending: OPHTHALMOLOGY
Payer: MEDICARE

## 2024-10-29 DIAGNOSIS — H35.3231 EXUDATIVE AGE-RELATED MACULAR DEGENERATION OF BOTH EYES WITH ACTIVE CHOROIDAL NEOVASCULARIZATION (H): Primary | ICD-10-CM

## 2024-10-29 PROCEDURE — 92134 CPTRZ OPH DX IMG PST SGM RTA: CPT | Mod: 26 | Performed by: OPHTHALMOLOGY

## 2024-10-29 PROCEDURE — 250N000011 HC RX IP 250 OP 636: Mod: JZ | Performed by: OPHTHALMOLOGY

## 2024-10-29 PROCEDURE — 92134 CPTRZ OPH DX IMG PST SGM RTA: CPT | Performed by: OPHTHALMOLOGY

## 2024-10-29 PROCEDURE — 67028 INJECTION EYE DRUG: CPT | Mod: LT | Performed by: OPHTHALMOLOGY

## 2024-10-29 PROCEDURE — 67028 INJECTION EYE DRUG: CPT | Mod: RT | Performed by: OPHTHALMOLOGY

## 2024-10-29 PROCEDURE — G0463 HOSPITAL OUTPT CLINIC VISIT: HCPCS | Performed by: OPHTHALMOLOGY

## 2024-10-29 RX ADMIN — AFLIBERCEPT 2 MG: 40 INJECTION, SOLUTION INTRAVITREAL at 10:51

## 2024-10-29 RX ADMIN — LIDOCAINE HYDROCHLORIDE 1 ML: 20 INJECTION, SOLUTION EPIDURAL; INFILTRATION; INTRACAUDAL; PERINEURAL at 10:49

## 2024-10-29 RX ADMIN — FARICIMAB 6 MG: 6 INJECTION, SOLUTION INTRAVITREAL at 10:50

## 2024-10-29 ASSESSMENT — SLIT LAMP EXAM - LIDS
COMMENTS: NORMAL
COMMENTS: NORMAL

## 2024-10-29 ASSESSMENT — REFRACTION_WEARINGRX
OD_CYLINDER: SPHERE
OD_CYLINDER: SPHERE
OS_CYLINDER: SPHERE
OS_CYLINDER: SPHERE
OS_SPHERE: +3.25
OD_ADD: +2.50
OD_SPHERE: +0.75
OS_ADD: +2.50
OS_SPHERE: +0.75
OD_SPHERE: +3.25

## 2024-10-29 ASSESSMENT — VISUAL ACUITY
OS_CC: 20/25
OS_CC+: -2
OD_CC: 20/20
METHOD: SNELLEN - LINEAR
CORRECTION_TYPE: GLASSES
OD_CC+: -2

## 2024-10-29 ASSESSMENT — CUP TO DISC RATIO
OS_RATIO: 0.25
OD_RATIO: 0.25

## 2024-10-29 ASSESSMENT — EXTERNAL EXAM - RIGHT EYE: OD_EXAM: NORMAL

## 2024-10-29 ASSESSMENT — TONOMETRY
OD_IOP_MMHG: 15
OS_IOP_MMHG: 13
IOP_METHOD: TONOPEN

## 2024-10-29 ASSESSMENT — EXTERNAL EXAM - LEFT EYE: OS_EXAM: NORMAL

## 2024-10-29 NOTE — NURSING NOTE
Chief Complaints and History of Present Illnesses   Patient presents with    Follow Up     exudative AMD ou      Chief Complaint(s) and History of Present Illness(es)       Follow Up              Comments: exudative AMD ou               Comments    Pt states no change in VA since last visit  No flashes or floaters   No eye pain or redness    Natacha Ortiz COT 9:28 AM October 29, 2024

## 2024-10-29 NOTE — PROGRESS NOTES
For intravitreal injection ou, Eylea right eye and vabysmo left eye     CC: exudative AMD ou     Interval hx: Va stable each eye. No eye pain today. No new flashes or floaters.  No redness. Dryness in each eye, relief with drops.    HPI:  Katie Casper is a 64 year old patient here for follow up of exudative AMD.    She has a history of pituitary adenoma, s/p resection in 2007 and again in 2010 due to recurrence.  Strong FH of early AMD: mother diagnosed in her 60s, sister, and brother with wet AMD  Father with AMD diagnosed in his 80s    OCT 10/29/24   Right eye: resolved SRF, central PED, several small to large drusen, choroid normal thickness, hyaloid  - largely stable compared to baseline  Left eye: improving SRF near neovascularization, several small to large drusen and few PED parafoveal    Assessment and Plan:    #  Exudative AMD right eye    Conversion to wet form 12/2021   OCTA: SubRPE neovascular membrane nasal to fovea   First fovea was not involved and VA was 20/20; gradually declined to 20/30; today 20/25   Previously (in 2022) IRF recurrence at 5 weeks; stable with no fluid with q4 injections for long time; will try to extend gradually   S/p Eylea right - last injection 4/24/2024- (7 week )     10/29/24: Eylea today; RTC 6 w for Eylea OD   Does not tolerate AREDS due to migraines    # Exudative AMD left eye   Conversion to wet form 2/2023   Initiated anti-VEGF with Eylea to keep dosing interval uniform   S/p Eylea multiple injections then switched to Vabysmo (last inj 4/24/24 - 5 w)   2/7/2024: continued resolution of SRF; Vabysmo today and RTC 5-6 weeks   06/12/24: Recurrence of SRF: Vabysmo (7 w)   07/30/24: Vabysmo (7w)   09/17/24: OCT with increased SRF; vabysmo and RTC 6 w not 7 w   10/29/24: resolved SRF: vabysmo and will stick to q6w injections    # Pigment epithelium detachment, drusen each eye    drusenoid PED; stable compared to previous except for #1   DDX; peripheral CNV (not  likely given FA finding) vs pigment epithelium adenoma vs peripheral PED   Not symptomatic now, but could be a harbinger of PECHR lesion   Lesion smaller or almost resolved 12/22/20 and 6/15/21: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 Retina attached.   Educated on signs and symptoms of a retinal detachment to be seen immediately.   No peripheral traction or tear in depressed exam today    #  Pituitary Adenoma  History of pituitary adenoma, s/p resection in 2007 and again in 2010 due to recurrence. Patient follows neurology.   Baseline visual field was done 2019. last VF within normal limits   Optic nerve head OCT was done 05/20/2020. No optic neuropathy seen on exam.  Continue monitoring yearly with VF and ONH OCT.   MRI brain summer 2022 showing stable lesion   Follows with [facility], doesn't have an endocrinologist, but is working on an appt.  Has appt with Jackie on 12/28/22 for repeat field testing    #  Cataract, both eyes.   Not visually significant. Monitor    RTC  6 weeks for OCT ou Eyela injection OD; Vabysmo OS    needs multiple washes after betadine - we did not use lido gel but used lid injection subconj this time  Last dilation 7/30/24    Considers a closer clinic for injection     Complete documentation of historical and exam elements from today's encounter can be found in the full encounter summary report (not reduplicated in this progress note).  I personally obtained the chief complaint(s) and history of present illness.  I confirmed and edited as necessary the review of systems, past medical/surgical history, family history, social history, and examination findings as documented by others; and I examined the patient myself.  I personally reviewed the relevant tests, images, and reports as documented above.  I formulated and edited as  necessary the assessment and plan and discussed the findings and management plan with the patient and family    Cameron Clemons MD PhD

## 2024-12-09 DIAGNOSIS — H35.3231 EXUDATIVE AGE-RELATED MACULAR DEGENERATION OF BOTH EYES WITH ACTIVE CHOROIDAL NEOVASCULARIZATION (H): Primary | ICD-10-CM

## 2024-12-10 ENCOUNTER — OFFICE VISIT (OUTPATIENT)
Dept: OPHTHALMOLOGY | Facility: CLINIC | Age: 64
End: 2024-12-10
Attending: OPHTHALMOLOGY
Payer: MEDICARE

## 2024-12-10 DIAGNOSIS — H35.3231 EXUDATIVE AGE-RELATED MACULAR DEGENERATION OF BOTH EYES WITH ACTIVE CHOROIDAL NEOVASCULARIZATION (H): ICD-10-CM

## 2024-12-10 PROCEDURE — 67028 INJECTION EYE DRUG: CPT | Mod: LT | Performed by: OPHTHALMOLOGY

## 2024-12-10 PROCEDURE — 250N000011 HC RX IP 250 OP 636: Performed by: OPHTHALMOLOGY

## 2024-12-10 PROCEDURE — 67028 INJECTION EYE DRUG: CPT | Mod: RT | Performed by: OPHTHALMOLOGY

## 2024-12-10 PROCEDURE — 92134 CPTRZ OPH DX IMG PST SGM RTA: CPT | Performed by: OPHTHALMOLOGY

## 2024-12-10 PROCEDURE — G0463 HOSPITAL OUTPT CLINIC VISIT: HCPCS | Performed by: OPHTHALMOLOGY

## 2024-12-10 RX ADMIN — AFLIBERCEPT 2 MG: 40 INJECTION, SOLUTION INTRAVITREAL at 14:01

## 2024-12-10 RX ADMIN — LIDOCAINE HYDROCHLORIDE 5 ML: 20 INJECTION, SOLUTION EPIDURAL; INFILTRATION; INTRACAUDAL; PERINEURAL at 14:01

## 2024-12-10 RX ADMIN — FARICIMAB 6 MG: 6 INJECTION, SOLUTION INTRAVITREAL at 14:02

## 2024-12-10 ASSESSMENT — REFRACTION_WEARINGRX
OS_SPHERE: +3.25
OS_ADD: +2.50
OS_CYLINDER: SPHERE
OD_CYLINDER: SPHERE
OD_SPHERE: +0.75
OS_SPHERE: +0.75
OD_CYLINDER: SPHERE
OD_ADD: +2.50
OD_SPHERE: +3.25
OS_CYLINDER: SPHERE

## 2024-12-10 ASSESSMENT — SLIT LAMP EXAM - LIDS
COMMENTS: NORMAL
COMMENTS: NORMAL

## 2024-12-10 ASSESSMENT — VISUAL ACUITY
OD_CC: 20/20
OD_CC+: 1
METHOD: SNELLEN - LINEAR
OS_CC+: -3
CORRECTION_TYPE: GLASSES
OS_CC: 20/20

## 2024-12-10 ASSESSMENT — EXTERNAL EXAM - LEFT EYE: OS_EXAM: NORMAL

## 2024-12-10 ASSESSMENT — TONOMETRY
OS_IOP_MMHG: 12
OD_IOP_MMHG: 15
IOP_METHOD: TONOPEN

## 2024-12-10 ASSESSMENT — EXTERNAL EXAM - RIGHT EYE: OD_EXAM: NORMAL

## 2024-12-10 ASSESSMENT — CUP TO DISC RATIO
OS_RATIO: 0.25
OD_RATIO: 0.25

## 2024-12-10 NOTE — NURSING NOTE
Chief Complaints and History of Present Illnesses   Patient presents with    Macular Degeneration Follow Up     AMD follow up     Chief Complaint(s) and History of Present Illness(es)       Macular Degeneration Follow Up              Laterality: both eyes    Associated symptoms: Negative for flashes and floaters    Treatments tried: artificial tears    Comments: AMD follow up              Comments    The patient reports stable vision.  She has no noticeable changes since last visit.  Radha Lau, COA, COA 1:16 PM 12/10/2024

## 2024-12-10 NOTE — PROGRESS NOTES
For intravitreal injection ou, Eylea right eye and vabysmo left eye     CC: exudative AMD ou     Interval hx: Va stable each eye. No eye pain today. No new flashes or floaters.  No redness. Dryness in each eye, relief with drops.    HPI:  Katie Casper is a 64 year old patient here for follow up of exudative AMD.    She has a history of pituitary adenoma, s/p resection in 2007 and again in 2010 due to recurrence.  Strong FH of early AMD: mother diagnosed in her 60s, sister, and brother with wet AMD  Father with AMD diagnosed in his 80s    /10/24   Right eye: resolved SRF, central PED, several small to large drusen, choroid normal thickness, hyaloid  - largely stable compared to baseline  Left eye: resolved SRF near neovascularization, several small to large drusen and few PED parafoveal    Assessment and Plan:    #  Exudative AMD right eye    Conversion to wet form 12/2021   OCTA: SubRPE neovascular membrane nasal to fovea   First fovea was not involved and VA was 20/20; gradually declined to 20/30; today 20/25   Previously (in 2022) IRF recurrence at 5 weeks; stable with no fluid with q4 injections for long time; will try to extend gradually   S/p Eylea right - last injection 4/24/2024- (7 week )     10/29/24: Eylea today; RTC 6 w for Eylea OD   Does not tolerate AREDS due to migraines    # Exudative AMD left eye   Conversion to wet form 2/2023   Initiated anti-VEGF with Eylea to keep dosing interval uniform   S/p Eylea multiple injections then switched to Vabysmo (last inj 4/24/24 - 5 w)   2/7/2024: continued resolution of SRF; Vabysmo today and RTC 5-6 weeks   06/12/24: Recurrence of SRF: Vabysmo (7 w)   07/30/24: Vabysmo (7w)   09/17/24: OCT with increased SRF; vabysmo and RTC 6 w not 7 w   10/29/24: resolved SRF: vabysmo and will stick to q6w injections    # Pigment epithelium detachment, drusen each eye    drusenoid PED; stable compared to previous except for #1   DDX; peripheral CNV (not  likely given FA finding) vs pigment epithelium adenoma vs peripheral PED   Not symptomatic now, but could be a harbinger of PECHR lesion   Lesion smaller or almost resolved 12/22/20 and 6/15/21: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 Retina attached.   Educated on signs and symptoms of a retinal detachment to be seen immediately.   No peripheral traction or tear in depressed exam today    #  Pituitary Adenoma  History of pituitary adenoma, s/p resection in 2007 and again in 2010 due to recurrence. Patient follows neurology.   Baseline visual field was done 2019. last VF within normal limits   Optic nerve head OCT was done 05/20/2020. No optic neuropathy seen on exam.  Continue monitoring yearly with VF and ONH OCT.   MRI brain summer 2022 showing stable lesion   Follows with [facility], doesn't have an endocrinologist, but is working on an appt.  Has appt with Jackie on 12/28/22 for repeat field testing    #  Cataract, both eyes.   Not visually significant. Monitor    RTC  6 weeks for OCT ou Eyela injection OD; Vabysmo OS    needs multiple washes after betadine - we did not use lido gel but used lid injection subconj this time  Last dilation 7/30/24    Considers a closer clinic for injection     Complete documentation of historical and exam elements from today's encounter can be found in the full encounter summary report (not reduplicated in this progress note).  I personally obtained the chief complaint(s) and history of present illness.  I confirmed and edited as necessary the review of systems, past medical/surgical history, family history, social history, and examination findings as documented by others; and I examined the patient myself.  I personally reviewed the relevant tests, images, and reports as documented above.  I formulated and edited as  necessary the assessment and plan and discussed the findings and management plan with the patient and family    Cameron Clemons MD PhD

## 2024-12-21 ENCOUNTER — HEALTH MAINTENANCE LETTER (OUTPATIENT)
Age: 64
End: 2024-12-21

## 2025-01-21 ENCOUNTER — OFFICE VISIT (OUTPATIENT)
Dept: OPHTHALMOLOGY | Facility: CLINIC | Age: 65
End: 2025-01-21
Attending: OPHTHALMOLOGY
Payer: MEDICARE

## 2025-01-21 DIAGNOSIS — H35.3231 EXUDATIVE AGE-RELATED MACULAR DEGENERATION OF BOTH EYES WITH ACTIVE CHOROIDAL NEOVASCULARIZATION (H): ICD-10-CM

## 2025-01-21 PROCEDURE — 67028 INJECTION EYE DRUG: CPT | Mod: RT | Performed by: OPHTHALMOLOGY

## 2025-01-21 PROCEDURE — 250N000011 HC RX IP 250 OP 636: Performed by: OPHTHALMOLOGY

## 2025-01-21 PROCEDURE — 92134 CPTRZ OPH DX IMG PST SGM RTA: CPT | Performed by: OPHTHALMOLOGY

## 2025-01-21 PROCEDURE — G0463 HOSPITAL OUTPT CLINIC VISIT: HCPCS | Performed by: OPHTHALMOLOGY

## 2025-01-21 PROCEDURE — 67028 INJECTION EYE DRUG: CPT | Mod: LT | Performed by: OPHTHALMOLOGY

## 2025-01-21 RX ADMIN — FARICIMAB 6 MG: 6 INJECTION, SOLUTION INTRAVITREAL at 13:36

## 2025-01-21 RX ADMIN — LIDOCAINE HYDROCHLORIDE 5 ML: 20 INJECTION, SOLUTION EPIDURAL; INFILTRATION; INTRACAUDAL; PERINEURAL at 13:43

## 2025-01-21 RX ADMIN — AFLIBERCEPT 2 MG: 40 INJECTION, SOLUTION INTRAVITREAL at 13:36

## 2025-01-21 ASSESSMENT — REFRACTION_WEARINGRX
OS_ADD: +2.50
OD_CYLINDER: SPHERE
OD_SPHERE: +3.25
OS_SPHERE: +0.75
OD_CYLINDER: SPHERE
OS_CYLINDER: SPHERE
OS_CYLINDER: SPHERE
OD_ADD: +2.50
OS_SPHERE: +3.25
OD_SPHERE: +0.75

## 2025-01-21 ASSESSMENT — VISUAL ACUITY
OD_CC: 20/20
OS_CC: 20/20
CORRECTION_TYPE: GLASSES
OS_CC+: -3
METHOD: SNELLEN - LINEAR
OD_CC+: -2

## 2025-01-21 ASSESSMENT — TONOMETRY
OS_IOP_MMHG: 16
OD_IOP_MMHG: 16
IOP_METHOD: TONOPEN

## 2025-01-21 NOTE — NURSING NOTE
Chief Complaints and History of Present Illnesses   Patient presents with    Follow Up     Chief Complaint(s) and History of Present Illness(es)       Follow Up              Course: stable    Associated symptoms: Negative for flashes and floaters    Treatments tried: artificial tears              Comments    Pt denies vision changes or new concerns. Still using PFATs.     Adia Oliveira OA 1:18 PM January 21, 2025

## 2025-01-21 NOTE — PROGRESS NOTES
For intravitreal injection ou, Eylea right eye and vabysmo left eye     CC: exudative AMD ou     Interval hx: Va stable each eye. No eye pain today. No new flashes or floaters.  No redness. Dryness in each eye, relief with drops.    HPI:  Katie Casper is a 64 year old patient here for follow up of exudative AMD.    She has a history of pituitary adenoma, s/p resection in 2007 and again in 2010 due to recurrence.  Strong FH of early AMD: mother diagnosed in her 60s, sister, and brother with wet AMD  Father with AMD diagnosed in his 80s    OCT 1/21/25   Right eye: resolved SRF, central PED, several small to large drusen, choroid normal thickness, hyaloid  - largely stable compared to baseline  Left eye: resolved SRF near neovascularization, several small to large drusen and few PED parafoveal    Assessment and Plan:    #  Exudative AMD right eye    Conversion to wet form 12/2021   OCTA: SubRPE neovascular membrane nasal to fovea   First fovea was not involved and VA was 20/20; gradually declined to 20/30; today 20/25   Previously (in 2022) IRF recurrence at 5 weeks; stable with no fluid with q4 injections for long time; will try to extend gradually   S/p Eylea right - last injection 4/24/2024- (7 week )     1/21/25: Eylea today; RTC 6 w for Eylea OD   Does not tolerate AREDS due to migraines    # Exudative AMD left eye   Conversion to wet form 2/2023   Initiated anti-VEGF with Eylea to keep dosing interval uniform   S/p Eylea multiple injections then switched to Vabysmo (last inj 4/24/24 - 5 w)   2/7/2024: continued resolution of SRF; Vabysmo today and RTC 5-6 weeks   06/12/24: Recurrence of SRF: Vabysmo (7 w)   07/30/24: Vabysmo (7w)   09/17/24: OCT with increased SRF; vabysmo and RTC 6 w not 7 w   10/29/24: resolved SRF: vabysmo and will stick to q6w injections   1/21/25: resolved SRF; vabysmo today and RTC 6 w for injection    # Pigment epithelium detachment, drusen each eye    drusenoid PED; stable  compared to previous except for #1   DDX; peripheral CNV (not likely given FA finding) vs pigment epithelium adenoma vs peripheral PED   Not symptomatic now, but could be a harbinger of PECHR lesion   Lesion smaller or almost resolved 12/22/20 and 6/15/21: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 Retina attached.   Educated on signs and symptoms of a retinal detachment to be seen immediately.   No peripheral traction or tear in depressed exam today    #  Pituitary Adenoma  History of pituitary adenoma, s/p resection in 2007 and again in 2010 due to recurrence. Patient follows neurology.   Baseline visual field was done 2019. last VF within normal limits   Optic nerve head OCT was done 05/20/2020. No optic neuropathy seen on exam.  Continue monitoring yearly with VF and ONH OCT.   MRI brain summer 2022 showing stable lesion   Follows with [facility], doesn't have an endocrinologist, but is working on an appt.  Has appt with Jackie on 12/28/22 for repeat field testing    #  Cataract, both eyes.   Not visually significant. Monitor    RTC  6 weeks for OCT ou Eyela injection OD; Vabysmo OS    needs multiple washes after betadine - we did not use lido gel but used lid injection subconj this time  Last dilation 7/30/24    Considers a closer clinic for injection     Complete documentation of historical and exam elements from today's encounter can be found in the full encounter summary report (not reduplicated in this progress note).  I personally obtained the chief complaint(s) and history of present illness.  I confirmed and edited as necessary the review of systems, past medical/surgical history, family history, social history, and examination findings as documented by others; and I examined the patient myself.  I personally reviewed the relevant tests, images, and  reports as documented above.  I formulated and edited as necessary the assessment and plan and discussed the findings and management plan with the patient and family    Cameron Clemons MD PhD

## 2025-02-25 DIAGNOSIS — H35.3231 EXUDATIVE AGE-RELATED MACULAR DEGENERATION OF BOTH EYES WITH ACTIVE CHOROIDAL NEOVASCULARIZATION (H): Primary | ICD-10-CM

## 2025-03-05 ENCOUNTER — OFFICE VISIT (OUTPATIENT)
Dept: OPHTHALMOLOGY | Facility: CLINIC | Age: 65
End: 2025-03-05
Attending: OPHTHALMOLOGY
Payer: MEDICARE

## 2025-03-05 DIAGNOSIS — H35.3231 EXUDATIVE AGE-RELATED MACULAR DEGENERATION OF BOTH EYES WITH ACTIVE CHOROIDAL NEOVASCULARIZATION (H): Primary | ICD-10-CM

## 2025-03-05 DIAGNOSIS — H25.13 NUCLEAR SENILE CATARACT OF BOTH EYES: ICD-10-CM

## 2025-03-05 DIAGNOSIS — H04.123 DRY EYE SYNDROME OF BOTH EYES: ICD-10-CM

## 2025-03-05 DIAGNOSIS — H43.813 VITREOUS DEGENERATION OF BOTH EYES: ICD-10-CM

## 2025-03-05 PROCEDURE — 92134 CPTRZ OPH DX IMG PST SGM RTA: CPT | Performed by: OPHTHALMOLOGY

## 2025-03-05 PROCEDURE — 67028 INJECTION EYE DRUG: CPT | Mod: LT | Performed by: OPHTHALMOLOGY

## 2025-03-05 PROCEDURE — 99214 OFFICE O/P EST MOD 30 MIN: CPT | Mod: 25 | Performed by: OPHTHALMOLOGY

## 2025-03-05 PROCEDURE — G0463 HOSPITAL OUTPT CLINIC VISIT: HCPCS | Performed by: OPHTHALMOLOGY

## 2025-03-05 PROCEDURE — 67028 INJECTION EYE DRUG: CPT | Mod: RT | Performed by: OPHTHALMOLOGY

## 2025-03-05 PROCEDURE — 250N000011 HC RX IP 250 OP 636: Mod: JZ | Performed by: OPHTHALMOLOGY

## 2025-03-05 RX ADMIN — AFLIBERCEPT 2 MG: 40 INJECTION, SOLUTION INTRAVITREAL at 13:07

## 2025-03-05 RX ADMIN — FARICIMAB 6 MG: 6 INJECTION, SOLUTION INTRAVITREAL at 13:06

## 2025-03-05 RX ADMIN — LIDOCAINE HYDROCHLORIDE 5 ML: 20 INJECTION, SOLUTION EPIDURAL; INFILTRATION; INTRACAUDAL; PERINEURAL at 13:02

## 2025-03-05 ASSESSMENT — TONOMETRY
IOP_METHOD: TONOPEN
OS_IOP_MMHG: 18
OD_IOP_MMHG: 20

## 2025-03-05 ASSESSMENT — VISUAL ACUITY
OS_SC+: +2
OD_SC: 20/20
OD_SC+: -1
OS_SC: 20/25
METHOD: SNELLEN - LINEAR

## 2025-03-05 ASSESSMENT — SLIT LAMP EXAM - LIDS
COMMENTS: NORMAL
COMMENTS: NORMAL

## 2025-03-05 ASSESSMENT — CUP TO DISC RATIO
OS_RATIO: 0.25
OD_RATIO: 0.25

## 2025-03-05 ASSESSMENT — EXTERNAL EXAM - LEFT EYE: OS_EXAM: NORMAL

## 2025-03-05 ASSESSMENT — EXTERNAL EXAM - RIGHT EYE: OD_EXAM: NORMAL

## 2025-03-05 NOTE — NURSING NOTE
Chief Complaints and History of Present Illnesses   Patient presents with    Macular Degeneration Follow Up     Chief Complaint(s) and History of Present Illness(es)       Macular Degeneration Follow Up               Comments    Patient states vision is doing well, some waviness while looking at a computer screen. No pain BE. Some dryness and irritation, pt uses AT's. No flashes and some floaters, nothing new.    Ocular Meds: Artificial tears    Lisa HAIRSTON 12:14 PM March 5, 2025

## 2025-03-05 NOTE — PROGRESS NOTES
For intravitreal injection ou, Eylea right eye and vabysmo left eye     CC: exudative AMD ou     Interval hx: Va stable each eye. No eye pain today. No new flashes or floaters.  No redness. Dryness in each eye, relief with drops.    HPI:  Katie Casper is a 64 year old patient here for follow up of exudative AMD.    She has a history of pituitary adenoma, s/p resection in 2007 and again in 2010 due to recurrence.  Strong FH of early AMD: mother diagnosed in her 60s, sister, and brother with wet AMD  Father with AMD diagnosed in his 80s    OCT 1/21/25   Right eye: resolved SRF, central PED, several small to large drusen, choroid normal thickness, hyaloid  - largely stable compared to baseline  Left eye: resolved SRF near neovascularization, several small to large drusen and few PED parafoveal    Assessment and Plan:    #  Exudative AMD right eye    Conversion to wet form 12/2021   OCTA: SubRPE neovascular membrane nasal to fovea   First fovea was not involved and VA was 20/20; gradually declined to 20/30; today 20/25   Previously (in 2022) IRF recurrence at 5 weeks; stable with no fluid with q4 injections for long time; will try to extend gradually   S/p Eylea right - last injection 4/24/2024- (7 week )     3/5/25: Eylea today; RTC 7 w for Eylea OD   Does not tolerate AREDS due to migraines    # Exudative AMD left eye   Conversion to wet form 2/2023   Initiated anti-VEGF with Eylea to keep dosing interval uniform   S/p Eylea multiple injections then switched to Vabysmo (last inj 4/24/24 - 5 w)   2/7/2024: continued resolution of SRF; Vabysmo today and RTC 5-6 weeks   06/12/24: Recurrence of SRF: Vabysmo (7 w)   07/30/24: Vabysmo (7w)   09/17/24: OCT with increased SRF; vabysmo and RTC 6 w not 7 w      3/5/25: resolved SRF; vabysmo today and RTC 7 w for injection       # Pigment epithelium detachment, drusen each eye    drusenoid PED; stable compared to previous except for #1   DDX; peripheral CNV (not  likely given FA finding) vs pigment epithelium adenoma vs peripheral PED   Not symptomatic now, but could be a harbinger of PECHR lesion   Lesion smaller or almost resolved 12/22/20 and 6/15/21: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 Retina attached.   Educated on signs and symptoms of a retinal detachment to be seen immediately.   No peripheral traction or tear in depressed exam today    #  Pituitary Adenoma  History of pituitary adenoma, s/p resection in 2007 and again in 2010 due to recurrence. Patient follows neurology.   Baseline visual field was done 2019. last VF within normal limits   Optic nerve head OCT was done 05/20/2020. No optic neuropathy seen on exam.  Continue monitoring yearly with VF and ONH OCT.   MRI brain summer 2022 showing stable lesion; next MRI brain scheduled for the next month  Follows with [facility], doesn't have an endocrinologist, but is working on an appt.    #  Cataract, both eyes.   Not visually significant. Monitor    RTC  7 weeks for OCT ou Eyela injection OD; Vabysmo OS    needs multiple washes after betadine - we did not use lido gel but used lid injection subconj this time  Last dilation 3/5/25    Considers a closer clinic for injection     Complete documentation of historical and exam elements from today's encounter can be found in the full encounter summary report (not reduplicated in this progress note).  I personally obtained the chief complaint(s) and history of present illness.  I confirmed and edited as necessary the review of systems, past medical/surgical history, family history, social history, and examination findings as documented by others; and I examined the patient myself.  I personally reviewed the relevant tests, images, and reports as documented above.  I formulated and edited as necessary the  assessment and plan and discussed the findings and management plan with the patient and family    Cameron Clemons MD PhD

## 2025-03-22 ENCOUNTER — HEALTH MAINTENANCE LETTER (OUTPATIENT)
Age: 65
End: 2025-03-22

## 2025-04-07 ENCOUNTER — ANCILLARY PROCEDURE (OUTPATIENT)
Dept: MRI IMAGING | Facility: CLINIC | Age: 65
End: 2025-04-07
Attending: NEUROLOGICAL SURGERY
Payer: MEDICARE

## 2025-04-07 DIAGNOSIS — D35.2 PITUITARY ADENOMA (H): ICD-10-CM

## 2025-04-07 PROCEDURE — 70553 MRI BRAIN STEM W/O & W/DYE: CPT | Mod: GC | Performed by: RADIOLOGY

## 2025-04-07 PROCEDURE — A9585 GADOBUTROL INJECTION: HCPCS | Mod: JW | Performed by: RADIOLOGY

## 2025-04-07 RX ORDER — GADOBUTROL 604.72 MG/ML
7.5 INJECTION INTRAVENOUS ONCE
Status: COMPLETED | OUTPATIENT
Start: 2025-04-07 | End: 2025-04-07

## 2025-04-07 RX ADMIN — GADOBUTROL 7 ML: 604.72 INJECTION INTRAVENOUS at 10:05

## 2025-04-07 NOTE — DISCHARGE INSTRUCTIONS
MRI Contrast Discharge Instructions    The IV contrast you received today will pass out of your body in your  urine. This will happen in the next 24 hours. You will not feel this process.  Your urine will not change color.    Drink at least 4 extra glasses of water or juice today (unless your doctor  has restricted your fluids). This reduces the stress on your kidneys.  You may take your regular medicines.    If you are on dialysis: It is best to have dialysis today.    If you have a reaction: Most reactions happen right away. If you have  any new symptoms after leaving the hospital (such as hives or swelling),  call your hospital at the correct number below. Or call your family doctor.  If you have breathing distress or wheezing, call 911.    Special instructions: ***    I have read and understand the above information.    Signature:______________________________________ Date:___________    Staff:__________________________________________ Date:___________     Time:__________    [facility] Departments:    ___[place]: [phone]  ___[facility]: [phone]  ___[place]: [phone] ___[facility]: [phone]  ___[facility]: [phone]  ___[facility]: [phone]  ___[phone]  ___[facility]: [phone]  ___[phone]

## 2025-04-10 DIAGNOSIS — H35.3231 EXUDATIVE AGE-RELATED MACULAR DEGENERATION OF BOTH EYES WITH ACTIVE CHOROIDAL NEOVASCULARIZATION (H): Primary | ICD-10-CM

## 2025-04-22 ENCOUNTER — OFFICE VISIT (OUTPATIENT)
Dept: NEUROSURGERY | Facility: CLINIC | Age: 65
End: 2025-04-22
Payer: MEDICARE

## 2025-04-22 VITALS
HEART RATE: 65 BPM | OXYGEN SATURATION: 99 % | BODY MASS INDEX: 25.06 KG/M2 | DIASTOLIC BLOOD PRESSURE: 74 MMHG | WEIGHT: 160 LBS | SYSTOLIC BLOOD PRESSURE: 109 MMHG

## 2025-04-22 DIAGNOSIS — D35.2 PITUITARY MACROADENOMA (H): Primary | ICD-10-CM

## 2025-04-22 PROCEDURE — 3074F SYST BP LT 130 MM HG: CPT | Performed by: NEUROLOGICAL SURGERY

## 2025-04-22 PROCEDURE — 99203 OFFICE O/P NEW LOW 30 MIN: CPT | Performed by: NEUROLOGICAL SURGERY

## 2025-04-22 PROCEDURE — 3078F DIAST BP <80 MM HG: CPT | Performed by: NEUROLOGICAL SURGERY

## 2025-04-22 NOTE — PROGRESS NOTES
Date of service: 4/22/2025  Length of service: 20 minutes    Shirlene Harp MD   [address] [place], [place] [id number]      Dear Dr. Harp:    We saw Ms. Casper back in pituitary clinic today for long-term follow-up of a pituitary macroadenoma, twice resected through an endoscopic, endonasal approach.  Her last operation was in August 2010.  We have been following a small hypoenhancement in the left side of the sella for several years.  She feels well overall.  Except for chronic TMJ issues, she feels well.  Her vision has been relatively stable, and she gets intravitreal injections for macular degeneration with Dr. Clemons.  Her peripheral vision is good and she has little difficulty with driving.  She is not on any hormone replacement and follows with Dr. Hoffman from endocrinology.  Her migraine headaches are also improved.    Her general appearance is good.  Blood pressure 109/74, heart rate 65, weight 72.6 kg  Her gross neurological examination remains normal.    We went over her MRI from 4/7/2025 and compared it to prior studies dating back to 2007 (preoperative).  The hypoenhancement in the left inferior sella is unchanged at about 7 to 8 mm.  The pituitary gland is displaced to the right side of the sella.  The optic apparatus remains completely decompressed.  Overall, the MRI is unchanged.    Given her clinical and radiographic stability, we will repeat an MRI in 3 years.  She will continue her follow-up with our ophthalmology clinic and Dr. Hoffman.  Please do not hesitate to contact us with questions.    Sincerely,        Sergey Hunter MD  Department of Neurosurgery

## 2025-04-22 NOTE — PATIENT INSTRUCTIONS
Follow up with Dr Hunter in 3 years with MRI Brain with and without contrast Pituitary Protocol    Call Fouzia KRAMER  Neurosurgery Care Coordinator with questions/concerns     Thank you for using [facility]

## 2025-04-22 NOTE — LETTER
4/22/2025       RE: Katie Casper  [address]  [facility] [address]     Dear Colleague,    Thank you for referring your patient, Katie Casper, to the [facility] [place] [facility]. Please see a copy of my visit note below.    Date of service: 4/22/2025  Length of service: 20 minutes    Shirlene Harp MD   [address] [place] [address]      Dear Dr. Harp:    We saw Ms. Casper back in pituitary clinic today for long-term follow-up of a pituitary macroadenoma, twice resected through an endoscopic, endonasal approach.  Her last operation was in August 2010.  We have been following a small hypoenhancement in the left side of the sella for several years.  She feels well overall.  Except for chronic TMJ issues, she feels well.  Her vision has been relatively stable, and she gets intravitreal injections for macular degeneration with Dr. Clemons.  Her peripheral vision is good and she has little difficulty with driving.  She is not on any hormone replacement and follows with Dr. Hoffman from endocrinology.  Her migraine headaches are also improved.    Her general appearance is good.  Blood pressure 109/74, heart rate 65, weight 72.6 kg  Her gross neurological examination remains normal.    We went over her MRI from 4/7/2025 and compared it to prior studies dating back to 2007 (preoperative).  The hypoenhancement in the left inferior sella is unchanged at about 7 to 8 mm.  The pituitary gland is displaced to the right side of the sella.  The optic apparatus remains completely decompressed.  Overall, the MRI is unchanged.    Given her clinical and radiographic stability, we will repeat an MRI in 3 years.  She will continue her follow-up with our ophthalmology clinic and Dr. Hoffman.  Please do not hesitate to contact us with questions.    Sincerely,        Sergey Hunter MD  Department of Neurosurgery    Again, thank  you for allowing me to participate in the care of your patient.      Sincerely,    Sergey Hunter MD

## 2025-04-23 ENCOUNTER — OFFICE VISIT (OUTPATIENT)
Dept: OPHTHALMOLOGY | Facility: CLINIC | Age: 65
End: 2025-04-23
Attending: OPHTHALMOLOGY
Payer: MEDICARE

## 2025-04-23 DIAGNOSIS — H35.3231 EXUDATIVE AGE-RELATED MACULAR DEGENERATION OF BOTH EYES WITH ACTIVE CHOROIDAL NEOVASCULARIZATION (H): Primary | ICD-10-CM

## 2025-04-23 PROCEDURE — 250N000011 HC RX IP 250 OP 636: Mod: JZ | Performed by: OPHTHALMOLOGY

## 2025-04-23 PROCEDURE — G0463 HOSPITAL OUTPT CLINIC VISIT: HCPCS | Performed by: OPHTHALMOLOGY

## 2025-04-23 PROCEDURE — 92134 CPTRZ OPH DX IMG PST SGM RTA: CPT | Performed by: OPHTHALMOLOGY

## 2025-04-23 PROCEDURE — 67028 INJECTION EYE DRUG: CPT | Mod: LT | Performed by: OPHTHALMOLOGY

## 2025-04-23 PROCEDURE — 67028 INJECTION EYE DRUG: CPT | Mod: RT | Performed by: OPHTHALMOLOGY

## 2025-04-23 RX ADMIN — FARICIMAB 6 MG: 6 INJECTION, SOLUTION INTRAVITREAL at 12:48

## 2025-04-23 RX ADMIN — AFLIBERCEPT 2 MG: 40 INJECTION, SOLUTION INTRAVITREAL at 12:48

## 2025-04-23 RX ADMIN — LIDOCAINE HYDROCHLORIDE 5 ML: 20 INJECTION, SOLUTION EPIDURAL; INFILTRATION; INTRACAUDAL; PERINEURAL at 12:52

## 2025-04-23 ASSESSMENT — SLIT LAMP EXAM - LIDS
COMMENTS: NORMAL
COMMENTS: NORMAL

## 2025-04-23 ASSESSMENT — VISUAL ACUITY
OS_CC+: -2
METHOD: SNELLEN - LINEAR
OD_CC: 20/20
OS_CC: 20/20
CORRECTION_TYPE: GLASSES

## 2025-04-23 ASSESSMENT — TONOMETRY
IOP_METHOD: TONOPEN
OD_IOP_MMHG: 17
OS_IOP_MMHG: 20

## 2025-04-23 ASSESSMENT — EXTERNAL EXAM - LEFT EYE: OS_EXAM: NORMAL

## 2025-04-23 ASSESSMENT — EXTERNAL EXAM - RIGHT EYE: OD_EXAM: NORMAL

## 2025-04-23 ASSESSMENT — CUP TO DISC RATIO
OS_RATIO: 0.25
OD_RATIO: 0.25

## 2025-04-23 NOTE — PROGRESS NOTES
For intravitreal injection ou, Eylea right eye and vabysmo left eye     CC: exudative AMD ou     Interval hx: Va stable each eye. No eye pain today. No new flashes or floaters.  No redness. Dryness in each eye, relief with drops.    HPI:  Katie Casper is a 64 year old patient here for follow up of exudative AMD.    She has a history of pituitary adenoma, s/p resection in 2007 and again in 2010 due to recurrence.  Strong FH of early AMD: mother diagnosed in her 60s, sister, and brother with wet AMD  Father with AMD diagnosed in his 80s    OCT 4/23/25   Right eye: resolved SRF, central PED, several small to large drusen, choroid normal thickness, hyaloid  - largely stable compared to baseline  Left eye: resolved SRF near neovascularization, several small to large drusen and few PED parafoveal    Assessment and Plan:    #  Exudative AMD right eye    Conversion to wet form 12/2021   OCTA: SubRPE neovascular membrane nasal to fovea   First fovea was not involved and VA was 20/20; gradually declined to 20/30; today 20/25   Previously (in 2022) IRF recurrence at 5 weeks; stable with no fluid with q4 injections for long time; will try to extend gradually   S/p Eylea right - last injection 4/24/2024- (7 week )     4/23/25: Eylea today; RTC 8 w for Eylea OD   Does not tolerate AREDS due to migraines    # Exudative AMD left eye   Conversion to wet form 2/2023   Initiated anti-VEGF with Eylea to keep dosing interval uniform   S/p Eylea multiple injections then switched to Vabysmo (last inj 4/24/24 - 5 w)   2/7/2024: continued resolution of SRF; Vabysmo today and RTC 5-6 weeks   06/12/24: Recurrence of SRF: Vabysmo (7 w)   07/30/24: Vabysmo (7w)   09/17/24: OCT with increased SRF; vabysmo and RTC 6 w not 7 w      4/23/25: resolved SRF; vabysmo today and RTC 8 w for injection       # Pigment epithelium detachment, drusen each eye    drusenoid PED; stable compared to previous except for #1   DDX; peripheral CNV (not  likely given FA finding) vs pigment epithelium adenoma vs peripheral PED   Not symptomatic now, but could be a harbinger of PECHR lesion   Lesion smaller or almost resolved 12/22/20 and 6/15/21: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 Retina attached.   Educated on signs and symptoms of a retinal detachment to be seen immediately.   No peripheral traction or tear in depressed exam today    #  Pituitary Adenoma  History of pituitary adenoma, s/p resection in 2007 and again in 2010 due to recurrence. Patient follows neurology.   Baseline visual field was done 2019. last VF within normal limits   Optic nerve head OCT was done 05/20/2020. No optic neuropathy seen on exam.  Continue monitoring yearly with VF and ONH OCT.   MRI brain summer 2022 showing stable lesion; next MRI brain scheduled for the next month  Follows with [facility], doesn't have an endocrinologist, but is working on an appt.    #  Cataract, both eyes.   Not visually significant. Monitor    RTC  8 weeks for OCT ou Eyela injection OD; Vabysmo OS    needs multiple washes after betadine - we did not use lido gel but used lid injection subconj this time  Last dilation 3/5/25    Considers a closer clinic for injection     Complete documentation of historical and exam elements from today's encounter can be found in the full encounter summary report (not reduplicated in this progress note).  I personally obtained the chief complaint(s) and history of present illness.  I confirmed and edited as necessary the review of systems, past medical/surgical history, family history, social history, and examination findings as documented by others; and I examined the patient myself.  I personally reviewed the relevant tests, images, and reports as documented above.  I formulated and edited as necessary the  assessment and plan and discussed the findings and management plan with the patient and family    Cameron Clemons MD PhD

## 2025-04-23 NOTE — NURSING NOTE
No chief complaint on file.    Chief Complaint(s) and History of Present Illness(es)    Vision stable ou near and far.   Waviness gone.   Denies new flashes, floaters, curtain, pain.   Gtts: AT prn ou   Allergy OTC prn- none recently        Mis COVARRUBIAS, April 23, 2025 12:11 PM

## 2025-05-27 NOTE — NURSING NOTE
Chief Complaint   Patient presents with     Referral     Follow up after referrals   Cheryl Torre LPN 6:55 AM on 1/21/2019    Has been screened for HIV and was negative.Cheryl Torre LPN 6:56 AM on 1/21/2019  Will bring copy of Health Directive to clinic at next appointment to have scanned into chart.Cheryl Torre LPN 6:58 AM on 1/21/2019    Rooming Note  Health Maintenance   Health Maintenance Due   Topic Date Due     DEPRESSION ACTION PLAN  10/14/1977     HIV SCREEN (SYSTEM ASSIGNED)  10/14/1977     PHQ-9 Q6 MONTHS  10/14/1977     HEPATITIS C SCREENING  10/14/1977     MAMMO SCREEN Q2 YR (SYSTEM ASSIGNED)  10/14/1999     LIPID SCREEN Q5 YR FEMALE (SYSTEM ASSIGNED)  10/14/2004     COLON CANCER SCREEN (SYSTEM ASSIGNED)  10/14/2009     ZOSTER IMMUNIZATION (1 of 2) 10/14/2009     ADVANCE DIRECTIVE PLANNING Q5 YRS  10/14/2014    All health maintenance items discussed and pended.  Cheryl Torre LPN 6:59 AM on 1/21/2019     contact guard with non-mechanical lift device (carmen wu)/rolling walker

## 2025-06-04 DIAGNOSIS — H35.3231 EXUDATIVE AGE-RELATED MACULAR DEGENERATION OF BOTH EYES WITH ACTIVE CHOROIDAL NEOVASCULARIZATION (H): Primary | ICD-10-CM

## 2025-06-18 ENCOUNTER — OFFICE VISIT (OUTPATIENT)
Dept: OPHTHALMOLOGY | Facility: CLINIC | Age: 65
End: 2025-06-18
Attending: OPHTHALMOLOGY
Payer: MEDICARE

## 2025-06-18 DIAGNOSIS — H35.3231 EXUDATIVE AGE-RELATED MACULAR DEGENERATION OF BOTH EYES WITH ACTIVE CHOROIDAL NEOVASCULARIZATION (H): Primary | ICD-10-CM

## 2025-06-18 PROCEDURE — 92134 CPTRZ OPH DX IMG PST SGM RTA: CPT | Performed by: OPHTHALMOLOGY

## 2025-06-18 PROCEDURE — 67028 INJECTION EYE DRUG: CPT | Mod: RT | Performed by: OPHTHALMOLOGY

## 2025-06-18 PROCEDURE — 250N000011 HC RX IP 250 OP 636: Mod: JZ | Performed by: OPHTHALMOLOGY

## 2025-06-18 PROCEDURE — 67028 INJECTION EYE DRUG: CPT | Mod: LT | Performed by: OPHTHALMOLOGY

## 2025-06-18 PROCEDURE — G0463 HOSPITAL OUTPT CLINIC VISIT: HCPCS | Performed by: OPHTHALMOLOGY

## 2025-06-18 RX ADMIN — AFLIBERCEPT 2 MG: 40 INJECTION, SOLUTION INTRAVITREAL at 13:05

## 2025-06-18 RX ADMIN — LIDOCAINE HYDROCHLORIDE 5 ML: 20 INJECTION, SOLUTION EPIDURAL; INFILTRATION; INTRACAUDAL; PERINEURAL at 13:05

## 2025-06-18 RX ADMIN — FARICIMAB 6 MG: 6 INJECTION, SOLUTION INTRAVITREAL at 13:05

## 2025-06-18 ASSESSMENT — EXTERNAL EXAM - RIGHT EYE: OD_EXAM: NORMAL

## 2025-06-18 ASSESSMENT — TONOMETRY
OD_IOP_MMHG: 10
OS_IOP_MMHG: 11
IOP_METHOD: TONOPEN

## 2025-06-18 ASSESSMENT — CUP TO DISC RATIO
OD_RATIO: 0.25
OS_RATIO: 0.25

## 2025-06-18 ASSESSMENT — VISUAL ACUITY
METHOD: SNELLEN - LINEAR
OD_CC+: -1
OS_CC+: -2
OD_CC: 20/25
OS_CC: 20/25

## 2025-06-18 ASSESSMENT — EXTERNAL EXAM - LEFT EYE: OS_EXAM: NORMAL

## 2025-06-18 ASSESSMENT — SLIT LAMP EXAM - LIDS
COMMENTS: NORMAL
COMMENTS: NORMAL

## 2025-06-18 NOTE — PROGRESS NOTES
For intravitreal injection ou, Eylea right eye and vabysmo left eye     CC: exudative AMD ou     Interval hx: Va stable each eye. No eye pain today. No new flashes or floaters.  No redness. Dryness in each eye, relief with drops.    HPI:  Katie Casper is a 64 year old patient here for follow up of exudative AMD.    She has a history of pituitary adenoma, s/p resection in 2007 and again in 2010 due to recurrence.  Strong FH of early AMD: mother diagnosed in her 60s, sister, and brother with wet AMD  Father with AMD diagnosed in his 80s    OCT 6/18/25   Right eye: resolved SRF, central PED, several small to large drusen, choroid normal thickness, hyaloid  - largely stable compared to baseline  Left eye: new SRF near neovascularization, several small to large drusen and few PED parafoveal    Assessment and Plan:    #  Exudative AMD right eye    Conversion to wet form 12/2021   OCTA: SubRPE neovascular membrane nasal to fovea   First fovea was not involved and VA was 20/20; gradually declined to 20/30; today 20/25   Previously (in 2022) IRF recurrence at 5 weeks; stable with no fluid with q4 injections for long time; will try to extend gradually   S/p Eylea right - last injection 4/24/2024- (7 week )     4/23/25: Eylea today; RTC 8 w for Eylea OD   Does not tolerate AREDS due to migraines    # Exudative AMD left eye   Conversion to wet form 2/2023   Initiated anti-VEGF with Eylea to keep dosing interval uniform   S/p Eylea multiple injections then switched to Vabysmo (last inj 4/24/24 - 5 w)   2/7/2024: continued resolution of SRF; Vabysmo today and RTC 5-6 weeks   06/12/24: Recurrence of SRF: Vabysmo (7 w)   07/30/24: Vabysmo (7w)   09/17/24: OCT with increased SRF; vabysmo and RTC 6 w not 7 w      4/23/25: resolved SRF; vabysmo today and RTC 8 w for injection   6/18/25: right eye no fluid but left eye with recurrent SRF: Eylea right eye and Vabysmo left eye and RTC 7 w for injections       # Pigment  epithelium detachment, drusen each eye    drusenoid PED; stable compared to previous except for #1   DDX; peripheral CNV (not likely given FA finding) vs pigment epithelium adenoma vs peripheral PED   Not symptomatic now, but could be a harbinger of PECHR lesion   Lesion smaller or almost resolved 12/22/20 and 6/15/21: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 Retina attached.   Educated on signs and symptoms of a retinal detachment to be seen immediately.   No peripheral traction or tear in depressed exam today    #  Pituitary Adenoma  History of pituitary adenoma, s/p resection in 2007 and again in 2010 due to recurrence. Patient follows neurology.   Baseline visual field was done 2019. last VF within normal limits   Optic nerve head OCT was done 05/20/2020. No optic neuropathy seen on exam.  Continue monitoring yearly with VF and ONH OCT.   MRI brain summer 2022 showing stable lesion; next MRI brain scheduled for the next month  Follows with [facility], doesn't have an endocrinologist, but is working on an appt.    #  Cataract, both eyes.   Not visually significant. Monitor    RTC  7 weeks for OCT ou Eyela injection OD; Vabysmo OS    needs multiple washes after betadine - we did not use lido gel but used lid injection subconj this time  Last dilation 3/5/25    Considers a closer clinic for injection     Complete documentation of historical and exam elements from today's encounter can be found in the full encounter summary report (not reduplicated in this progress note).  I personally obtained the chief complaint(s) and history of present illness.  I confirmed and edited as necessary the review of systems, past medical/surgical history, family history, social history, and examination findings as documented by others; and I examined the patient myself.  I  personally reviewed the relevant tests, images, and reports as documented above.  I formulated and edited as necessary the assessment and plan and discussed the findings and management plan with the patient and family    Cameron Clemons MD PhD

## 2025-06-18 NOTE — NURSING NOTE
Chief Complaints and History of Present Illnesses   Patient presents with    Follow Up     Exudative age-related macular degeneration of both eyes with active choroidal neovascularization      Chief Complaint(s) and History of Present Illness(es)       Follow Up              Comments: Exudative age-related macular degeneration of both eyes with active choroidal neovascularization               Comments    Pt states no change in VA since last visit  No flashes or floaters   No eye pain or redness    Natacha Ortiz COT 12:15 PM June 18, 2025

## 2025-07-28 DIAGNOSIS — H35.3231 EXUDATIVE AGE-RELATED MACULAR DEGENERATION OF BOTH EYES WITH ACTIVE CHOROIDAL NEOVASCULARIZATION (H): Primary | ICD-10-CM

## 2025-08-06 ENCOUNTER — OFFICE VISIT (OUTPATIENT)
Dept: OPHTHALMOLOGY | Facility: CLINIC | Age: 65
End: 2025-08-06
Attending: OPHTHALMOLOGY
Payer: MEDICARE

## 2025-08-06 DIAGNOSIS — H35.3231 EXUDATIVE AGE-RELATED MACULAR DEGENERATION OF BOTH EYES WITH ACTIVE CHOROIDAL NEOVASCULARIZATION (H): ICD-10-CM

## 2025-08-06 PROCEDURE — 250N000011 HC RX IP 250 OP 636: Mod: JZ | Performed by: OPHTHALMOLOGY

## 2025-08-06 PROCEDURE — 67028 INJECTION EYE DRUG: CPT | Mod: RT | Performed by: OPHTHALMOLOGY

## 2025-08-06 PROCEDURE — 67028 INJECTION EYE DRUG: CPT | Mod: LT | Performed by: OPHTHALMOLOGY

## 2025-08-06 PROCEDURE — 92134 CPTRZ OPH DX IMG PST SGM RTA: CPT | Performed by: OPHTHALMOLOGY

## 2025-08-06 PROCEDURE — G0463 HOSPITAL OUTPT CLINIC VISIT: HCPCS | Performed by: OPHTHALMOLOGY

## 2025-08-06 RX ADMIN — FARICIMAB 6 MG: 6 INJECTION, SOLUTION INTRAVITREAL at 13:22

## 2025-08-06 RX ADMIN — AFLIBERCEPT 2 MG: 40 INJECTION, SOLUTION INTRAVITREAL at 13:22

## 2025-08-06 RX ADMIN — LIDOCAINE HYDROCHLORIDE 5 ML: 20 INJECTION, SOLUTION EPIDURAL; INFILTRATION; INTRACAUDAL; PERINEURAL at 13:23

## 2025-08-06 ASSESSMENT — TONOMETRY
OD_IOP_MMHG: 18
OS_IOP_MMHG: 17
IOP_METHOD: TONOPEN

## 2025-08-06 ASSESSMENT — CONF VISUAL FIELD
OD_SUPERIOR_NASAL_RESTRICTION: 0
OD_NORMAL: 1
OD_INFERIOR_TEMPORAL_RESTRICTION: 0
OS_INFERIOR_TEMPORAL_RESTRICTION: 0
OD_INFERIOR_NASAL_RESTRICTION: 0
OS_SUPERIOR_NASAL_RESTRICTION: 0
OS_INFERIOR_NASAL_RESTRICTION: 0
OS_SUPERIOR_TEMPORAL_RESTRICTION: 0
OS_NORMAL: 1
METHOD: COUNTING FINGERS
OD_SUPERIOR_TEMPORAL_RESTRICTION: 0

## 2025-08-06 ASSESSMENT — REFRACTION_WEARINGRX
OD_ADD: +2.50
OD_CYLINDER: SPHERE
OS_SPHERE: +3.25
OS_ADD: +2.50
OD_SPHERE: +0.75
OS_SPHERE: +0.75
OD_CYLINDER: SPHERE
OD_SPHERE: +3.25
OS_CYLINDER: SPHERE
OS_CYLINDER: SPHERE

## 2025-08-06 ASSESSMENT — VISUAL ACUITY
OS_CC: 20/20
OS_CC+: -3
METHOD: SNELLEN - LINEAR
OD_CC: 20/20
OD_CC+: -3

## 2025-08-06 ASSESSMENT — PATIENT HEALTH QUESTIONNAIRE - PHQ9: SUM OF ALL RESPONSES TO PHQ QUESTIONS 1-9: 1

## (undated) DEVICE — LINEN GOWN XLG 5407

## (undated) DEVICE — TAPE MEDIPORE 2"X2YD 2962S

## (undated) DEVICE — ENDO TROCAR FIRST ENTRY KII FIOS Z-THRD 05X100MM CTF03

## (undated) DEVICE — FCPS RAD JAW 4LC 240CM W/NDL -- BX/20 RADIAL JAW 4

## (undated) DEVICE — SUCTION MANIFOLD NEPTUNE 2 SYS 1 PORT 702-025-000

## (undated) DEVICE — PREP CHLORAPREP 26ML TINTED ORANGE  260815

## (undated) DEVICE — 1010 S-DRAPE TOWEL DRAPE 10/BX: Brand: STERI-DRAPE™

## (undated) DEVICE — CLIP APPLIER ENDO 5MM M/L LIGAMAX EL5ML

## (undated) DEVICE — ENDO TROCAR SLEEVE KII Z-THREADED 05X100MM CTS02

## (undated) DEVICE — 4.0MM ROUND CARBIDE BUR

## (undated) DEVICE — DRSG STERI STRIP 1/2X4" R1547

## (undated) DEVICE — DERMACEA GAUZE ROLL: Brand: DERMACEA

## (undated) DEVICE — NEEDLE HYPO 25GA L1.5IN BVL ORIENTED ECLIPSE

## (undated) DEVICE — (D)GLOVE EXAM LG NITRL NS -- DISC BY MFR NO SUB

## (undated) DEVICE — CATHETER SUCT TR FL TIP 14FR W/ O CTRL

## (undated) DEVICE — SOL WATER IRRIG 1000ML BOTTLE 2F7114

## (undated) DEVICE — SPONGE GZ W4XL4IN COT 12 PLY TYP VII WVN C FLD DSGN

## (undated) DEVICE — SYR 50ML SLIP TIP NSAF LF STRL --

## (undated) DEVICE — PADDING CAST W4INXL4YD ST COT COHESIVE HND TEARABLE SPEC

## (undated) DEVICE — 8FR FRAZIER SUCTION HANDLE: Brand: CARDINAL HEALTH

## (undated) DEVICE — STERILE POLYISOPRENE POWDER-FREE SURGICAL GLOVES: Brand: PROTEXIS

## (undated) DEVICE — KIT PROC EXTRM HND FT CUST LF --

## (undated) DEVICE — BIPOLAR FORCEPS CORD,BANANA LEADS: Brand: VALLEYLAB

## (undated) DEVICE — TUBING INSUFFLATION W/FILTER 10FT GS1016

## (undated) DEVICE — BANDAGE COMPR 9 FTX4 IN SMOOTH COMFORTABLE SYNTH ESMRK LF

## (undated) DEVICE — SYRINGE MED 25GA 3ML L5/8IN SUBQ PLAS W/ DETACH NDL SFTY

## (undated) DEVICE — FLUFF AND POLYMER UNDERPAD,EXTRA HEAVY: Brand: WINGS

## (undated) DEVICE — LIGHT HANDLE: Brand: DEVON

## (undated) DEVICE — NDL PRT INJ NSAF BLNT 18GX1.5 --

## (undated) DEVICE — TUBING SUCTION 12"X1/4" N612

## (undated) DEVICE — GLOVE PROTEXIS POWDER FREE SMT 7.5  2D72PT75X

## (undated) DEVICE — SU VICRYL 0 CT-2 27" J334H

## (undated) DEVICE — PACK LAP CHOLE CUSTOM ASC

## (undated) DEVICE — MAYO STAND COVER: Brand: CONVERTORS

## (undated) DEVICE — BLADE OPHTH 180DEG CUT SURF BLU STR SHRP DBL BVL GRINDLESS

## (undated) DEVICE — FLEX ADVANTAGE 3000CC: Brand: FLEX ADVANTAGE

## (undated) DEVICE — SOLUTION IRRIG 1000ML H2O STRL BLT

## (undated) DEVICE — DECANTER VI C-FLO LF --

## (undated) DEVICE — ENDOSCOPY PUMP TUBING/ CAP SET: Brand: ERBE

## (undated) DEVICE — LINEN TOWEL PACK X5 5464

## (undated) DEVICE — ESU GROUND PAD ADULT W/CORD E7507

## (undated) DEVICE — BITE BLOCK ENDOSCP UNIV AD 6 TO 9.4 MM

## (undated) DEVICE — PREP SKN CHLRAPRP 26ML TNT -- CONVERT TO ITEM 373320

## (undated) DEVICE — SUTURE PROL SZ 3-0 L36IN NONABSORBABLE BLU L17MM RB-1 1/2 8558H

## (undated) DEVICE — SYR 10ML CTRL LR LCK NSAF LF --

## (undated) DEVICE — MEDI-VAC SUCTION HIGH CAPACITY: Brand: CARDINAL HEALTH

## (undated) DEVICE — SUTURE FIBERWIRE 2-0 L18IN NONABSORBABLE BLU L17.9MM 3/8 AR7220

## (undated) DEVICE — BASIN EMESIS 500CC ROSE 250/CS 60/PLT: Brand: MEDEGEN MEDICAL PRODUCTS, LLC

## (undated) DEVICE — GOWN IMPERVIOUS 2XL BLUE

## (undated) DEVICE — SU MONOCRYL 4-0 PS-2 27" UND Y426H

## (undated) DEVICE — SUTURE MCRYL SZ 3-0 L27IN ABSRB UD L19MM PS-2 3/8 CIR PRIM Y427H

## (undated) DEVICE — OCCLUSIVE GAUZE STRIP,3% BISMUTH TRIBROMOPHENATE IN PETROLATUM BLEND: Brand: XEROFORM

## (undated) DEVICE — (D)GLOVE SURG ORTH 8 PWD LTX -- DISC BY MFR USE ITEM 278015

## (undated) DEVICE — ST BIAS STOCKINETTE: Brand: DEROYAL

## (undated) DEVICE — GAMMEX® NON-LATEX SIZE 8, STERILE NEOPRENE POWDER-FREE SURGICAL GLOVE: Brand: GAMMEX

## (undated) DEVICE — KIT ENDO TURNOVER/PROCEDURE CARRY-ON 101822

## (undated) DEVICE — SPECIMEN CONTAINER W/10% BUFFERED FORMALIN 120ML 591201

## (undated) DEVICE — INTENDED FOR TISSUE SEPARATION, AND OTHER PROCEDURES THAT REQUIRE A SHARP SURGICAL BLADE TO PUNCTURE OR CUT.: Brand: BARD-PARKER SAFETY BLADES SIZE 15, STERILE

## (undated) DEVICE — TUBING SMOKE EVAC PNEUVIEW 9660-XE

## (undated) DEVICE — SPECIMEN CONTAINER 3OZ W/FORMALIN 59901

## (undated) DEVICE — ENDO TROCAR BLUNT TIP KII BALLOON 12X100MM C0R47

## (undated) DEVICE — LINEN POUCH DBL 5427

## (undated) DEVICE — SOL NACL 0.9% IRRIG 500ML BOTTLE 2F7123

## (undated) DEVICE — KENDALL SCD EXPRESS SLEEVES, KNEE LENGTH, MEDIUM: Brand: KENDALL SCD

## (undated) DEVICE — MEDI-VAC NON-CONDUCTIVE SUCTION TUBING: Brand: CARDINAL HEALTH

## (undated) DEVICE — GAUZE SPONGES,16 PLY: Brand: CURITY

## (undated) DEVICE — DRESSING,GAUZE,XEROFORM,CURAD,1"X8",ST: Brand: CURAD

## (undated) DEVICE — AIRLIFE™ NASAL OXYGEN CANNULA CURVED, FLARED TIP WITH 14 FOOT (4.3 M) CRUSH-RESISTANT TUBING, OVER-THE-EAR STYLE: Brand: AIRLIFE™

## (undated) DEVICE — SOLUTION IV 1000ML 0.9% SOD CHL

## (undated) DEVICE — ZIMMER® STERILE DISPOSABLE TOURNIQUET CUFF WITH PLC, SINGLE PORT, SINGLE BLADDER, 18 IN. (46 CM)

## (undated) DEVICE — HEWSON SUTURE RETRIEVER: Brand: HEWSON SUTURE RETRIEVER

## (undated) RX ORDER — GLYCOPYRROLATE 0.2 MG/ML
INJECTION INTRAMUSCULAR; INTRAVENOUS
Status: DISPENSED
Start: 2019-02-18

## (undated) RX ORDER — PROPOFOL 10 MG/ML
INJECTION, EMULSION INTRAVENOUS
Status: DISPENSED
Start: 2019-02-18

## (undated) RX ORDER — ONDANSETRON 2 MG/ML
INJECTION INTRAMUSCULAR; INTRAVENOUS
Status: DISPENSED
Start: 2019-02-18

## (undated) RX ORDER — CEFAZOLIN SODIUM 2 G/50ML
SOLUTION INTRAVENOUS
Status: DISPENSED
Start: 2019-02-18

## (undated) RX ORDER — KETOROLAC TROMETHAMINE 30 MG/ML
INJECTION, SOLUTION INTRAMUSCULAR; INTRAVENOUS
Status: DISPENSED
Start: 2019-02-18

## (undated) RX ORDER — FENTANYL CITRATE 50 UG/ML
INJECTION, SOLUTION INTRAMUSCULAR; INTRAVENOUS
Status: DISPENSED
Start: 2021-02-11

## (undated) RX ORDER — ACETAMINOPHEN 325 MG/1
TABLET ORAL
Status: DISPENSED
Start: 2019-02-18

## (undated) RX ORDER — FENTANYL CITRATE 50 UG/ML
INJECTION, SOLUTION INTRAMUSCULAR; INTRAVENOUS
Status: DISPENSED
Start: 2019-02-18

## (undated) RX ORDER — BUPIVACAINE HYDROCHLORIDE 2.5 MG/ML
INJECTION, SOLUTION EPIDURAL; INFILTRATION; INTRACAUDAL
Status: DISPENSED
Start: 2019-02-18

## (undated) RX ORDER — GABAPENTIN 300 MG/1
CAPSULE ORAL
Status: DISPENSED
Start: 2019-02-18

## (undated) RX ORDER — DEXAMETHASONE SODIUM PHOSPHATE 4 MG/ML
INJECTION, SOLUTION INTRA-ARTICULAR; INTRALESIONAL; INTRAMUSCULAR; INTRAVENOUS; SOFT TISSUE
Status: DISPENSED
Start: 2019-02-18

## (undated) RX ORDER — LIDOCAINE HYDROCHLORIDE 20 MG/ML
INJECTION, SOLUTION EPIDURAL; INFILTRATION; INTRACAUDAL; PERINEURAL
Status: DISPENSED
Start: 2019-02-18

## (undated) RX ORDER — EPHEDRINE SULFATE 50 MG/ML
INJECTION, SOLUTION INTRAMUSCULAR; INTRAVENOUS; SUBCUTANEOUS
Status: DISPENSED
Start: 2019-02-18